# Patient Record
Sex: MALE | Race: WHITE | NOT HISPANIC OR LATINO | Employment: OTHER | URBAN - NONMETROPOLITAN AREA
[De-identification: names, ages, dates, MRNs, and addresses within clinical notes are randomized per-mention and may not be internally consistent; named-entity substitution may affect disease eponyms.]

---

## 2017-02-10 DIAGNOSIS — Z95.5 STATUS POST CORONARY ARTERY STENT PLACEMENT: ICD-10-CM

## 2017-02-10 DIAGNOSIS — I25.10 ATHEROSCLEROSIS OF NATIVE CORONARY ARTERY OF NATIVE HEART WITHOUT ANGINA PECTORIS: ICD-10-CM

## 2017-03-13 ENCOUNTER — HOSPITAL ENCOUNTER (OUTPATIENT)
Dept: NUCLEAR MEDICINE | Facility: HOSPITAL | Age: 52
Discharge: HOME | End: 2017-03-13
Attending: INTERNAL MEDICINE

## 2017-03-13 ENCOUNTER — HOSPITAL ENCOUNTER (OUTPATIENT)
Dept: CARDIOLOGY | Facility: HOSPITAL | Age: 52
Discharge: HOME | End: 2017-03-13
Attending: INTERNAL MEDICINE

## 2017-03-13 VITALS — HEIGHT: 70 IN | BODY MASS INDEX: 23.62 KG/M2 | WEIGHT: 165 LBS

## 2017-03-13 LAB
BH CV NUCLEAR PRIOR STUDY: 3
BH CV STRESS BP STAGE 1: NORMAL
BH CV STRESS COMMENTS STAGE 1: NORMAL
BH CV STRESS DOSE REGADENOSON STAGE 1: 0.4
BH CV STRESS DURATION MIN STAGE 1: 0
BH CV STRESS DURATION SEC STAGE 1: 15
BH CV STRESS HR STAGE 1: 84
BH CV STRESS PROTOCOL 1: NORMAL
BH CV STRESS RECOVERY BP: NORMAL MMHG
BH CV STRESS RECOVERY HR: 54 BPM
BH CV STRESS STAGE 1: 1
LV EF NUC BP: 67 %
MAXIMAL PREDICTED HEART RATE: 169 BPM
PERCENT MAX PREDICTED HR: 59.17 %
STRESS BASELINE BP: NORMAL MMHG
STRESS BASELINE HR: 59 BPM
STRESS PERCENT HR: 70 %
STRESS POST PEAK BP: NORMAL MMHG
STRESS POST PEAK HR: 100 BPM
STRESS TARGET HR: 144 BPM

## 2017-03-13 PROCEDURE — A9500 TC99M SESTAMIBI: HCPCS | Performed by: INTERNAL MEDICINE

## 2017-03-13 PROCEDURE — 78452 HT MUSCLE IMAGE SPECT MULT: CPT | Performed by: INTERNAL MEDICINE

## 2017-03-13 PROCEDURE — 0 TECHNETIUM SESTAMIBI: Performed by: INTERNAL MEDICINE

## 2017-03-13 PROCEDURE — 93017 CV STRESS TEST TRACING ONLY: CPT

## 2017-03-13 PROCEDURE — 25010000002 AMINOPHYLLINE PER 250 MG: Performed by: INTERNAL MEDICINE

## 2017-03-13 PROCEDURE — 93018 CV STRESS TEST I&R ONLY: CPT | Performed by: INTERNAL MEDICINE

## 2017-03-13 PROCEDURE — 78451 HT MUSCLE IMAGE SPECT SING: CPT

## 2017-03-13 PROCEDURE — 25010000002 REGADENOSON 0.4 MG/5ML SOLUTION: Performed by: INTERNAL MEDICINE

## 2017-03-13 RX ORDER — AMINOPHYLLINE DIHYDRATE 25 MG/ML
125 INJECTION, SOLUTION INTRAVENOUS ONCE
Status: COMPLETED | OUTPATIENT
Start: 2017-03-13 | End: 2017-03-13

## 2017-03-13 RX ADMIN — Medication 1 DOSE: at 07:45

## 2017-03-13 RX ADMIN — AMINOPHYLLINE 125 MG: 25 INJECTION, SOLUTION INTRAVENOUS at 09:15

## 2017-03-13 RX ADMIN — Medication 1 DOSE: at 09:15

## 2017-03-13 RX ADMIN — REGADENOSON 0.4 MG: 0.08 INJECTION, SOLUTION INTRAVENOUS at 09:10

## 2017-03-28 ENCOUNTER — OFFICE VISIT (OUTPATIENT)
Dept: CARDIOLOGY | Facility: CLINIC | Age: 52
End: 2017-03-28

## 2017-03-28 VITALS
OXYGEN SATURATION: 96 % | DIASTOLIC BLOOD PRESSURE: 83 MMHG | SYSTOLIC BLOOD PRESSURE: 130 MMHG | HEIGHT: 70 IN | WEIGHT: 166 LBS | HEART RATE: 76 BPM | BODY MASS INDEX: 23.77 KG/M2

## 2017-03-28 DIAGNOSIS — E78.01 FAMILIAL HYPERCHOLESTEROLEMIA: ICD-10-CM

## 2017-03-28 DIAGNOSIS — G47.33 OBSTRUCTIVE SLEEP APNEA SYNDROME: ICD-10-CM

## 2017-03-28 DIAGNOSIS — I10 ESSENTIAL HYPERTENSION: ICD-10-CM

## 2017-03-28 DIAGNOSIS — Z95.5 STATUS POST CORONARY ARTERY STENT PLACEMENT: ICD-10-CM

## 2017-03-28 DIAGNOSIS — I25.10 ATHEROSCLEROSIS OF NATIVE CORONARY ARTERY OF NATIVE HEART WITHOUT ANGINA PECTORIS: Primary | ICD-10-CM

## 2017-03-28 DIAGNOSIS — R07.89 ATYPICAL CHEST PAIN: ICD-10-CM

## 2017-03-28 PROCEDURE — 99213 OFFICE O/P EST LOW 20 MIN: CPT | Performed by: INTERNAL MEDICINE

## 2017-03-28 RX ORDER — ALBUTEROL SULFATE 90 UG/1
2 AEROSOL, METERED RESPIRATORY (INHALATION) EVERY 4 HOURS PRN
COMMUNITY

## 2017-03-28 NOTE — PROGRESS NOTES
Subjective   NAME:    Javier Santiago   :      1965  DATE:    2017    Javier Santiago is a 51-year-old  male who has known atherosclerotic coronary vascular disease he was last seen by me in the clinic in 2016.  At that time we scheduled him for a nuclear stress test.  He finally had that done last week.  Today he states that he is not sleeping well.  He admits that he snores.  And he wakes up frequently gasping for breath and with chest tightness.    The nuclear stress test demonstrates normal myocardial perfusion and normal wall motion.    REASON FOR VISIT:  Chief Complaint   Patient presents with   • Follow-up   • Coronary Artery Disease       HISTORY:  PAST MEDICAL HISTORY:   Past Medical History:   Diagnosis Date   • ASCVD (arteriosclerotic cardiovascular disease)    • Chronic low back pain    • History of EKG 2016    NORMAL   • Hyperlipidemia    • Hypertension    • Hypotension    • Insomnia        SURGICAL HISTORY:   Past Surgical History:   Procedure Laterality Date   • CORONARY ANGIOPLASTY WITH STENT PLACEMENT         SOCIAL HISTORY:   Social History     Social History   • Marital status:      Spouse name: N/A   • Number of children: N/A   • Years of education: N/A     Social History Main Topics   • Smoking status: Current Every Day Smoker     Packs/day: 1.00     Years: 30.00     Types: Cigarettes   • Smokeless tobacco: Current User     Types: Chew   • Alcohol use No   • Drug use: No   • Sexual activity: Not Asked     Other Topics Concern   • None     Social History Narrative       FAMILY HISTORY:   Family History   Problem Relation Age of Onset   • Heart attack Mother      x 3   • Atrial fibrillation Mother    • Heart disease Mother    • Heart attack Father    • Heart disease Father      CABG       REVIEW OF SYSTEMS:  Review of Systems   Constitutional: Positive for fatigue. Negative for activity change and appetite change.   HENT: Positive for congestion and tinnitus.     Eyes: Negative for visual disturbance.   Respiratory: Positive for chest tightness and shortness of breath. Negative for cough.    Cardiovascular: Positive for chest pain and leg swelling. Negative for palpitations.   Gastrointestinal: Negative for blood in stool, nausea and vomiting.   Endocrine: Negative for cold intolerance, heat intolerance and polyuria.   Genitourinary: Negative for dysuria.   Musculoskeletal: Positive for myalgias and neck pain.   Skin: Positive for rash.   Neurological: Negative for dizziness, syncope, weakness and light-headedness.   Hematological: Bruises/bleeds easily.   Psychiatric/Behavioral: Positive for sleep disturbance. Negative for confusion.       Objective       PHYSICAL EXAMINATION:  Physical Exam   Constitutional: He is oriented to person, place, and time. He appears well-developed and well-nourished.   HENT:   Head: Normocephalic and atraumatic.   Eyes: Conjunctivae and EOM are normal. Pupils are equal, round, and reactive to light.   Neck: Normal range of motion. Neck supple. No JVD present. No tracheal deviation present. No thyromegaly present.   Cardiovascular: Normal rate, regular rhythm, normal heart sounds and intact distal pulses.  Exam reveals no gallop and no friction rub.    No murmur heard.  Pulmonary/Chest: Effort normal and breath sounds normal. No respiratory distress. He has no wheezes. He has no rales. He exhibits no tenderness.   Abdominal: Soft. Bowel sounds are normal. He exhibits no distension and no mass. There is no tenderness. No hernia.   Musculoskeletal: Normal range of motion. He exhibits no edema, tenderness or deformity.   Lymphadenopathy:     He has no cervical adenopathy.   Neurological: He is alert and oriented to person, place, and time. He has normal reflexes. He displays normal reflexes. No cranial nerve deficit. He exhibits normal muscle tone. Coordination normal.   Skin: Skin is warm and dry.   Psychiatric: He has a normal mood and affect.  "His behavior is normal. Thought content normal.       VITAL SIGNS: /83 (BP Location: Right arm, Patient Position: Sitting)  Pulse 76  Ht 70\" (177.8 cm)  Wt 166 lb (75.3 kg)  SpO2 96%  BMI 23.82 kg/m2    Procedure   Procedures         Assessment/Plan     Problems Addressed this Visit        Cardiovascular and Mediastinum    Atherosclerosis of native coronary artery - Primary    Status post coronary artery stent placement    Essential hypertension    Familial hypercholesterolemia       Respiratory    Obstructive sleep apnea syndrome    Relevant Orders    Home Sleep Study       Nervous and Auditory    Atypical chest pain    Relevant Orders    Home Sleep Study           Orders Placed This Encounter   Procedures   • Home Sleep Study     Standing Status:   Future     Standing Expiration Date:   3/28/2018     Order Specific Question:   May take own meds     Answer:   Yes     Order Specific Question:   Details     Answer:   O2 Implementation per Protocol    1.  Continue current medical regimen.  2.  We'll schedule for an in home sleep study.  3.  See him back in the clinic in 4 weeks.    Return in about 4 weeks (around 4/25/2017).    Current Outpatient Prescriptions:   •  albuterol (PROVENTIL HFA;VENTOLIN HFA) 108 (90 BASE) MCG/ACT inhaler, Inhale 2 puffs Every 4 (Four) Hours As Needed for Wheezing., Disp: , Rfl:   •  Apremilast (OTEZLA) 30 MG tablet, Take 30 mg by mouth 2 (Two) Times a Day., Disp: , Rfl:   •  aspirin  MG EC tablet, Take  by mouth daily., Disp: , Rfl:   •  atorvastatin (LIPITOR) 80 MG tablet, Take 80 mg by mouth daily., Disp: , Rfl:   •  clopidogrel (PLAVIX) 75 MG tablet, Take  by mouth daily., Disp: , Rfl:   •  gabapentin (NEURONTIN) 800 MG tablet, Take 800 mg by mouth 3 (three) times a day., Disp: , Rfl:   •  omeprazole (PriLOSEC) 40 MG capsule, Take 40 mg by mouth 2 (two) times a day., Disp: , Rfl:   •  lisinopril (PRINIVIL,ZESTRIL) 2.5 MG tablet, Take 2.5 mg by mouth daily., Disp: , " Rfl:   •  metoprolol tartrate (LOPRESSOR) 25 MG tablet, Take 25 mg by mouth 2 (Two) Times a Day. Take 1/2 tablet bid, Disp: , Rfl:                  Joe Diaz DO, KC, FACC, FACOI, FCCP

## 2017-06-15 ENCOUNTER — TELEPHONE (OUTPATIENT)
Dept: CARDIOLOGY | Facility: CLINIC | Age: 52
End: 2017-06-15

## 2017-06-15 DIAGNOSIS — G47.33 OBSTRUCTIVE SLEEP APNEA: Primary | ICD-10-CM

## 2017-06-15 NOTE — TELEPHONE ENCOUNTER
PER DR GIRON     PATIENT IS BEING  REFERRED TO PULMONOLOGY FOR FURTHER EVALUATION OF SLEEP APNEA TEST    PATIENT IS AWARE AND UNDERSTANDS NBMA

## 2017-07-05 ENCOUNTER — OFFICE VISIT (OUTPATIENT)
Dept: PULMONOLOGY | Facility: CLINIC | Age: 52
End: 2017-07-05

## 2017-07-05 VITALS
OXYGEN SATURATION: 94 % | SYSTOLIC BLOOD PRESSURE: 92 MMHG | DIASTOLIC BLOOD PRESSURE: 52 MMHG | HEIGHT: 70 IN | BODY MASS INDEX: 23.62 KG/M2 | HEART RATE: 79 BPM | TEMPERATURE: 98.7 F | WEIGHT: 165 LBS

## 2017-07-05 DIAGNOSIS — G47.33 OSA (OBSTRUCTIVE SLEEP APNEA): Primary | ICD-10-CM

## 2017-07-05 DIAGNOSIS — J41.0 SIMPLE CHRONIC BRONCHITIS (HCC): ICD-10-CM

## 2017-07-05 PROCEDURE — 99204 OFFICE O/P NEW MOD 45 MIN: CPT | Performed by: INTERNAL MEDICINE

## 2017-07-05 NOTE — PROGRESS NOTES
Subjective   Javier Santiago is a 51 y.o. male who is being seen for Sleep Apnea    History of Present Illness   This 51-year-old gentleman who is an active smoker and has a prior diagnosis of COPD has been referred to us for evaluation of sleep apnea.  Patient's wife tells me that he snores loudly and quits breathing during sleep that she has noticed.  Patient said that he is not aware of snoring but feels very was sleepy, tired and fatigued throughout the day with these symptoms his primary care provider has ordered a home sleep study, that was done.  He is not sure about the result yet.    For his COPD he uses albuterol inhaler on a when necessary basis and that pretty much controls his symptoms at this point.    Past Medical History:   Diagnosis Date   • ASCVD (arteriosclerotic cardiovascular disease)    • Chronic low back pain    • History of EKG 01/26/2016    NORMAL   • Hyperlipidemia    • Hypertension    • Hypotension    • Insomnia      Past Surgical History:   Procedure Laterality Date   • CORONARY ANGIOPLASTY WITH STENT PLACEMENT       Family History   Problem Relation Age of Onset   • Heart attack Mother      x 3   • Atrial fibrillation Mother    • Heart disease Mother    • Heart attack Father    • Heart disease Father      CABG      reports that he has been smoking Cigarettes.  He has a 30.00 pack-year smoking history. His smokeless tobacco use includes Chew. He reports that he does not drink alcohol or use illicit drugs.  No Known Allergies        The following portions of the patient's history were reviewed and updated as appropriate: allergies, current medications, past family history, past medical history, past social history, past surgical history and problem list.    Review of Systems   Constitutional: Positive for fatigue (with increased daytime sleepiness).   HENT:        Loud snoring   Respiratory: Positive for apnea (Witnessed apnea per family/spouse) and shortness of breath (exhustional).   "  Cardiovascular: Positive for leg swelling.   Neurological: Positive for headaches.   Psychiatric/Behavioral: Positive for sleep disturbance (Fragmented sleep with unrefreshed feeling in the morning ).        SLEEP: Loud snoring, fragmented sleep, un refreshed feeling in the morning, increased daytime sleepiness    All other systems reviewed and are negative.      Objective   BP 92/52 (BP Location: Left arm, Patient Position: Sitting, Cuff Size: Adult)  Pulse 79  Temp 98.7 °F (37.1 °C) (Oral)   Ht 70\" (177.8 cm)  Wt 165 lb (74.8 kg)  SpO2 94%  BMI 23.68 kg/m2  Physical Exam   Constitutional: He is oriented to person, place, and time.   Neck Size: 14 1/2  Benton Scale Score: 12   HENT:   Head: Normocephalic and atraumatic.   Nose: Mucosal edema present.   Eyes: EOM are normal. Pupils are equal, round, and reactive to light.   Neck: Neck supple.   Cardiovascular: Normal rate, regular rhythm and normal heart sounds.    Pulmonary/Chest: He has rhonchi.   Vesicular breath sound bilaterally with prolonged expiratory phase   Abdominal: Soft. Bowel sounds are normal.   Musculoskeletal: Normal range of motion. He exhibits no deformity.   Neurological: He is alert and oriented to person, place, and time.   Skin: Skin is warm and dry.   Psychiatric: He has a normal mood and affect. His behavior is normal.   Nursing note and vitals reviewed.        Radiology:  No Images in the past 120 days found..    Lab Results:  Orders Only on 02/10/2017   Component Date Value Ref Range Status   • Target HR (85%) 03/13/2017 144  bpm Final   • Max. Pred. HR (100%) 03/13/2017 169  bpm Final   • BH CV STRESS PROTOCOL 1 03/13/2017 Pharmacologic   Final   • Stage 1 03/13/2017 1   Final   • HR Stage 1 03/13/2017 84   Final   • BP Stage 1 03/13/2017 152/85   Final   • Duration Min Stage 1 03/13/2017 0   Final   • Duration Sec Stage 1 03/13/2017 15   Final   • Stress Dose Regadenoson Stage 1 03/13/2017 0.4   Final   • Stress Comments Stage 1 " 03/13/2017 15 sec bolus injection   Final   • Baseline HR 03/13/2017 59  bpm Final   • Baseline BP 03/13/2017 138/79  mmHg Final   • Peak HR 03/13/2017 100  bpm Final   • Percent Max Pred HR 03/13/2017 59.17  % Final   • Percent Target HR 03/13/2017 70  % Final   • Peak BP 03/13/2017 172/96  mmHg Final   • Recovery HR 03/13/2017 54  bpm Final   • Recovery BP 03/13/2017 143/85  mmHg Final   • Nuclear Prior Study 03/13/2017 3   Final   • Nuc Stress EF 03/13/2017 67  % Final           Assessment      ICD-10-CM ICD-9-CM   1. ALEJANDRA (obstructive sleep apnea) G47.33 327.23   2. Simple chronic bronchitis J41.0 491.0                DISCUSSION:  I have reviewed the home sleep study.  Patient had findings consistent with obstructive sleep apnea, apnea-hypopnea index was 9.5.  This patient is very much symptomatic and would benefit from pressure therapy.  I'm giving him AutoPap with a pressure setting of 5-15 cm of water.  We would reevaluate him again in approximately a month from now.  Negative sleep hygiene instructions given, hazards of drowsy driving discussed.    Plan    Orders Placed This Encounter   Procedures   • PAP Therapy     No orders of the defined types were placed in this encounter.                 Sharan Marcelo MD, FCCP, [facility]  Pulmonary, Critical Care, and Sleep Medicine

## 2017-08-09 ENCOUNTER — OFFICE VISIT (OUTPATIENT)
Dept: PULMONOLOGY | Facility: CLINIC | Age: 52
End: 2017-08-09

## 2017-08-09 VITALS
SYSTOLIC BLOOD PRESSURE: 102 MMHG | DIASTOLIC BLOOD PRESSURE: 60 MMHG | HEART RATE: 75 BPM | BODY MASS INDEX: 23.62 KG/M2 | OXYGEN SATURATION: 96 % | HEIGHT: 70 IN | WEIGHT: 165 LBS | TEMPERATURE: 98.6 F

## 2017-08-09 DIAGNOSIS — G47.33 OSA (OBSTRUCTIVE SLEEP APNEA): Primary | ICD-10-CM

## 2017-08-09 DIAGNOSIS — J41.0 SIMPLE CHRONIC BRONCHITIS (HCC): ICD-10-CM

## 2017-08-09 PROCEDURE — 99213 OFFICE O/P EST LOW 20 MIN: CPT | Performed by: INTERNAL MEDICINE

## 2017-08-09 NOTE — PROGRESS NOTES
Subjective   Javier Santiago is a 51 y.o. male who is being seen for Sleep Apnea    History of Present Illness   Patient returns for evaluation of sleep disturbance.  He was diagnosed as having obstructive sleep apnea and we have started him on pressure therapy.  He is using that for almost a month and has already noticed significant improvement.  He tells me that he sleeps better in the morning wakes up refreshed, does not have increased fatigue or sleepiness as before.  Overall he is very happy with the improvement.  He said that he still has some problem with the mask but believes that that would get better as time goes by.    Respiratory wise he is using albuterol inhaler but tells me that he needs less than before.  Exertional dyspnea is persisting but better.  Past Medical History:   Diagnosis Date   • ASCVD (arteriosclerotic cardiovascular disease)    • Chronic low back pain    • History of EKG 01/26/2016    NORMAL   • Hyperlipidemia    • Hypertension    • Hypotension    • Insomnia      Past Surgical History:   Procedure Laterality Date   • CORONARY ANGIOPLASTY WITH STENT PLACEMENT       Family History   Problem Relation Age of Onset   • Heart attack Mother      x 3   • Atrial fibrillation Mother    • Heart disease Mother    • Heart attack Father    • Heart disease Father      CABG      reports that he has been smoking Cigarettes.  He has a 30.00 pack-year smoking history. His smokeless tobacco use includes Chew. He reports that he does not drink alcohol or use illicit drugs.  No Known Allergies        The following portions of the patient's history were reviewed and updated as appropriate: allergies, current medications, past family history, past medical history, past social history, past surgical history and problem list.    Review of Systems   HENT:        Loud snoring   Respiratory: Positive for shortness of breath (exhustional).    Psychiatric/Behavioral:        SLEEP: Loud snoring, fragmented sleep, un  "refreshed feeling in the morning, increased daytime sleepiness    All other systems reviewed and are negative.      Objective   /60 (BP Location: Left arm, Patient Position: Sitting, Cuff Size: Adult)  Pulse 75  Temp 98.6 °F (37 °C) (Oral)   Ht 70\" (177.8 cm)  Wt 165 lb (74.8 kg)  SpO2 96%  BMI 23.68 kg/m2  Physical Exam   Constitutional: He is oriented to person, place, and time.   HENT:   Head: Normocephalic and atraumatic.   Nose: Mucosal edema present.   Eyes: EOM are normal. Pupils are equal, round, and reactive to light.   Neck: Neck supple.   Cardiovascular: Normal rate, regular rhythm and normal heart sounds.    Pulmonary/Chest: He has rhonchi.   Vesicular breath sound bilaterally with prolonged expiratory phase   Abdominal: Soft. Bowel sounds are normal.   Musculoskeletal: Normal range of motion. He exhibits no deformity.   Neurological: He is alert and oriented to person, place, and time.   Skin: Skin is warm and dry.   Psychiatric: He has a normal mood and affect. His behavior is normal.   Nursing note and vitals reviewed.        Radiology:  No Images in the past 120 days found..    Lab Results:  Orders Only on 02/10/2017   Component Date Value Ref Range Status   • Target HR (85%) 03/13/2017 144  bpm Final   • Max. Pred. HR (100%) 03/13/2017 169  bpm Final   •  CV STRESS PROTOCOL 1 03/13/2017 Pharmacologic   Final   • Stage 1 03/13/2017 1   Final   • HR Stage 1 03/13/2017 84   Final   • BP Stage 1 03/13/2017 152/85   Final   • Duration Min Stage 1 03/13/2017 0   Final   • Duration Sec Stage 1 03/13/2017 15   Final   • Stress Dose Regadenoson Stage 1 03/13/2017 0.4   Final   • Stress Comments Stage 1 03/13/2017 15 sec bolus injection   Final   • Baseline HR 03/13/2017 59  bpm Final   • Baseline BP 03/13/2017 138/79  mmHg Final   • Peak HR 03/13/2017 100  bpm Final   • Percent Max Pred HR 03/13/2017 59.17  % Final   • Percent Target HR 03/13/2017 70  % Final   • Peak BP 03/13/2017 172/96  mmHg " Final   • Recovery HR 03/13/2017 54  bpm Final   • Recovery BP 03/13/2017 143/85  mmHg Final   • Nuclear Prior Study 03/13/2017 3   Final   • Nuc Stress EF 03/13/2017 67  % Final       Assessment      ICD-10-CM ICD-9-CM   1. ALEJANDRA (obstructive sleep apnea) G47.33 327.23   2. Simple chronic bronchitis J41.0 491.0                DISCUSSION:  She has responded very well with a pressure therapy.  We like to continue the same, currently he is in 5-15 cm water pressure.  I would reevaluate him again approximately 6 months from now.  Meanwhile I have asked him to continue using albuterolinhaler on a when necessary basis.    Plan    No orders of the defined types were placed in this encounter.    No orders of the defined types were placed in this encounter.                 Sharan Marcelo MD, FCCP, FAASM  Pulmonary, Critical Care, and Sleep Medicine

## 2017-09-14 ENCOUNTER — OFFICE VISIT (OUTPATIENT)
Dept: CARDIOLOGY | Facility: CLINIC | Age: 52
End: 2017-09-14

## 2017-09-14 VITALS
BODY MASS INDEX: 22.62 KG/M2 | WEIGHT: 158 LBS | HEART RATE: 78 BPM | OXYGEN SATURATION: 97 % | SYSTOLIC BLOOD PRESSURE: 115 MMHG | DIASTOLIC BLOOD PRESSURE: 80 MMHG | HEIGHT: 70 IN

## 2017-09-14 DIAGNOSIS — G47.33 OBSTRUCTIVE SLEEP APNEA SYNDROME: ICD-10-CM

## 2017-09-14 DIAGNOSIS — Z72.0 TOBACCO ABUSE: ICD-10-CM

## 2017-09-14 DIAGNOSIS — E78.01 FAMILIAL HYPERCHOLESTEROLEMIA: ICD-10-CM

## 2017-09-14 DIAGNOSIS — I10 ESSENTIAL HYPERTENSION: ICD-10-CM

## 2017-09-14 DIAGNOSIS — I25.119 CORONARY ARTERY DISEASE INVOLVING NATIVE CORONARY ARTERY OF NATIVE HEART WITH ANGINA PECTORIS (HCC): Primary | ICD-10-CM

## 2017-09-14 PROCEDURE — 93000 ELECTROCARDIOGRAM COMPLETE: CPT | Performed by: INTERNAL MEDICINE

## 2017-09-14 PROCEDURE — 99214 OFFICE O/P EST MOD 30 MIN: CPT | Performed by: INTERNAL MEDICINE

## 2017-09-14 RX ORDER — ISOSORBIDE MONONITRATE 30 MG/1
30 TABLET, EXTENDED RELEASE ORAL DAILY
Qty: 30 TABLET | Refills: 11 | Status: SHIPPED | OUTPATIENT
Start: 2017-09-14 | End: 2017-11-08

## 2017-09-14 NOTE — PROGRESS NOTES
Subjective   Javier Santiago is a 52 y.o. male.     Chief Complaint   Patient presents with   • Follow-up       History of Present Illness     Eitan is a pleasant 52-year-old gentleman who underwent stent implantation to his LAD approximately 10 years ago.  He presented in 2016 with an acute anterior wall myocardial infarction and underwent restenting of his LAD at that time.  He notes that he has continued to have chest discomfort.  He states that it occurs perhaps once per week and lasts anywhere from 2-3 minutes.  He states this has been ongoing for the past 6 months and has been getting progressively worse.  He notes that his discomfort is distinctly different than the discomfort he had when he had his myocardial infarction.  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  However, he also notes that the discomfort occurs at night when he is laying down and also notes that he walks on occasion and when he does so he does not have chest discomfort.  Unfortunately, he has continued tobacco products.  He did undergo a stress Cardiolite test in March 2017 which was interpreted as being normal.    The following portions of the patient's history were reviewed and updated as appropriate: allergies, current medications, past family history, past medical history, past social history, past surgical history and problem list.    Review of Systems   Constitutional: Positive for appetite change and fatigue. Negative for activity change.   HENT: Positive for congestion and tinnitus.    Eyes: Positive for visual disturbance.   Respiratory: Positive for cough, chest tightness and shortness of breath.    Cardiovascular: Positive for chest pain and leg swelling. Negative for palpitations.   Gastrointestinal: Negative for blood in stool, nausea and vomiting.   Endocrine: Negative for cold intolerance, heat intolerance and polyuria.   Genitourinary:  Negative for dysuria.   Musculoskeletal: Positive for myalgias. Negative for neck pain.   Skin: Negative for rash.   Neurological: Negative for dizziness, syncope, weakness and light-headedness.   Hematological: Bruises/bleeds easily.   Psychiatric/Behavioral: Negative for confusion and sleep disturbance.       Objective   Physical Exam   Constitutional: He is oriented to person, place, and time. He appears well-developed and well-nourished. No distress.   HENT:   Head: Normocephalic and atraumatic.   Nose: Nose normal.   Mouth/Throat: Oropharynx is clear and moist.   Eyes: Conjunctivae and EOM are normal. Right eye exhibits no discharge. Left eye exhibits no discharge. No scleral icterus.   Neck: Normal range of motion. No hepatojugular reflux and no JVD present. Carotid bruit is not present. No tracheal deviation present.   Cardiovascular: Normal rate, regular rhythm, S1 normal, S2 normal and intact distal pulses.  PMI is not displaced.  Exam reveals no gallop, no S3, no S4 and no friction rub.    No murmur heard.  Pulmonary/Chest: Effort normal and breath sounds normal. No accessory muscle usage. No respiratory distress. He has no wheezes. He has no rales. He exhibits no tenderness.   Abdominal: Soft. Bowel sounds are normal. He exhibits no distension. There is no tenderness.   Musculoskeletal: Normal range of motion. He exhibits no edema or tenderness.       Vascular Status -  His exam exhibits no right foot edema. His exam exhibits no left foot edema.  Neurological: He is alert and oriented to person, place, and time. No cranial nerve deficit. Coordination normal.   Skin: Skin is warm and dry. He is not diaphoretic.   Nursing note and vitals reviewed.        ECG 12 Lead  Date/Time: 9/14/2017 2:38 PM  Performed by: ABBIE GIRON  Authorized by: ABBIE GIRON   Comments: EKG today reveals normal sinus rhythm with normal intervals and durations.  There are no acute or chronic ischemic changes  noted.            Assessment/Plan   CAD  In regard to his history of chest discomfort, I am concerned that he is having ongoing episodes of chest pain.  In particular, I am concerned that he describes having chest pain when he is active and caring for his invalid son.  However, he also has some atypical features to his chest discomfort.  He has undergone a recent stress Cardiolite test which was unremarkable.  For now I feel that the best course of action would be to go ahead and start him on Imdur.  If he continues to have symptoms I will consider cardiac catheterization.    Tobacco.  In regard to their history of tobacco consumption, I have discussed the importance of tobacco cessation as it relates to progression of coronary artery disease and comorbidities.  I discussed options for tobacco cessation to include medical therapy.  I discussed the importance of the planned approach.    Tobacco.  In regard to their history of tobacco consumption, I have discussed the importance of tobacco cessation as it relates to progression of coronary artery disease and comorbidities.  I discussed options for tobacco cessation to include medical therapy.  I discussed the importance of the planned approach.    Hyperlipidemia.  Given the patient's history of CAD I have asked them to obtain a fasting lipid panel and an AST and ALT.    In short, it is been a pleasure to participate in Javier's care, I look forward to seeing him again in 2 weeks.

## 2017-10-10 ENCOUNTER — OFFICE VISIT (OUTPATIENT)
Dept: CARDIOLOGY | Facility: CLINIC | Age: 52
End: 2017-10-10

## 2017-10-10 VITALS
SYSTOLIC BLOOD PRESSURE: 116 MMHG | HEART RATE: 68 BPM | WEIGHT: 155.3 LBS | OXYGEN SATURATION: 96 % | DIASTOLIC BLOOD PRESSURE: 73 MMHG | BODY MASS INDEX: 22.23 KG/M2 | HEIGHT: 70 IN

## 2017-10-10 DIAGNOSIS — I20.0 UNSTABLE ANGINA (HCC): ICD-10-CM

## 2017-10-10 DIAGNOSIS — I10 ESSENTIAL HYPERTENSION: ICD-10-CM

## 2017-10-10 DIAGNOSIS — I25.119 CORONARY ARTERY DISEASE INVOLVING NATIVE CORONARY ARTERY OF NATIVE HEART WITH ANGINA PECTORIS (HCC): Primary | ICD-10-CM

## 2017-10-10 PROCEDURE — 99214 OFFICE O/P EST MOD 30 MIN: CPT | Performed by: NURSE PRACTITIONER

## 2017-10-10 RX ORDER — NICOTINE 21 MG/24HR
1 PATCH, TRANSDERMAL 24 HOURS TRANSDERMAL EVERY 24 HOURS
Qty: 42 PATCH | Refills: 0 | Status: SHIPPED | OUTPATIENT
Start: 2017-10-10 | End: 2017-11-21

## 2017-10-10 RX ORDER — PREDNISONE 10 MG/1
10 TABLET ORAL 2 TIMES DAILY
COMMUNITY
End: 2017-12-27

## 2017-10-10 NOTE — PROGRESS NOTES
Subjective   Javier Santiago is a 52 y.o. male.     Chief Complaint   Patient presents with   • Follow-up       History of Present Illness   Eitan is a pleasant 52-year-old gentleman who underwent stent implantation to his LAD approximately 10 years ago.  He presented in 2016 with an acute anterior wall myocardial infarction and underwent restenting of his LAD at that time.  He notes that he has continued to have chest discomfort.  He states that it occurs perhaps once per week and lasts anywhere from 2-3 minutes.  He states this has been ongoing for the past 6 months and has been getting progressively worse.  He notes that his discomfort is distinctly different than the discomfort he had when he had his myocardial infarction.  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  However, he also notes that the discomfort occurs at night when he is laying down and also notes that he walks on occasion and when he does so he does not have chest discomfort.  Unfortunately, he has continued tobacco products.  He did undergo a stress Cardiolite test in March 2017 which was interpreted as being normal.  {Common H&P Review Areas:54360}    Review of Systems   Constitutional: Positive for fatigue. Negative for activity change and appetite change.   HENT: Positive for congestion and tinnitus.    Eyes: Negative for visual disturbance.   Respiratory: Positive for cough, chest tightness and shortness of breath.    Cardiovascular: Positive for chest pain and leg swelling. Negative for palpitations.   Gastrointestinal: Negative for blood in stool, nausea and vomiting.   Endocrine: Negative for cold intolerance, heat intolerance and polyuria.   Genitourinary: Negative for dysuria.   Musculoskeletal: Negative for myalgias and neck pain.   Skin: Positive for rash.   Neurological: Negative for dizziness, syncope, weakness and light-headedness.    Hematological: Bruises/bleeds easily.   Psychiatric/Behavioral: Positive for sleep disturbance. Negative for confusion.       Objective   Physical Exam    Procedures    Assessment/Plan   {Assess/PlanSmartLinks:03526}  CAD  In regard to his history of chest discomfort, I am concerned that he is having ongoing episodes of chest pain.  In particular, I am concerned that he describes having chest pain when he is active and caring for his invalid son.  However, he also has some atypical features to his chest discomfort.  He has undergone a recent stress Cardiolite test which was unremarkable.  For now I feel that the best course of action would be to go ahead and start him on Imdur.  If he continues to have symptoms I will consider cardiac catheterization.     Tobacco.  In regard to their history of tobacco consumption, I have discussed the importance of tobacco cessation as it relates to progression of coronary artery disease and comorbidities.  I discussed options for tobacco cessation to include medical therapy.  I discussed the importance of the planned approach.     Tobacco.  In regard to their history of tobacco consumption, I have discussed the importance of tobacco cessation as it relates to progression of coronary artery disease and comorbidities.  I discussed options for tobacco cessation to include medical therapy.  I discussed the importance of the planned approach.     Hyperlipidemia.  Given the patient's history of CAD I have asked them to obtain a fasting lipid panel and an AST and ALT.

## 2017-10-10 NOTE — PROGRESS NOTES
Subjective     Chief Complaint: Follow-up; Hyperlipidemia; and Coronary Artery Disease    History of Present Illness   Javier Santiago is a 52 y.o. male who presents with known history of hyperlipidemia, hypertension and coronary artery disease.  Approximately 10 years ago he underwent stent implantation to his LAD.  He presented in 2016 with an acute anterior wall myocardial infarction and underwent restenting of his LAD at that time.  He was seen last month and noted that he was having chest discomfort.  He reported that it occurred at least once per week and last anywhere from 2-3 minutes.  He also noted that this had been going on for about 6 months and was getting progressively worse.  The chest discomfort occurred with exertion and also at rest.  A stress Cardiolite test in 2016 was unremarkable and therefore he was placed on Imdur at his last visit.      He reports today that the chest pain has continued despite the treatment with Imdur.  He reports a recent incident where he was having chest pain and chewed 5 aspirin and the pain was relieved.      Current Outpatient Prescriptions:   •  albuterol (PROVENTIL HFA;VENTOLIN HFA) 108 (90 BASE) MCG/ACT inhaler, Inhale 2 puffs Every 4 (Four) Hours As Needed for Wheezing., Disp: , Rfl:   •  Apremilast (OTEZLA) 30 MG tablet, Take 30 mg by mouth 2 (Two) Times a Day., Disp: , Rfl:   •  aspirin  MG EC tablet, Take  by mouth daily., Disp: , Rfl:   •  atorvastatin (LIPITOR) 80 MG tablet, Take 80 mg by mouth daily., Disp: , Rfl:   •  clopidogrel (PLAVIX) 75 MG tablet, Take  by mouth daily., Disp: , Rfl:   •  gabapentin (NEURONTIN) 800 MG tablet, Take 800 mg by mouth 3 (three) times a day., Disp: , Rfl:   •  isosorbide mononitrate (IMDUR) 30 MG 24 hr tablet, Take 1 tablet by mouth Daily., Disp: 30 tablet, Rfl: 11  •  lisinopril (PRINIVIL,ZESTRIL) 2.5 MG tablet, Take 2.5 mg by mouth daily., Disp: , Rfl:   •  metoprolol tartrate (LOPRESSOR) 25 MG tablet, Take 25 mg by mouth  "2 (Two) Times a Day. Take 1/2 tablet bid, Disp: , Rfl:   •  omeprazole (PriLOSEC) 40 MG capsule, Take 40 mg by mouth 2 (two) times a day., Disp: , Rfl:   •  predniSONE (DELTASONE) 10 MG tablet, Take 10 mg by mouth 2 (Two) Times a Day., Disp: , Rfl:   •  nicotine (NICODERM CQ) 21 MG/24HR patch, Place 1 patch on the skin Daily for 42 days., Disp: 42 patch, Rfl: 0     The following portions of the patient's history were reviewed and updated as appropriate: allergies, current medications, past family history, past medical history, past social history, past surgical history and problem list.    Review of Systems   Constitutional: Positive for fatigue.   HENT: Positive for congestion.    Eyes: Positive for visual disturbance (blurred vision).   Respiratory: Positive for cough, chest tightness and shortness of breath.    Cardiovascular: Positive for chest pain and leg swelling. Negative for palpitations.   Gastrointestinal: Negative for blood in stool.   Skin: Positive for rash.   Neurological: Negative for dizziness, syncope, weakness and light-headedness.   Hematological: Bruises/bleeds easily (bruises easily).   Psychiatric/Behavioral: Positive for sleep disturbance.       Objective     /73 (BP Location: Right arm)  Pulse 68  Ht 70\" (177.8 cm)  Wt 155 lb 4.8 oz (70.4 kg)  SpO2 96%  BMI 22.28 kg/m2    Physical Exam   Constitutional: He appears well-developed and well-nourished.   HENT:   Head: Normocephalic and atraumatic.   Eyes: Pupils are equal, round, and reactive to light.   Neck: No JVD present.   Cardiovascular: Normal rate, regular rhythm and intact distal pulses.  Exam reveals no gallop and no friction rub.    No murmur heard.  Pulses:       Radial pulses are 2+ on the right side, and 2+ on the left side.        Dorsalis pedis pulses are 2+ on the right side, and 2+ on the left side.        Posterior tibial pulses are 2+ on the right side, and 2+ on the left side.   No lower extremity swelling "   Pulmonary/Chest: Effort normal and breath sounds normal. No respiratory distress. He has no wheezes. He has no rales.   Abdominal: Soft. He exhibits no mass. There is no tenderness. No hernia.   Skin: Skin is warm and dry.   Psychiatric: He has a normal mood and affect.       Procedures    Assessment/Plan       Javier was seen today for follow-up, hyperlipidemia and coronary artery disease.    Diagnoses and all orders for this visit:    Coronary artery disease involving native coronary artery of native heart with angina pectoris     Mr. Santiago' complaints of fairly recent onset of chest pain are concerning   Left heart catheterization ordered   Continue Imdur   Lipid panel, ALT, AST to evaluate statin efficacy   Continue aspirin, Plavix, lisinopril and Lopressor    Essential hypertension     Stable.   Continue lisinopril and Lopressor as prescribed    Risk Factor Modification     I have asked Mr. Santiago if he was interested in quitting smoking.  I have advised him of the cardiovascular risks of continuing to smoke, as well as the additional health risks.  I have offered tobacco cessation medications and other self-help materials.  He has agreed to  the NicoDerm patch and this has been prescribed.       I have encouraged the patient to increase their activity.  I have recommended that they begin a walking program, participate in cardiac rehab, or participate in [employer].  I have advised them that the recommendation is to participate in physical activity at least 30 minutes a day, 5-7 days a week.      Return to clinic in 4 weeks             OBDULIO Bustamante

## 2017-10-13 ENCOUNTER — HOSPITAL ENCOUNTER (OUTPATIENT)
Facility: HOSPITAL | Age: 52
Discharge: HOME | End: 2017-10-13
Attending: INTERNAL MEDICINE | Admitting: INTERNAL MEDICINE

## 2017-10-13 VITALS
WEIGHT: 155 LBS | HEIGHT: 70 IN | BODY MASS INDEX: 22.19 KG/M2 | RESPIRATION RATE: 18 BRPM | HEART RATE: 58 BPM | DIASTOLIC BLOOD PRESSURE: 96 MMHG | SYSTOLIC BLOOD PRESSURE: 158 MMHG | TEMPERATURE: 97.7 F | OXYGEN SATURATION: 97 %

## 2017-10-13 DIAGNOSIS — I25.119 CORONARY ARTERY DISEASE INVOLVING NATIVE CORONARY ARTERY OF NATIVE HEART WITH ANGINA PECTORIS (HCC): ICD-10-CM

## 2017-10-13 LAB
ANION GAP SERPL CALCULATED.3IONS-SCNC: 4 MMOL/L (ref 3.6–11.2)
BUN BLD-MCNC: 10 MG/DL (ref 7–21)
BUN/CREAT SERPL: 8.9 (ref 7–25)
CALCIUM SPEC-SCNC: 9.7 MG/DL (ref 7.7–10)
CHLORIDE SERPL-SCNC: 107 MMOL/L (ref 99–112)
CO2 SERPL-SCNC: 27 MMOL/L (ref 24.3–31.9)
CREAT BLD-MCNC: 1.12 MG/DL (ref 0.43–1.29)
DEPRECATED RDW RBC AUTO: 46.1 FL (ref 37–54)
ERYTHROCYTE [DISTWIDTH] IN BLOOD BY AUTOMATED COUNT: 14.4 % (ref 11.5–14.5)
GFR SERPL CREATININE-BSD FRML MDRD: 69 ML/MIN/1.73
GLUCOSE BLD-MCNC: 122 MG/DL (ref 70–110)
HCT VFR BLD AUTO: 46.1 % (ref 42–52)
HGB BLD-MCNC: 16.1 G/DL (ref 14–18)
INR PPP: 0.95 (ref 0.9–1.1)
MCH RBC QN AUTO: 30.9 PG (ref 27–33)
MCHC RBC AUTO-ENTMCNC: 34.9 G/DL (ref 33–37)
MCV RBC AUTO: 88.5 FL (ref 80–94)
OSMOLALITY SERPL CALC.SUM OF ELEC: 276 MOSM/KG (ref 273–305)
PLATELET # BLD AUTO: 384 10*3/MM3 (ref 130–400)
PMV BLD AUTO: 10.2 FL (ref 6–10)
POTASSIUM BLD-SCNC: 3.7 MMOL/L (ref 3.5–5.3)
PROTHROMBIN TIME: 12.7 SECONDS (ref 11–15.4)
RBC # BLD AUTO: 5.21 10*6/MM3 (ref 4.7–6.1)
SODIUM BLD-SCNC: 138 MMOL/L (ref 135–153)
WBC NRBC COR # BLD: 12.7 10*3/MM3 (ref 4.5–12.5)

## 2017-10-13 PROCEDURE — 80048 BASIC METABOLIC PNL TOTAL CA: CPT | Performed by: INTERNAL MEDICINE

## 2017-10-13 PROCEDURE — 25010000002 FENTANYL CITRATE (PF) 100 MCG/2ML SOLUTION: Performed by: INTERNAL MEDICINE

## 2017-10-13 PROCEDURE — C1894 INTRO/SHEATH, NON-LASER: HCPCS | Performed by: INTERNAL MEDICINE

## 2017-10-13 PROCEDURE — 93454 CORONARY ARTERY ANGIO S&I: CPT | Performed by: INTERNAL MEDICINE

## 2017-10-13 PROCEDURE — 0 IOPAMIDOL PER 1 ML: Performed by: INTERNAL MEDICINE

## 2017-10-13 PROCEDURE — 25010000002 HEPARIN (PORCINE) PER 1000 UNITS: Performed by: INTERNAL MEDICINE

## 2017-10-13 PROCEDURE — 25010000002 MIDAZOLAM PER 1 MG: Performed by: INTERNAL MEDICINE

## 2017-10-13 PROCEDURE — 85027 COMPLETE CBC AUTOMATED: CPT | Performed by: INTERNAL MEDICINE

## 2017-10-13 PROCEDURE — 93005 ELECTROCARDIOGRAM TRACING: CPT | Performed by: INTERNAL MEDICINE

## 2017-10-13 PROCEDURE — 93010 ELECTROCARDIOGRAM REPORT: CPT | Performed by: INTERNAL MEDICINE

## 2017-10-13 PROCEDURE — C1769 GUIDE WIRE: HCPCS | Performed by: INTERNAL MEDICINE

## 2017-10-13 PROCEDURE — 85610 PROTHROMBIN TIME: CPT | Performed by: INTERNAL MEDICINE

## 2017-10-13 PROCEDURE — 25010000002 DIPHENHYDRAMINE PER 50 MG: Performed by: INTERNAL MEDICINE

## 2017-10-13 RX ORDER — MIDAZOLAM HYDROCHLORIDE 1 MG/ML
INJECTION INTRAMUSCULAR; INTRAVENOUS AS NEEDED
Status: DISCONTINUED | OUTPATIENT
Start: 2017-10-13 | End: 2017-10-13 | Stop reason: HOSPADM

## 2017-10-13 RX ORDER — NITROGLYCERIN 5 MG/ML
INJECTION, SOLUTION INTRAVENOUS AS NEEDED
Status: DISCONTINUED | OUTPATIENT
Start: 2017-10-13 | End: 2017-10-13 | Stop reason: HOSPADM

## 2017-10-13 RX ORDER — DIPHENHYDRAMINE HYDROCHLORIDE 50 MG/ML
INJECTION INTRAMUSCULAR; INTRAVENOUS AS NEEDED
Status: DISCONTINUED | OUTPATIENT
Start: 2017-10-13 | End: 2017-10-13 | Stop reason: HOSPADM

## 2017-10-13 RX ORDER — FENTANYL CITRATE 50 UG/ML
INJECTION, SOLUTION INTRAMUSCULAR; INTRAVENOUS AS NEEDED
Status: DISCONTINUED | OUTPATIENT
Start: 2017-10-13 | End: 2017-10-13 | Stop reason: HOSPADM

## 2017-10-13 RX ORDER — SODIUM CHLORIDE 9 MG/ML
100 INJECTION, SOLUTION INTRAVENOUS CONTINUOUS
Status: DISCONTINUED | OUTPATIENT
Start: 2017-10-13 | End: 2017-10-13 | Stop reason: HOSPADM

## 2017-10-13 RX ORDER — SODIUM CHLORIDE 9 MG/ML
INJECTION, SOLUTION INTRAVENOUS CONTINUOUS PRN
Status: DISCONTINUED | OUTPATIENT
Start: 2017-10-13 | End: 2017-10-13 | Stop reason: HOSPADM

## 2017-10-13 RX ORDER — MIDAZOLAM HYDROCHLORIDE 1 MG/ML
2 INJECTION INTRAMUSCULAR; INTRAVENOUS ONCE
Status: COMPLETED | OUTPATIENT
Start: 2017-10-13 | End: 2017-10-13

## 2017-10-13 RX ORDER — LIDOCAINE HYDROCHLORIDE 20 MG/ML
INJECTION, SOLUTION INFILTRATION; PERINEURAL AS NEEDED
Status: DISCONTINUED | OUTPATIENT
Start: 2017-10-13 | End: 2017-10-13 | Stop reason: HOSPADM

## 2017-10-13 RX ADMIN — MIDAZOLAM HYDROCHLORIDE 2 MG: 1 INJECTION, SOLUTION INTRAMUSCULAR; INTRAVENOUS at 16:12

## 2017-10-13 NOTE — NURSING NOTE
"Dr. Centeno in room with patient.  Patient was informed per MD of risk to leaving AMA.  Patient verbalized understanding, and stated \"I know how to take care of myself.\"  Dr. Centeno encouraged patient to stay and would allow staff to medicate patient for anxiety if he would agree to stay until 1700.  Patient agreed.  New orders noted.  "

## 2017-10-13 NOTE — DISCHARGE INSTR - ACTIVITY
No strenuous activity with right hand/wrist for 1 week (no pulling/tugging)  Do NOT lift more than 5 pounds for 1 week with right hand  Do not submerge wrist in water for 3 days.

## 2017-10-13 NOTE — NURSING NOTE
Patient stated he was leaving.  Encouraged patient to stay for at least 20 minutes.  Dr. Centeno notified of patient wishing to leave AMA.  Dr. Centeno stated he would be in to see patient.

## 2017-10-13 NOTE — NURSING NOTE
"Patient arrived to [facility]B post cath.  TR Band in place.  Patient asks how long he will need to stay.  Informed patient approximately 3 hours.  Patient stated he would not stay that long, that he \"had things he needed to do\", and he was capable of taking care of himself.  Informed patient of bleeding risk and the importance of being monitored prior to discharge.  Patient verbalized understanding, but continued to state he \"would leave when he got ready to.\"  "

## 2017-11-01 ENCOUNTER — LAB (OUTPATIENT)
Dept: LAB | Facility: HOSPITAL | Age: 52
End: 2017-11-01

## 2017-11-01 ENCOUNTER — TRANSCRIBE ORDERS (OUTPATIENT)
Dept: ADMINISTRATIVE | Facility: HOSPITAL | Age: 52
End: 2017-11-01

## 2017-11-01 DIAGNOSIS — I25.119 CORONARY ARTERY DISEASE INVOLVING NATIVE CORONARY ARTERY OF NATIVE HEART WITH ANGINA PECTORIS (HCC): ICD-10-CM

## 2017-11-01 DIAGNOSIS — L29.9 PRURITUS OF SKIN: Primary | ICD-10-CM

## 2017-11-01 DIAGNOSIS — L50.1 CHRONIC IDIOPATHIC URTICARIA: ICD-10-CM

## 2017-11-01 DIAGNOSIS — L29.9 PRURITUS OF SKIN: ICD-10-CM

## 2017-11-01 LAB
ALT SERPL W P-5'-P-CCNC: 21 U/L (ref 10–44)
ANION GAP SERPL CALCULATED.3IONS-SCNC: 5.1 MMOL/L (ref 3.6–11.2)
AST SERPL-CCNC: 21 U/L (ref 10–34)
BASOPHILS # BLD AUTO: 0.03 10*3/MM3 (ref 0–0.3)
BASOPHILS NFR BLD AUTO: 0.5 % (ref 0–2)
BUN BLD-MCNC: 8 MG/DL (ref 7–21)
BUN/CREAT SERPL: 7.5 (ref 7–25)
CALCIUM SPEC-SCNC: 9.5 MG/DL (ref 7.7–10)
CHLORIDE SERPL-SCNC: 114 MMOL/L (ref 99–112)
CHOLEST SERPL-MCNC: 137 MG/DL (ref 0–200)
CO2 SERPL-SCNC: 22.9 MMOL/L (ref 24.3–31.9)
CREAT BLD-MCNC: 1.06 MG/DL (ref 0.43–1.29)
DEPRECATED RDW RBC AUTO: 48.5 FL (ref 37–54)
EOSINOPHIL # BLD AUTO: 0.24 10*3/MM3 (ref 0–0.7)
EOSINOPHIL NFR BLD AUTO: 4.3 % (ref 0–5)
ERYTHROCYTE [DISTWIDTH] IN BLOOD BY AUTOMATED COUNT: 15.1 % (ref 11.5–14.5)
GFR SERPL CREATININE-BSD FRML MDRD: 73 ML/MIN/1.73
GLUCOSE BLD-MCNC: 114 MG/DL (ref 70–110)
HCT VFR BLD AUTO: 45.3 % (ref 42–52)
HDLC SERPL-MCNC: 40 MG/DL (ref 60–100)
HGB BLD-MCNC: 15.6 G/DL (ref 14–18)
IMM GRANULOCYTES # BLD: 0 10*3/MM3 (ref 0–0.03)
IMM GRANULOCYTES NFR BLD: 0 % (ref 0–0.5)
INR PPP: 0.89 (ref 0.9–1.1)
LDLC SERPL CALC-MCNC: 78 MG/DL (ref 0–100)
LDLC/HDLC SERPL: 1.95 {RATIO}
LYMPHOCYTES # BLD AUTO: 1.31 10*3/MM3 (ref 1–3)
LYMPHOCYTES NFR BLD AUTO: 23.4 % (ref 21–51)
MCH RBC QN AUTO: 31 PG (ref 27–33)
MCHC RBC AUTO-ENTMCNC: 34.4 G/DL (ref 33–37)
MCV RBC AUTO: 89.9 FL (ref 80–94)
MONOCYTES # BLD AUTO: 0.29 10*3/MM3 (ref 0.1–0.9)
MONOCYTES NFR BLD AUTO: 5.2 % (ref 0–10)
NEUTROPHILS # BLD AUTO: 3.74 10*3/MM3 (ref 1.4–6.5)
NEUTROPHILS NFR BLD AUTO: 66.6 % (ref 30–70)
OSMOLALITY SERPL CALC.SUM OF ELEC: 282.3 MOSM/KG (ref 273–305)
PLATELET # BLD AUTO: 285 10*3/MM3 (ref 130–400)
PMV BLD AUTO: 10.8 FL (ref 6–10)
POTASSIUM BLD-SCNC: 3.8 MMOL/L (ref 3.5–5.3)
PROTHROMBIN TIME: 12.1 SECONDS (ref 11–15.4)
RBC # BLD AUTO: 5.04 10*6/MM3 (ref 4.7–6.1)
SODIUM BLD-SCNC: 142 MMOL/L (ref 135–153)
TRIGL SERPL-MCNC: 95 MG/DL (ref 0–150)
VLDLC SERPL-MCNC: 19 MG/DL
WBC NRBC COR # BLD: 5.61 10*3/MM3 (ref 4.5–12.5)

## 2017-11-01 PROCEDURE — 86003 ALLG SPEC IGE CRUDE XTRC EA: CPT | Performed by: NURSE PRACTITIONER

## 2017-11-01 PROCEDURE — 84450 TRANSFERASE (AST) (SGOT): CPT | Performed by: NURSE PRACTITIONER

## 2017-11-01 PROCEDURE — 85610 PROTHROMBIN TIME: CPT | Performed by: NURSE PRACTITIONER

## 2017-11-01 PROCEDURE — 83520 IMMUNOASSAY QUANT NOS NONAB: CPT | Performed by: NURSE PRACTITIONER

## 2017-11-01 PROCEDURE — 80048 BASIC METABOLIC PNL TOTAL CA: CPT | Performed by: NURSE PRACTITIONER

## 2017-11-01 PROCEDURE — 85025 COMPLETE CBC W/AUTO DIFF WBC: CPT | Performed by: NURSE PRACTITIONER

## 2017-11-01 PROCEDURE — 84460 ALANINE AMINO (ALT) (SGPT): CPT | Performed by: NURSE PRACTITIONER

## 2017-11-01 PROCEDURE — 82785 ASSAY OF IGE: CPT | Performed by: NURSE PRACTITIONER

## 2017-11-01 PROCEDURE — 36415 COLL VENOUS BLD VENIPUNCTURE: CPT

## 2017-11-01 PROCEDURE — 80061 LIPID PANEL: CPT | Performed by: NURSE PRACTITIONER

## 2017-11-03 ENCOUNTER — TELEPHONE (OUTPATIENT)
Dept: CARDIOLOGY | Facility: CLINIC | Age: 52
End: 2017-11-03

## 2017-11-03 LAB — TRYPTASE SERPL-MCNC: 2.8 UG/L (ref 2.2–13.2)

## 2017-11-03 NOTE — TELEPHONE ENCOUNTER
----- Message from OBDULIO Lock sent at 11/1/2017  5:27 PM EDT -----  Please call Mr Santiago and tell him that his cholesterol is fine.    Thanks, Iza

## 2017-11-03 NOTE — TELEPHONE ENCOUNTER
----- Message from OBDULIO Lock sent at 11/1/2017  5:28 PM EDT -----  Please call Mr Santiago and tell him that his labs were good.    Thanks, Iza

## 2017-11-04 LAB
CALIF WALNUT POLN IGE QN: <0.1 KU/L
CLAM IGE QN: <0.1 KU/L
CODFISH IGE QN: <0.1 KU/L
CONV CLASS DESCRIPTION: ABNORMAL
CORN IGE QN: <0.1 KU/L
COW MILK IGE QN: 0.8 KU/L
EGG WHITE IGE QN: <0.1 KU/L
PEANUT IGE QN: <0.1 KU/L
SCALLOP IGE QN: <0.1 KU/L
SESAME SEED IGE: <0.1 KU/L
SHRIMP IGE: 0.86 KU/L
SOYBEAN IGE QN: <0.1 KU/L
WHEAT IGE QN: <0.1 KU/L

## 2017-11-07 LAB
A ALTERNATA IGE QN: <0.1 KU/L
A FUMIGATUS IGE QN: 0.17 KU/L
BERMUDA GRASS IGE QN: <0.1 KU/L
BOXELDER IGE QN: ABNORMAL KU/L
C HERBARUM IGE QN: <0.1 KU/L
CAT DANDER IGG QN: 0.32 KU/L
CMN PIGWEED IGE QN: ABNORMAL KU/L
COMMON RAGWEED IGE QN: <0.1 KU/L
CONV CLASS DESCRIPTION: ABNORMAL
COTTONWOOD IGE QN: ABNORMAL KU/L
D FARINAE IGE QN: 0.21 KU/L
D PTERONYSS IGE QN: 0.28 KU/L
DOG DANDER IGE QN: 0.88 KU/L
MOUSE URINE PROT IGE QN: <0.1 KU/L
MT JUNIPER IGE QN: ABNORMAL KU/L
P NOTATUM IGE QN: ABNORMAL KU/L
PECAN/HICK TREE IGE QN: ABNORMAL KU/L
ROACH IGE QN: 1.21 KU/L
SALTWORT IGE QN: ABNORMAL KU/L
SHEEP SORREL IGE QN: ABNORMAL KU/L
T003-IGE SILVER BIRCH: ABNORMAL KU/L
T011-IGE MAPLE LEAF SYCAMORE: ABNORMAL KU/L
TIMOTHY IGE QN: <0.1 KU/L
TOTAL IGE SMQN RAST: 966 IU/ML (ref 0–100)
WALNUT IGE QN: ABNORMAL KU/L
WHITE ASH IGE QN: ABNORMAL KU/L
WHITE ELM IGE QN: <0.1 KU/L
WHITE MULBERRY IGE QN: ABNORMAL KU/L
WHITE OAK IGE QN: ABNORMAL KU/L

## 2017-11-08 ENCOUNTER — OFFICE VISIT (OUTPATIENT)
Dept: CARDIOLOGY | Facility: CLINIC | Age: 52
End: 2017-11-08

## 2017-11-08 VITALS
WEIGHT: 155.2 LBS | SYSTOLIC BLOOD PRESSURE: 97 MMHG | HEART RATE: 70 BPM | OXYGEN SATURATION: 96 % | BODY MASS INDEX: 22.22 KG/M2 | HEIGHT: 70 IN | DIASTOLIC BLOOD PRESSURE: 63 MMHG

## 2017-11-08 DIAGNOSIS — E78.01 FAMILIAL HYPERCHOLESTEROLEMIA: ICD-10-CM

## 2017-11-08 DIAGNOSIS — I25.119 CORONARY ARTERY DISEASE INVOLVING NATIVE CORONARY ARTERY OF NATIVE HEART WITH ANGINA PECTORIS (HCC): ICD-10-CM

## 2017-11-08 DIAGNOSIS — R07.89 ATYPICAL CHEST PAIN: Primary | ICD-10-CM

## 2017-11-08 DIAGNOSIS — I10 ESSENTIAL HYPERTENSION: ICD-10-CM

## 2017-11-08 LAB
A ALTERNATA IGE QN: <0.1 KU/L
A FUMIGATUS IGE QN: 0.16 KU/L
AMER ROACH IGE QN: 0.23 KU/L
BAHIA GRASS IGE QN: <0.1 KU/L
BAYBERRY POLN IGE QN: ABNORMAL KU/L
BERMUDA GRASS IGE QN: <0.1 KU/L
BOXELDER IGE QN: <0.1 KU/L
C HERBARUM IGE QN: <0.1 KU/L
CAT DANDER IGG QN: 0.29 KU/L
COMMON RAGWEED IGE QN: <0.1 KU/L
CONV CLASS DESCRIPTION: ABNORMAL
D FARINAE IGE QN: 0.21 KU/L
D PTERONYSS IGE QN: 0.26 KU/L
DOG DANDER IGE QN: 0.89 KU/L
DOG FENNEL IGE QN: ABNORMAL KU/L
ENGL PLANTAIN IGE QN: 0.11 KU/L
GOOSEFOOT IGE QN: 0.13 KU/L
GUM-TREE IGE QN: <0.1 KU/L
ITALIAN CYPRESS IGE QN: ABNORMAL KU/L
JOHNSON GRASS IGE QN: <0.1 KU/L
M RACEMOSUS IGE QN: <0.1 KU/L
P NOTATUM IGE QN: <0.1 KU/L
PEPPER TREE IGE QN: ABNORMAL KU/L
PER RYE GRASS IGE QN: <0.1 KU/L
QUEEN PALM IGE QN: ABNORMAL KU/L
S BOTRYOSUM IGE QN: <0.1 KU/L
SHEEP SORREL IGE QN: <0.1 KU/L
T210-IGE PRIVET, COMMON: ABNORMAL KU/L
VIRG LIVE OAK IGE QN: <0.1 KU/L
WHITE ELM IGE QN: <0.1 KU/L

## 2017-11-08 PROCEDURE — 99214 OFFICE O/P EST MOD 30 MIN: CPT | Performed by: NURSE PRACTITIONER

## 2017-11-08 RX ORDER — NITROGLYCERIN 0.4 MG/1
0.4 TABLET SUBLINGUAL
COMMUNITY

## 2017-11-08 RX ORDER — HYDRALAZINE HYDROCHLORIDE 25 MG/1
25 TABLET, FILM COATED ORAL 3 TIMES DAILY
COMMUNITY

## 2017-11-08 RX ORDER — DIAZEPAM 5 MG/1
5 TABLET ORAL 2 TIMES DAILY PRN
COMMUNITY
End: 2020-11-30

## 2017-11-08 RX ORDER — HYDROCODONE BITARTRATE AND ACETAMINOPHEN 10; 325 MG/1; MG/1
1 TABLET ORAL EVERY 6 HOURS PRN
COMMUNITY
End: 2020-11-30

## 2017-11-08 RX ORDER — METHYLPREDNISOLONE 4 MG/1
4 TABLET ORAL DAILY
COMMUNITY
End: 2018-05-14 | Stop reason: ALTCHOICE

## 2017-11-08 RX ORDER — MOMETASONE FUROATE 50 UG/1
2 SPRAY, METERED NASAL DAILY
COMMUNITY
End: 2021-04-23

## 2017-11-08 NOTE — PROGRESS NOTES
Subjective     Chief Complaint: Coronary Artery Disease    History of Present Illness   Javier Santiago is a 52 y.o. male who presents with known history of hyperlipidemia, hypertension and coronary artery disease.  Approximately 10 years ago he underwent stent implantation to his LAD.  He presented in 2016 with an acute anterior wall myocardial infarction and underwent restenting of his LAD at that time. He continued to complain of recurrent chest pain, with and without exertion.  In October 2017 he underwent cardiac cath which was unremarkable for any significant disease.  He is here for followup today.      He does continue to complain of chest discomfort and lower extremity swelling.  The chest discomfort occurs mostly at night and is midsternal.  He does not report exertional chest discomfort.  He reports shortness of breath.        Current Outpatient Prescriptions:   •  albuterol (PROVENTIL HFA;VENTOLIN HFA) 108 (90 BASE) MCG/ACT inhaler, Inhale 2 puffs Every 4 (Four) Hours As Needed for Wheezing., Disp: , Rfl:   •  Apremilast (OTEZLA) 30 MG tablet, Take 30 mg by mouth 2 (Two) Times a Day., Disp: , Rfl:   •  aspirin  MG EC tablet, Take  by mouth daily., Disp: , Rfl:   •  atorvastatin (LIPITOR) 80 MG tablet, Take 80 mg by mouth daily., Disp: , Rfl:   •  clopidogrel (PLAVIX) 75 MG tablet, Take  by mouth daily., Disp: , Rfl:   •  diazePAM (VALIUM) 5 MG tablet, Take 5 mg by mouth 2 (Two) Times a Day As Needed for Anxiety., Disp: , Rfl:   •  EPINEPHrine (EPIPEN IJ), Inject  as directed., Disp: , Rfl:   •  gabapentin (NEURONTIN) 800 MG tablet, Take 800 mg by mouth 3 (three) times a day., Disp: , Rfl:   •  hydrALAZINE (APRESOLINE) 25 MG tablet, Take 25 mg by mouth 3 (Three) Times a Day., Disp: , Rfl:   •  HYDROcodone-acetaminophen (NORCO)  MG per tablet, Take 1 tablet by mouth Every 6 (Six) Hours As Needed for Moderate Pain ., Disp: , Rfl:   •  isosorbide mononitrate (IMDUR) 30 MG 24 hr tablet, Take 1 tablet  by mouth Daily., Disp: 30 tablet, Rfl: 11  •  methylPREDNISolone (MEDROL) 4 MG tablet, Take 4 mg by mouth Daily., Disp: , Rfl:   •  mometasone (NASONEX) 50 MCG/ACT nasal spray, 2 sprays into each nostril Daily., Disp: , Rfl:   •  nitroglycerin (NITROSTAT) 0.4 MG SL tablet, Place 0.4 mg under the tongue Every 5 (Five) Minutes As Needed for Chest Pain. Take no more than 3 doses in 15 minutes., Disp: , Rfl:   •  omeprazole (PriLOSEC) 40 MG capsule, Take 40 mg by mouth 2 (two) times a day., Disp: , Rfl:   •  lisinopril (PRINIVIL,ZESTRIL) 2.5 MG tablet, Take 2.5 mg by mouth daily., Disp: , Rfl:   •  metoprolol tartrate (LOPRESSOR) 25 MG tablet, Take 25 mg by mouth 2 (Two) Times a Day. Take 1/2 tablet bid, Disp: , Rfl:   •  nicotine (NICODERM CQ) 21 MG/24HR patch, Place 1 patch on the skin Daily for 42 days., Disp: 42 patch, Rfl: 0  •  predniSONE (DELTASONE) 10 MG tablet, Take 10 mg by mouth 2 (Two) Times a Day., Disp: , Rfl:      The following portions of the patient's history were reviewed and updated as appropriate: allergies, current medications, past family history, past medical history, past social history, past surgical history and problem list.    Review of Systems   Constitutional: Negative for activity change, appetite change and fatigue.   HENT: Negative for congestion and tinnitus.    Eyes: Negative for visual disturbance.   Respiratory: Positive for cough, chest tightness and shortness of breath.    Cardiovascular: Positive for chest pain and leg swelling. Negative for palpitations.   Gastrointestinal: Negative for blood in stool, nausea and vomiting.   Endocrine: Negative for cold intolerance, heat intolerance and polyuria.   Genitourinary: Negative for dysuria.   Musculoskeletal: Negative for myalgias and neck pain.   Skin: Negative for rash.   Neurological: Negative for dizziness, syncope, weakness and light-headedness.   Hematological: Bruises/bleeds easily.   Psychiatric/Behavioral: Negative for  "confusion and sleep disturbance.       Objective     BP 97/63 (BP Location: Right arm, Patient Position: Sitting)  Pulse 70  Ht 70\" (177.8 cm)  Wt 155 lb 3.2 oz (70.4 kg)  SpO2 96%  BMI 22.27 kg/m2    Physical Exam   Constitutional: He appears well-developed and well-nourished.   HENT:   Head: Normocephalic and atraumatic.   Eyes: Pupils are equal, round, and reactive to light.   Neck: No JVD present.   Cardiovascular: Normal rate, regular rhythm, S1 normal, S2 normal, normal heart sounds and intact distal pulses.  Exam reveals no gallop and no friction rub.    No murmur heard.  Pulses:       Radial pulses are 2+ on the right side, and 2+ on the left side.        Dorsalis pedis pulses are 2+ on the right side, and 2+ on the left side.        Posterior tibial pulses are 2+ on the right side, and 2+ on the left side.   No lower extremity swelling.    Right wrist healed.  No evidence of hematoma or mass at catheter insertion site.     Pulmonary/Chest: Effort normal and breath sounds normal. No respiratory distress. He has no wheezes. He has no rales.   Abdominal: Soft. He exhibits no mass. There is no tenderness. No hernia.   Skin: Skin is warm and dry.   Psychiatric: He has a normal mood and affect.       Procedures    DATA:          Results for orders placed in visit on 02/10/17   Stress Test With Myocardial Perfusion One Day    Narrative · Impressions are consistent with a low risk study.  · Left ventricular ejection fraction is normal (Calculated EF = 67%).  · Myocardial perfusion imaging indicates a normal myocardial perfusion   study with no evidence of ischemia.  · Gated images show normal wall motion.          Results for orders placed during the hospital encounter of 10/13/17   Cardiac Catheterization/Vascular Study    Narrative · Right dominant coronary system without hemodynamically significant   coronary artery disease.     Final impression  Right dominant coronary artery system without hemodynamically " significant   coronary artery disease        Final impression and plan:  Overall it is my impression that Javier does not have hemodynamically   significant coronary artery disease and will undergo risk factor   modification as appropriate.         Assessment/Plan       Javier was seen today for coronary artery disease.    Diagnoses and all orders for this visit:    Atypical chest pain  -     Ambulatory Referral to Gastroenterology    In light of Mr Santiago normal cardiac cath, I have ordered him a referral to gastroenterology.      Essential hypertension  -     Adult Transthoracic Echo Complete W/ Cont if Necessary Per Protocol; Future       Familial hypercholesterolemia     Recent lipid panel reviewed and discussed.  No changes to current medication.      Coronary artery disease involving native coronary artery of native heart with angina pectoris  -     Adult Transthoracic Echo Complete W/ Cont if Necessary Per Protocol; Future    Return to clinic in 6 months.                 Iza Muhammad, APRN

## 2017-11-16 ENCOUNTER — APPOINTMENT (OUTPATIENT)
Dept: CARDIOLOGY | Facility: HOSPITAL | Age: 52
End: 2017-11-16

## 2017-11-17 ENCOUNTER — APPOINTMENT (OUTPATIENT)
Dept: CARDIOLOGY | Facility: HOSPITAL | Age: 52
End: 2017-11-17

## 2017-11-22 ENCOUNTER — APPOINTMENT (OUTPATIENT)
Dept: CARDIOLOGY | Facility: HOSPITAL | Age: 52
End: 2017-11-22

## 2017-12-11 ENCOUNTER — CONSULT (OUTPATIENT)
Dept: GASTROENTEROLOGY | Facility: CLINIC | Age: 52
End: 2017-12-11

## 2017-12-11 ENCOUNTER — DOCUMENTATION (OUTPATIENT)
Dept: GASTROENTEROLOGY | Facility: CLINIC | Age: 52
End: 2017-12-11

## 2017-12-11 ENCOUNTER — TELEPHONE (OUTPATIENT)
Dept: GASTROENTEROLOGY | Facility: CLINIC | Age: 52
End: 2017-12-11

## 2017-12-11 VITALS
BODY MASS INDEX: 21.73 KG/M2 | HEART RATE: 64 BPM | HEIGHT: 70 IN | DIASTOLIC BLOOD PRESSURE: 71 MMHG | SYSTOLIC BLOOD PRESSURE: 112 MMHG | WEIGHT: 151.8 LBS | OXYGEN SATURATION: 97 %

## 2017-12-11 DIAGNOSIS — Z79.01 ON CLOPIDOGREL THERAPY: ICD-10-CM

## 2017-12-11 DIAGNOSIS — K21.9 GASTROESOPHAGEAL REFLUX DISEASE, ESOPHAGITIS PRESENCE NOT SPECIFIED: Primary | ICD-10-CM

## 2017-12-11 DIAGNOSIS — R07.9 CHEST PAIN, UNSPECIFIED TYPE: ICD-10-CM

## 2017-12-11 DIAGNOSIS — R13.10 DYSPHAGIA, UNSPECIFIED TYPE: ICD-10-CM

## 2017-12-11 PROCEDURE — 99214 OFFICE O/P EST MOD 30 MIN: CPT | Performed by: PHYSICIAN ASSISTANT

## 2017-12-11 RX ORDER — PANTOPRAZOLE SODIUM 40 MG/1
40 TABLET, DELAYED RELEASE ORAL
Qty: 60 TABLET | Refills: 5 | Status: SHIPPED | OUTPATIENT
Start: 2017-12-11

## 2017-12-11 NOTE — PROGRESS NOTES
Deedee called back from patient's cardiologist's office and said that she spoke with Iza and she stated that it was ok for patient to hold his Plavix 1 day before and day of procedure.

## 2017-12-11 NOTE — PROGRESS NOTES
": 1965    Chief Complaint   Patient presents with   • Chest Pain     atypical       Javier Santiago is a 52 y.o. male who presents to the office today as a consultation from OBDULIO Lock (established with ) for evaluation of Chest Pain (atypical).    History of Present Illness:  He reports chest pain which starts bilaterally in lower chest and radiates up even into his neck. This has been occurring for the past couple of years and seems to be getting some worse and more in frequency. He has had complete cardiology evaluation. History of MI and s/p 2 cardiac stents. His last cardiac cath was 10/2017 which was reported as \"good.\" His last stent was 3 years ago around the same time as his MI. He states that chest pain is best relieved with 81 mg ASA and notices complete relief after only a few minutes.     Denies any abdominal pain, nausea, vomiting or rectal bleeding. Takes Plavix. Cardiologist is Dr. Centeno.    He takes Prilosec 40 mg BID and has been taking this for years. He has only taken Prilosec and nothing else. Still has heartburn intermittently and acid coming into his mouth. Dysphagia is present with all foods, especially meats and even water. He has noticed more dysphagia when he eats quickly. History of EGD with dilatation by Dr. Zamudio when he was 50. He had colonoscopy at that time. No polyps on the colonoscopy. There is no known family history of colon cancer or colon polyps.      Review of Systems   Constitutional: Positive for fatigue. Negative for chills and fever.   HENT: Positive for congestion and sore throat. Negative for trouble swallowing and voice change.    Eyes: Positive for pain and visual disturbance.   Respiratory: Positive for cough, shortness of breath and wheezing.    Cardiovascular: Positive for chest pain, palpitations and leg swelling.   Gastrointestinal: Negative for abdominal distention, abdominal pain, anal bleeding, blood in stool, constipation, diarrhea, nausea, " rectal pain and vomiting.   Endocrine: Negative for cold intolerance and heat intolerance.   Genitourinary: Negative for difficulty urinating.   Musculoskeletal: Positive for arthralgias, back pain and myalgias.   Skin: Positive for rash and wound.   Allergic/Immunologic: Positive for environmental allergies. Negative for food allergies.   Neurological: Positive for dizziness and headaches. Negative for syncope and light-headedness.   Hematological: Bruises/bleeds easily.   Psychiatric/Behavioral: Positive for sleep disturbance. The patient is nervous/anxious.        Past Medical History:   Diagnosis Date   • ASCVD (arteriosclerotic cardiovascular disease)    • Chronic low back pain    • History of EKG 01/26/2016    NORMAL   • Hyperlipidemia    • Hypertension    • Hypotension    • Insomnia        Past Surgical History:   Procedure Laterality Date   • BACK SURGERY     • CARDIAC CATHETERIZATION N/A 10/13/2017    Procedure: Left Heart Cath;  Surgeon: Chester Centeno MD;  Location: [facility] INVASIVE LOCATION;  Service:    • COLONOSCOPY     • CORONARY ANGIOPLASTY WITH STENT PLACEMENT     • ENDOSCOPY         Family History   Problem Relation Age of Onset   • Heart attack Mother      x 3   • Atrial fibrillation Mother    • Heart disease Mother    • Heart attack Father    • Heart disease Father      CABG       Social History     Social History   • Marital status:      Spouse name: N/A   • Number of children: N/A   • Years of education: N/A     Social History Main Topics   • Smoking status: Current Every Day Smoker     Packs/day: 1.00     Years: 35.00     Types: Cigarettes   • Smokeless tobacco: Current User     Types: Chew   • Alcohol use No   • Drug use: No   • Sexual activity: Defer     Other Topics Concern   • None     Social History Narrative         Current Outpatient Prescriptions:   •  albuterol (PROVENTIL HFA;VENTOLIN HFA) 108 (90 BASE) MCG/ACT inhaler, Inhale 2 puffs Every 4 (Four) Hours As Needed for  "Wheezing., Disp: , Rfl:   •  Apremilast (OTEZLA) 30 MG tablet, Take 30 mg by mouth 2 (Two) Times a Day., Disp: , Rfl:   •  aspirin  MG EC tablet, Take  by mouth daily., Disp: , Rfl:   •  atorvastatin (LIPITOR) 80 MG tablet, Take 80 mg by mouth daily., Disp: , Rfl:   •  clopidogrel (PLAVIX) 75 MG tablet, Take  by mouth daily., Disp: , Rfl:   •  diazePAM (VALIUM) 5 MG tablet, Take 5 mg by mouth 2 (Two) Times a Day As Needed for Anxiety., Disp: , Rfl:   •  EPINEPHrine (EPIPEN IJ), Inject  as directed., Disp: , Rfl:   •  gabapentin (NEURONTIN) 800 MG tablet, Take 800 mg by mouth 3 (three) times a day., Disp: , Rfl:   •  hydrALAZINE (APRESOLINE) 25 MG tablet, Take 25 mg by mouth 3 (Three) Times a Day., Disp: , Rfl:   •  HYDROcodone-acetaminophen (NORCO)  MG per tablet, Take 1 tablet by mouth Every 6 (Six) Hours As Needed for Moderate Pain ., Disp: , Rfl:   •  lisinopril (PRINIVIL,ZESTRIL) 2.5 MG tablet, Take 2.5 mg by mouth daily., Disp: , Rfl:   •  methylPREDNISolone (MEDROL) 4 MG tablet, Take 4 mg by mouth Daily., Disp: , Rfl:   •  metoprolol tartrate (LOPRESSOR) 25 MG tablet, Take 25 mg by mouth 2 (Two) Times a Day. Take 1/2 tablet bid, Disp: , Rfl:   •  mometasone (NASONEX) 50 MCG/ACT nasal spray, 2 sprays into each nostril Daily., Disp: , Rfl:   •  nitroglycerin (NITROSTAT) 0.4 MG SL tablet, Place 0.4 mg under the tongue Every 5 (Five) Minutes As Needed for Chest Pain. Take no more than 3 doses in 15 minutes., Disp: , Rfl:   •  omeprazole (PriLOSEC) 40 MG capsule, Take 40 mg by mouth 2 (two) times a day., Disp: , Rfl:   •  predniSONE (DELTASONE) 10 MG tablet, Take 10 mg by mouth 2 (Two) Times a Day., Disp: , Rfl:     Allergies:   Review of patient's allergies indicates no known allergies.    Vitals:  /71  Pulse 64  Ht 177.8 cm (70\")  Wt 68.9 kg (151 lb 12.8 oz)  SpO2 97%  BMI 21.78 kg/m2    Physical Exam   Constitutional: He is oriented to person, place, and time. He appears well-developed and " well-nourished. No distress.   HENT:   Head: Normocephalic and atraumatic.   Right Ear: External ear normal.   Left Ear: External ear normal.   Nose: Nose normal.   Mouth/Throat: Oropharynx is clear and moist.   Eyes: Conjunctivae and EOM are normal. Right eye exhibits no discharge. Left eye exhibits no discharge. No scleral icterus.   Neck: Normal range of motion. Neck supple.   Cardiovascular: Normal rate, regular rhythm and normal heart sounds.  Exam reveals no gallop and no friction rub.    No murmur heard.  Pulmonary/Chest: Effort normal and breath sounds normal. No respiratory distress. He has no wheezes. He has no rales. He exhibits no tenderness.   Abdominal: Soft. Normal appearance and bowel sounds are normal. He exhibits no distension, no ascites and no mass. There is tenderness (epigastric, moderate). There is no rigidity and no guarding. No hernia.   Musculoskeletal: Normal range of motion. He exhibits no edema or deformity.   Neurological: He is alert and oriented to person, place, and time. He exhibits normal muscle tone. Coordination normal.   Skin: Skin is warm and dry. No rash noted. No erythema. No pallor.   Psychiatric: He has a normal mood and affect. His behavior is normal. Judgment and thought content normal.   Nursing note and vitals reviewed.      Assessment/Plan:  1. Gastroesophageal reflux disease, esophagitis presence not specified    2. Chest pain, unspecified type    3. On clopidogrel therapy    4. Dysphagia, unspecified type      He will need an esophagogastroduodenoscopy with possible dilatation of the esophagus performed with IV general sedation. All of the risks, benefits and alternatives of this procedure have been discussed with him, all of his questions have been answered and he has elected to proceed. He should follow up in the office after this procedure to discuss the results and further recommendations can be made at that time.    Discontinue Prilosec. Start Protonix 40 mg BID  30 minutes before meals for treatment of GERD.     We will discuss holding Plavix and continuing ASA with his cardiologist and with Dr. Villanueva.           Return for follow up after procedure.    Holley Summers PA-C      Electronically signed 12/11/2017 at 10:17 AM.

## 2017-12-11 NOTE — PROGRESS NOTES
Spoke with Deedee at patient's cardiologist's office in-regards to patient stopping ASA and Plavix before having his EGD. She stated she will talk to with them and call me back.

## 2017-12-12 PROBLEM — R13.10 DYSPHAGIA: Status: ACTIVE | Noted: 2017-12-12

## 2017-12-12 PROBLEM — Z79.01 ON CLOPIDOGREL THERAPY: Status: ACTIVE | Noted: 2017-12-12

## 2017-12-12 PROBLEM — R07.9 CHEST PAIN: Status: ACTIVE | Noted: 2017-12-12

## 2017-12-12 PROBLEM — K21.9 GASTROESOPHAGEAL REFLUX DISEASE: Status: ACTIVE | Noted: 2017-12-12

## 2018-01-08 ENCOUNTER — TELEPHONE (OUTPATIENT)
Dept: GASTROENTEROLOGY | Facility: CLINIC | Age: 53
End: 2018-01-08

## 2018-01-08 NOTE — TELEPHONE ENCOUNTER
Holley, can you please put a case request in for patient to have a EGD. He had one a little while back and just needs a new one. Please and Thank you.

## 2018-01-09 NOTE — TELEPHONE ENCOUNTER
I saw that he is scheduled for 2/9. Spoke with Chanel in surgery scheduling and she says no need for another case request.

## 2018-02-09 ENCOUNTER — ANESTHESIA EVENT (OUTPATIENT)
Dept: PERIOP | Facility: HOSPITAL | Age: 53
End: 2018-02-09

## 2018-02-09 ENCOUNTER — ANESTHESIA (OUTPATIENT)
Dept: PERIOP | Facility: HOSPITAL | Age: 53
End: 2018-02-09

## 2018-02-09 ENCOUNTER — HOSPITAL ENCOUNTER (OUTPATIENT)
Facility: HOSPITAL | Age: 53
Setting detail: HOSPITAL OUTPATIENT SURGERY
Discharge: HOME | End: 2018-02-09
Attending: INTERNAL MEDICINE | Admitting: INTERNAL MEDICINE

## 2018-02-09 VITALS
OXYGEN SATURATION: 98 % | RESPIRATION RATE: 20 BRPM | HEIGHT: 70 IN | SYSTOLIC BLOOD PRESSURE: 131 MMHG | BODY MASS INDEX: 22.9 KG/M2 | DIASTOLIC BLOOD PRESSURE: 89 MMHG | HEART RATE: 63 BPM | WEIGHT: 160 LBS | TEMPERATURE: 97.9 F

## 2018-02-09 DIAGNOSIS — Z79.01 ON CLOPIDOGREL THERAPY: ICD-10-CM

## 2018-02-09 DIAGNOSIS — R13.10 DYSPHAGIA, UNSPECIFIED TYPE: ICD-10-CM

## 2018-02-09 DIAGNOSIS — K21.9 GASTROESOPHAGEAL REFLUX DISEASE, ESOPHAGITIS PRESENCE NOT SPECIFIED: ICD-10-CM

## 2018-02-09 DIAGNOSIS — R07.9 CHEST PAIN, UNSPECIFIED TYPE: ICD-10-CM

## 2018-02-09 PROCEDURE — 43239 EGD BIOPSY SINGLE/MULTIPLE: CPT | Performed by: INTERNAL MEDICINE

## 2018-02-09 PROCEDURE — 25010000002 PROPOFOL 10 MG/ML EMULSION: Performed by: NURSE ANESTHETIST, CERTIFIED REGISTERED

## 2018-02-09 PROCEDURE — 25010000002 PROPOFOL 1000 MG/ML EMULSION: Performed by: NURSE ANESTHETIST, CERTIFIED REGISTERED

## 2018-02-09 PROCEDURE — 43248 EGD GUIDE WIRE INSERTION: CPT | Performed by: INTERNAL MEDICINE

## 2018-02-09 RX ORDER — FENTANYL CITRATE 50 UG/ML
50 INJECTION, SOLUTION INTRAMUSCULAR; INTRAVENOUS
Status: DISCONTINUED | OUTPATIENT
Start: 2018-02-09 | End: 2018-02-09 | Stop reason: HOSPADM

## 2018-02-09 RX ORDER — ONDANSETRON 2 MG/ML
4 INJECTION INTRAMUSCULAR; INTRAVENOUS ONCE AS NEEDED
Status: DISCONTINUED | OUTPATIENT
Start: 2018-02-09 | End: 2018-02-09 | Stop reason: HOSPADM

## 2018-02-09 RX ORDER — LIDOCAINE HYDROCHLORIDE 10 MG/ML
INJECTION, SOLUTION INFILTRATION; PERINEURAL AS NEEDED
Status: DISCONTINUED | OUTPATIENT
Start: 2018-02-09 | End: 2018-02-09 | Stop reason: SURG

## 2018-02-09 RX ORDER — SODIUM CHLORIDE, SODIUM LACTATE, POTASSIUM CHLORIDE, CALCIUM CHLORIDE 600; 310; 30; 20 MG/100ML; MG/100ML; MG/100ML; MG/100ML
125 INJECTION, SOLUTION INTRAVENOUS CONTINUOUS
Status: DISCONTINUED | OUTPATIENT
Start: 2018-02-09 | End: 2018-02-09 | Stop reason: HOSPADM

## 2018-02-09 RX ORDER — PROPOFOL 10 MG/ML
VIAL (ML) INTRAVENOUS AS NEEDED
Status: DISCONTINUED | OUTPATIENT
Start: 2018-02-09 | End: 2018-02-09 | Stop reason: SURG

## 2018-02-09 RX ORDER — OXYCODONE HYDROCHLORIDE AND ACETAMINOPHEN 5; 325 MG/1; MG/1
1 TABLET ORAL ONCE AS NEEDED
Status: DISCONTINUED | OUTPATIENT
Start: 2018-02-09 | End: 2018-02-09 | Stop reason: HOSPADM

## 2018-02-09 RX ORDER — MEPERIDINE HYDROCHLORIDE 25 MG/ML
12.5 INJECTION INTRAMUSCULAR; INTRAVENOUS; SUBCUTANEOUS
Status: DISCONTINUED | OUTPATIENT
Start: 2018-02-09 | End: 2018-02-09 | Stop reason: HOSPADM

## 2018-02-09 RX ORDER — SODIUM CHLORIDE 0.9 % (FLUSH) 0.9 %
1-10 SYRINGE (ML) INJECTION AS NEEDED
Status: DISCONTINUED | OUTPATIENT
Start: 2018-02-09 | End: 2018-02-09 | Stop reason: HOSPADM

## 2018-02-09 RX ORDER — IPRATROPIUM BROMIDE AND ALBUTEROL SULFATE 2.5; .5 MG/3ML; MG/3ML
3 SOLUTION RESPIRATORY (INHALATION) ONCE AS NEEDED
Status: DISCONTINUED | OUTPATIENT
Start: 2018-02-09 | End: 2018-02-09 | Stop reason: HOSPADM

## 2018-02-09 RX ADMIN — EPHEDRINE SULFATE 5 MG: 50 INJECTION INTRAMUSCULAR; INTRAVENOUS; SUBCUTANEOUS at 10:37

## 2018-02-09 RX ADMIN — EPHEDRINE SULFATE 5 MG: 50 INJECTION INTRAMUSCULAR; INTRAVENOUS; SUBCUTANEOUS at 10:38

## 2018-02-09 RX ADMIN — SODIUM CHLORIDE, POTASSIUM CHLORIDE, SODIUM LACTATE AND CALCIUM CHLORIDE: 600; 310; 30; 20 INJECTION, SOLUTION INTRAVENOUS at 10:27

## 2018-02-09 RX ADMIN — PROPOFOL 150 MCG/KG/MIN: 10 INJECTION, EMULSION INTRAVENOUS at 10:30

## 2018-02-09 RX ADMIN — PROPOFOL 70 MG: 10 INJECTION, EMULSION INTRAVENOUS at 10:30

## 2018-02-09 RX ADMIN — LIDOCAINE HYDROCHLORIDE 60 MG: 10 INJECTION, SOLUTION INFILTRATION; PERINEURAL at 10:30

## 2018-02-09 NOTE — ANESTHESIA POSTPROCEDURE EVALUATION
Patient: Javier Santiago    Procedure Summary     Date Anesthesia Start Anesthesia Stop Room / Location    02/09/18 1027 1038 [facility] 10 / [facility]       Procedure Diagnosis Surgeon Provider    ESOPHAGOGASTRODUODENOSCOPY WITH DILATATION CPT CODE: 10650 (N/A Esophagus) On clopidogrel therapy; Gastroesophageal reflux disease, esophagitis presence not specified; Dysphagia, unspecified type; Chest pain, unspecified type  (On clopidogrel therapy [Z79.01]; Gastroesophageal reflux disease, esophagitis presence not specified [K21.9]; Dysphagia, unspecified type [R13.10]; Chest pain, unspecified type [R07.9]) MD Tray Galan III Depa, DO          Anesthesia Type: general  Last vitals  BP   (!) 83/43 (continue to monitor, ivf wor) (02/09/18 1045)   Temp   97.9 °F (36.6 °C) (02/09/18 1040)   Pulse   57 (02/09/18 1045)   Resp   16 (02/09/18 1045)     SpO2   100 % (02/09/18 1045)     Post Anesthesia Care and Evaluation    Patient location during evaluation: bedside  Patient participation: complete - patient participated  Level of consciousness: awake and alert  Pain score: 1  Pain management: adequate  Airway patency: patent  Anesthetic complications: No anesthetic complications  PONV Status: none  Cardiovascular status: acceptable  Respiratory status: acceptable  Hydration status: acceptable

## 2018-02-09 NOTE — H&P
History of presenting illness: 52-year-old white male presents for EGD in order to investigate to GERD symptoms, atypical chest pain, dysphagia.    Review of Systems:   Negative and noncontributory    Past History:  Past Medical History:   Diagnosis Date   • Arthritis    • ASCVD (arteriosclerotic cardiovascular disease)    • CHF (congestive heart failure)    • Chronic low back pain    • COPD (chronic obstructive pulmonary disease)    • Coronary artery disease    • History of EKG 01/26/2016    NORMAL   • Hyperlipidemia    • Hypertension    • Hypotension    • Insomnia      Past Surgical History:   Procedure Laterality Date   • BACK SURGERY     • CARDIAC CATHETERIZATION N/A 10/13/2017    Procedure: Left Heart Cath;  Surgeon: Chester Centeno MD;  Location: [facility] INVASIVE LOCATION;  Service:    • COLONOSCOPY     • CORONARY ANGIOPLASTY WITH STENT PLACEMENT     • ENDOSCOPY       Family History   Problem Relation Age of Onset   • Heart attack Mother      x 3   • Atrial fibrillation Mother    • Heart disease Mother    • Heart attack Father    • Heart disease Father      CABG     Social History     Social History   • Marital status:      Spouse name: N/A   • Number of children: N/A   • Years of education: N/A     Social History Main Topics   • Smoking status: Current Every Day Smoker     Packs/day: 1.00     Years: 35.00     Types: Cigarettes   • Smokeless tobacco: Current User     Types: Chew   • Alcohol use No   • Drug use: No   • Sexual activity: Defer     Other Topics Concern   • None     Social History Narrative     Prior to Admission medications    Medication Sig Start Date End Date Taking? Authorizing Provider   albuterol (PROVENTIL HFA;VENTOLIN HFA) 108 (90 BASE) MCG/ACT inhaler Inhale 2 puffs Every 4 (Four) Hours As Needed for Wheezing.   Yes Historical Provider, MD   Apremilast (OTEZLA) 30 MG tablet Take 30 mg by mouth 2 (Two) Times a Day.   Yes Historical Provider, MD   aspirin  MG EC tablet  Take  by mouth daily. 10/23/14  Yes Historical Provider, MD   atorvastatin (LIPITOR) 80 MG tablet Take 80 mg by mouth daily. 4/28/16  Yes Historical Provider, MD   clopidogrel (PLAVIX) 75 MG tablet Take  by mouth daily. 10/23/14  Yes Historical Provider, MD   diazePAM (VALIUM) 5 MG tablet Take 5 mg by mouth 2 (Two) Times a Day As Needed for Anxiety.   Yes Historical Provider, MD   gabapentin (NEURONTIN) 800 MG tablet Take 800 mg by mouth 3 (three) times a day. 4/28/16  Yes Historical Provider, MD   HYDROcodone-acetaminophen (NORCO)  MG per tablet Take 1 tablet by mouth Every 6 (Six) Hours As Needed for Moderate Pain .   Yes Historical Provider, MD   lisinopril (PRINIVIL,ZESTRIL) 2.5 MG tablet Take 2.5 mg by mouth daily. 4/28/16  Yes Historical Provider, MD   methylPREDNISolone (MEDROL) 4 MG tablet Take 4 mg by mouth Daily.   Yes Historical Provider, MD   metoprolol tartrate (LOPRESSOR) 25 MG tablet Take 25 mg by mouth 2 (Two) Times a Day. Take 1/2 tablet bid   Yes Historical Provider, MD   mometasone (NASONEX) 50 MCG/ACT nasal spray 2 sprays into each nostril Daily.   Yes Historical Provider, MD   nitroglycerin (NITROSTAT) 0.4 MG SL tablet Place 0.4 mg under the tongue Every 5 (Five) Minutes As Needed for Chest Pain. Take no more than 3 doses in 15 minutes.   Yes Historical Provider, MD   pantoprazole (PROTONIX) 40 MG EC tablet Take 1 tablet by mouth 2 (Two) Times a Day Before Meals. 12/11/17  Yes Holley Summers PA-C   EPINEPHrine (EPIPEN IJ) Inject  as directed.    Historical Provider, MD   hydrALAZINE (APRESOLINE) 25 MG tablet Take 25 mg by mouth 3 (Three) Times a Day.    Historical Provider, MD       Current medication:  I have reviewed the list of current medications.    Allergies:   Review of patient's allergies indicates no known allergies.    Vital Signs  Temp:  [98.8 °F (37.1 °C)] 98.8 °F (37.1 °C)  Heart Rate:  [63] 63  Resp:  [18] 18  BP: (114)/(67) 114/67    Physical exam:  Alert, oriented  ×3  HEENT: Normal  Neck: No mass  Chest: Clear  Heart: Regular rhythm  Abdomen: Soft, nontender, active BS  Extremities: No edema  Neuro: No focal deficit    Assessment/Plan:   GERD  Dysphagia  Atypical chest pain    REC  EGD with possible esophageal dilation planned.  The procedures, benefits, risks and alternatives explained to the patient.      Everett Villanueva III, MD  02/09/18  10:24 AM

## 2018-02-09 NOTE — ANESTHESIA PREPROCEDURE EVALUATION
Anesthesia Evaluation     Patient summary reviewed and Nursing notes reviewed   no history of anesthetic complications:  NPO Solid Status: > 8 hours  NPO Liquid Status: > 8 hours           Airway   Mallampati: II  TM distance: >3 FB  Neck ROM: full  no difficulty expected  Dental - normal exam   (+) edentulous    Pulmonary - normal exam    breath sounds clear to auscultation  (+) a smoker Current Smoked day of surgery, COPD moderate, shortness of breath, sleep apnea,   Cardiovascular - normal exam    ECG reviewed  Rhythm: regular  Rate: normal    (+) hypertension well controlled, CAD, angina, CHF, [place], hyperlipidemia      Neuro/Psych- negative ROS  GI/Hepatic/Renal/Endo    (+)  hiatal hernia, GERD,     Musculoskeletal     Abdominal  - normal exam    Bowel sounds: normal.   Substance History - negative use     OB/GYN negative ob/gyn ROS         Other   (+) arthritis                     Anesthesia Plan    ASA 3     general   total IV anesthesia  intravenous induction   Anesthetic plan and risks discussed with patient and sibling.    Plan discussed with CRNA.

## 2018-02-09 NOTE — PLAN OF CARE
Problem: GI Endoscopy (Adult)  Goal: Signs and Symptoms of Listed Potential Problems Will be Absent or Manageable (GI Endoscopy)  Outcome: Ongoing (interventions implemented as appropriate)   02/09/18 1031   GI Endoscopy   Problems Assessed (GI Endoscopy) all   Problems Present (GI Endoscopy) none

## 2018-02-09 NOTE — ANESTHESIA POSTPROCEDURE EVALUATION
Patient: Javier Santiago    Procedure Summary     Date Anesthesia Start Anesthesia Stop Room / Location    02/09/18 1027 1038 [facility] / [facility]       Procedure Diagnosis Surgeon Provider    ESOPHAGOGASTRODUODENOSCOPY WITH DILATATION CPT CODE: 23813 (N/A Esophagus) On clopidogrel therapy; Gastroesophageal reflux disease, esophagitis presence not specified; Dysphagia, unspecified type; Chest pain, unspecified type  (On clopidogrel therapy [Z79.01]; Gastroesophageal reflux disease, esophagitis presence not specified [K21.9]; Dysphagia, unspecified type [R13.10]; Chest pain, unspecified type [R07.9]) MD Tray Galan III Depa, DO          Anesthesia Type: general  Last vitals  BP   (!) 83/43 (continue to monitor, ivf wor) (02/09/18 1045)   Temp   97.9 °F (36.6 °C) (02/09/18 1040)   Pulse   57 (02/09/18 1045)   Resp   16 (02/09/18 1045)     SpO2   100 % (02/09/18 1045)     Post Anesthesia Care and Evaluation    Patient location during evaluation: PHASE II  Patient participation: complete - patient participated  Level of consciousness: awake and alert  Pain score: 0  Pain management: adequate  Airway patency: patent  Anesthetic complications: No anesthetic complications  PONV Status: controlled  Cardiovascular status: acceptable  Respiratory status: acceptable and room air  Hydration status: euvolemic  No anesthesia care post op

## 2018-02-09 NOTE — OP NOTE
02/09/18    ESOPHAGOGASTRODUODENOSCOPY WITH BIOPSIES AND ESOPHAGEAL DILATATION  Procedure Note    Javier Santiago  2/9/2018    Dr. Villanueva      Pre-op Diagnosis: GERD, dysphagia, atypical chest pain      Post-op Diagnosis: Normal EGD        Anesthesia: Per Anesthesia service, general anesthesia      Estimated Blood Loss: Negligible      Findings: EGD was performed with careful inspection of the esophagus, stomach and duodenum to the fourth portion.  All areas appeared normal.  Biopsies were taken randomly from the esophagus and gastric antrum.  The esophagus was dilated using a 48 Yi Savary dilator.  Reinspection of the esophagus, using the endoscope, revealed no injury to the esophagus.  She tolerated the procedure well and there were no complications.  She left the OR in stable and satisfactory condition.      Specimens: Esophagus and gastric antrum      Grafts/Implants: N/A      Complications: None      Recommendations:   Findings discussed with the patient  Continue PPI treatment    Everett Villanueva III, MD     Date: 2/9/2018  Time: 10:36 AM    Isidro MAK

## 2018-02-12 LAB
LAB AP CASE REPORT: NORMAL
Lab: NORMAL
PATH REPORT.FINAL DX SPEC: NORMAL

## 2018-02-20 ENCOUNTER — HOSPITAL ENCOUNTER (OUTPATIENT)
Dept: CARDIOLOGY | Facility: HOSPITAL | Age: 53
Discharge: HOME | End: 2018-02-20
Admitting: NURSE PRACTITIONER

## 2018-02-20 DIAGNOSIS — I25.119 CORONARY ARTERY DISEASE INVOLVING NATIVE CORONARY ARTERY OF NATIVE HEART WITH ANGINA PECTORIS (HCC): ICD-10-CM

## 2018-02-20 DIAGNOSIS — I10 ESSENTIAL HYPERTENSION: ICD-10-CM

## 2018-02-20 DIAGNOSIS — R07.89 ATYPICAL CHEST PAIN: ICD-10-CM

## 2018-02-20 PROCEDURE — 93306 TTE W/DOPPLER COMPLETE: CPT

## 2018-02-20 PROCEDURE — 93306 TTE W/DOPPLER COMPLETE: CPT | Performed by: INTERNAL MEDICINE

## 2018-02-22 LAB
BH CV ECHO MEAS - % IVS THICK: 48.3 %
BH CV ECHO MEAS - % LVPW THICK: 120.9 %
BH CV ECHO MEAS - ACS: 2.1 CM
BH CV ECHO MEAS - AO MAX PG: 5.2 MMHG
BH CV ECHO MEAS - AO MEAN PG: 3 MMHG
BH CV ECHO MEAS - AO ROOT AREA (BSA CORRECTED): 1.6
BH CV ECHO MEAS - AO ROOT AREA: 7 CM^2
BH CV ECHO MEAS - AO ROOT DIAM: 3 CM
BH CV ECHO MEAS - AO V2 MAX: 113.6 CM/SEC
BH CV ECHO MEAS - AO V2 MEAN: 80.5 CM/SEC
BH CV ECHO MEAS - AO V2 VTI: 23.5 CM
BH CV ECHO MEAS - BSA(HAYCOCK): 1.9 M^2
BH CV ECHO MEAS - BSA: 1.9 M^2
BH CV ECHO MEAS - BZI_BMI: 23 KILOGRAMS/M^2
BH CV ECHO MEAS - BZI_METRIC_HEIGHT: 177.8 CM
BH CV ECHO MEAS - BZI_METRIC_WEIGHT: 72.6 KG
BH CV ECHO MEAS - CONTRAST EF 4CH: 66.7 ML/M^2
BH CV ECHO MEAS - EDV(CUBED): 181.3 ML
BH CV ECHO MEAS - EDV(MOD-SP4): 90 ML
BH CV ECHO MEAS - EDV(TEICH): 157.5 ML
BH CV ECHO MEAS - EF(CUBED): 69.9 %
BH CV ECHO MEAS - EF(TEICH): 60.8 %
BH CV ECHO MEAS - ESV(CUBED): 54.5 ML
BH CV ECHO MEAS - ESV(MOD-SP4): 30 ML
BH CV ECHO MEAS - ESV(TEICH): 61.7 ML
BH CV ECHO MEAS - FS: 33 %
BH CV ECHO MEAS - IVS/LVPW: 1.1
BH CV ECHO MEAS - IVSD: 0.7 CM
BH CV ECHO MEAS - IVSS: 1 CM
BH CV ECHO MEAS - LA DIMENSION: 3.2 CM
BH CV ECHO MEAS - LA/AO: 1.1
BH CV ECHO MEAS - LV DIASTOLIC VOL/BSA (35-75): 47.4 ML/M^2
BH CV ECHO MEAS - LV MASS(C)D: 138.1 GRAMS
BH CV ECHO MEAS - LV MASS(C)DI: 72.8 GRAMS/M^2
BH CV ECHO MEAS - LV MASS(C)S: 163.1 GRAMS
BH CV ECHO MEAS - LV MASS(C)SI: 85.9 GRAMS/M^2
BH CV ECHO MEAS - LV SYSTOLIC VOL/BSA (12-30): 15.8 ML/M^2
BH CV ECHO MEAS - LVIDD: 5.7 CM
BH CV ECHO MEAS - LVIDS: 3.8 CM
BH CV ECHO MEAS - LVLD AP4: 8.3 CM
BH CV ECHO MEAS - LVLS AP4: 6.6 CM
BH CV ECHO MEAS - LVOT AREA (M): 2.8 CM^2
BH CV ECHO MEAS - LVOT AREA: 2.8 CM^2
BH CV ECHO MEAS - LVOT DIAM: 1.9 CM
BH CV ECHO MEAS - LVPWD: 0.66 CM
BH CV ECHO MEAS - LVPWS: 1.5 CM
BH CV ECHO MEAS - MV A MAX VEL: 45.4 CM/SEC
BH CV ECHO MEAS - MV E MAX VEL: 95.8 CM/SEC
BH CV ECHO MEAS - MV E/A: 2.1
BH CV ECHO MEAS - PA ACC SLOPE: 1725 CM/SEC^2
BH CV ECHO MEAS - PA ACC TIME: 0.07 SEC
BH CV ECHO MEAS - PA PR(ACCEL): 48.9 MMHG
BH CV ECHO MEAS - RAP SYSTOLE: 10 MMHG
BH CV ECHO MEAS - RVDD: 1 CM
BH CV ECHO MEAS - RVSP: 36 MMHG
BH CV ECHO MEAS - SI(AO): 86.5 ML/M^2
BH CV ECHO MEAS - SI(CUBED): 66.8 ML/M^2
BH CV ECHO MEAS - SI(MOD-SP4): 31.6 ML/M^2
BH CV ECHO MEAS - SI(TEICH): 50.5 ML/M^2
BH CV ECHO MEAS - SV(AO): 164.3 ML
BH CV ECHO MEAS - SV(CUBED): 126.8 ML
BH CV ECHO MEAS - SV(MOD-SP4): 60 ML
BH CV ECHO MEAS - SV(TEICH): 95.8 ML
BH CV ECHO MEAS - TR MAX VEL: 255.2 CM/SEC
MAXIMAL PREDICTED HEART RATE: 168 BPM
STRESS TARGET HR: 143 BPM

## 2018-02-23 ENCOUNTER — TELEPHONE (OUTPATIENT)
Dept: CARDIOLOGY | Facility: CLINIC | Age: 53
End: 2018-02-23

## 2018-02-23 NOTE — TELEPHONE ENCOUNTER
----- Message from OBDULIO Lock sent at 2/22/2018  3:36 PM EST -----  Please call Mr Santiago and tell him that his echo was fine.     Thanks, Iza

## 2018-05-14 ENCOUNTER — OFFICE VISIT (OUTPATIENT)
Dept: CARDIOLOGY | Facility: CLINIC | Age: 53
End: 2018-05-14

## 2018-05-14 VITALS
OXYGEN SATURATION: 98 % | HEIGHT: 70 IN | HEART RATE: 60 BPM | SYSTOLIC BLOOD PRESSURE: 119 MMHG | WEIGHT: 159 LBS | BODY MASS INDEX: 22.76 KG/M2 | DIASTOLIC BLOOD PRESSURE: 74 MMHG

## 2018-05-14 DIAGNOSIS — I10 ESSENTIAL HYPERTENSION: Primary | ICD-10-CM

## 2018-05-14 DIAGNOSIS — E78.01 FAMILIAL HYPERCHOLESTEROLEMIA: ICD-10-CM

## 2018-05-14 DIAGNOSIS — I25.119 CORONARY ARTERY DISEASE INVOLVING NATIVE CORONARY ARTERY OF NATIVE HEART WITH ANGINA PECTORIS (HCC): ICD-10-CM

## 2018-05-14 DIAGNOSIS — I20.0 UNSTABLE ANGINA (HCC): ICD-10-CM

## 2018-05-14 PROCEDURE — 99214 OFFICE O/P EST MOD 30 MIN: CPT | Performed by: NURSE PRACTITIONER

## 2018-05-14 RX ORDER — DICLOFENAC SODIUM AND MISOPROSTOL 50; 200 MG/1; UG/1
1 TABLET, DELAYED RELEASE ORAL 2 TIMES DAILY
COMMUNITY
End: 2019-02-28

## 2018-05-14 RX ORDER — ISOSORBIDE MONONITRATE 30 MG/1
30 TABLET, EXTENDED RELEASE ORAL DAILY
COMMUNITY
End: 2019-03-20 | Stop reason: ALTCHOICE

## 2018-05-14 RX ORDER — DILTIAZEM HYDROCHLORIDE 60 MG/1
2 TABLET, FILM COATED ORAL 2 TIMES DAILY PRN
COMMUNITY
Start: 2018-04-09

## 2018-05-14 NOTE — PROGRESS NOTES
Subjective     Chief Complaint: Coronary Artery Disease; Hypertension; and Hyperlipidemia    History of Present Illness   Javier Santiago is a 52 y.o. male who presents medical history significant for coronary artery disease and status post remote history of stent implantation to the LAD, hyperlipidemia and hypertension.  In 2016 he presented with an acute anterior wall myocardial infarction and underwent restenting of his LAD at that time.  He continued to complain of recurrent chest pain with and without exertion.  In October 2017 he underwent cardiac catheter which was unremarkable for any significant disease.    Today he is here for his regular follow-up visit.  He does report intermittent sharp knifelike pain in his left chest area.  Pain lasts just a few minutes and generally resolves either spontaneously or by taking 4-5 aspirin.  He reports that there is associated weakness and shortness of air.      Mr. Concepcion does recur report that he is compliant with his medications.  Unfortunately he has continued to smoke, although he has decreased to about one to 2 cigarettes per day.  He walks fairly regularly and reports that he does have some light chest pain with exercise.        Current Outpatient Prescriptions:   •  albuterol (PROVENTIL HFA;VENTOLIN HFA) 108 (90 BASE) MCG/ACT inhaler, Inhale 2 puffs Every 4 (Four) Hours As Needed for Wheezing., Disp: , Rfl:   •  atorvastatin (LIPITOR) 80 MG tablet, Take 80 mg by mouth daily., Disp: , Rfl:   •  clopidogrel (PLAVIX) 75 MG tablet, Take  by mouth daily., Disp: , Rfl:   •  diazePAM (VALIUM) 5 MG tablet, Take 5 mg by mouth 2 (Two) Times a Day As Needed for Anxiety., Disp: , Rfl:   •  diclofenac-misoprostol (ARTHROTEC 50) 50-0.2 MG EC tablet, Take 1 tablet by mouth 2 (Two) Times a Day., Disp: , Rfl:   •  EPINEPHrine (EPIPEN IJ), Inject  as directed., Disp: , Rfl:   •  gabapentin (NEURONTIN) 800 MG tablet, Take 800 mg by mouth 3 (three) times a day., Disp: , Rfl:   •   "hydrALAZINE (APRESOLINE) 25 MG tablet, Take 25 mg by mouth 3 (Three) Times a Day., Disp: , Rfl:   •  HYDROcodone-acetaminophen (NORCO)  MG per tablet, Take 1 tablet by mouth Every 6 (Six) Hours As Needed for Moderate Pain ., Disp: , Rfl:   •  isosorbide mononitrate (IMDUR) 30 MG 24 hr tablet, Take 30 mg by mouth Daily., Disp: , Rfl:   •  mometasone (NASONEX) 50 MCG/ACT nasal spray, 2 sprays into each nostril Daily., Disp: , Rfl:   •  nitroglycerin (NITROSTAT) 0.4 MG SL tablet, Place 0.4 mg under the tongue Every 5 (Five) Minutes As Needed for Chest Pain. Take no more than 3 doses in 15 minutes., Disp: , Rfl:   •  pantoprazole (PROTONIX) 40 MG EC tablet, Take 1 tablet by mouth 2 (Two) Times a Day Before Meals., Disp: 60 tablet, Rfl: 5  •  ustekinumab (STELARA) 45 MG/0.5ML injection, Inject 45 mg under the skin Every 30 (Thirty) Days., Disp: , Rfl:   •  aspirin 81 MG tablet, Take 1 tablet by mouth Daily., Disp: 30 tablet, Rfl: 11  •  SYMBICORT 80-4.5 MCG/ACT inhaler, , Disp: , Rfl:      The following portions of the patient's history were reviewed and updated as appropriate: allergies, current medications, past family history, past medical history, past social history, past surgical history and problem list.    Review of Systems   Constitution: Negative.   HENT: Negative.    Eyes: Negative.    Cardiovascular: Positive for chest pain and leg swelling (right ankle).   Respiratory: Positive for cough and shortness of breath.    Endocrine: Negative.    Hematologic/Lymphatic: Bruises/bleeds easily.   Skin: Negative.    Musculoskeletal: Negative.    Gastrointestinal: Negative.    Genitourinary: Negative.    Neurological: Positive for dizziness and light-headedness.   Psychiatric/Behavioral: Negative.    Allergic/Immunologic: Negative.          Objective     /74 (BP Location: Left arm)   Pulse 60   Ht 177.8 cm (70\")   Wt 72.1 kg (159 lb)   SpO2 98%   BMI 22.81 kg/m²     Physical Exam   Constitutional: He " appears well-developed and well-nourished.   HENT:   Head: Normocephalic and atraumatic.   Eyes: Pupils are equal, round, and reactive to light.   Neck: No JVD present.   Cardiovascular: Normal rate, regular rhythm and intact distal pulses.  Exam reveals no gallop and no friction rub.    No murmur heard.  Pulmonary/Chest: Effort normal. No respiratory distress. He has decreased breath sounds. He has no wheezes. He has no rales.   Abdominal: Soft. He exhibits no mass. There is no tenderness. No hernia.   Skin: Skin is warm and dry.   Psychiatric: He has a normal mood and affect.   Vitals reviewed.      Procedures      Assessment/Plan       Javier was seen today for coronary artery disease, hypertension and hyperlipidemia.    Diagnoses and all orders for this visit:      Assessment:  1. Coronary artery disease, status post stenting ×2 of the LAD  2. Unstable angina  3. Essential hypertension  4. Hyperlipidemia      Plan:  1. Continue current line directed medical therapy including aspirin 81 mg, Plavix, lisinopril, metoprolol and atorvastatin  2. Continue Imdur as prescribed.  I'm hesitant to increase his Imdur were due to his already fairly low blood pressure.   3. Have gone ahead and ordered a nuclear stress test.  If this is normal, we can consider increasing his Imdur were and possibly decreasing his lisinopril, referral to pulmonology, or repeat cardiac catheterization.  4. Again I have asked him to quit smoking.  I discussed the risks associated with tobacco use with regard to his cardiac health as well as his overall health.  I spent less than 3 minutes talking to him about this.  He is aware of the risks and states that it is hard to give up his cigarette after a meal and with morning coffee.  5. Return to clinic in about 4 weeks, sooner for any concerning problems.      Return in about 4 weeks (around 6/11/2018).        OBDULIO Bustamante

## 2018-07-12 ENCOUNTER — TELEPHONE (OUTPATIENT)
Dept: CARDIOLOGY | Facility: CLINIC | Age: 53
End: 2018-07-12

## 2018-07-12 NOTE — TELEPHONE ENCOUNTER
Called patient to see if he was ready to reschedule his stress test. He said that he had been sick the day of the tests and missed the appointment. I told him that was fine, we understand things come up all the time. I asked if he would be alright with me transferring him to schedule that now and he was. Transferred him to ext. [id number]

## 2019-02-28 ENCOUNTER — OFFICE VISIT (OUTPATIENT)
Dept: NEUROSURGERY | Facility: CLINIC | Age: 54
End: 2019-02-28

## 2019-02-28 VITALS
HEIGHT: 70 IN | TEMPERATURE: 98.6 F | OXYGEN SATURATION: 99 % | RESPIRATION RATE: 20 BRPM | BODY MASS INDEX: 20.76 KG/M2 | SYSTOLIC BLOOD PRESSURE: 131 MMHG | HEART RATE: 76 BPM | WEIGHT: 145 LBS | DIASTOLIC BLOOD PRESSURE: 85 MMHG

## 2019-02-28 DIAGNOSIS — M50.30 DEGENERATIVE DISC DISEASE, CERVICAL: ICD-10-CM

## 2019-02-28 DIAGNOSIS — M48.02 SPINAL STENOSIS OF CERVICAL REGION: ICD-10-CM

## 2019-02-28 DIAGNOSIS — M51.36 DEGENERATIVE DISC DISEASE, LUMBAR: Primary | ICD-10-CM

## 2019-02-28 PROCEDURE — 99203 OFFICE O/P NEW LOW 30 MIN: CPT | Performed by: NEUROLOGICAL SURGERY

## 2019-02-28 RX ORDER — BACLOFEN 10 MG/1
10 TABLET ORAL 2 TIMES DAILY
Qty: 60 TABLET | Refills: 0 | Status: SHIPPED | OUTPATIENT
Start: 2019-02-28

## 2019-02-28 NOTE — PROGRESS NOTES
Javier Santiago  1965  [id number]      Chief Complaint   Patient presents with   • Arm Pain   • Neck Pain       HISTORY OF PRESENT ILLNESS: This is a 53-year-old male with history of arthritis presenting with a 4-month history of severe pain in his both upper extremities particularly when he hyperextends his neck.  This occurred when he hyperextended his neck with the onset of radiation in both upper extremities and it has persisted.  Cervical MRIs been performed is referred for neurosurgical consultation.    Past Medical History:   Diagnosis Date   • Arthritis    • ASCVD (arteriosclerotic cardiovascular disease)    • CHF (congestive heart failure) (CMS/[facility])    • Chronic low back pain    • COPD (chronic obstructive pulmonary disease) (CMS/[facility])    • Coronary artery disease    • History of EKG 01/26/2016    NORMAL   • Hyperlipidemia    • Hypertension    • Hypotension    • Insomnia        Past Surgical History:   Procedure Laterality Date   • BACK SURGERY     • CARDIAC CATHETERIZATION N/A 10/13/2017    Procedure: Left Heart Cath;  Surgeon: Chester Centeno MD;  Location: [facility] CATH INVASIVE LOCATION;  Service:    • COLONOSCOPY     • CORONARY ANGIOPLASTY WITH STENT PLACEMENT     • ENDOSCOPY     • ENDOSCOPY N/A 2/9/2018    Procedure: ESOPHAGOGASTRODUODENOSCOPY WITH DILATATION CPT CODE: 17935;  Surgeon: Everett Villanueva III, MD;  Location: [facility] OR;  Service:        Family History   Problem Relation Age of Onset   • Heart attack Mother         x 3   • Atrial fibrillation Mother    • Heart disease Mother    • Heart attack Father    • Heart disease Father         CABG       Social History     Socioeconomic History   • Marital status:      Spouse name: Not on file   • Number of children: Not on file   • Years of education: Not on file   • Highest education level: Not on file   Social Needs   • Financial resource strain: Not on file   • Food insecurity - worry: Not on file   • Food insecurity - inability: Not on  file   • Transportation needs - medical: Not on file   • Transportation needs - non-medical: Not on file   Occupational History   • Not on file   Tobacco Use   • Smoking status: Current Every Day Smoker     Packs/day: 0.25     Years: 35.00     Pack years: 8.75     Types: Cigarettes   • Smokeless tobacco: Current User     Types: Chew   Substance and Sexual Activity   • Alcohol use: No   • Drug use: No   • Sexual activity: Defer   Other Topics Concern   • Not on file   Social History Narrative   • Not on file       No Known Allergies      Current Outpatient Medications:   •  albuterol (PROVENTIL HFA;VENTOLIN HFA) 108 (90 BASE) MCG/ACT inhaler, Inhale 2 puffs Every 4 (Four) Hours As Needed for Wheezing., Disp: , Rfl:   •  aspirin 81 MG tablet, Take 1 tablet by mouth Daily., Disp: 30 tablet, Rfl: 11  •  atorvastatin (LIPITOR) 80 MG tablet, Take 80 mg by mouth daily., Disp: , Rfl:   •  clopidogrel (PLAVIX) 75 MG tablet, Take  by mouth daily., Disp: , Rfl:   •  diazePAM (VALIUM) 5 MG tablet, Take 5 mg by mouth 2 (Two) Times a Day As Needed for Anxiety., Disp: , Rfl:   •  diclofenac-misoprostol (ARTHROTEC 50) 50-0.2 MG EC tablet, Take 1 tablet by mouth 2 (Two) Times a Day., Disp: , Rfl:   •  EPINEPHrine (EPIPEN IJ), Inject  as directed., Disp: , Rfl:   •  gabapentin (NEURONTIN) 800 MG tablet, Take 800 mg by mouth 3 (three) times a day., Disp: , Rfl:   •  hydrALAZINE (APRESOLINE) 25 MG tablet, Take 25 mg by mouth 3 (Three) Times a Day., Disp: , Rfl:   •  HYDROcodone-acetaminophen (NORCO)  MG per tablet, Take 1 tablet by mouth Every 6 (Six) Hours As Needed for Moderate Pain ., Disp: , Rfl:   •  isosorbide mononitrate (IMDUR) 30 MG 24 hr tablet, Take 30 mg by mouth Daily., Disp: , Rfl:   •  mometasone (NASONEX) 50 MCG/ACT nasal spray, 2 sprays into each nostril Daily., Disp: , Rfl:   •  nitroglycerin (NITROSTAT) 0.4 MG SL tablet, Place 0.4 mg under the tongue Every 5 (Five) Minutes As Needed for Chest Pain. Take no more  "than 3 doses in 15 minutes., Disp: , Rfl:   •  pantoprazole (PROTONIX) 40 MG EC tablet, Take 1 tablet by mouth 2 (Two) Times a Day Before Meals., Disp: 60 tablet, Rfl: 5  •  SYMBICORT 80-4.5 MCG/ACT inhaler, , Disp: , Rfl:   •  ustekinumab (STELARA) 45 MG/0.5ML injection, Inject 45 mg under the skin Every 30 (Thirty) Days., Disp: , Rfl:     Review of Systems   Constitutional: Positive for activity change, appetite change and unexpected weight change.   HENT: Positive for drooling, sore throat, tinnitus and trouble swallowing.    Eyes: Positive for visual disturbance.   Respiratory: Positive for apnea and shortness of breath.    Cardiovascular: Positive for chest pain and leg swelling.   Genitourinary: Positive for flank pain.   Musculoskeletal: Positive for arthralgias, back pain, joint swelling, myalgias, neck pain and neck stiffness.   Skin: Positive for rash.   Neurological: Positive for numbness.   Hematological: Bruises/bleeds easily.   Psychiatric/Behavioral: Positive for sleep disturbance. The patient is nervous/anxious.    All other systems reviewed and are negative.      Vitals:    02/28/19 1346   BP: 131/85   BP Location: Left arm   Patient Position: Sitting   Pulse: 76   Resp: 20   Temp: 98.6 °F (37 °C)   SpO2: 99%   Weight: 65.8 kg (145 lb)   Height: 177.8 cm (70\")       Neurological Examination: Mental status/speech: The patient is alert and oriented.  Speech is clear without aphysia or dysarthria.  No overt cognitive deficits.    Cranial nerve examination:    Olfaction: Smell is intact.  Vision: Vision is intact without visual field abnormalities.  Funduscopic examination is normal.  No pupillary irregularity.  Ocular motor examination: The extraocular muscles are intact.  There is no diplopia.  The pupil is round and reactive to both light and accommodation.  There is no nystagmus.  Facial movement/sensation: There is no facial weakness.  Sensation is intact in the first, second, and third divisions " of the trigeminal nerve.  The corneal reflex is intact.  Auditory: Hearing is intact to finger rub bilaterally.  Cranial nerves IX, X, XI, XII: Phonation is normal.  No dysphagia.  Tongue is protruded in the midline without atrophy.  The gag reflex is intact.  Shoulder shrug is normal.    Musculoligamentous ligamentous examination: The deep tendon reflexes are symmetrical.  I do not find evidence of hyper reflexia or clonus.  He has generalized weakness in his upper extremities.            Medical Decision Making:     Diagnostic Data Set: Cervical MRI shows multilevel degenerative disc disease and moderately severe spinal stenosis with no evidence of abnormal signal within the spinal cord.      Assessment: Symptomatic cervical spondylosis          Recommendations: I have recommended cervical myelography, post myelography CT scanning associated with a EMG and NCV of both upper extremities.  Certainly his symptoms suggest that he has nerve root compression related to degenerative change.  He has multilevel disease therefore the goal will be defined which is the culprit..  I will keep you informed and appreciate seeing him        I greatly appreciate the opportunity to see and evaluate this individual.  If you have questions or concerns regarding issues that I may have overlooked please call me at any time: [phone].  Tobias Lema M.D.  [facility]  [address] beds at the [facility].  [place] [address]

## 2019-03-04 ENCOUNTER — TELEPHONE (OUTPATIENT)
Dept: CARDIOLOGY | Facility: CLINIC | Age: 54
End: 2019-03-04

## 2019-03-04 NOTE — TELEPHONE ENCOUNTER
ROBINSON FROM DR. HURLEY OFFICE CALLED WANTING TO KNOW IF FUAD COULD GO OFF PLAVIX FOR SURGERY. HAVING A CERVICAL MILOGRAM, CAN SCAN RESPONSE INTO Epic, OR IF YOU NEED TO SPEAK TO HER , THEIR NUMBER -[phone].

## 2019-03-20 ENCOUNTER — HOSPITAL ENCOUNTER (OUTPATIENT)
Dept: CT IMAGING | Facility: HOSPITAL | Age: 54
Discharge: HOME | End: 2019-03-20

## 2019-03-20 ENCOUNTER — HOSPITAL ENCOUNTER (OUTPATIENT)
Dept: NEUROLOGY | Facility: HOSPITAL | Age: 54
Discharge: HOME | End: 2019-03-20

## 2019-03-20 ENCOUNTER — HOSPITAL ENCOUNTER (OUTPATIENT)
Dept: GENERAL RADIOLOGY | Facility: HOSPITAL | Age: 54
Discharge: HOME | End: 2019-03-20
Admitting: NEUROLOGICAL SURGERY

## 2019-03-20 VITALS
RESPIRATION RATE: 20 BRPM | HEIGHT: 70 IN | TEMPERATURE: 98 F | DIASTOLIC BLOOD PRESSURE: 85 MMHG | SYSTOLIC BLOOD PRESSURE: 132 MMHG | HEART RATE: 65 BPM | OXYGEN SATURATION: 97 % | BODY MASS INDEX: 21.02 KG/M2 | WEIGHT: 146.8 LBS

## 2019-03-20 DIAGNOSIS — M47.812 CERVICAL SPONDYLOSIS WITHOUT MYELOPATHY: Primary | ICD-10-CM

## 2019-03-20 PROCEDURE — 95886 MUSC TEST DONE W/N TEST COMP: CPT

## 2019-03-20 PROCEDURE — 95910 NRV CNDJ TEST 7-8 STUDIES: CPT

## 2019-03-20 PROCEDURE — 25010000002 IOPAMIDOL 61 % SOLUTION: Performed by: NEUROLOGICAL SURGERY

## 2019-03-20 PROCEDURE — 72240 MYELOGRAPHY NECK SPINE: CPT

## 2019-03-20 PROCEDURE — 72126 CT NECK SPINE W/DYE: CPT

## 2019-03-20 PROCEDURE — 63710000001 DIPHENHYDRAMINE PER 50 MG: Performed by: NEUROLOGICAL SURGERY

## 2019-03-20 PROCEDURE — 62302 MYELOGRAPHY LUMBAR INJECTION: CPT

## 2019-03-20 RX ORDER — DIPHENHYDRAMINE HCL 50 MG
50 CAPSULE ORAL ONCE
Status: COMPLETED | OUTPATIENT
Start: 2019-03-20 | End: 2019-03-20

## 2019-03-20 RX ORDER — MELOXICAM 7.5 MG/1
7.5 TABLET ORAL 2 TIMES DAILY
Qty: 60 TABLET | Refills: 1 | Status: SHIPPED | OUTPATIENT
Start: 2019-03-20 | End: 2021-04-26 | Stop reason: HOSPADM

## 2019-03-20 RX ORDER — LIDOCAINE HYDROCHLORIDE 10 MG/ML
5 INJECTION, SOLUTION INFILTRATION; PERINEURAL ONCE
Status: COMPLETED | OUTPATIENT
Start: 2019-03-20 | End: 2019-03-20

## 2019-03-20 RX ORDER — DIAZEPAM 5 MG/1
10 TABLET ORAL ONCE
Status: COMPLETED | OUTPATIENT
Start: 2019-03-20 | End: 2019-03-20

## 2019-03-20 RX ADMIN — LIDOCAINE HYDROCHLORIDE 5 ML: 10 INJECTION, SOLUTION INFILTRATION; PERINEURAL at 12:40

## 2019-03-20 RX ADMIN — DIAZEPAM 10 MG: 5 TABLET ORAL at 07:59

## 2019-03-20 RX ADMIN — IOPAMIDOL 15 ML: 612 INJECTION, SOLUTION INTRATHECAL at 12:40

## 2019-03-20 RX ADMIN — DIPHENHYDRAMINE HYDROCHLORIDE 50 MG: 50 CAPSULE ORAL at 08:00

## 2019-03-20 NOTE — POST-PROCEDURE NOTE
Neurosurgery post myelogram note:  This is a 53-year-old male with history of arthritis presenting with a 4-month history of severe pain in his both upper extremities particularly when he hyperextends his neck.  This occurred when he hyperextended his neck with the onset of radiation in both upper extremities and it has persisted.  Cervical MRIs been performed is referred for neurosurgical consultation.    EMG/NCV  Ulnar neuropathy at the elbow bilaterally, mild (incidental finding)     EMG suggest chronic C5/C6 radiculopathy bilaterally       CT scan:  The CT scan after the myelogram shows the presence of multilevel disc narrowing and facet closure with neuroforaminal closure particularly C4-C5; C5-C6.    Impression:  The symptoms that he has are pain in his upper extremities when he extends his arms.  He has neck pain as well.  There is no consistent radiculopathy however.  A conservative course should be employed prior to any consideration of surgical intervention.    I have given him prescription of meloxicam 7.5 mg twice daily sent him referred him to physical therapy; he will follow-up in the [facility] in 1 month.

## 2019-03-20 NOTE — NURSING NOTE
Given printed and oral discharge instructions with CD of films. Verbalizes understanding. Discharged per wheelchair to front entrance with family driving.

## 2019-03-20 NOTE — NURSING NOTE
Returned to room. Tolerated procedure well. No dressing at injection site. HOB elevated, provided drinks and lunch tray. Enc to drink at least 8 - 10 glasses of fluid per day.

## 2019-03-21 ENCOUNTER — TELEPHONE (OUTPATIENT)
Dept: INFUSION THERAPY | Facility: HOSPITAL | Age: 54
End: 2019-03-21

## 2019-07-30 ENCOUNTER — TRANSCRIBE ORDERS (OUTPATIENT)
Dept: ADMINISTRATIVE | Facility: HOSPITAL | Age: 54
End: 2019-07-30

## 2019-07-30 DIAGNOSIS — R13.10 DYSPHAGIA, UNSPECIFIED TYPE: Primary | ICD-10-CM

## 2020-11-11 ENCOUNTER — TRANSCRIBE ORDERS (OUTPATIENT)
Dept: ADMINISTRATIVE | Facility: HOSPITAL | Age: 55
End: 2020-11-11

## 2020-11-11 DIAGNOSIS — Z01.818 PRE-OPERATIVE CLEARANCE: Primary | ICD-10-CM

## 2020-11-13 ENCOUNTER — LAB (OUTPATIENT)
Dept: LAB | Facility: HOSPITAL | Age: 55
End: 2020-11-13

## 2020-11-13 DIAGNOSIS — Z01.818 PRE-OPERATIVE CLEARANCE: ICD-10-CM

## 2020-11-13 PROCEDURE — C9803 HOPD COVID-19 SPEC COLLECT: HCPCS

## 2020-11-13 PROCEDURE — U0004 COV-19 TEST NON-CDC HGH THRU: HCPCS | Performed by: OPHTHALMOLOGY

## 2020-11-14 LAB — SARS-COV-2 RNA RESP QL NAA+PROBE: NOT DETECTED

## 2020-11-30 ENCOUNTER — PREP FOR SURGERY (OUTPATIENT)
Dept: OTHER | Facility: HOSPITAL | Age: 55
End: 2020-11-30

## 2020-11-30 ENCOUNTER — OFFICE VISIT (OUTPATIENT)
Dept: NEUROSURGERY | Facility: CLINIC | Age: 55
End: 2020-11-30

## 2020-11-30 VITALS
DIASTOLIC BLOOD PRESSURE: 82 MMHG | WEIGHT: 157 LBS | HEIGHT: 70 IN | SYSTOLIC BLOOD PRESSURE: 130 MMHG | BODY MASS INDEX: 22.48 KG/M2

## 2020-11-30 DIAGNOSIS — M48.02 SPINAL STENOSIS IN CERVICAL REGION: Primary | ICD-10-CM

## 2020-11-30 DIAGNOSIS — Z79.01 ON CLOPIDOGREL THERAPY: ICD-10-CM

## 2020-11-30 DIAGNOSIS — Z72.0 TOBACCO USE: ICD-10-CM

## 2020-11-30 DIAGNOSIS — I20.0 UNSTABLE ANGINA (HCC): ICD-10-CM

## 2020-11-30 PROCEDURE — 99214 OFFICE O/P EST MOD 30 MIN: CPT | Performed by: PHYSICIAN ASSISTANT

## 2020-11-30 RX ORDER — SODIUM CHLORIDE, SODIUM LACTATE, POTASSIUM CHLORIDE, CALCIUM CHLORIDE 600; 310; 30; 20 MG/100ML; MG/100ML; MG/100ML; MG/100ML
100 INJECTION, SOLUTION INTRAVENOUS CONTINUOUS
Status: CANCELLED | OUTPATIENT
Start: 2020-11-30

## 2020-11-30 RX ORDER — SODIUM CHLORIDE 0.9 % (FLUSH) 0.9 %
3-10 SYRINGE (ML) INJECTION AS NEEDED
Status: CANCELLED | OUTPATIENT
Start: 2020-11-30

## 2020-11-30 RX ORDER — SODIUM CHLORIDE 0.9 % (FLUSH) 0.9 %
3 SYRINGE (ML) INJECTION EVERY 12 HOURS SCHEDULED
Status: CANCELLED | OUTPATIENT
Start: 2020-11-30

## 2020-11-30 RX ORDER — CHLORHEXIDINE GLUCONATE 4 G/100ML
SOLUTION TOPICAL
Qty: 120 ML | Refills: 0 | Status: SHIPPED | OUTPATIENT
Start: 2020-11-30 | End: 2021-02-24

## 2020-11-30 NOTE — PROGRESS NOTES
Patient: Javier Santiago  : 1965  Gender: male    Primary Care Provider: Lorene Velarde MD    Chief Complaint: Neck and upper extremity pain    History of Present Illness:  Mr. Santiago is a 55-year-old disabled gentleman who is known to Dr. Lema.  He underwent a cervical myelogram on 3/20/2019 which demonstrated multilevel disc narrowing and facet closure with foraminal closure particularly at C4-5 and C5-6.  In the absence of nuclear complaints at that time no intervention was recommended.  He presents today for evaluation of worsening symptoms.    Patient states that over the last 6-9 months he has developed increased pain in his neck and upper extremities.  Pain extends from his posterior cervical region and radiates to his hands, particularly his thumbs bilaterally.  He also reports some vague numbness in his arms and hands.  He reports sensory alteration in his left lower extremity as well which he has attributed to fibromyalgia in the past.  He denies overt gait disturbance, bowel or bladder difficulties.  He has tried physical therapy in the last year or so to no avail.  He presents today with a cervical MRI.      Past Medical and Surgical History:  Past Medical History:   Diagnosis Date   • Arthritis     psoriatic   • ASCVD (arteriosclerotic cardiovascular disease)    • CHF (congestive heart failure) (CMS/[facility])    • Chronic low back pain    • COPD (chronic obstructive pulmonary disease) (CMS/[facility])    • Coronary artery disease    • History of EKG 2016    NORMAL   • Hyperlipidemia    • Hypertension    • Hypotension    • Insomnia      Past Surgical History:   Procedure Laterality Date   • BACK SURGERY     • CARDIAC CATHETERIZATION N/A 10/13/2017    Procedure: Left Heart Cath;  Surgeon: Chester Centeno MD;  Location: [facility] INVASIVE LOCATION;  Service:    • COLONOSCOPY     • CORONARY ANGIOPLASTY WITH STENT PLACEMENT     • ENDOSCOPY     • ENDOSCOPY N/A 2018    Procedure:  ESOPHAGOGASTRODUODENOSCOPY WITH DILATATION CPT CODE: 43104;  Surgeon: Everett Villanueva III, MD;  Location: [facility] OR;  Service:        Current Medications:    Current Outpatient Medications:   •  albuterol (PROVENTIL HFA;VENTOLIN HFA) 108 (90 BASE) MCG/ACT inhaler, Inhale 2 puffs Every 4 (Four) Hours As Needed for Wheezing., Disp: , Rfl:   •  aspirin 81 MG tablet, Take 1 tablet by mouth Daily., Disp: 30 tablet, Rfl: 11  •  atorvastatin (LIPITOR) 80 MG tablet, Take 80 mg by mouth daily., Disp: , Rfl:   •  baclofen (LIORESAL) 10 MG tablet, Take 1 tablet by mouth 2 (Two) Times a Day., Disp: 60 tablet, Rfl: 0  •  clopidogrel (PLAVIX) 75 MG tablet, Take  by mouth daily., Disp: , Rfl:   •  EPINEPHrine (EPIPEN IJ), Inject  as directed., Disp: , Rfl:   •  hydrALAZINE (APRESOLINE) 25 MG tablet, Take 25 mg by mouth 3 (Three) Times a Day., Disp: , Rfl:   •  meloxicam (MOBIC) 7.5 MG tablet, Take 1 tablet by mouth 2 (Two) Times a Day., Disp: 60 tablet, Rfl: 1  •  mometasone (NASONEX) 50 MCG/ACT nasal spray, 2 sprays into each nostril Daily., Disp: , Rfl:   •  nitroglycerin (NITROSTAT) 0.4 MG SL tablet, Place 0.4 mg under the tongue Every 5 (Five) Minutes As Needed for Chest Pain. Take no more than 3 doses in 15 minutes., Disp: , Rfl:   •  pantoprazole (PROTONIX) 40 MG EC tablet, Take 1 tablet by mouth 2 (Two) Times a Day Before Meals., Disp: 60 tablet, Rfl: 5  •  SYMBICORT 80-4.5 MCG/ACT inhaler, , Disp: , Rfl:   •  ustekinumab (STELARA) 45 MG/0.5ML injection, Inject 45 mg under the skin Every 30 (Thirty) Days., Disp: , Rfl:   •  chlorhexidine (HIBICLENS) 4 % external liquid, Shower each day with solution for 5 days beginning 5 days before surgery., Disp: 120 mL, Rfl: 0  •  diazePAM (VALIUM) 5 MG tablet, Take 5 mg by mouth 2 (Two) Times a Day As Needed for Anxiety., Disp: , Rfl:   •  gabapentin (NEURONTIN) 800 MG tablet, Take 800 mg by mouth 3 (three) times a day., Disp: , Rfl:   •  HYDROcodone-acetaminophen (NORCO)  MG  "per tablet, Take 1 tablet by mouth Every 6 (Six) Hours As Needed for Moderate Pain ., Disp: , Rfl:   •  mupirocin (BACTROBAN) 2 % ointment, Apply to the inside of each nostril with a cotton swab two times daily, morning and evening, for 5 days before surgery., Disp: 15 g, Rfl: 0    Allergies:  No Known Allergies      Review of Systems   Musculoskeletal: Positive for back pain, joint swelling, neck pain and neck stiffness.   Neurological: Positive for numbness.   Psychiatric/Behavioral: Positive for sleep disturbance.         Physical Exam  Constitutional:       Appearance: Normal appearance.   HENT:      Head: Normocephalic and atraumatic.   Neck:      Musculoskeletal: Normal range of motion and neck supple.   Musculoskeletal: Normal range of motion.   Skin:     General: Skin is warm and dry.   Neurological:      Mental Status: He is alert and oriented to person, place, and time.      Motor: Motor function is intact.      Coordination: Coordination is intact. Romberg sign negative. Coordination normal.      Gait: Gait is intact.      Deep Tendon Reflexes:      Reflex Scores:       Bicep reflexes are 3+ on the right side and 3+ on the left side.       Brachioradialis reflexes are 3+ on the right side and 3+ on the left side.       Patellar reflexes are 3+ on the right side and 3+ on the left side.       Achilles reflexes are 3+ on the right side and 3+ on the left side.     Comments: Decreased  strength bilaterally  Decreased pinprick sensation over bilateral hands particularly thumb and index finger bilaterally  Positive Penn sign on the left   Psychiatric:         Mood and Affect: Mood normal.         Behavior: Behavior normal.           Vitals:    11/30/20 1329   BP: 130/82   BP Location: Left arm   Patient Position: Sitting   Cuff Size: Adult   Weight: 71.2 kg (157 lb)   Height: 177.8 cm (70\")       Patient's Body mass index is 22.53 kg/m². BMI is within normal parameters. No follow-up " required..    Imaging Review:  Cervical MRI performed 6/25/2020 demonstrates moderate-severe canal narrowing at C4-5 and C5-6 to a somewhat lesser degree at C6-7.  Mod-severe foraminal narrowing is noted C4-5 and C5-6 and C6-7. Signal change is noted within the cord at the C4-6 levels.    Assessment:  1.  Cervical stenosis  2.  Cervical myelopathy    Plan:  Mr. Santiago is seen today for evaluation of worsening neck and upper extremity pain.  I reviewed his cervical imaging with Dr. Lema which demonstrates multilevel stenosis as well as signal change within the spinal cord at the C4-6 levels. This has progressed since myelogram imaging from March 2019.  Patient's examination is consistent with myelopathy.  As such Dr. Lema has recommended decompression C4-6 possibly to include C7.  The procedure as well as associated risks, possible complications and limitations were discussed at length. Patient elects to proceed.     Due to his cardiac history he will require formal cardiac clearance. He has previously been established with a cardiologist in [place].  I have placed a new referral if he should need this.  We will begin surgical planning once cardiac clearance arranged.  He will call with any questions, new/worsening or progressive symptoms in the interim.    Patient was counseled at length on the importance of smoking cessation for his best surgical outcome.     Naye Irving PA-C

## 2020-12-15 ENCOUNTER — TRANSCRIBE ORDERS (OUTPATIENT)
Dept: ADMINISTRATIVE | Facility: HOSPITAL | Age: 55
End: 2020-12-15

## 2020-12-15 DIAGNOSIS — Z01.818 OTHER SPECIFIED PRE-OPERATIVE EXAMINATION: Primary | ICD-10-CM

## 2020-12-17 NOTE — TELEPHONE ENCOUNTER
Provider:  Torey    Caller:   patient  Time of call:   8:53 am  Phone #:  [phone]  Surgery:  -----  Surgery Date:  ------  Last visit: 11/30/2020    Next visit: -----    JON:    -----     Reason for call:       Pt  Called asking if Dr. Lema would write him some pain medication until his next appointment. Pt states that Dr. Velarde is not allowed to write for pain medications  any longer.  Please advise.

## 2020-12-17 NOTE — TELEPHONE ENCOUNTER
09/21/2020 Gabapentin 600MG 1965 90 30 GREEN [facility] [place] 2  09/21/2020 Hydrocodone/Acetaminophen  325MG/10MG  1965 55 30 GREEN [facility] [place] [facility] 2  10/21/2020 Gabapentin 600MG 1965 90 30 [facility] [place] 2  10/21/2020 Hydrocodone/Acetaminophen  325MG/10MG  1965 [age] GREEN [facility] [place] [facility] 2  11/18/2020 Gabapentin 600MG 1965 90 30 GREEN [facility] [place] 3  11/18/2020 Hydrocodone/Acetaminophen  325MG/10MG  1965 [age] [facility] [place] [facility] 3

## 2020-12-18 ENCOUNTER — LAB (OUTPATIENT)
Dept: LAB | Facility: HOSPITAL | Age: 55
End: 2020-12-18

## 2020-12-18 DIAGNOSIS — Z01.818 OTHER SPECIFIED PRE-OPERATIVE EXAMINATION: ICD-10-CM

## 2020-12-18 PROCEDURE — U0004 COV-19 TEST NON-CDC HGH THRU: HCPCS | Performed by: OPHTHALMOLOGY

## 2020-12-18 PROCEDURE — C9803 HOPD COVID-19 SPEC COLLECT: HCPCS

## 2020-12-19 LAB — SARS-COV-2 RNA RESP QL NAA+PROBE: NOT DETECTED

## 2020-12-21 RX ORDER — HYDROCODONE BITARTRATE AND ACETAMINOPHEN 10; 325 MG/1; MG/1
1 TABLET ORAL EVERY 8 HOURS PRN
Qty: 40 TABLET | Refills: 0 | Status: SHIPPED | OUTPATIENT
Start: 2020-12-21 | End: 2021-04-23

## 2020-12-22 ENCOUNTER — TELEPHONE (OUTPATIENT)
Dept: NEUROSURGERY | Facility: CLINIC | Age: 55
End: 2020-12-22

## 2020-12-22 NOTE — TELEPHONE ENCOUNTER
Called Javier to confirm if he wanted  or mailed prescription. Pt. Stated he needed it mailed, verified address. Going out today.

## 2020-12-29 ENCOUNTER — OFFICE VISIT (OUTPATIENT)
Dept: CARDIOLOGY | Facility: CLINIC | Age: 55
End: 2020-12-29

## 2020-12-29 VITALS
DIASTOLIC BLOOD PRESSURE: 93 MMHG | SYSTOLIC BLOOD PRESSURE: 142 MMHG | OXYGEN SATURATION: 96 % | BODY MASS INDEX: 23.51 KG/M2 | HEART RATE: 84 BPM | WEIGHT: 164.2 LBS | HEIGHT: 70 IN

## 2020-12-29 DIAGNOSIS — I10 ESSENTIAL HYPERTENSION: Primary | ICD-10-CM

## 2020-12-29 DIAGNOSIS — I25.10 CORONARY ARTERY DISEASE INVOLVING NATIVE CORONARY ARTERY OF NATIVE HEART WITHOUT ANGINA PECTORIS: ICD-10-CM

## 2020-12-29 DIAGNOSIS — R06.02 SHORTNESS OF BREATH: ICD-10-CM

## 2020-12-29 DIAGNOSIS — Z01.810 PREOPERATIVE CARDIOVASCULAR EXAMINATION: ICD-10-CM

## 2020-12-29 DIAGNOSIS — E78.5 DYSLIPIDEMIA: ICD-10-CM

## 2020-12-29 PROCEDURE — 99204 OFFICE O/P NEW MOD 45 MIN: CPT | Performed by: INTERNAL MEDICINE

## 2020-12-29 RX ORDER — METOPROLOL SUCCINATE 25 MG/1
25 TABLET, EXTENDED RELEASE ORAL DAILY
Qty: 90 TABLET | Refills: 1 | Status: SHIPPED | OUTPATIENT
Start: 2020-12-29

## 2020-12-29 RX ORDER — LORATADINE 10 MG/1
CAPSULE, LIQUID FILLED ORAL
COMMUNITY
End: 2021-04-23

## 2020-12-29 RX ORDER — HYDROXYZINE HYDROCHLORIDE 25 MG/1
25 TABLET, FILM COATED ORAL EVERY 8 HOURS PRN
COMMUNITY
Start: 2020-12-16

## 2020-12-29 RX ORDER — DULOXETIN HYDROCHLORIDE 30 MG/1
30 CAPSULE, DELAYED RELEASE ORAL DAILY
COMMUNITY

## 2020-12-29 RX ORDER — METOPROLOL SUCCINATE 25 MG/1
25 TABLET, EXTENDED RELEASE ORAL DAILY
COMMUNITY
End: 2020-12-29 | Stop reason: SDUPTHER

## 2020-12-29 NOTE — PROGRESS NOTES
[facility] CARDIOLOGY  [address] [place] [address]  Phone: [phone]  Fax: [phone]    12/29/2020    Chief Complaint   Patient presents with   • Coronary Artery Disease   • Hyperlipidemia   • Hypertension        History:   Javier Santiago is a 55 y.o. male seen in follow for surgical clearance for cervical spine surgery.  He has a past medical history significant for CAD, essential hypertension, chronic tobacco abuse, dyslipidemia, and COPD. He is a current every day smoker. He smokes o.5 to 1 ppd. Denies to quit at this time.   The chart and medications were reviewed today. Problems above addressed this visit.        Past Medical History:   Diagnosis Date   • Arthritis     psoriatic   • ASCVD (arteriosclerotic cardiovascular disease)    • CHF (congestive heart failure) (CMS/[facility])    • Chronic low back pain    • COPD (chronic obstructive pulmonary disease) (CMS/[facility])    • Coronary artery disease    • History of EKG 01/26/2016    NORMAL   • Hyperlipidemia    • Hypertension    • Hypotension    • Insomnia        Past Surgical History:   Procedure Laterality Date   • BACK SURGERY     • CARDIAC CATHETERIZATION N/A 10/13/2017    Procedure: Left Heart Cath;  Surgeon: Chester Centeno MD;  Location: [facility] CATH INVASIVE LOCATION;  Service:    • COLONOSCOPY     • CORONARY ANGIOPLASTY WITH STENT PLACEMENT     • ENDOSCOPY     • ENDOSCOPY N/A 2/9/2018    Procedure: ESOPHAGOGASTRODUODENOSCOPY WITH DILATATION CPT CODE: 09607;  Surgeon: Everett Villanueva III, MD;  Location: [facility] OR;  Service:         Past Social History:  Social History     Socioeconomic History   • Marital status:      Spouse name: Coretta hBatt   • Number of children: Not on file   • Years of education: Not on file   • Highest education level: Not on file   Tobacco Use   • Smoking status: Current Every Day Smoker     Packs/day: 0.25     Years: 35.00     Pack years: 8.75     Types: Cigarettes   • Smokeless  tobacco: Current User     Types: Chew   Substance and Sexual Activity   • Alcohol use: No   • Drug use: No   • Sexual activity: Defer   Social History Narrative    Live in [place] with ex wife and disabled son       Past Family History:  Family History   Problem Relation Age of Onset   • Heart attack Mother         x 3   • Atrial fibrillation Mother    • Heart disease Mother    • Heart attack Father    • Heart disease Father         CABG       Review of Systems:   Review of Systems   Constitution: Negative for diaphoresis, fever, weight gain and weight loss.   HENT: Negative for congestion, nosebleeds and sore throat.    Eyes: Negative for blurred vision and visual disturbance.   Cardiovascular: Negative for chest pain, claudication, dyspnea on exertion, irregular heartbeat, leg swelling, orthopnea, palpitations, paroxysmal nocturnal dyspnea and syncope.   Respiratory: Negative for cough, hemoptysis, shortness of breath, sleep disturbances due to breathing, snoring, sputum production and wheezing.    Endocrine: Negative for cold intolerance, heat intolerance, polydipsia, polyphagia and polyuria.   Hematologic/Lymphatic: Negative for bleeding problem. Bruises/bleeds easily.   Skin: Negative for dry skin, itching and rash.   Musculoskeletal: Negative for muscle cramps, muscle weakness and myalgias.   Gastrointestinal: Negative for abdominal pain, constipation, diarrhea, heartburn, hematemesis, hematochezia, hemorrhoids, melena, nausea and vomiting.   Genitourinary: Negative for hematuria and nocturia.   Neurological: Negative for excessive daytime sleepiness, dizziness, focal weakness, headaches, light-headedness, numbness, seizures, vertigo and weakness.   Psychiatric/Behavioral: Negative for depression, substance abuse and suicidal ideas.   Allergic/Immunologic: Negative for environmental allergies.          Current Outpatient Medications   Medication Sig Dispense Refill   • aspirin 81 MG tablet Take 1 tablet  by mouth Daily. (Patient taking differently: Take 325 mg by mouth Daily.) 30 tablet 11   • atorvastatin (LIPITOR) 80 MG tablet Take 80 mg by mouth daily.     • clopidogrel (PLAVIX) 75 MG tablet Take  by mouth daily.     • DULoxetine (CYMBALTA) 30 MG capsule Take 30 mg by mouth Daily.     • hydrOXYzine (ATARAX) 25 MG tablet      • Loratadine 10 MG capsule Take  by mouth.     • metoprolol succinate XL (TOPROL-XL) 25 MG 24 hr tablet Take 1 tablet by mouth Daily. 90 tablet 1   • pantoprazole (PROTONIX) 40 MG EC tablet Take 1 tablet by mouth 2 (Two) Times a Day Before Meals. 60 tablet 5   • albuterol (PROVENTIL HFA;VENTOLIN HFA) 108 (90 BASE) MCG/ACT inhaler Inhale 2 puffs Every 4 (Four) Hours As Needed for Wheezing.     • baclofen (LIORESAL) 10 MG tablet Take 1 tablet by mouth 2 (Two) Times a Day. 60 tablet 0   • chlorhexidine (HIBICLENS) 4 % external liquid Shower each day with solution for 5 days beginning 5 days before surgery. 120 mL 0   • EPINEPHrine (EPIPEN IJ) Inject  as directed.     • hydrALAZINE (APRESOLINE) 25 MG tablet Take 25 mg by mouth 3 (Three) Times a Day.     • HYDROcodone-acetaminophen (NORCO)  MG per tablet Take 1 tablet by mouth Every 8 (Eight) Hours As Needed for Moderate Pain . 40 tablet 0   • meloxicam (MOBIC) 7.5 MG tablet Take 1 tablet by mouth 2 (Two) Times a Day. 60 tablet 1   • mometasone (NASONEX) 50 MCG/ACT nasal spray 2 sprays into each nostril Daily.     • mupirocin (BACTROBAN) 2 % ointment Apply to the inside of each nostril with a cotton swab two times daily, morning and evening, for 5 days before surgery. 15 g 0   • nitroglycerin (NITROSTAT) 0.4 MG SL tablet Place 0.4 mg under the tongue Every 5 (Five) Minutes As Needed for Chest Pain. Take no more than 3 doses in 15 minutes.     • SYMBICORT 80-4.5 MCG/ACT inhaler      • ustekinumab (STELARA) 45 MG/0.5ML injection Inject 45 mg under the skin Every 30 (Thirty) Days.       No current facility-administered medications for this  "visit.         No Known Allergies    Objective     /93 (BP Location: Left arm)   Pulse 84   Ht 177.8 cm (70\")   Wt 74.5 kg (164 lb 3.2 oz)   SpO2 96%   BMI 23.56 kg/m²     Physical Exam  Constitutional:       General: He is not in acute distress.     Appearance: Normal appearance.   HENT:      Head: Normocephalic.      Nose: Nose normal.      Mouth/Throat:      Pharynx: Oropharynx is clear.   Eyes:      Pupils: Pupils are equal, round, and reactive to light.   Neck:      Musculoskeletal: Normal range of motion and neck supple.   Cardiovascular:      Rate and Rhythm: Normal rate and regular rhythm.      Pulses: Normal pulses.      Heart sounds: Normal heart sounds.   Pulmonary:      Effort: Pulmonary effort is normal.      Breath sounds: Normal breath sounds.   Abdominal:      General: Bowel sounds are normal.      Palpations: Abdomen is soft.   Musculoskeletal:         General: No swelling.   Skin:     Findings: No bruising or lesion.   Neurological:      General: No focal deficit present.      Mental Status: He is alert and oriented to person, place, and time.   Psychiatric:         Mood and Affect: Mood normal.         Behavior: Behavior normal.         Judgment: Judgment normal.            DATA:      Results for orders placed during the hospital encounter of 02/20/18   Adult Transthoracic Echo Complete W/ Cont if Necessary Per Protocol    Narrative · Mild mitral valve regurgitation is present  · Estimated EF appears to be in the range of 56 - 60%. Normal left   ventricular cavity size noted. All left ventricular wall segments contract   normally.  · Mild mitral valve regurgitation is present.  · Trace tricuspid valve regurgitation is present. Estimated right   ventricular systolic pressure from tricuspid regurgitation is mildly   elevated (35-45 mmHg).         Results for orders placed in visit on 02/10/17   Stress Test With Myocardial Perfusion One Day    Narrative · Impressions are consistent with a " low risk study.  · Left ventricular ejection fraction is normal (Calculated EF = 67%).  · Myocardial perfusion imaging indicates a normal myocardial perfusion   study with no evidence of ischemia.  · Gated images show normal wall motion.         Results for orders placed in visit on 02/10/17   Stress Test With Myocardial Perfusion One Day    Narrative · Impressions are consistent with a low risk study.  · Left ventricular ejection fraction is normal (Calculated EF = 67%).  · Myocardial perfusion imaging indicates a normal myocardial perfusion   study with no evidence of ischemia.  · Gated images show normal wall motion.         Results for orders placed during the hospital encounter of 10/13/17   Cardiac Catheterization/Vascular Study    Narrative · Right dominant coronary system without hemodynamically significant   coronary artery disease.     Final impression  Right dominant coronary artery system without hemodynamically significant   coronary artery disease    Procedures performed:  Coronary angiography    History of present illness:  Javier is a very pleasant 52-year-old gentleman who presents with a history   of known coronary artery disease status post myocardial infarction.  He   previously underwent stenting of his LAD and has had recurrent progressive   chest discomfort.  Because of his symptoms he underwent stress testing   which revealed a fixed defect in his anterior territory.  He was maximized   on medical therapy however continued to have chest discomfort and as such   was referred for coronary angiography.  Prior to the procedure the patient   was given informed consent apprising him of the risk of heart attack,   stroke and death.  The patient understood these risks and agreed to   proceed.    Procedure in detail:  The patient was brought to the cardiac catheterization laboratory and   prepped and draped in the usual sterile fashion.  Adequate anesthesia was   obtained by infiltrating the right wrist  with local lidocaine.  Using a   modified Seldinger technique a 5 [place]ian sheath was placed in the right   radial artery.  A 5 [place]ian Negrito catheter was used to engage the ostium of   the left main coronary artery and angiography was performed in varying   views using hand injection of contrast material.  After this a 5 [place]ian   Negrito catheter was used to engage the ostium of the right coronary artery   and angiography was again performed in varying views using hand injection   of contrast material.  At the conclusion of the procedure the patient was   returned to the floor without evidence of complication.    Findings in detail:  Left Main coronary artery:  The left main coronary artery is a large vessel which bifurcates into the   LAD and circumflex arteries.  The left main coronary artery is free of   atherosclerotic disease.    Left anterior descending artery:  The left anterior descending artery is a large vessel which gives rise to   one large diagonal branch.  The LAD is noted to have a previously   implanted stent is proximal segment.  The stent is widely patent without   evidence of significant in-stent restenosis.    Circumflex artery:  The circumflex artery is a large vessel which gives rise to 2 obtuse   marginal branches the first of which is very large the next of which is   moderate.  The circumflex artery and its branches are free of   atherosclerotic disease.    Right coronary artery:  The right coronary artery is a large vessel gives rise to the posterior   descending artery and a posterior lateral branch.  The right coronary   artery and its branches are free of atherosclerotic disease.    Final impression and plan:  Overall it is my impression that Javier does not have hemodynamically   significant coronary artery disease and will undergo risk factor   modification as appropriate.       Procedures     Lab Results   Component Value Date    CHOL 137 11/01/2017    CHLPL 170 01/11/2016    CHLPL 230 (H)  01/14/2014     Lab Results   Component Value Date    TRIG 95 11/01/2017    TRIG 234 (H) 01/11/2016    TRIG 323 (H) 01/14/2014     Lab Results   Component Value Date    HDL 40 (L) 11/01/2017    HDL 25 (L) 01/11/2016     Lab Results   Component Value Date    LDL 78 11/01/2017    LDL 98 01/11/2016     Assessment and Plan      ASCVD with status post PCI to the LAD  Hypertension is stable  Intermittent episodes of shortness of breath.   Chronic tobacco abuse. Smoke 0.5 to 1 ppd.     Inability to walk on a treadmill due to back pain and pre-op exam for cervical spine surgery.   Will proceed with a Lexiscan stress test, echocardiogram, CMP, Lipid panel, and TSH  Metoprolol succinate 25 mg daily.  Follow up in 3 monhts      Recommended increase activity to 30 minutes of walking daily, most days of the week.  Counseled the patient for 5 regarding smoking cessation.  Discussed tobacco use relationship to cardiac disease progression.  Offered smoking cessation medications. Denies to quit  Discussed diet and weight loss with patient.        Patient's Body mass index is 23.56 kg/m². BMI is within normal parameters. No follow-up required..       Return in about 3 months (around 3/29/2021).    Thank you for allowing me to participate in the care of Javier Santiago. Feel free to contact me directly with any further questions or concerns.        This document has been electronically signed by Brent Degroot MD  January 11, 2021 14:34 EST    Brent Degroot MD, [facility]  Interventional Cardiology    ANGELICA Liz, acting as scribe for Brent Degroot MD, [facility]    12/29/20  16:31 EST

## 2021-01-06 ENCOUNTER — TELEPHONE (OUTPATIENT)
Dept: CARDIOLOGY | Facility: CLINIC | Age: 56
End: 2021-01-06

## 2021-01-06 NOTE — TELEPHONE ENCOUNTER
Connie huerta/Dr. Lema office contacted office re: cardiac clearance and pre-procedure instructions for Plavix.    Patient was seen 12/29, and is scheduled for stress/echo 1/20/21 to determine cardiac clearance.     Once tests are completed, determination will be made by Dr. Mukherjee and scanned into patient's chart.    [facility]

## 2021-01-20 ENCOUNTER — HOSPITAL ENCOUNTER (OUTPATIENT)
Dept: CARDIOLOGY | Facility: HOSPITAL | Age: 56
Discharge: HOME | End: 2021-01-20

## 2021-01-20 ENCOUNTER — HOSPITAL ENCOUNTER (OUTPATIENT)
Dept: NUCLEAR MEDICINE | Facility: HOSPITAL | Age: 56
Discharge: HOME | End: 2021-01-20

## 2021-01-20 DIAGNOSIS — R06.02 SHORTNESS OF BREATH: ICD-10-CM

## 2021-01-20 DIAGNOSIS — I25.10 CORONARY ARTERY DISEASE INVOLVING NATIVE CORONARY ARTERY OF NATIVE HEART WITHOUT ANGINA PECTORIS: ICD-10-CM

## 2021-01-20 DIAGNOSIS — Z01.810 PREOPERATIVE CARDIOVASCULAR EXAMINATION: ICD-10-CM

## 2021-01-20 DIAGNOSIS — E78.5 DYSLIPIDEMIA: ICD-10-CM

## 2021-01-20 DIAGNOSIS — I10 ESSENTIAL HYPERTENSION: ICD-10-CM

## 2021-01-20 PROCEDURE — 78452 HT MUSCLE IMAGE SPECT MULT: CPT

## 2021-01-20 PROCEDURE — 25010000002 REGADENOSON 0.4 MG/5ML SOLUTION: Performed by: INTERNAL MEDICINE

## 2021-01-20 PROCEDURE — 93017 CV STRESS TEST TRACING ONLY: CPT

## 2021-01-20 PROCEDURE — 93306 TTE W/DOPPLER COMPLETE: CPT

## 2021-01-20 PROCEDURE — 93018 CV STRESS TEST I&R ONLY: CPT | Performed by: INTERNAL MEDICINE

## 2021-01-20 PROCEDURE — 78452 HT MUSCLE IMAGE SPECT MULT: CPT | Performed by: INTERNAL MEDICINE

## 2021-01-20 PROCEDURE — A9500 TC99M SESTAMIBI: HCPCS | Performed by: INTERNAL MEDICINE

## 2021-01-20 PROCEDURE — 93306 TTE W/DOPPLER COMPLETE: CPT | Performed by: INTERNAL MEDICINE

## 2021-01-20 PROCEDURE — 0 TECHNETIUM SESTAMIBI: Performed by: INTERNAL MEDICINE

## 2021-01-20 RX ADMIN — TECHNETIUM TC 99M SESTAMIBI 1 DOSE: 1 INJECTION INTRAVENOUS at 10:34

## 2021-01-20 RX ADMIN — REGADENOSON 0.4 MG: 0.08 INJECTION, SOLUTION INTRAVENOUS at 10:34

## 2021-01-20 RX ADMIN — TECHNETIUM TC 99M SESTAMIBI 1 DOSE: 1 INJECTION INTRAVENOUS at 08:50

## 2021-01-23 LAB
BH CV ECHO MEAS - % IVS THICK: -6.4 %
BH CV ECHO MEAS - % LVPW THICK: 11.8 %
BH CV ECHO MEAS - ACS: 2.4 CM
BH CV ECHO MEAS - AO MAX PG: 4.5 MMHG
BH CV ECHO MEAS - AO MEAN PG: 2 MMHG
BH CV ECHO MEAS - AO ROOT AREA (BSA CORRECTED): 1.3
BH CV ECHO MEAS - AO ROOT AREA: 4.9 CM^2
BH CV ECHO MEAS - AO ROOT DIAM: 2.5 CM
BH CV ECHO MEAS - AO V2 MAX: 106 CM/SEC
BH CV ECHO MEAS - AO V2 MEAN: 65.3 CM/SEC
BH CV ECHO MEAS - AO V2 VTI: 21 CM
BH CV ECHO MEAS - BSA(HAYCOCK): 1.9 M^2
BH CV ECHO MEAS - BSA: 1.9 M^2
BH CV ECHO MEAS - BZI_BMI: 23.5 KILOGRAMS/M^2
BH CV ECHO MEAS - BZI_METRIC_HEIGHT: 177.8 CM
BH CV ECHO MEAS - BZI_METRIC_WEIGHT: 74.4 KG
BH CV ECHO MEAS - EDV(CUBED): 133 ML
BH CV ECHO MEAS - EDV(MOD-SP4): 84.3 ML
BH CV ECHO MEAS - EDV(TEICH): 124.1 ML
BH CV ECHO MEAS - EF(CUBED): 77.4 %
BH CV ECHO MEAS - EF(MOD-SP4): 64.8 %
BH CV ECHO MEAS - EF(TEICH): 69.2 %
BH CV ECHO MEAS - ESV(CUBED): 30.1 ML
BH CV ECHO MEAS - ESV(MOD-SP4): 29.7 ML
BH CV ECHO MEAS - ESV(TEICH): 38.2 ML
BH CV ECHO MEAS - FS: 39.1 %
BH CV ECHO MEAS - IVS/LVPW: 1
BH CV ECHO MEAS - IVSD: 1 CM
BH CV ECHO MEAS - IVSS: 0.97 CM
BH CV ECHO MEAS - LA DIMENSION: 2.8 CM
BH CV ECHO MEAS - LA/AO: 1.1
BH CV ECHO MEAS - LV DIASTOLIC VOL/BSA (35-75): 43.9 ML/M^2
BH CV ECHO MEAS - LV MASS(C)D: 191.5 GRAMS
BH CV ECHO MEAS - LV MASS(C)DI: 99.8 GRAMS/M^2
BH CV ECHO MEAS - LV MASS(C)S: 91.6 GRAMS
BH CV ECHO MEAS - LV MASS(C)SI: 47.8 GRAMS/M^2
BH CV ECHO MEAS - LV SYSTOLIC VOL/BSA (12-30): 15.5 ML/M^2
BH CV ECHO MEAS - LVIDD: 5.1 CM
BH CV ECHO MEAS - LVIDS: 3.1 CM
BH CV ECHO MEAS - LVLD AP4: 7.2 CM
BH CV ECHO MEAS - LVLS AP4: 5.7 CM
BH CV ECHO MEAS - LVOT AREA (M): 3.8 CM^2
BH CV ECHO MEAS - LVOT AREA: 3.8 CM^2
BH CV ECHO MEAS - LVOT DIAM: 2.2 CM
BH CV ECHO MEAS - LVPWD: 0.99 CM
BH CV ECHO MEAS - LVPWS: 1.1 CM
BH CV ECHO MEAS - MV A MAX VEL: 57.8 CM/SEC
BH CV ECHO MEAS - MV E MAX VEL: 91.3 CM/SEC
BH CV ECHO MEAS - MV E/A: 1.6
BH CV ECHO MEAS - PA ACC TIME: 0.08 SEC
BH CV ECHO MEAS - PA PR(ACCEL): 42.6 MMHG
BH CV ECHO MEAS - RAP SYSTOLE: 10 MMHG
BH CV ECHO MEAS - RVSP: 23.8 MMHG
BH CV ECHO MEAS - SI(AO): 53.7 ML/M^2
BH CV ECHO MEAS - SI(CUBED): 53.7 ML/M^2
BH CV ECHO MEAS - SI(MOD-SP4): 28.5 ML/M^2
BH CV ECHO MEAS - SI(TEICH): 44.8 ML/M^2
BH CV ECHO MEAS - SV(AO): 103.1 ML
BH CV ECHO MEAS - SV(CUBED): 103 ML
BH CV ECHO MEAS - SV(MOD-SP4): 54.6 ML
BH CV ECHO MEAS - SV(TEICH): 85.9 ML
BH CV ECHO MEAS - TR MAX VEL: 186 CM/SEC
BH CV NUCLEAR PRIOR STUDY: 3
BH CV STRESS BP STAGE 1: NORMAL
BH CV STRESS BP STAGE 2: NORMAL
BH CV STRESS COMMENTS STAGE 1: NORMAL
BH CV STRESS COMMENTS STAGE 2: NORMAL
BH CV STRESS DOSE REGADENOSON STAGE 1: 0.4
BH CV STRESS DURATION MIN STAGE 1: 0
BH CV STRESS DURATION MIN STAGE 2: 4
BH CV STRESS DURATION SEC STAGE 1: 10
BH CV STRESS DURATION SEC STAGE 2: 0
BH CV STRESS HR STAGE 1: 80
BH CV STRESS HR STAGE 2: 61
BH CV STRESS PROTOCOL 1: NORMAL
BH CV STRESS RECOVERY BP: NORMAL MMHG
BH CV STRESS RECOVERY HR: 61 BPM
BH CV STRESS STAGE 1: 1
BH CV STRESS STAGE 2: 2
LV EF NUC BP: 60 %
MAXIMAL PREDICTED HEART RATE: 165 BPM
MAXIMAL PREDICTED HEART RATE: 165 BPM
PERCENT MAX PREDICTED HR: 48.48 %
STRESS BASELINE BP: NORMAL MMHG
STRESS BASELINE HR: 59 BPM
STRESS PERCENT HR: 57 %
STRESS POST PEAK BP: NORMAL MMHG
STRESS POST PEAK HR: 80 BPM
STRESS TARGET HR: 140 BPM
STRESS TARGET HR: 140 BPM

## 2021-01-28 ENCOUNTER — TELEPHONE (OUTPATIENT)
Dept: CARDIOLOGY | Facility: CLINIC | Age: 56
End: 2021-01-28

## 2021-01-28 NOTE — TELEPHONE ENCOUNTER
----- Message from Brent Degroot MD sent at 1/25/2021 10:37 AM EST -----  Please call the patient regarding his abnormal result. It could be secondary to his previous heart attack vs new blockages, his echo was normal, ask him if he has any symptoms of chest pain or sob if yes he will need repeat cath prior to his surgery      Patient notified of test results and will schedule LHC on 2-

## 2021-02-02 DIAGNOSIS — I25.10 CORONARY ARTERY DISEASE INVOLVING NATIVE CORONARY ARTERY OF NATIVE HEART WITHOUT ANGINA PECTORIS: ICD-10-CM

## 2021-02-02 DIAGNOSIS — R06.02 SHORTNESS OF BREATH: ICD-10-CM

## 2021-02-02 DIAGNOSIS — Z01.810 PREOPERATIVE CARDIOVASCULAR EXAMINATION: ICD-10-CM

## 2021-02-02 DIAGNOSIS — I20.9 ANGINA, CLASS II (HCC): Primary | ICD-10-CM

## 2021-02-02 DIAGNOSIS — E78.5 DYSLIPIDEMIA: ICD-10-CM

## 2021-02-02 DIAGNOSIS — I10 ESSENTIAL HYPERTENSION: ICD-10-CM

## 2021-02-02 DIAGNOSIS — Z01.818 OTHER SPECIFIED PRE-OPERATIVE EXAMINATION: ICD-10-CM

## 2021-02-09 ENCOUNTER — LAB (OUTPATIENT)
Dept: LAB | Facility: HOSPITAL | Age: 56
End: 2021-02-09

## 2021-02-09 DIAGNOSIS — R06.02 SHORTNESS OF BREATH: ICD-10-CM

## 2021-02-09 DIAGNOSIS — I10 ESSENTIAL HYPERTENSION: ICD-10-CM

## 2021-02-09 DIAGNOSIS — Z01.810 PREOPERATIVE CARDIOVASCULAR EXAMINATION: ICD-10-CM

## 2021-02-09 DIAGNOSIS — E78.5 DYSLIPIDEMIA: ICD-10-CM

## 2021-02-09 DIAGNOSIS — Z01.818 OTHER SPECIFIED PRE-OPERATIVE EXAMINATION: ICD-10-CM

## 2021-02-09 DIAGNOSIS — I20.9 ANGINA, CLASS II (HCC): ICD-10-CM

## 2021-02-09 DIAGNOSIS — I25.10 CORONARY ARTERY DISEASE INVOLVING NATIVE CORONARY ARTERY OF NATIVE HEART WITHOUT ANGINA PECTORIS: ICD-10-CM

## 2021-02-09 PROCEDURE — C9803 HOPD COVID-19 SPEC COLLECT: HCPCS

## 2021-02-09 PROCEDURE — U0004 COV-19 TEST NON-CDC HGH THRU: HCPCS

## 2021-02-10 LAB — SARS-COV-2 RNA RESP QL NAA+PROBE: NOT DETECTED

## 2021-02-11 ENCOUNTER — HOSPITAL ENCOUNTER (OUTPATIENT)
Facility: HOSPITAL | Age: 56
Setting detail: HOSPITAL OUTPATIENT SURGERY
Discharge: HOME | End: 2021-02-11
Attending: INTERNAL MEDICINE | Admitting: INTERNAL MEDICINE

## 2021-02-11 VITALS
HEIGHT: 70 IN | OXYGEN SATURATION: 99 % | DIASTOLIC BLOOD PRESSURE: 84 MMHG | TEMPERATURE: 98.2 F | HEART RATE: 57 BPM | SYSTOLIC BLOOD PRESSURE: 127 MMHG | RESPIRATION RATE: 18 BRPM | BODY MASS INDEX: 23.19 KG/M2 | WEIGHT: 162 LBS

## 2021-02-11 DIAGNOSIS — R06.02 SHORTNESS OF BREATH: ICD-10-CM

## 2021-02-11 DIAGNOSIS — I10 ESSENTIAL HYPERTENSION: ICD-10-CM

## 2021-02-11 DIAGNOSIS — I25.10 CORONARY ARTERY DISEASE INVOLVING NATIVE CORONARY ARTERY OF NATIVE HEART WITHOUT ANGINA PECTORIS: ICD-10-CM

## 2021-02-11 DIAGNOSIS — Z01.810 PREOPERATIVE CARDIOVASCULAR EXAMINATION: ICD-10-CM

## 2021-02-11 DIAGNOSIS — I20.9 ANGINA, CLASS II (HCC): ICD-10-CM

## 2021-02-11 DIAGNOSIS — E78.5 DYSLIPIDEMIA: ICD-10-CM

## 2021-02-11 LAB
ALBUMIN SERPL-MCNC: 4.3 G/DL (ref 3.5–5.2)
ALBUMIN/GLOB SERPL: 1.4 G/DL
ALP SERPL-CCNC: 145 U/L (ref 39–117)
ALT SERPL W P-5'-P-CCNC: 21 U/L (ref 1–41)
ANION GAP SERPL CALCULATED.3IONS-SCNC: 10.8 MMOL/L (ref 5–15)
APTT PPP: 29.6 SECONDS (ref 25.6–35.3)
AST SERPL-CCNC: 21 U/L (ref 1–40)
BASOPHILS # BLD AUTO: 0.13 10*3/MM3 (ref 0–0.2)
BASOPHILS NFR BLD AUTO: 1.8 % (ref 0–1.5)
BILIRUB SERPL-MCNC: 0.2 MG/DL (ref 0–1.2)
BUN SERPL-MCNC: 7 MG/DL (ref 6–20)
BUN/CREAT SERPL: 6.7 (ref 7–25)
CALCIUM SPEC-SCNC: 10.2 MG/DL (ref 8.6–10.5)
CHLORIDE SERPL-SCNC: 102 MMOL/L (ref 98–107)
CO2 SERPL-SCNC: 24.2 MMOL/L (ref 22–29)
CREAT SERPL-MCNC: 1.05 MG/DL (ref 0.76–1.27)
DEPRECATED RDW RBC AUTO: 47.8 FL (ref 37–54)
EOSINOPHIL # BLD AUTO: 0.42 10*3/MM3 (ref 0–0.4)
EOSINOPHIL NFR BLD AUTO: 5.9 % (ref 0.3–6.2)
ERYTHROCYTE [DISTWIDTH] IN BLOOD BY AUTOMATED COUNT: 13.7 % (ref 12.3–15.4)
GFR SERPL CREATININE-BSD FRML MDRD: 73 ML/MIN/1.73
GLOBULIN UR ELPH-MCNC: 3.1 GM/DL
GLUCOSE SERPL-MCNC: 118 MG/DL (ref 65–99)
HCT VFR BLD AUTO: 46.1 % (ref 37.5–51)
HGB BLD-MCNC: 15.6 G/DL (ref 13–17.7)
IMM GRANULOCYTES # BLD AUTO: 0.03 10*3/MM3 (ref 0–0.05)
IMM GRANULOCYTES NFR BLD AUTO: 0.4 % (ref 0–0.5)
INR PPP: 0.88 (ref 0.9–1.1)
LYMPHOCYTES # BLD AUTO: 2.37 10*3/MM3 (ref 0.7–3.1)
LYMPHOCYTES NFR BLD AUTO: 33.4 % (ref 19.6–45.3)
MCH RBC QN AUTO: 32 PG (ref 26.6–33)
MCHC RBC AUTO-ENTMCNC: 33.8 G/DL (ref 31.5–35.7)
MCV RBC AUTO: 94.7 FL (ref 79–97)
MONOCYTES # BLD AUTO: 0.53 10*3/MM3 (ref 0.1–0.9)
MONOCYTES NFR BLD AUTO: 7.5 % (ref 5–12)
NEUTROPHILS NFR BLD AUTO: 3.62 10*3/MM3 (ref 1.7–7)
NEUTROPHILS NFR BLD AUTO: 51 % (ref 42.7–76)
NRBC BLD AUTO-RTO: 0 /100 WBC (ref 0–0.2)
PLATELET # BLD AUTO: 345 10*3/MM3 (ref 140–450)
PMV BLD AUTO: 10.2 FL (ref 6–12)
POTASSIUM SERPL-SCNC: 3.6 MMOL/L (ref 3.5–5.2)
PROT SERPL-MCNC: 7.4 G/DL (ref 6–8.5)
PROTHROMBIN TIME: 11.7 SECONDS (ref 11.9–14.1)
RBC # BLD AUTO: 4.87 10*6/MM3 (ref 4.14–5.8)
SODIUM SERPL-SCNC: 137 MMOL/L (ref 136–145)
WBC # BLD AUTO: 7.1 10*3/MM3 (ref 3.4–10.8)

## 2021-02-11 PROCEDURE — 85610 PROTHROMBIN TIME: CPT | Performed by: INTERNAL MEDICINE

## 2021-02-11 PROCEDURE — 80053 COMPREHEN METABOLIC PANEL: CPT | Performed by: INTERNAL MEDICINE

## 2021-02-11 PROCEDURE — S0260 H&P FOR SURGERY: HCPCS | Performed by: INTERNAL MEDICINE

## 2021-02-11 PROCEDURE — 25010000002 FENTANYL CITRATE (PF) 100 MCG/2ML SOLUTION: Performed by: INTERNAL MEDICINE

## 2021-02-11 PROCEDURE — 85730 THROMBOPLASTIN TIME PARTIAL: CPT | Performed by: INTERNAL MEDICINE

## 2021-02-11 PROCEDURE — 99152 MOD SED SAME PHYS/QHP 5/>YRS: CPT | Performed by: INTERNAL MEDICINE

## 2021-02-11 PROCEDURE — 93458 L HRT ARTERY/VENTRICLE ANGIO: CPT | Performed by: INTERNAL MEDICINE

## 2021-02-11 PROCEDURE — C1769 GUIDE WIRE: HCPCS | Performed by: INTERNAL MEDICINE

## 2021-02-11 PROCEDURE — 25010000002 MIDAZOLAM PER 1 MG: Performed by: INTERNAL MEDICINE

## 2021-02-11 PROCEDURE — 85025 COMPLETE CBC W/AUTO DIFF WBC: CPT | Performed by: INTERNAL MEDICINE

## 2021-02-11 PROCEDURE — C1894 INTRO/SHEATH, NON-LASER: HCPCS | Performed by: INTERNAL MEDICINE

## 2021-02-11 PROCEDURE — 0 IOPAMIDOL PER 1 ML: Performed by: INTERNAL MEDICINE

## 2021-02-11 PROCEDURE — 25010000002 HEPARIN (PORCINE) PER 1000 UNITS: Performed by: INTERNAL MEDICINE

## 2021-02-11 RX ORDER — SODIUM CHLORIDE 9 MG/ML
100 INJECTION, SOLUTION INTRAVENOUS CONTINUOUS
Status: DISCONTINUED | OUTPATIENT
Start: 2021-02-11 | End: 2021-02-11 | Stop reason: HOSPADM

## 2021-02-11 RX ORDER — MIDAZOLAM HYDROCHLORIDE 1 MG/ML
INJECTION INTRAMUSCULAR; INTRAVENOUS AS NEEDED
Status: DISCONTINUED | OUTPATIENT
Start: 2021-02-11 | End: 2021-02-11 | Stop reason: HOSPADM

## 2021-02-11 RX ORDER — CLOPIDOGREL BISULFATE 75 MG/1
TABLET ORAL AS NEEDED
Status: DISCONTINUED | OUTPATIENT
Start: 2021-02-11 | End: 2021-02-11 | Stop reason: HOSPADM

## 2021-02-11 RX ORDER — HEPARIN SODIUM 1000 [USP'U]/ML
INJECTION, SOLUTION INTRAVENOUS; SUBCUTANEOUS AS NEEDED
Status: DISCONTINUED | OUTPATIENT
Start: 2021-02-11 | End: 2021-02-11 | Stop reason: HOSPADM

## 2021-02-11 RX ORDER — FENTANYL CITRATE 50 UG/ML
INJECTION, SOLUTION INTRAMUSCULAR; INTRAVENOUS AS NEEDED
Status: DISCONTINUED | OUTPATIENT
Start: 2021-02-11 | End: 2021-02-11 | Stop reason: HOSPADM

## 2021-02-11 RX ORDER — SODIUM CHLORIDE 9 MG/ML
INJECTION, SOLUTION INTRAVENOUS CONTINUOUS PRN
Status: DISCONTINUED | OUTPATIENT
Start: 2021-02-11 | End: 2021-02-11 | Stop reason: HOSPADM

## 2021-02-11 RX ORDER — LIDOCAINE HYDROCHLORIDE 20 MG/ML
INJECTION, SOLUTION INFILTRATION; PERINEURAL AS NEEDED
Status: DISCONTINUED | OUTPATIENT
Start: 2021-02-11 | End: 2021-02-11 | Stop reason: HOSPADM

## 2021-02-11 NOTE — H&P
Chief Complaint   Patient presents with   • Coronary Artery Disease   • Hyperlipidemia   • Hypertension         History:   Javier Santiago is a 55 y.o. male seen in follow for surgical clearance for cervical spine surgery.  He has a past medical history significant for CAD, essential hypertension, chronic tobacco abuse, dyslipidemia, and COPD. He is a current every day smoker. He smokes o.5 to 1 ppd. Denies to quit at this time.   The chart and medications were reviewed today. Problems above addressed this visit.           Medical History        Past Medical History:   Diagnosis Date   • Arthritis       psoriatic   • ASCVD (arteriosclerotic cardiovascular disease)     • CHF (congestive heart failure) (CMS/[facility])     • Chronic low back pain     • COPD (chronic obstructive pulmonary disease) (CMS/[facility])     • Coronary artery disease     • History of EKG 01/26/2016     NORMAL   • Hyperlipidemia     • Hypertension     • Hypotension     • Insomnia             Surgical History         Past Surgical History:   Procedure Laterality Date   • BACK SURGERY       • CARDIAC CATHETERIZATION N/A 10/13/2017     Procedure: Left Heart Cath;  Surgeon: Chester Centeno MD;  Location: [facility] CATH INVASIVE LOCATION;  Service:    • COLONOSCOPY       • CORONARY ANGIOPLASTY WITH STENT PLACEMENT       • ENDOSCOPY       • ENDOSCOPY N/A 2/9/2018     Procedure: ESOPHAGOGASTRODUODENOSCOPY WITH DILATATION CPT CODE: 28575;  Surgeon: Everett Villanueva III, MD;  Location: [facility] OR;  Service:             Past Social History:  Social History   Social History            Socioeconomic History   • Marital status:        Spouse name: Coretta Bhatt   • Number of children: Not on file   • Years of education: Not on file   • Highest education level: Not on file   Tobacco Use   • Smoking status: Current Every Day Smoker       Packs/day: 0.25       Years: 35.00       Pack years: 8.75       Types: Cigarettes   • Smokeless tobacco: Current User       Types:  Chew   Substance and Sexual Activity   • Alcohol use: No   • Drug use: No   • Sexual activity: Defer   Social History Narrative     Live in [place] with ex wife and disabled son           Past Family History:        Family History   Problem Relation Age of Onset   • Heart attack Mother           x 3   • Atrial fibrillation Mother     • Heart disease Mother     • Heart attack Father     • Heart disease Father           CABG         Review of Systems:   Review of Systems   Constitution: Negative for diaphoresis, fever, weight gain and weight loss.   HENT: Negative for congestion, nosebleeds and sore throat.    Eyes: Negative for blurred vision and visual disturbance.   Cardiovascular: Negative for chest pain, claudication, dyspnea on exertion, irregular heartbeat, leg swelling, orthopnea, palpitations, paroxysmal nocturnal dyspnea and syncope.   Respiratory: Negative for cough, hemoptysis, shortness of breath, sleep disturbances due to breathing, snoring, sputum production and wheezing.    Endocrine: Negative for cold intolerance, heat intolerance, polydipsia, polyphagia and polyuria.   Hematologic/Lymphatic: Negative for bleeding problem. Bruises/bleeds easily.   Skin: Negative for dry skin, itching and rash.   Musculoskeletal: Negative for muscle cramps, muscle weakness and myalgias.   Gastrointestinal: Negative for abdominal pain, constipation, diarrhea, heartburn, hematemesis, hematochezia, hemorrhoids, melena, nausea and vomiting.   Genitourinary: Negative for hematuria and nocturia.   Neurological: Negative for excessive daytime sleepiness, dizziness, focal weakness, headaches, light-headedness, numbness, seizures, vertigo and weakness.   Psychiatric/Behavioral: Negative for depression, substance abuse and suicidal ideas.   Allergic/Immunologic: Negative for environmental allergies.            Current Medications          Current Outpatient Medications   Medication Sig Dispense Refill   • aspirin 81 MG  tablet Take 1 tablet by mouth Daily. (Patient taking differently: Take 325 mg by mouth Daily.) 30 tablet 11   • atorvastatin (LIPITOR) 80 MG tablet Take 80 mg by mouth daily.       • clopidogrel (PLAVIX) 75 MG tablet Take  by mouth daily.       • DULoxetine (CYMBALTA) 30 MG capsule Take 30 mg by mouth Daily.       • hydrOXYzine (ATARAX) 25 MG tablet         • Loratadine 10 MG capsule Take  by mouth.       • metoprolol succinate XL (TOPROL-XL) 25 MG 24 hr tablet Take 1 tablet by mouth Daily. 90 tablet 1   • pantoprazole (PROTONIX) 40 MG EC tablet Take 1 tablet by mouth 2 (Two) Times a Day Before Meals. 60 tablet 5   • albuterol (PROVENTIL HFA;VENTOLIN HFA) 108 (90 BASE) MCG/ACT inhaler Inhale 2 puffs Every 4 (Four) Hours As Needed for Wheezing.       • baclofen (LIORESAL) 10 MG tablet Take 1 tablet by mouth 2 (Two) Times a Day. 60 tablet 0   • chlorhexidine (HIBICLENS) 4 % external liquid Shower each day with solution for 5 days beginning 5 days before surgery. 120 mL 0   • EPINEPHrine (EPIPEN IJ) Inject  as directed.       • hydrALAZINE (APRESOLINE) 25 MG tablet Take 25 mg by mouth 3 (Three) Times a Day.       • HYDROcodone-acetaminophen (NORCO)  MG per tablet Take 1 tablet by mouth Every 8 (Eight) Hours As Needed for Moderate Pain . 40 tablet 0   • meloxicam (MOBIC) 7.5 MG tablet Take 1 tablet by mouth 2 (Two) Times a Day. 60 tablet 1   • mometasone (NASONEX) 50 MCG/ACT nasal spray 2 sprays into each nostril Daily.       • mupirocin (BACTROBAN) 2 % ointment Apply to the inside of each nostril with a cotton swab two times daily, morning and evening, for 5 days before surgery. 15 g 0   • nitroglycerin (NITROSTAT) 0.4 MG SL tablet Place 0.4 mg under the tongue Every 5 (Five) Minutes As Needed for Chest Pain. Take no more than 3 doses in 15 minutes.       • SYMBICORT 80-4.5 MCG/ACT inhaler         • ustekinumab (STELARA) 45 MG/0.5ML injection Inject 45 mg under the skin Every 30 (Thirty) Days.          No current  "facility-administered medications for this visit.             No Known Allergies        Objective         /93 (BP Location: Left arm)   Pulse 84   Ht 177.8 cm (70\")   Wt 74.5 kg (164 lb 3.2 oz)   SpO2 96%   BMI 23.56 kg/m²      Physical Exam  Constitutional:       General: He is not in acute distress.     Appearance: Normal appearance.   HENT:      Head: Normocephalic.      Nose: Nose normal.      Mouth/Throat:      Pharynx: Oropharynx is clear.   Eyes:      Pupils: Pupils are equal, round, and reactive to light.   Neck:      Musculoskeletal: Normal range of motion and neck supple.   Cardiovascular:      Rate and Rhythm: Normal rate and regular rhythm.      Pulses: Normal pulses.      Heart sounds: Normal heart sounds.   Pulmonary:      Effort: Pulmonary effort is normal.      Breath sounds: Normal breath sounds.   Abdominal:      General: Bowel sounds are normal.      Palpations: Abdomen is soft.   Musculoskeletal:         General: No swelling.   Skin:     Findings: No bruising or lesion.   Neurological:      General: No focal deficit present.      Mental Status: He is alert and oriented to person, place, and time.   Psychiatric:         Mood and Affect: Mood normal.         Behavior: Behavior normal.         Judgment: Judgment normal.               DATA:           Results for orders placed during the hospital encounter of 02/20/18   Adult Transthoracic Echo Complete W/ Cont if Necessary Per Protocol     Narrative · Mild mitral valve regurgitation is present  · Estimated EF appears to be in the range of 56 - 60%. Normal left   ventricular cavity size noted. All left ventricular wall segments contract   normally.  · Mild mitral valve regurgitation is present.  · Trace tricuspid valve regurgitation is present. Estimated right   ventricular systolic pressure from tricuspid regurgitation is mildly   elevated (35-45 mmHg).              Results for orders placed in visit on 02/10/17   Stress Test With " Myocardial Perfusion One Day     Narrative · Impressions are consistent with a low risk study.  · Left ventricular ejection fraction is normal (Calculated EF = 67%).  · Myocardial perfusion imaging indicates a normal myocardial perfusion   study with no evidence of ischemia.  · Gated images show normal wall motion.              Results for orders placed in visit on 02/10/17   Stress Test With Myocardial Perfusion One Day     Narrative · Impressions are consistent with a low risk study.  · Left ventricular ejection fraction is normal (Calculated EF = 67%).  · Myocardial perfusion imaging indicates a normal myocardial perfusion   study with no evidence of ischemia.  · Gated images show normal wall motion.              Results for orders placed during the hospital encounter of 10/13/17   Cardiac Catheterization/Vascular Study     Narrative · Right dominant coronary system without hemodynamically significant   coronary artery disease.     Final impression  Right dominant coronary artery system without hemodynamically significant   coronary artery disease     Procedures performed:  Coronary angiography     History of present illness:  Javier is a very pleasant 52-year-old gentleman who presents with a history   of known coronary artery disease status post myocardial infarction.  He   previously underwent stenting of his LAD and has had recurrent progressive   chest discomfort.  Because of his symptoms he underwent stress testing   which revealed a fixed defect in his anterior territory.  He was maximized   on medical therapy however continued to have chest discomfort and as such   was referred for coronary angiography.  Prior to the procedure the patient   was given informed consent apprising him of the risk of heart attack,   stroke and death.  The patient understood these risks and agreed to   proceed.     Procedure in detail:  The patient was brought to the cardiac catheterization laboratory and   prepped and draped in  the usual sterile fashion.  Adequate anesthesia was   obtained by infiltrating the right wrist with local lidocaine.  Using a   modified Seldinger technique a 5 Austrian sheath was placed in the right   radial artery.  A 5 Austrian Negrito catheter was used to engage the ostium of   the left main coronary artery and angiography was performed in varying   views using hand injection of contrast material.  After this a 5 Austrian   Negrito catheter was used to engage the ostium of the right coronary artery   and angiography was again performed in varying views using hand injection   of contrast material.  At the conclusion of the procedure the patient was   returned to the floor without evidence of complication.     Findings in detail:  Left Main coronary artery:  The left main coronary artery is a large vessel which bifurcates into the   LAD and circumflex arteries.  The left main coronary artery is free of   atherosclerotic disease.     Left anterior descending artery:  The left anterior descending artery is a large vessel which gives rise to   one large diagonal branch.  The LAD is noted to have a previously   implanted stent is proximal segment.  The stent is widely patent without   evidence of significant in-stent restenosis.     Circumflex artery:  The circumflex artery is a large vessel which gives rise to 2 obtuse   marginal branches the first of which is very large the next of which is   moderate.  The circumflex artery and its branches are free of   atherosclerotic disease.     Right coronary artery:  The right coronary artery is a large vessel gives rise to the posterior   descending artery and a posterior lateral branch.  The right coronary   artery and its branches are free of atherosclerotic disease.     Final impression and plan:  Overall it is my impression that Javier does not have hemodynamically   significant coronary artery disease and will undergo risk factor   modification as appropriate.         Procedures             Lab Results   Component Value Date     CHOL 137 11/01/2017     CHLPL 170 01/11/2016     CHLPL 230 (H) 01/14/2014            Lab Results   Component Value Date     TRIG 95 11/01/2017     TRIG 234 (H) 01/11/2016     TRIG 323 (H) 01/14/2014            Lab Results   Component Value Date     HDL 40 (L) 11/01/2017     HDL 25 (L) 01/11/2016            Lab Results   Component Value Date     LDL 78 11/01/2017     LDL 98 01/11/2016      Assessment and Plan        ASCVD with status post PCI to the LAD  Hypertension is stable  Intermittent episodes of shortness of breath.   Chronic tobacco abuse. Smoke 0.5 to 1 ppd.      Pt had intermediate stress test which showed apical infarct with mild ischemia, he had LAD PCI in past and due to ongoing anginal symptoms on GDMT, we will proceed with coronary angiogram   I have explained the risks associated with the procedure to the patient including but not limited to an allergic reaction to the contrast material or medications used during the procedure bleeding, infection, and bruising at the catheter insertion site blood clots, which may trigger heart attack, stroke,   damage to the artery where the catheter was inserted, or damage to the arteries as the catheter travels through your body, irregular heart rhythm arrhythmias, kidney damage caused by the contrast material.            Recommended increase activity to 30 minutes of walking daily, most days of the week.  Counseled the patient for 5 regarding smoking cessation.  Discussed tobacco use relationship to cardiac disease progression.  Offered smoking cessation medications. Denies to quit  Discussed diet and weight loss with patient.          I have explained the risks associated with the procedure to the patient including but not limited to an allergic reaction to the contrast material or medications used during the procedure bleeding, infection, and bruising at the catheter insertion site blood clots, which may trigger  heart attack, stroke,   damage to the artery where the catheter was inserted, or damage to the arteries as the catheter travels through your body, irregular heart rhythm arrhythmias, kidney damage caused by the contrast material.

## 2021-02-11 NOTE — DISCHARGE INSTR - ACTIVITY
Take it easy for the next several days to one week. No driving or signing legal documents for the next 24 hours. Keep cath site clean, dry, and covered. Do not submerge site underwater, no hot tubs, tub baths, or swimming pools for the next several days to one week. No heavy lifting with the right arm for the next several days to one week.

## 2021-02-15 DIAGNOSIS — M48.02 SPINAL STENOSIS IN CERVICAL REGION: Primary | ICD-10-CM

## 2021-02-15 NOTE — TELEPHONE ENCOUNTER
Provider:  Torey  Caller: Javier  Time of call: 10:53a     Phone #:  [phone]  Surgery:  C4-6 decompression  Surgery Date:  TBD  Last visit:   11/30/20  Next visit: TBD    JON:       10/21/2020 Hydrocodone/Acetaminophen  325MG/10MG  1965 [age] [facility] [place] 20 2  11/18/2020 Gabapentin 600MG 1965 90 30 [facility] 3  11/18/2020 Hydrocodone/Acetaminophen  325MG/10MG  1965 [age] [facility] 18 3  12/28/2020 Hydrocodone/Acetaminophen  325MG/10MG  1965 40 13 [facility] 31 3      Reason for call:       Pt left a message stating his heart doctor has cleared him for surgery.   Pt requesting a refill on pain meds to get him through until surgery is scheduled.      -Message sent to Cheryl as well for scheduling purposes.

## 2021-02-18 RX ORDER — HYDROCODONE BITARTRATE AND ACETAMINOPHEN 10; 325 MG/1; MG/1
1 TABLET ORAL EVERY 8 HOURS PRN
Qty: 40 TABLET | Refills: 0 | OUTPATIENT
Start: 2021-02-18

## 2021-02-18 NOTE — TELEPHONE ENCOUNTER
Per Dr. Lema, pt will need a referral to PM if he continues to need pain medication.  S/W pt. He agrees to a PM referral in [place]. Pt verbalized understanding that no more pain medication can be provided and to consult with his PCP in the meantime.     Referral placed.  Please contact pt once set up.

## 2021-02-24 ENCOUNTER — OFFICE VISIT (OUTPATIENT)
Dept: CARDIOLOGY | Facility: CLINIC | Age: 56
End: 2021-02-24

## 2021-02-24 ENCOUNTER — TELEPHONE (OUTPATIENT)
Dept: CARDIOLOGY | Facility: CLINIC | Age: 56
End: 2021-02-24

## 2021-02-24 VITALS
HEIGHT: 70 IN | BODY MASS INDEX: 23.94 KG/M2 | DIASTOLIC BLOOD PRESSURE: 90 MMHG | OXYGEN SATURATION: 98 % | SYSTOLIC BLOOD PRESSURE: 147 MMHG | WEIGHT: 167.2 LBS | HEART RATE: 64 BPM

## 2021-02-24 DIAGNOSIS — I25.10 CORONARY ARTERY DISEASE INVOLVING NATIVE CORONARY ARTERY OF NATIVE HEART WITHOUT ANGINA PECTORIS: Primary | ICD-10-CM

## 2021-02-24 DIAGNOSIS — R07.2 PRECORDIAL PAIN: ICD-10-CM

## 2021-02-24 DIAGNOSIS — E78.01 FAMILIAL HYPERCHOLESTEROLEMIA: ICD-10-CM

## 2021-02-24 DIAGNOSIS — R06.02 SHORTNESS OF BREATH: ICD-10-CM

## 2021-02-24 DIAGNOSIS — I10 ESSENTIAL HYPERTENSION: ICD-10-CM

## 2021-02-24 PROCEDURE — 99214 OFFICE O/P EST MOD 30 MIN: CPT | Performed by: INTERNAL MEDICINE

## 2021-02-24 RX ORDER — AMLODIPINE BESYLATE 5 MG/1
5 TABLET ORAL DAILY
COMMUNITY
End: 2021-02-24 | Stop reason: SDUPTHER

## 2021-02-24 RX ORDER — ISOSORBIDE MONONITRATE 30 MG/1
30 TABLET, EXTENDED RELEASE ORAL DAILY
COMMUNITY
End: 2021-02-24 | Stop reason: SDUPTHER

## 2021-02-24 RX ORDER — AMLODIPINE BESYLATE 5 MG/1
5 TABLET ORAL DAILY
Qty: 30 TABLET | Refills: 5 | Status: SHIPPED | OUTPATIENT
Start: 2021-02-24 | End: 2022-01-11

## 2021-02-24 RX ORDER — ISOSORBIDE MONONITRATE 30 MG/1
30 TABLET, EXTENDED RELEASE ORAL DAILY
Qty: 30 TABLET | Refills: 5 | Status: SHIPPED | OUTPATIENT
Start: 2021-02-24 | End: 2021-10-19

## 2021-02-24 NOTE — PROGRESS NOTES
Chief Complaint  No chief complaint on file.    Subjective         Javier Santiago presents to [facility] CARDIOLOGY for [facility]  History of Present Illness   He has a past medical history significant for CAD, essential hypertension, chronic tobacco abuse, dyslipidemia, and COPD   Chest pain:  Intermittent. Described as pressure. He is taking 3-5  EC ASA for relief. No change in pain since [facility]  CAD:  The patient states that he has been stable with his coronary artery disease. Patient is currently asymptomatic.     Status post PCI SHREE to the stenting of his LAD. [facility] 2-11-21 showed mild to moderate Non obstructive 50% in stent restenosis in the proximal LAD stent By report, there is good compliance with treatement, good tolerance of treatment and good symptom control.  Medications:  The patient is adherent with his medications.  Smoking:  Patient is a current every day smoker. He smokes up to O,25 ppd.  Denies to quit at this time.    The chart, PMH, FMH, social history, PSH, and medications were reviewed today. Problems above addressed this visit.    Objective     Vital Signs:   There were no vitals taken for this visit.      Physical Exam  Constitutional:       General: He is not in acute distress.     Appearance: Normal appearance.   HENT:      Head: Normocephalic.      Nose: Nose normal.      Mouth/Throat:      Pharynx: Oropharynx is clear. No posterior oropharyngeal erythema.   Eyes:      Pupils: Pupils are equal, round, and reactive to light.   Neck:      Musculoskeletal: Neck supple.      Vascular: No carotid bruit.   Cardiovascular:      Rate and Rhythm: Normal rate and regular rhythm.      Pulses: Normal pulses.      Heart sounds: Normal heart sounds. No murmur.   Pulmonary:      Effort: Pulmonary effort is normal. No respiratory distress.      Breath sounds: Normal breath sounds.   Abdominal:      General: Bowel sounds are normal.      Palpations: Abdomen is soft.   Musculoskeletal:      Right  lower leg: No edema.      Left lower leg: No edema.   Skin:     Findings: No bruising.   Neurological:      General: No focal deficit present.      Mental Status: He is alert and oriented to person, place, and time.   Psychiatric:         Mood and Affect: Mood normal.         Behavior: Behavior normal.         Judgment: Judgment normal.          Result Review :       Data reviewed: Cardiology studies 02/24/2021       Assessment  Chest pain  Shortness of breath   CAD status post PCI SHREE to the stenting of his LAD.  Elevated essential hypertension  Dyslipidemia    Plan   Start Imdur 30 mg daily and Norvasc 5 mg daily  Follow up in 3 months    He may proceed with surgery with moderate risk. He may hold Plavix 5-7 days prior to surgery.         Problem List Items Addressed This Visit     None            Follow Up     No follow-ups on file.  Patient was given instructions and counseling regarding his condition or for health maintenance advice. Please see specific information pulled into the AVS if appropriate.       Brent Degroot MD, [facility]  Interventional Cardiology    ANGELICA Liz, acting as scribe for Brent Degroot MD, [facility]    02/24/21  09:14 EST

## 2021-03-05 ENCOUNTER — PREP FOR SURGERY (OUTPATIENT)
Dept: OTHER | Facility: HOSPITAL | Age: 56
End: 2021-03-05

## 2021-03-05 ENCOUNTER — TELEPHONE (OUTPATIENT)
Dept: NEUROSURGERY | Facility: CLINIC | Age: 56
End: 2021-03-05

## 2021-03-05 DIAGNOSIS — M47.12 CERVICAL SPONDYLOSIS WITH MYELOPATHY: ICD-10-CM

## 2021-03-05 DIAGNOSIS — M47.812 CERVICAL SPONDYLOSIS WITHOUT MYELOPATHY: Primary | ICD-10-CM

## 2021-03-05 PROBLEM — G99.2 MYELOPATHY CONCURRENT WITH AND DUE TO SPINAL STENOSIS OF CERVICAL REGION: Status: ACTIVE | Noted: 2021-03-05

## 2021-03-05 PROBLEM — M48.02 MYELOPATHY CONCURRENT WITH AND DUE TO SPINAL STENOSIS OF CERVICAL REGION: Status: ACTIVE | Noted: 2021-03-05

## 2021-03-05 RX ORDER — CEFAZOLIN SODIUM 2 G/100ML
2 INJECTION, SOLUTION INTRAVENOUS ONCE
Status: CANCELLED | OUTPATIENT
Start: 2021-03-05 | End: 2021-03-05

## 2021-03-05 RX ORDER — SODIUM CHLORIDE 0.9 % (FLUSH) 0.9 %
3 SYRINGE (ML) INJECTION EVERY 12 HOURS SCHEDULED
Status: CANCELLED | OUTPATIENT
Start: 2021-03-05

## 2021-03-05 RX ORDER — ACETAMINOPHEN 325 MG/1
650 TABLET ORAL ONCE
Status: CANCELLED | OUTPATIENT
Start: 2021-03-05 | End: 2021-03-05

## 2021-03-05 RX ORDER — HYDROCODONE BITARTRATE AND ACETAMINOPHEN 7.5; 325 MG/1; MG/1
1 TABLET ORAL ONCE
Status: CANCELLED | OUTPATIENT
Start: 2021-03-05 | End: 2021-03-05

## 2021-03-05 RX ORDER — SODIUM CHLORIDE 0.9 % (FLUSH) 0.9 %
3-10 SYRINGE (ML) INJECTION AS NEEDED
Status: CANCELLED | OUTPATIENT
Start: 2021-03-05

## 2021-03-05 RX ORDER — SODIUM CHLORIDE, SODIUM LACTATE, POTASSIUM CHLORIDE, CALCIUM CHLORIDE 600; 310; 30; 20 MG/100ML; MG/100ML; MG/100ML; MG/100ML
100 INJECTION, SOLUTION INTRAVENOUS CONTINUOUS
Status: CANCELLED | OUTPATIENT
Start: 2021-03-05

## 2021-03-05 RX ORDER — IBUPROFEN 800 MG/1
800 TABLET ORAL ONCE
Status: CANCELLED | OUTPATIENT
Start: 2021-03-05 | End: 2021-03-05

## 2021-03-05 NOTE — H&P
Primary Care Provider: Lorene Velarde MD     Chief Complaint: Neck and upper extremity pain     History of Present Illness:  Mr. Santiago is a 55-year-old disabled gentleman who is known to Dr. Lema.  He underwent a cervical myelogram on 3/20/2019 which demonstrated multilevel disc narrowing and facet closure with foraminal closure particularly at C4-5 and C5-6.  In the absence of nuclear complaints at that time no intervention was recommended.  He presents today for evaluation of worsening symptoms.     Patient states that over the last 6-9 months he has developed increased pain in his neck and upper extremities.  Pain extends from his posterior cervical region and radiates to his hands, particularly his thumbs bilaterally.  He also reports some vague numbness in his arms and hands.  He reports sensory alteration in his left lower extremity as well which he has attributed to fibromyalgia in the past.  He denies overt gait disturbance, bowel or bladder difficulties.  He has tried physical therapy in the last year or so to no avail.  He presents today with a cervical MRI.        Past Medical and Surgical History:  Medical History        Past Medical History:   Diagnosis Date   • Arthritis       psoriatic   • ASCVD (arteriosclerotic cardiovascular disease)     • CHF (congestive heart failure) (CMS/[facility])     • Chronic low back pain     • COPD (chronic obstructive pulmonary disease) (CMS/[facility])     • Coronary artery disease     • History of EKG 01/26/2016     NORMAL   • Hyperlipidemia     • Hypertension     • Hypotension     • Insomnia          Surgical History         Past Surgical History:   Procedure Laterality Date   • BACK SURGERY       • CARDIAC CATHETERIZATION N/A 10/13/2017     Procedure: Left Heart Cath;  Surgeon: Chester Centeno MD;  Location: [facility] INVASIVE LOCATION;  Service:    • COLONOSCOPY       • CORONARY ANGIOPLASTY WITH STENT PLACEMENT       • ENDOSCOPY       • ENDOSCOPY N/A 2/9/2018     Procedure:  ESOPHAGOGASTRODUODENOSCOPY WITH DILATATION CPT CODE: 56797;  Surgeon: Everett Villanueva III, MD;  Location: [facility] OR;  Service:            Current Medications:     Current Outpatient Medications:   •  albuterol (PROVENTIL HFA;VENTOLIN HFA) 108 (90 BASE) MCG/ACT inhaler, Inhale 2 puffs Every 4 (Four) Hours As Needed for Wheezing., Disp: , Rfl:   •  aspirin 81 MG tablet, Take 1 tablet by mouth Daily., Disp: 30 tablet, Rfl: 11  •  atorvastatin (LIPITOR) 80 MG tablet, Take 80 mg by mouth daily., Disp: , Rfl:   •  baclofen (LIORESAL) 10 MG tablet, Take 1 tablet by mouth 2 (Two) Times a Day., Disp: 60 tablet, Rfl: 0  •  clopidogrel (PLAVIX) 75 MG tablet, Take  by mouth daily., Disp: , Rfl:   •  EPINEPHrine (EPIPEN IJ), Inject  as directed., Disp: , Rfl:   •  hydrALAZINE (APRESOLINE) 25 MG tablet, Take 25 mg by mouth 3 (Three) Times a Day., Disp: , Rfl:   •  meloxicam (MOBIC) 7.5 MG tablet, Take 1 tablet by mouth 2 (Two) Times a Day., Disp: 60 tablet, Rfl: 1  •  mometasone (NASONEX) 50 MCG/ACT nasal spray, 2 sprays into each nostril Daily., Disp: , Rfl:   •  nitroglycerin (NITROSTAT) 0.4 MG SL tablet, Place 0.4 mg under the tongue Every 5 (Five) Minutes As Needed for Chest Pain. Take no more than 3 doses in 15 minutes., Disp: , Rfl:   •  pantoprazole (PROTONIX) 40 MG EC tablet, Take 1 tablet by mouth 2 (Two) Times a Day Before Meals., Disp: 60 tablet, Rfl: 5  •  SYMBICORT 80-4.5 MCG/ACT inhaler, , Disp: , Rfl:   •  ustekinumab (STELARA) 45 MG/0.5ML injection, Inject 45 mg under the skin Every 30 (Thirty) Days., Disp: , Rfl:   •  chlorhexidine (HIBICLENS) 4 % external liquid, Shower each day with solution for 5 days beginning 5 days before surgery., Disp: 120 mL, Rfl: 0  •  diazePAM (VALIUM) 5 MG tablet, Take 5 mg by mouth 2 (Two) Times a Day As Needed for Anxiety., Disp: , Rfl:   •  gabapentin (NEURONTIN) 800 MG tablet, Take 800 mg by mouth 3 (three) times a day., Disp: , Rfl:   •  HYDROcodone-acetaminophen (NORCO)  " MG per tablet, Take 1 tablet by mouth Every 6 (Six) Hours As Needed for Moderate Pain ., Disp: , Rfl:   •  mupirocin (BACTROBAN) 2 % ointment, Apply to the inside of each nostril with a cotton swab two times daily, morning and evening, for 5 days before surgery., Disp: 15 g, Rfl: 0     Allergies:  No Known Allergies        Review of Systems   Musculoskeletal: Positive for back pain, joint swelling, neck pain and neck stiffness.   Neurological: Positive for numbness.   Psychiatric/Behavioral: Positive for sleep disturbance.            Physical Exam  Constitutional:       Appearance: Normal appearance.   HENT:      Head: Normocephalic and atraumatic.   Neck:      Musculoskeletal: Normal range of motion and neck supple.   Musculoskeletal: Normal range of motion.   Skin:     General: Skin is warm and dry.   Neurological:      Mental Status: He is alert and oriented to person, place, and time.      Motor: Motor function is intact.      Coordination: Coordination is intact. Romberg sign negative. Coordination normal.      Gait: Gait is intact.      Deep Tendon Reflexes:      Reflex Scores:       Bicep reflexes are 3+ on the right side and 3+ on the left side.       Brachioradialis reflexes are 3+ on the right side and 3+ on the left side.       Patellar reflexes are 3+ on the right side and 3+ on the left side.       Achilles reflexes are 3+ on the right side and 3+ on the left side.     Comments: Decreased  strength bilaterally  Decreased pinprick sensation over bilateral hands particularly thumb and index finger bilaterally  Positive Penn sign on the left   Psychiatric:         Mood and Affect: Mood normal.         Behavior: Behavior normal.               Vitals       Vitals:     11/30/20 1329   BP: 130/82   BP Location: Left arm   Patient Position: Sitting   Cuff Size: Adult   Weight: 71.2 kg (157 lb)   Height: 177.8 cm (70\")           Patient's Body mass index is 22.53 kg/m². BMI is within normal " parameters. No follow-up required..     Imaging Review:  Cervical MRI performed 6/25/2020 demonstrates moderate-severe canal narrowing at C4-5 and C5-6 to a somewhat lesser degree at C6-7.  Mod-severe foraminal narrowing is noted C4-5 and C5-6 and C6-7. Signal change is noted within the cord at the C4-6 levels.     Assessment:  1.  Cervical stenosis  2.  Cervical myelopathy     Plan:  Mr. Santiago is seen today for evaluation of worsening neck and upper extremity pain.  I reviewed his cervical imaging with Dr. Lema which demonstrates multilevel stenosis as well as signal change within the spinal cord at the C4-6 levels. This has progressed since myelogram imaging from March 2019.  Patient's examination is consistent with myelopathy.  As such Dr. Lema has recommended decompression C4-6 possibly to include C7.  The procedure as well as associated risks, possible complications and limitations were discussed at length. Patient elects to proceed.      Due to his cardiac history he will require formal cardiac clearance. He has previously been established with a cardiologist in [place].  I have placed a new referral if he should need this.  We will begin surgical planning once cardiac clearance arranged.  He will call with any questions, new/worsening or progressive symptoms in the interim.     Patient was counseled at length on the importance of smoking cessation for his best surgical outcome.     Will need laminectomy C3-7; he may require both anterior and posterior approach.

## 2021-03-05 NOTE — TELEPHONE ENCOUNTER
Provider:  Torey  Caller: self  Time of call:  09:00   Phone #:  [phone]  Surgery:    Surgery Date:    Last visit:   11/30/2020  Next visit: TBD    Reason for call:       Patient left message this morning in regards to a visit with his cardiologist for clearance on a procedure Dr. Lema mentioned.   Patient states his cardiologist has cleared him.    Patient would like to know what the next steps would be since he was cleared in February.

## 2021-03-16 ENCOUNTER — BULK ORDERING (OUTPATIENT)
Dept: CASE MANAGEMENT | Facility: OTHER | Age: 56
End: 2021-03-16

## 2021-03-16 DIAGNOSIS — Z23 IMMUNIZATION DUE: ICD-10-CM

## 2021-03-18 ENCOUNTER — TELEPHONE (OUTPATIENT)
Dept: NEUROSURGERY | Facility: CLINIC | Age: 56
End: 2021-03-18

## 2021-03-30 ENCOUNTER — DOCUMENTATION (OUTPATIENT)
Dept: CARDIAC REHAB | Facility: HOSPITAL | Age: 56
End: 2021-03-30

## 2021-03-30 NOTE — PROGRESS NOTES
Due to the Covid 19 pandemic [facility]  is open but we are social distancing that means space is limited  We will do our best to get  the patients that want to participate into the program scheduled. Patients are being notified of the the restrictions and  being placed on a waiting list at this time and if they are interested staff will schedule patients as soon as possible.    Left message for patient to call us back if he is interested in being the cardiac rehab program. We are scheduling for the end of May

## 2021-04-01 NOTE — TELEPHONE ENCOUNTER
Spoke with the patient and told him we just received all of his clearances. Informed Mr. Santiago that I'll ask Dr. Lema which doctor he wishes pt to see going forward, since he's retiring. Told him we'll call him back early next week with the information.

## 2021-04-09 ENCOUNTER — OFFICE VISIT (OUTPATIENT)
Dept: NEUROSURGERY | Facility: CLINIC | Age: 56
End: 2021-04-09

## 2021-04-09 ENCOUNTER — PREP FOR SURGERY (OUTPATIENT)
Dept: OTHER | Facility: HOSPITAL | Age: 56
End: 2021-04-09

## 2021-04-09 VITALS — TEMPERATURE: 97.1 F | BODY MASS INDEX: 23.88 KG/M2 | WEIGHT: 166.8 LBS | HEIGHT: 70 IN

## 2021-04-09 DIAGNOSIS — M50.30 DDD (DEGENERATIVE DISC DISEASE), CERVICAL: ICD-10-CM

## 2021-04-09 DIAGNOSIS — M47.12 CERVICAL SPONDYLOSIS WITH MYELOPATHY: Primary | ICD-10-CM

## 2021-04-09 PROCEDURE — 99213 OFFICE O/P EST LOW 20 MIN: CPT | Performed by: NEUROLOGICAL SURGERY

## 2021-04-09 RX ORDER — GABAPENTIN 600 MG/1
600 TABLET ORAL 3 TIMES DAILY
COMMUNITY
Start: 2021-03-25

## 2021-04-09 RX ORDER — CEFAZOLIN SODIUM 2 G/100ML
2 INJECTION, SOLUTION INTRAVENOUS ONCE
Status: CANCELLED | OUTPATIENT
Start: 2021-04-09 | End: 2021-04-09

## 2021-04-09 RX ORDER — HYDROCODONE BITARTRATE AND ACETAMINOPHEN 7.5; 325 MG/1; MG/1
1 TABLET ORAL 3 TIMES DAILY PRN
Status: ON HOLD | COMMUNITY
End: 2021-04-26 | Stop reason: SDUPTHER

## 2021-04-09 NOTE — H&P
Patient: Javier Santiago  : 1965     Primary Care Provider: Moi Segura APRN     Requesting Provider: As above           History     Chief Complaint: Neck and arm pain with sensory alteration and weakness.     History of Present Illness: Mr. Santiago is a 55-year-old disabled former  who is well-known to our service.  He has been followed by Dr. Lema for some time and is noted to have extensive cervical spondylosis.  He reports progressive pain in his neck and arms with weakness and numbness throughout.  If he turns or moves in certain positions he gets an electric shock feeling that extends down his neck into his whole body.  He has been dropping things.  His hands and arms are numb.  He has no bowel or bladder dysfunction.  He has cut his smoking down to 1/2 pack/day.     Review of Systems   Constitutional: Negative for activity change, appetite change, chills, diaphoresis, fatigue, fever and unexpected weight change.   HENT: Negative for congestion, dental problem, drooling, ear discharge, ear pain, facial swelling, hearing loss, mouth sores, nosebleeds, postnasal drip, rhinorrhea, sinus pressure, sinus pain, sneezing, sore throat, tinnitus, trouble swallowing and voice change.    Eyes: Negative for photophobia, pain, discharge, redness, itching and visual disturbance.   Respiratory: Negative for apnea, cough, choking, chest tightness, shortness of breath, wheezing and stridor.    Cardiovascular: Negative for chest pain, palpitations and leg swelling.   Gastrointestinal: Negative for abdominal distention, abdominal pain, anal bleeding, blood in stool, constipation, diarrhea, nausea, rectal pain and vomiting.   Endocrine: Negative for cold intolerance, heat intolerance, polydipsia, polyphagia and polyuria.   Genitourinary: Negative for decreased urine volume, difficulty urinating, discharge, dysuria, enuresis, flank pain, frequency, genital sores, hematuria, penile pain, penile swelling,  scrotal swelling, testicular pain and urgency.   Musculoskeletal: Positive for back pain, joint swelling, neck pain and neck stiffness. Negative for arthralgias, gait problem and myalgias.   Skin: Negative for color change, pallor, rash and wound.   Allergic/Immunologic: Negative for environmental allergies, food allergies and immunocompromised state.   Neurological: Positive for numbness. Negative for dizziness, tremors, seizures, syncope, facial asymmetry, speech difficulty, weakness, light-headedness and headaches.   Hematological: Negative for adenopathy. Does not bruise/bleed easily.   Psychiatric/Behavioral: Positive for sleep disturbance. Negative for agitation, behavioral problems, confusion, decreased concentration, dysphoric mood, hallucinations, self-injury and suicidal ideas. The patient is not nervous/anxious and is not hyperactive.          The patient's past medical history, past surgical history, family history, and social history have been reviewed at length in the electronic medical record.     Past Medical History:   Diagnosis Date   • Arthritis     psoriatic   • ASCVD (arteriosclerotic cardiovascular disease)    • CHF (congestive heart failure) (CMS/[facility])    • Chronic low back pain    • COPD (chronic obstructive pulmonary disease) (CMS/[facility])    • Coronary artery disease    • History of EKG 01/26/2016    NORMAL   • Hyperlipidemia    • Hypertension    • Hypotension    • Insomnia      Past Surgical History:   Procedure Laterality Date   • BACK SURGERY     • CARDIAC CATHETERIZATION N/A 10/13/2017    Procedure: Left Heart Cath;  Surgeon: Chester Centeno MD;  Location: [facility] INVASIVE LOCATION;  Service:    • CARDIAC CATHETERIZATION N/A 2/11/2021    Procedure: Left Heart Cath;  Surgeon: Brent Degroot MD;  Location: [facility] INVASIVE LOCATION;  Service: Cardiology;  Laterality: N/A;   • COLONOSCOPY     • CORONARY ANGIOPLASTY WITH STENT PLACEMENT     • ENDOSCOPY     • ENDOSCOPY N/A  2/9/2018    Procedure: ESOPHAGOGASTRODUODENOSCOPY WITH DILATATION CPT CODE: 23714;  Surgeon: Everett Villanueva III, MD;  Location: [facility];  Service:      Family History   Problem Relation Age of Onset   • Heart attack Mother         x 3   • Atrial fibrillation Mother    • Heart disease Mother    • Heart attack Father    • Heart disease Father         CABG     Social History     Socioeconomic History   • Marital status:      Spouse name: Coretta Bhatt   • Number of children: Not on file   • Years of education: Not on file   • Highest education level: Not on file   Tobacco Use   • Smoking status: Current Every Day Smoker     Packs/day: 0.50     Years: 35.00     Pack years: 17.50     Types: Cigarettes   • Smokeless tobacco: Current User     Types: Chew   Vaping Use   • Vaping Use: Never used   Substance and Sexual Activity   • Alcohol use: No   • Drug use: No   • Sexual activity: Defer       No Known Allergies    Current Outpatient Medications on File Prior to Visit   Medication Sig Dispense Refill   • albuterol (PROVENTIL HFA;VENTOLIN HFA) 108 (90 BASE) MCG/ACT inhaler Inhale 2 puffs Every 4 (Four) Hours As Needed for Wheezing.     • amLODIPine (NORVASC) 5 MG tablet Take 1 tablet by mouth Daily. 30 tablet 5   • aspirin 81 MG tablet Take 1 tablet by mouth Daily. (Patient taking differently: Take 325 mg by mouth Daily.) 30 tablet 11   • atorvastatin (LIPITOR) 80 MG tablet Take 80 mg by mouth daily.     • baclofen (LIORESAL) 10 MG tablet Take 1 tablet by mouth 2 (Two) Times a Day. 60 tablet 0   • clopidogrel (PLAVIX) 75 MG tablet Take  by mouth daily.     • DULoxetine (CYMBALTA) 30 MG capsule Take 30 mg by mouth Daily.     • EPINEPHrine (EPIPEN IJ) Inject  as directed.     • gabapentin (NEURONTIN) 300 MG capsule Take 300 mg by mouth 3 (Three) Times a Day.     • hydrALAZINE (APRESOLINE) 25 MG tablet Take 25 mg by mouth 3 (Three) Times a Day.     • HYDROcodone-acetaminophen (NORCO)  MG per tablet Take 1  "tablet by mouth Every 8 (Eight) Hours As Needed for Moderate Pain . 40 tablet 0   • HYDROcodone-acetaminophen (NORCO) 7.5-325 MG per tablet Take 1 tablet by mouth 3 (Three) Times a Day As Needed for Moderate Pain .     • hydrOXYzine (ATARAX) 25 MG tablet      • isosorbide mononitrate (IMDUR) 30 MG 24 hr tablet Take 1 tablet by mouth Daily. 30 tablet 5   • Loratadine 10 MG capsule Take  by mouth.     • meloxicam (MOBIC) 7.5 MG tablet Take 1 tablet by mouth 2 (Two) Times a Day. 60 tablet 1   • metoprolol succinate XL (TOPROL-XL) 25 MG 24 hr tablet Take 1 tablet by mouth Daily. 90 tablet 1   • mometasone (NASONEX) 50 MCG/ACT nasal spray 2 sprays into each nostril Daily.     • mupirocin (BACTROBAN) 2 % ointment Apply to the inside of each nostril with a cotton swab two times daily, morning and evening, for 5 days before surgery. 15 g 0   • mupirocin (BACTROBAN) 2 % ointment Apply to the inside of each nostril with a cotton swab two times daily, morning and evening, for 5 days before surgery. 15 g 0   • nitroglycerin (NITROSTAT) 0.4 MG SL tablet Place 0.4 mg under the tongue Every 5 (Five) Minutes As Needed for Chest Pain. Take no more than 3 doses in 15 minutes.     • pantoprazole (PROTONIX) 40 MG EC tablet Take 1 tablet by mouth 2 (Two) Times a Day Before Meals. 60 tablet 5   • SYMBICORT 80-4.5 MCG/ACT inhaler      • ustekinumab (STELARA) 45 MG/0.5ML injection Inject 45 mg under the skin Every 30 (Thirty) Days.       No current facility-administered medications on file prior to visit.        Physical Exam:   Temp 97.1 °F (36.2 °C)   Ht 177.8 cm (70\")   Wt 75.7 kg (166 lb 12.8 oz)   BMI 23.93 kg/m²   MUSCULOSKELETAL:  Neck tenderness to palpation is not observed.   ROM in neck is met in all directions.  NEUROLOGICAL:  Strength is intact in the upper and lower extremities to direct testing.  Muscle tone is increased throughout.  Station and gait are normal.  Sensation is fairly globally diminished in his arms, chest, " abdomen.  Deep tendon reflexes are 3+ and symmetrical.  Andres's Sign is positive bilaterally.            Medical Decision Making     Data Review:   MRI of the cervical spine dated 5/6/2020 demonstrates diffuse degenerative disc disease and spondylosis.  There is rather substantial signal change within the spinal cord behind the C4 and C5 vertebral segments.  Broad-based spurring is most prominent at C4-5 and slightly less so at C5-6.  Similar more modest findings are noted at C3-4 and C6-7.     Diagnosis:   Cervical spondylosis with progressive myelopathy.     Treatment Options:   I have recommended ACDF C4-6.  The goal of surgery is to stabilize his myelopathy and prevent progression of his symptoms.  Surgery may or may not correct some of his current neurologic deficits.  The nature of the procedure as well as the potential risks, complications, limitations, and alternatives to the procedure were discussed at length with the patient and the patient has agreed to proceed with surgery.  It is also conceivable that he may require additional dorsal surgery for additional decompression if we cannot achieve all that we need to from in front.  I have once again emphasized the importance of smoking cessation as it relates to a successful fusion.  Formal cardiac clearance will be required.  Aspirin can be continued but I would like him to be off his Plavix in the perioperative period if possible.          Diagnosis Plan   1. Cervical spondylosis with myelopathy      2. DDD (degenerative disc disease), cervical

## 2021-04-09 NOTE — PROGRESS NOTES
Patient: Javier Santiago  : 1965    Primary Care Provider: Moi Segura APRN    Requesting Provider: As above        History    Chief Complaint: Neck and arm pain with sensory alteration and weakness.    History of Present Illness: Mr. Santiago is a 55-year-old disabled former  who is well-known to our service.  He has been followed by Dr. Lema for some time and is noted to have extensive cervical spondylosis.  He reports progressive pain in his neck and arms with weakness and numbness throughout.  If he turns or moves in certain positions he gets an electric shock feeling that extends down his neck into his whole body.  He has been dropping things.  His hands and arms are numb.  He has no bowel or bladder dysfunction.  He has cut his smoking down to 1/2 pack/day.    Review of Systems   Constitutional: Negative for activity change, appetite change, chills, diaphoresis, fatigue, fever and unexpected weight change.   HENT: Negative for congestion, dental problem, drooling, ear discharge, ear pain, facial swelling, hearing loss, mouth sores, nosebleeds, postnasal drip, rhinorrhea, sinus pressure, sinus pain, sneezing, sore throat, tinnitus, trouble swallowing and voice change.    Eyes: Negative for photophobia, pain, discharge, redness, itching and visual disturbance.   Respiratory: Negative for apnea, cough, choking, chest tightness, shortness of breath, wheezing and stridor.    Cardiovascular: Negative for chest pain, palpitations and leg swelling.   Gastrointestinal: Negative for abdominal distention, abdominal pain, anal bleeding, blood in stool, constipation, diarrhea, nausea, rectal pain and vomiting.   Endocrine: Negative for cold intolerance, heat intolerance, polydipsia, polyphagia and polyuria.   Genitourinary: Negative for decreased urine volume, difficulty urinating, discharge, dysuria, enuresis, flank pain, frequency, genital sores, hematuria, penile pain, penile swelling, scrotal  "swelling, testicular pain and urgency.   Musculoskeletal: Positive for back pain, joint swelling, neck pain and neck stiffness. Negative for arthralgias, gait problem and myalgias.   Skin: Negative for color change, pallor, rash and wound.   Allergic/Immunologic: Negative for environmental allergies, food allergies and immunocompromised state.   Neurological: Positive for numbness. Negative for dizziness, tremors, seizures, syncope, facial asymmetry, speech difficulty, weakness, light-headedness and headaches.   Hematological: Negative for adenopathy. Does not bruise/bleed easily.   Psychiatric/Behavioral: Positive for sleep disturbance. Negative for agitation, behavioral problems, confusion, decreased concentration, dysphoric mood, hallucinations, self-injury and suicidal ideas. The patient is not nervous/anxious and is not hyperactive.        The patient's past medical history, past surgical history, family history, and social history have been reviewed at length in the electronic medical record.    Physical Exam:   Temp 97.1 °F (36.2 °C)   Ht 177.8 cm (70\")   Wt 75.7 kg (166 lb 12.8 oz)   BMI 23.93 kg/m²   MUSCULOSKELETAL:  Neck tenderness to palpation is not observed.   ROM in neck is met in all directions.  NEUROLOGICAL:  Strength is intact in the upper and lower extremities to direct testing.  Muscle tone is increased throughout.  Station and gait are normal.  Sensation is fairly globally diminished in his arms, chest, abdomen.  Deep tendon reflexes are 3+ and symmetrical.  Andres's Sign is positive bilaterally.         Medical Decision Making    Data Review:   MRI of the cervical spine dated 5/6/2020 demonstrates diffuse degenerative disc disease and spondylosis.  There is rather substantial signal change within the spinal cord behind the C4 and C5 vertebral segments.  Broad-based spurring is most prominent at C4-5 and slightly less so at C5-6.  Similar more modest findings are noted at C3-4 and " C6-7.    Diagnosis:   Cervical spondylosis with progressive myelopathy.    Treatment Options:   I have recommended ACDF C4-6.  The goal of surgery is to stabilize his myelopathy and prevent progression of his symptoms.  Surgery may or may not correct some of his current neurologic deficits.  The nature of the procedure as well as the potential risks, complications, limitations, and alternatives to the procedure were discussed at length with the patient and the patient has agreed to proceed with surgery.  It is also conceivable that he may require additional dorsal surgery for additional decompression if we cannot achieve all that we need to from in front.  I have once again emphasized the importance of smoking cessation as it relates to a successful fusion.       Diagnosis Plan   1. Cervical spondylosis with myelopathy     2. DDD (degenerative disc disease), cervical         Scribed for Chester Cowan MD by Malka Black MICHELLE 4/9/2021 12:56 EDT      I, Dr. Cowan, personally performed the services described in the documentation, as scribed in my presence, and it is both accurate and complete.

## 2021-04-23 ENCOUNTER — TELEPHONE (OUTPATIENT)
Dept: CARDIOLOGY | Facility: CLINIC | Age: 56
End: 2021-04-23

## 2021-04-23 ENCOUNTER — APPOINTMENT (OUTPATIENT)
Dept: PREADMISSION TESTING | Facility: HOSPITAL | Age: 56
End: 2021-04-23

## 2021-04-23 ENCOUNTER — PRE-ADMISSION TESTING (OUTPATIENT)
Dept: PREADMISSION TESTING | Facility: HOSPITAL | Age: 56
End: 2021-04-23

## 2021-04-23 VITALS — BODY MASS INDEX: 24.49 KG/M2 | HEIGHT: 70 IN | WEIGHT: 171.08 LBS

## 2021-04-23 LAB
DEPRECATED RDW RBC AUTO: 46.8 FL (ref 37–54)
ERYTHROCYTE [DISTWIDTH] IN BLOOD BY AUTOMATED COUNT: 13.7 % (ref 12.3–15.4)
HCT VFR BLD AUTO: 44.6 % (ref 37.5–51)
HGB BLD-MCNC: 15.2 G/DL (ref 13–17.7)
MCH RBC QN AUTO: 31.8 PG (ref 26.6–33)
MCHC RBC AUTO-ENTMCNC: 34.1 G/DL (ref 31.5–35.7)
MCV RBC AUTO: 93.3 FL (ref 79–97)
MRSA DNA SPEC QL NAA+PROBE: NEGATIVE
PLATELET # BLD AUTO: 347 10*3/MM3 (ref 140–450)
PMV BLD AUTO: 9.9 FL (ref 6–12)
POTASSIUM SERPL-SCNC: 4.7 MMOL/L (ref 3.5–5.2)
QT INTERVAL: 388 MS
QTC INTERVAL: 409 MS
RBC # BLD AUTO: 4.78 10*6/MM3 (ref 4.14–5.8)
WBC # BLD AUTO: 7.64 10*3/MM3 (ref 3.4–10.8)

## 2021-04-23 PROCEDURE — 87641 MR-STAPH DNA AMP PROBE: CPT

## 2021-04-23 PROCEDURE — U0004 COV-19 TEST NON-CDC HGH THRU: HCPCS

## 2021-04-23 PROCEDURE — 93010 ELECTROCARDIOGRAM REPORT: CPT | Performed by: INTERNAL MEDICINE

## 2021-04-23 PROCEDURE — 84132 ASSAY OF SERUM POTASSIUM: CPT

## 2021-04-23 PROCEDURE — 93005 ELECTROCARDIOGRAM TRACING: CPT

## 2021-04-23 PROCEDURE — 85027 COMPLETE CBC AUTOMATED: CPT

## 2021-04-23 PROCEDURE — 36415 COLL VENOUS BLD VENIPUNCTURE: CPT

## 2021-04-23 PROCEDURE — C9803 HOPD COVID-19 SPEC COLLECT: HCPCS

## 2021-04-23 NOTE — TELEPHONE ENCOUNTER
Daja from [facility] has called requesting instruction on ASA prior to surgery on Monday 4/26/21. He had previously been told he could hold Plavix for 5-7 days, however, nothing was noted about the ASA.     I have attempted to call Dr. Mukherjee who is out of country at this time, with no answer, and we have no provider in office at this time. I have also messaged him for clarification and awaiting response.  I have reviewed chart, which shows he had in stent restenosis in his LAD. I explained to her that because of this he will need to stay on ASA at this time. She is aware and understands.      Since this telephone call I have talked with Dr. Mukherjee who agrees he should stay on ASA for surgery.

## 2021-04-23 NOTE — PAT
An arrival time for procedure was not given during PAT visit. If patient had any questions or concerns about their arrival time, they were instructed to contact their surgeon/physician.  Additionally, if the patient referred to an arrival time that was acquired from their my chart account, patient was encouraged to verify that time with their surgeon/physician.  NO arrival times given in Pre Admission Testing Department.    The following information and instructions were given:    Do not eat, drink, smoke or chew gum after midnight the night before surgery. This includes no mints.  Take all routine, prescribed medications including heart and blood pressure medicines with a sip of water unless otherwise instructed by your physician.   Do NOT take diabetic medication unless instructed by your physician.    DO NOT shave for two days before your procedure.  Do not wear makeup.      DO NOT wear fingernail polish (gel/regular) and/or acrylic/artificial nails on the day of surgery.   If you had a recent manicure and would rather not remove polish or artificial nails, the minimum requirement is that the polish/artificial nails must be removed from the middle finger on each hand.      If you are having surgery/procedure on an upper extremity, fingernail polish/artificial fingernails must be removed for surgery.  NO EXCEPTIONS.      If you are having surgery/procedure on a lower extremity, toenail polish on both extremities must be removed for surgery.  NO EXCEPTIONS.    Remove all jewelry (advise to go to jeweler if unable to remove).  Jewelry, especially rings, can no longer be taped for surgery.    Leave anything you consider valuable at home.    Leave your suitcase in the car until after your surgery.    Bring the following with you the day of your procedure (when applicable):       -Picture ID and insurance cards       -Co-pay/deductible required by insurance       -Medications in the original bottles (not a list)  including all over-the-counter medications if not brought to PAT       -Copy of advance directive, living will or power of  documents if not brought to PAT       -CPAP or BIPAP mask and tubing (do not bring machine)       -Skin prep instruction(s) sheet       -PAT Pass    Education booklet, brochure, handout or binder related to procedure given to patient.  Education booklet also includes general information related to their recovery that mentions signs and symptoms of infection and when to call the doctor.    When applicable, an ERAS handout/booklet was given to patient.    Pain Control After Surgery handout given to patient.    Respirex use (handout given to patient) and pneumonia prevention education provided.    Signs and Symptoms of infection discussed with patient in Pre Admission Testing.  Patient instructed to call their doctor if any of the following symptoms are noted during recovery:  Fever of 100.4 F or higher, incision that is warm or has increasing bleeding, redness or drainage.    DVT Prevention instructions given verbally during Pre Admission Testing appointment that stress the importance of ambulation to improve blood circulation.  Also encouraged patient to perform foot exercises when in bed and application of a sequential device may be applied to lower extremities to improve circulation.      Please apply Chlorhexidine wipes to surgical area (if instructed) the night before procedure and the AM of procedure and document date/time of applications on skin prep instruction sheet.    Patient to apply Chlorhexadine wipes  to surgical area (as instructed) the night before procedure and the AM of procedure. Wipes provided.    CARDIAC CLEARANCE ON CHART FROM DR. DEJESUS DATED 2/24/21 AND INSTRUCTIONS TO STOP PLAVIX 5-7 DAYS PRIOR TO SURGERY. CALLED OFFICE FOR INSTRUCTIONS REGARDING ASPIRIN 81MG. WAS TOLD BY KATHY WHITTAKER MA THAT SHE WOULD CONTACT DR. DEJESUS FOR INSTRUCTIONS AND THAT  A NOTE WOULD BE PLACED IN Epic.     ECHO & STRESS IN Epic FROM 1/20/21.    CARDIAC CATH IN [facility] ON 2/11/21.

## 2021-04-24 LAB — SARS-COV-2 RNA NOSE QL NAA+PROBE: NOT DETECTED

## 2021-04-26 ENCOUNTER — ANESTHESIA EVENT (OUTPATIENT)
Dept: PERIOP | Facility: HOSPITAL | Age: 56
End: 2021-04-26

## 2021-04-26 ENCOUNTER — HOSPITAL ENCOUNTER (OUTPATIENT)
Facility: HOSPITAL | Age: 56
Discharge: HOME | End: 2021-04-26
Attending: NEUROLOGICAL SURGERY | Admitting: NEUROLOGICAL SURGERY

## 2021-04-26 ENCOUNTER — ANESTHESIA (OUTPATIENT)
Dept: PERIOP | Facility: HOSPITAL | Age: 56
End: 2021-04-26

## 2021-04-26 ENCOUNTER — APPOINTMENT (OUTPATIENT)
Dept: GENERAL RADIOLOGY | Facility: HOSPITAL | Age: 56
End: 2021-04-26

## 2021-04-26 VITALS
TEMPERATURE: 98.1 F | SYSTOLIC BLOOD PRESSURE: 111 MMHG | HEART RATE: 74 BPM | WEIGHT: 171 LBS | DIASTOLIC BLOOD PRESSURE: 72 MMHG | OXYGEN SATURATION: 96 % | BODY MASS INDEX: 24.48 KG/M2 | RESPIRATION RATE: 18 BRPM | HEIGHT: 70 IN

## 2021-04-26 DIAGNOSIS — M47.812 CERVICAL SPONDYLOSIS WITHOUT MYELOPATHY: ICD-10-CM

## 2021-04-26 DIAGNOSIS — M47.12 CERVICAL SPONDYLOSIS WITH MYELOPATHY: ICD-10-CM

## 2021-04-26 DIAGNOSIS — M48.02 SPINAL STENOSIS IN CERVICAL REGION: ICD-10-CM

## 2021-04-26 PROCEDURE — 22551 ARTHRD ANT NTRBDY CERVICAL: CPT | Performed by: PHYSICIAN ASSISTANT

## 2021-04-26 PROCEDURE — C1713 ANCHOR/SCREW BN/BN,TIS/BN: HCPCS | Performed by: NEUROLOGICAL SURGERY

## 2021-04-26 PROCEDURE — 25010000003 CEFAZOLIN IN DEXTROSE 2-4 GM/100ML-% SOLUTION: Performed by: NEUROLOGICAL SURGERY

## 2021-04-26 PROCEDURE — 25010000002 FENTANYL CITRATE (PF) 100 MCG/2ML SOLUTION: Performed by: NURSE ANESTHETIST, CERTIFIED REGISTERED

## 2021-04-26 PROCEDURE — 22551 ARTHRD ANT NTRBDY CERVICAL: CPT | Performed by: NEUROLOGICAL SURGERY

## 2021-04-26 PROCEDURE — C1889 IMPLANT/INSERT DEVICE, NOC: HCPCS | Performed by: NEUROLOGICAL SURGERY

## 2021-04-26 PROCEDURE — 22552 ARTHRD ANT NTRBD CERVICAL EA: CPT | Performed by: NEUROLOGICAL SURGERY

## 2021-04-26 PROCEDURE — 22845 INSERT SPINE FIXATION DEVICE: CPT | Performed by: PHYSICIAN ASSISTANT

## 2021-04-26 PROCEDURE — 22552 ARTHRD ANT NTRBD CERVICAL EA: CPT | Performed by: PHYSICIAN ASSISTANT

## 2021-04-26 PROCEDURE — 25010000002 DEXAMETHASONE: Performed by: NEUROLOGICAL SURGERY

## 2021-04-26 PROCEDURE — 20931 SP BONE ALGRFT STRUCT ADD-ON: CPT | Performed by: NEUROLOGICAL SURGERY

## 2021-04-26 PROCEDURE — 25010000002 HYDROMORPHONE PER 4 MG: Performed by: NURSE ANESTHETIST, CERTIFIED REGISTERED

## 2021-04-26 PROCEDURE — G0378 HOSPITAL OBSERVATION PER HR: HCPCS

## 2021-04-26 PROCEDURE — 25010000002 PROPOFOL 10 MG/ML EMULSION: Performed by: NURSE ANESTHETIST, CERTIFIED REGISTERED

## 2021-04-26 PROCEDURE — 76000 FLUOROSCOPY <1 HR PHYS/QHP: CPT

## 2021-04-26 PROCEDURE — 22845 INSERT SPINE FIXATION DEVICE: CPT | Performed by: NEUROLOGICAL SURGERY

## 2021-04-26 PROCEDURE — 25010000002 NEOSTIGMINE 10 MG/10ML SOLUTION: Performed by: NURSE ANESTHETIST, CERTIFIED REGISTERED

## 2021-04-26 DEVICE — PLATE 3002033 ZEVO 33MM 2 LVL
Type: IMPLANTABLE DEVICE | Site: SPINE CERVICAL | Status: FUNCTIONAL
Brand: ZEVO™ ANTERIOR CERVICAL PLATE SYSTEM

## 2021-04-26 DEVICE — BONE LORDOTIC ASR 5X14X11 FZD: Type: IMPLANTABLE DEVICE | Site: SPINE CERVICAL | Status: FUNCTIONAL

## 2021-04-26 DEVICE — FLOSEAL HEMOSTATIC MATRIX, 10ML
Type: IMPLANTABLE DEVICE | Site: SPINE CERVICAL | Status: FUNCTIONAL
Brand: FLOSEAL HEMOSTATIC MATRIX

## 2021-04-26 DEVICE — IMPLANTABLE DEVICE
Type: IMPLANTABLE DEVICE | Site: SPINE CERVICAL | Status: FUNCTIONAL
Brand: SURGIFOAM® ABSORBABLE GELATIN SPONGE, U.S.P.

## 2021-04-26 RX ORDER — HYDROCODONE BITARTRATE AND ACETAMINOPHEN 7.5; 325 MG/1; MG/1
1 TABLET ORAL ONCE
Status: DISCONTINUED | OUTPATIENT
Start: 2021-04-26 | End: 2021-04-26

## 2021-04-26 RX ORDER — HYDROMORPHONE HYDROCHLORIDE 1 MG/ML
0.5 INJECTION, SOLUTION INTRAMUSCULAR; INTRAVENOUS; SUBCUTANEOUS
Status: DISCONTINUED | OUTPATIENT
Start: 2021-04-26 | End: 2021-04-26 | Stop reason: HOSPADM

## 2021-04-26 RX ORDER — FAMOTIDINE 10 MG/ML
20 INJECTION, SOLUTION INTRAVENOUS ONCE
Status: CANCELLED | OUTPATIENT
Start: 2021-04-26 | End: 2021-04-26

## 2021-04-26 RX ORDER — SODIUM CHLORIDE, SODIUM LACTATE, POTASSIUM CHLORIDE, CALCIUM CHLORIDE 600; 310; 30; 20 MG/100ML; MG/100ML; MG/100ML; MG/100ML
9 INJECTION, SOLUTION INTRAVENOUS CONTINUOUS
Status: DISCONTINUED | OUTPATIENT
Start: 2021-04-26 | End: 2021-04-26 | Stop reason: HOSPADM

## 2021-04-26 RX ORDER — BUPIVACAINE HCL/0.9 % NACL/PF 0.125 %
PLASTIC BAG, INJECTION (ML) EPIDURAL AS NEEDED
Status: DISCONTINUED | OUTPATIENT
Start: 2021-04-26 | End: 2021-04-26 | Stop reason: SURG

## 2021-04-26 RX ORDER — LIDOCAINE HYDROCHLORIDE 10 MG/ML
0.5 INJECTION, SOLUTION EPIDURAL; INFILTRATION; INTRACAUDAL; PERINEURAL ONCE AS NEEDED
Status: COMPLETED | OUTPATIENT
Start: 2021-04-26 | End: 2021-04-26

## 2021-04-26 RX ORDER — LIDOCAINE HYDROCHLORIDE 10 MG/ML
INJECTION, SOLUTION EPIDURAL; INFILTRATION; INTRACAUDAL; PERINEURAL AS NEEDED
Status: DISCONTINUED | OUTPATIENT
Start: 2021-04-26 | End: 2021-04-26 | Stop reason: SURG

## 2021-04-26 RX ORDER — SODIUM CHLORIDE 0.9 % (FLUSH) 0.9 %
10 SYRINGE (ML) INJECTION EVERY 12 HOURS SCHEDULED
Status: DISCONTINUED | OUTPATIENT
Start: 2021-04-26 | End: 2021-04-26 | Stop reason: HOSPADM

## 2021-04-26 RX ORDER — CEFAZOLIN SODIUM 2 G/100ML
2 INJECTION, SOLUTION INTRAVENOUS ONCE
Status: COMPLETED | OUTPATIENT
Start: 2021-04-26 | End: 2021-04-26

## 2021-04-26 RX ORDER — MAGNESIUM HYDROXIDE 1200 MG/15ML
LIQUID ORAL AS NEEDED
Status: DISCONTINUED | OUTPATIENT
Start: 2021-04-26 | End: 2021-04-26 | Stop reason: HOSPADM

## 2021-04-26 RX ORDER — MIDAZOLAM HYDROCHLORIDE 1 MG/ML
1 INJECTION INTRAMUSCULAR; INTRAVENOUS
Status: DISCONTINUED | OUTPATIENT
Start: 2021-04-26 | End: 2021-04-26 | Stop reason: HOSPADM

## 2021-04-26 RX ORDER — SODIUM CHLORIDE 0.9 % (FLUSH) 0.9 %
3 SYRINGE (ML) INJECTION EVERY 12 HOURS SCHEDULED
Status: DISCONTINUED | OUTPATIENT
Start: 2021-04-26 | End: 2021-04-26

## 2021-04-26 RX ORDER — SODIUM CHLORIDE 0.9 % (FLUSH) 0.9 %
3-10 SYRINGE (ML) INJECTION AS NEEDED
Status: DISCONTINUED | OUTPATIENT
Start: 2021-04-26 | End: 2021-04-26

## 2021-04-26 RX ORDER — GLYCOPYRROLATE 0.2 MG/ML
INJECTION INTRAMUSCULAR; INTRAVENOUS AS NEEDED
Status: DISCONTINUED | OUTPATIENT
Start: 2021-04-26 | End: 2021-04-26 | Stop reason: SURG

## 2021-04-26 RX ORDER — HYDROCODONE BITARTRATE AND ACETAMINOPHEN 5; 325 MG/1; MG/1
1 TABLET ORAL ONCE AS NEEDED
Status: COMPLETED | OUTPATIENT
Start: 2021-04-26 | End: 2021-04-26

## 2021-04-26 RX ORDER — EPHEDRINE SULFATE 50 MG/ML
INJECTION, SOLUTION INTRAVENOUS AS NEEDED
Status: DISCONTINUED | OUTPATIENT
Start: 2021-04-26 | End: 2021-04-26 | Stop reason: SURG

## 2021-04-26 RX ORDER — ONDANSETRON 2 MG/ML
4 INJECTION INTRAMUSCULAR; INTRAVENOUS ONCE AS NEEDED
Status: DISCONTINUED | OUTPATIENT
Start: 2021-04-26 | End: 2021-04-26 | Stop reason: HOSPADM

## 2021-04-26 RX ORDER — FENTANYL CITRATE 50 UG/ML
INJECTION, SOLUTION INTRAMUSCULAR; INTRAVENOUS AS NEEDED
Status: DISCONTINUED | OUTPATIENT
Start: 2021-04-26 | End: 2021-04-26 | Stop reason: SURG

## 2021-04-26 RX ORDER — IBUPROFEN 800 MG/1
800 TABLET ORAL ONCE
Status: DISCONTINUED | OUTPATIENT
Start: 2021-04-26 | End: 2021-04-26

## 2021-04-26 RX ORDER — CLOPIDOGREL BISULFATE 75 MG/1
75 TABLET ORAL DAILY
Qty: 30 TABLET
Start: 2021-05-03

## 2021-04-26 RX ORDER — HYDROCODONE BITARTRATE AND ACETAMINOPHEN 7.5; 325 MG/1; MG/1
1 TABLET ORAL 3 TIMES DAILY PRN
Qty: 25 TABLET | Refills: 0 | Status: SHIPPED | OUTPATIENT
Start: 2021-04-26 | End: 2022-06-29

## 2021-04-26 RX ORDER — ACETAMINOPHEN 325 MG/1
650 TABLET ORAL ONCE
Status: DISCONTINUED | OUTPATIENT
Start: 2021-04-26 | End: 2021-04-26

## 2021-04-26 RX ORDER — ROCURONIUM BROMIDE 10 MG/ML
INJECTION, SOLUTION INTRAVENOUS AS NEEDED
Status: DISCONTINUED | OUTPATIENT
Start: 2021-04-26 | End: 2021-04-26 | Stop reason: SURG

## 2021-04-26 RX ORDER — SODIUM CHLORIDE 9 MG/ML
INJECTION, SOLUTION INTRAVENOUS AS NEEDED
Status: DISCONTINUED | OUTPATIENT
Start: 2021-04-26 | End: 2021-04-26 | Stop reason: HOSPADM

## 2021-04-26 RX ORDER — MIDAZOLAM HYDROCHLORIDE 1 MG/ML
2 INJECTION INTRAMUSCULAR; INTRAVENOUS
Status: DISCONTINUED | OUTPATIENT
Start: 2021-04-26 | End: 2021-04-26 | Stop reason: HOSPADM

## 2021-04-26 RX ORDER — FENTANYL CITRATE 50 UG/ML
50 INJECTION, SOLUTION INTRAMUSCULAR; INTRAVENOUS
Status: DISCONTINUED | OUTPATIENT
Start: 2021-04-26 | End: 2021-04-26 | Stop reason: HOSPADM

## 2021-04-26 RX ORDER — NEOSTIGMINE METHYLSULFATE 1 MG/ML
INJECTION, SOLUTION INTRAVENOUS AS NEEDED
Status: DISCONTINUED | OUTPATIENT
Start: 2021-04-26 | End: 2021-04-26 | Stop reason: SURG

## 2021-04-26 RX ORDER — SODIUM CHLORIDE, SODIUM LACTATE, POTASSIUM CHLORIDE, CALCIUM CHLORIDE 600; 310; 30; 20 MG/100ML; MG/100ML; MG/100ML; MG/100ML
100 INJECTION, SOLUTION INTRAVENOUS CONTINUOUS
Status: DISCONTINUED | OUTPATIENT
Start: 2021-04-26 | End: 2021-04-26

## 2021-04-26 RX ORDER — PROPOFOL 10 MG/ML
VIAL (ML) INTRAVENOUS AS NEEDED
Status: DISCONTINUED | OUTPATIENT
Start: 2021-04-26 | End: 2021-04-26 | Stop reason: SURG

## 2021-04-26 RX ORDER — FAMOTIDINE 20 MG/1
20 TABLET, FILM COATED ORAL ONCE
Status: COMPLETED | OUTPATIENT
Start: 2021-04-26 | End: 2021-04-26

## 2021-04-26 RX ORDER — SODIUM CHLORIDE, SODIUM LACTATE, POTASSIUM CHLORIDE, CALCIUM CHLORIDE 600; 310; 30; 20 MG/100ML; MG/100ML; MG/100ML; MG/100ML
100 INJECTION, SOLUTION INTRAVENOUS CONTINUOUS
Status: DISCONTINUED | OUTPATIENT
Start: 2021-04-26 | End: 2021-04-26 | Stop reason: SDUPTHER

## 2021-04-26 RX ORDER — SODIUM CHLORIDE 0.9 % (FLUSH) 0.9 %
10 SYRINGE (ML) INJECTION AS NEEDED
Status: DISCONTINUED | OUTPATIENT
Start: 2021-04-26 | End: 2021-04-26 | Stop reason: HOSPADM

## 2021-04-26 RX ADMIN — HYDROCODONE BITARTRATE AND ACETAMINOPHEN 1 TABLET: 5; 325 TABLET ORAL at 12:26

## 2021-04-26 RX ADMIN — LIDOCAINE HYDROCHLORIDE 0.5 ML: 10 INJECTION, SOLUTION EPIDURAL; INFILTRATION; INTRACAUDAL; PERINEURAL at 07:20

## 2021-04-26 RX ADMIN — FENTANYL CITRATE 50 MCG: 50 INJECTION, SOLUTION INTRAMUSCULAR; INTRAVENOUS at 08:25

## 2021-04-26 RX ADMIN — SODIUM CHLORIDE, POTASSIUM CHLORIDE, SODIUM LACTATE AND CALCIUM CHLORIDE: 600; 310; 30; 20 INJECTION, SOLUTION INTRAVENOUS at 09:50

## 2021-04-26 RX ADMIN — FENTANYL CITRATE 50 MCG: 50 INJECTION, SOLUTION INTRAMUSCULAR; INTRAVENOUS at 10:31

## 2021-04-26 RX ADMIN — SODIUM CHLORIDE, POTASSIUM CHLORIDE, SODIUM LACTATE AND CALCIUM CHLORIDE 9 ML/HR: 600; 310; 30; 20 INJECTION, SOLUTION INTRAVENOUS at 07:20

## 2021-04-26 RX ADMIN — ROCURONIUM BROMIDE 10 MG: 10 INJECTION INTRAVENOUS at 10:02

## 2021-04-26 RX ADMIN — ROCURONIUM BROMIDE 10 MG: 10 INJECTION INTRAVENOUS at 08:55

## 2021-04-26 RX ADMIN — FAMOTIDINE 20 MG: 20 TABLET ORAL at 07:20

## 2021-04-26 RX ADMIN — FENTANYL CITRATE 50 MCG: 50 INJECTION, SOLUTION INTRAMUSCULAR; INTRAVENOUS at 11:41

## 2021-04-26 RX ADMIN — GLYCOPYRROLATE 0.2 MG: 0.4 INJECTION INTRAMUSCULAR; INTRAVENOUS at 08:54

## 2021-04-26 RX ADMIN — NEOSTIGMINE 4 MG: 1 INJECTION INTRAVENOUS at 10:31

## 2021-04-26 RX ADMIN — Medication 80 MCG: at 09:11

## 2021-04-26 RX ADMIN — SODIUM CHLORIDE, POTASSIUM CHLORIDE, SODIUM LACTATE AND CALCIUM CHLORIDE: 600; 310; 30; 20 INJECTION, SOLUTION INTRAVENOUS at 08:25

## 2021-04-26 RX ADMIN — DEXAMETHASONE SODIUM PHOSPHATE 10 MG: 10 INJECTION INTRAMUSCULAR; INTRAVENOUS at 08:25

## 2021-04-26 RX ADMIN — EPHEDRINE SULFATE 10 MG: 50 INJECTION, SOLUTION INTRAVENOUS at 08:56

## 2021-04-26 RX ADMIN — FENTANYL CITRATE 50 MCG: 50 INJECTION, SOLUTION INTRAMUSCULAR; INTRAVENOUS at 11:05

## 2021-04-26 RX ADMIN — PROPOFOL 200 MG: 10 INJECTION, EMULSION INTRAVENOUS at 08:25

## 2021-04-26 RX ADMIN — EPHEDRINE SULFATE 5 MG: 50 INJECTION, SOLUTION INTRAVENOUS at 09:49

## 2021-04-26 RX ADMIN — EPHEDRINE SULFATE 5 MG: 50 INJECTION, SOLUTION INTRAVENOUS at 09:06

## 2021-04-26 RX ADMIN — ROCURONIUM BROMIDE 50 MG: 10 INJECTION INTRAVENOUS at 08:25

## 2021-04-26 RX ADMIN — HYDROMORPHONE HYDROCHLORIDE 0.5 MG: 1 INJECTION, SOLUTION INTRAMUSCULAR; INTRAVENOUS; SUBCUTANEOUS at 11:18

## 2021-04-26 RX ADMIN — GLYCOPYRROLATE 0.4 MG: 0.4 INJECTION INTRAMUSCULAR; INTRAVENOUS at 10:31

## 2021-04-26 RX ADMIN — CEFAZOLIN SODIUM 2 G: 2 INJECTION, SOLUTION INTRAVENOUS at 08:25

## 2021-04-26 RX ADMIN — LIDOCAINE HYDROCHLORIDE 50 MG: 10 INJECTION, SOLUTION EPIDURAL; INFILTRATION; INTRACAUDAL; PERINEURAL at 08:25

## 2021-04-26 RX ADMIN — HYDROMORPHONE HYDROCHLORIDE 0.5 MG: 1 INJECTION, SOLUTION INTRAMUSCULAR; INTRAVENOUS; SUBCUTANEOUS at 11:29

## 2021-04-26 RX ADMIN — EPHEDRINE SULFATE 5 MG: 50 INJECTION, SOLUTION INTRAVENOUS at 09:01

## 2021-04-26 RX ADMIN — FENTANYL CITRATE 50 MCG: 50 INJECTION, SOLUTION INTRAMUSCULAR; INTRAVENOUS at 11:00

## 2021-04-26 RX ADMIN — Medication 80 MCG: at 09:32

## 2021-04-26 NOTE — ANESTHESIA POSTPROCEDURE EVALUATION
Patient: Javier Santiago    Procedure Summary     Date: 04/26/21 Room / Location:  [facility]  [facility]    Anesthesia Start: 0824 Anesthesia Stop: 1043    Procedure: CERVICAL DISCECTOMY ANTERIOR WITH FUSION C4-6 (N/A Spine Cervical) Diagnosis:       Cervical spondylosis with myelopathy      (Cervical spondylosis with myelopathy [M47.12])    Surgeons: Chester Cowan MD Provider: Rahul Banks MD    Anesthesia Type: general ASA Status: 3          Anesthesia Type: general    Vitals  Vitals Value Taken Time   /70 04/26/21 1043   Temp 97.9 °F (36.6 °C) 04/26/21 1043   Pulse     Resp 18 04/26/21 1043   SpO2 96 % 04/26/21 1043           Post Anesthesia Care and Evaluation    Patient location during evaluation: PACU  Patient participation: waiting for patient participation  Level of consciousness: responsive to physical stimuli and lethargic  Pain management: adequate  Airway patency: patent  Anesthetic complications: No anesthetic complications  PONV Status: none  Cardiovascular status: hemodynamically stable and acceptable  Respiratory status: nonlabored ventilation, acceptable and nasal cannula  Hydration status: acceptable

## 2021-04-26 NOTE — ANESTHESIA PREPROCEDURE EVALUATION
" Anesthesia Evaluation     Patient summary reviewed and Nursing notes reviewed                Airway   Mallampati: I  TM distance: >3 FB  Neck ROM: full  No difficulty expected  Dental - normal exam   (+) upper dentures and lower dentures    Pulmonary - normal exam   (+) a smoker Current, COPD, sleep apnea,   Cardiovascular - normal exam    (+) hypertension, CAD, cardiac stents (asa/plavix tx; last plavix dose 6 days ago, ASA last night) hyperlipidemia,     ROS comment:   Echo 1/21: normal EF, no significant valve disease    LHC 2/21: mild-mod non-obstructive 50% in stent restenosis in LAD stent --> continue medical treatment    \"may proceed with surgery with moderate risk\" per cardiology    Neuro/Psych- negative ROS  GI/Hepatic/Renal/Endo    (+)  GERD,      Musculoskeletal (-) negative ROS    Abdominal  - normal exam    Bowel sounds: normal.   Substance History - negative use     OB/GYN negative ob/gyn ROS         Other                        Anesthesia Plan    ASA 3     general   (Glidescope/simmons)  intravenous induction     Anesthetic plan, all risks, benefits, and alternatives have been provided, discussed and informed consent has been obtained with: patient.    Plan discussed with CRNA.      "

## 2021-04-26 NOTE — ANESTHESIA PROCEDURE NOTES
Airway  Urgency: elective    Date/Time: 4/26/2021 8:27 AM  Airway not difficult    General Information and Staff    Patient location during procedure: OR  CRNA: Maverick Cordoba CRNA    Indications and Patient Condition  Indications for airway management: airway protection    Preoxygenated: yes  MILS not maintained throughout  Mask difficulty assessment: 2 - vent by mask + OA or adjuvant +/- NMBA    Final Airway Details  Final airway type: endotracheal airway      Successful airway: ETT  Cuffed: yes   Successful intubation technique: video laryngoscopy  Facilitating devices/methods: intubating stylet  Endotracheal tube insertion site: oral  Blade: Love  Blade size: 3  ETT size (mm): 7.5  Cormack-Lehane Classification: grade I - full view of glottis  Placement verified by: chest auscultation and capnometry   Measured from: lips  ETT/EBT  to lips (cm): 20  Number of attempts at approach: 1  Assessment: lips, teeth, and gum same as pre-op and atraumatic intubation    Additional Comments  Negative epigastric sounds, Breath sound equal bilaterally with symmetric chest rise and fall. Easy airway. For cervical protection only

## 2021-04-28 ENCOUNTER — TELEPHONE (OUTPATIENT)
Dept: NEUROSURGERY | Facility: CLINIC | Age: 56
End: 2021-04-28

## 2021-04-28 DIAGNOSIS — M47.12 CERVICAL SPONDYLOSIS WITH MYELOPATHY: Primary | ICD-10-CM

## 2021-04-28 DIAGNOSIS — M50.30 DDD (DEGENERATIVE DISC DISEASE), CERVICAL: ICD-10-CM

## 2021-05-03 ENCOUNTER — TELEPHONE (OUTPATIENT)
Dept: NEUROSURGERY | Facility: CLINIC | Age: 56
End: 2021-05-03

## 2021-05-03 NOTE — TELEPHONE ENCOUNTER
Provider:  Chapo  Caller: pt  Time of call:   11:49  Phone #:  [phone]  Surgery:  Cervical Discectomy  Surgery Date:  4-26-21  Last visit:   4-9-21  Next visit:   5-17-21  JON:         Reason for call:       Pt is calling and wanting someone to call and let him know what all was done in surgery? He said his brother talked to the dr but did not understand or could tell him what was done. Could someone please call and talk to him and his wife.Please Advise. Thank you.

## 2021-05-11 ENCOUNTER — HOSPITAL ENCOUNTER (OUTPATIENT)
Dept: GENERAL RADIOLOGY | Facility: HOSPITAL | Age: 56
Discharge: HOME | End: 2021-05-11
Admitting: PHYSICIAN ASSISTANT

## 2021-05-11 DIAGNOSIS — M50.30 DDD (DEGENERATIVE DISC DISEASE), CERVICAL: ICD-10-CM

## 2021-05-11 DIAGNOSIS — M47.12 CERVICAL SPONDYLOSIS WITH MYELOPATHY: ICD-10-CM

## 2021-05-11 PROCEDURE — 72050 X-RAY EXAM NECK SPINE 4/5VWS: CPT | Performed by: RADIOLOGY

## 2021-05-11 PROCEDURE — 72050 X-RAY EXAM NECK SPINE 4/5VWS: CPT

## 2021-05-17 ENCOUNTER — OFFICE VISIT (OUTPATIENT)
Dept: NEUROSURGERY | Facility: CLINIC | Age: 56
End: 2021-05-17

## 2021-05-17 VITALS
TEMPERATURE: 97.1 F | BODY MASS INDEX: 23.88 KG/M2 | HEIGHT: 70 IN | DIASTOLIC BLOOD PRESSURE: 80 MMHG | SYSTOLIC BLOOD PRESSURE: 120 MMHG | WEIGHT: 166.8 LBS

## 2021-05-17 DIAGNOSIS — M47.12 CERVICAL SPONDYLOSIS WITH MYELOPATHY: Primary | ICD-10-CM

## 2021-05-17 PROCEDURE — 99024 POSTOP FOLLOW-UP VISIT: CPT | Performed by: PHYSICIAN ASSISTANT

## 2021-05-17 NOTE — PROGRESS NOTES
"Patient: Javier Santiago  : 1965  Gender: male    Primary Care Provider: Moi Segura APRN    Chief Complaint: Neck and arm pain with sensory alteration and weakness    History of Present Illness:  Mr. Santiago is a 55-year-old gentleman who presented to our clinic with progressive neck and arm pain with sensory alteration and weakness.  Studies demonstrated cervical spondylosis with stenosis and associated signal change within the spinal cord.  He was felt to harbor a progressive myelopathy.  As such on 2021 he underwent C4-6 anterior cervical discectomy with fusion.  Surgery was without complication.    Mr. Santiago presents today 3 weeks postop.  He reports that he is doing well.  He has had complete improvement of \"electrical\" type sensations that occurred in his anterior chest region.  He reports some improvement of arm pain, however hand numbness persists.  He denies new gait disturbance or bowel or bladder difficulties.  His anterior cervical incision has healed well.  He denies hoarseness or dysphagia.  He continues taking Norco 7.5 when needed and gabapentin. Overall he is pleased with his progress.      Past Medical and Surgical History:  Past Medical History:   Diagnosis Date   • Arthritis     psoriatic   • ASCVD (arteriosclerotic cardiovascular disease)    • CHF (congestive heart failure) (CMS/[facility])    • Chronic low back pain    • COPD (chronic obstructive pulmonary disease) (CMS/[facility])    • Coronary artery disease    • History of EKG 2016    NORMAL   • Hyperlipidemia    • Hypertension    • Hypotension    • Insomnia    • Sleep apnea     cpap non compluant     Past Surgical History:   Procedure Laterality Date   • ANTERIOR CERVICAL DISCECTOMY W/ FUSION N/A 2021    Procedure: CERVICAL DISCECTOMY ANTERIOR WITH FUSION C4-6;  Surgeon: Chester Cowan MD;  Location: [facility];  Service: Neurosurgery;  Laterality: N/A;   • BACK SURGERY     • CARDIAC CATHETERIZATION N/A 10/13/2017    " Procedure: Left Heart Cath;  Surgeon: Chester Centeno MD;  Location:  [facility];  Service:    • CARDIAC CATHETERIZATION N/A 2/11/2021    Procedure: Left Heart Cath;  Surgeon: Brent Degroot MD;  Location:  [facility];  Service: Cardiology;  Laterality: N/A;   • COLONOSCOPY     • CORONARY ANGIOPLASTY WITH STENT PLACEMENT     • ENDOSCOPY     • ENDOSCOPY N/A 2/9/2018    Procedure: ESOPHAGOGASTRODUODENOSCOPY WITH DILATATION CPT CODE: 87462;  Surgeon: Eevrett Villanueva III, MD;  Location: [facility] OR;  Service:        Current Medications:    Current Outpatient Medications:   •  albuterol (PROVENTIL HFA;VENTOLIN HFA) 108 (90 BASE) MCG/ACT inhaler, Inhale 2 puffs Every 4 (Four) Hours As Needed for Wheezing., Disp: , Rfl:   •  amLODIPine (NORVASC) 5 MG tablet, Take 1 tablet by mouth Daily., Disp: 30 tablet, Rfl: 5  •  aspirin 81 MG tablet, Take 1 tablet by mouth Daily., Disp: 30 tablet, Rfl: 11  •  atorvastatin (LIPITOR) 80 MG tablet, Take 80 mg by mouth daily., Disp: , Rfl:   •  baclofen (LIORESAL) 10 MG tablet, Take 1 tablet by mouth 2 (Two) Times a Day., Disp: 60 tablet, Rfl: 0  •  clopidogrel (Plavix) 75 MG tablet, Take 1 tablet by mouth Daily., Disp: 30 tablet, Rfl:   •  DULoxetine (CYMBALTA) 30 MG capsule, Take 30 mg by mouth Daily., Disp: , Rfl:   •  EPINEPHrine (EPIPEN IJ), Inject 1 dose as directed As Needed (ALLERGIC REACTION)., Disp: , Rfl:   •  gabapentin (NEURONTIN) 300 MG capsule, Take 300 mg by mouth 3 (Three) Times a Day., Disp: , Rfl:   •  hydrALAZINE (APRESOLINE) 25 MG tablet, Take 25 mg by mouth 3 (Three) Times a Day., Disp: , Rfl:   •  HYDROcodone-acetaminophen (NORCO) 7.5-325 MG per tablet, Take 1 tablet by mouth 3 (Three) Times a Day As Needed for Moderate Pain ., Disp: 25 tablet, Rfl: 0  •  hydrOXYzine (ATARAX) 25 MG tablet, Take 25 mg by mouth Every 8 (Eight) Hours As Needed., Disp: , Rfl:   •  isosorbide mononitrate (IMDUR) 30 MG 24 hr tablet,  "Take 1 tablet by mouth Daily., Disp: 30 tablet, Rfl: 5  •  metoprolol succinate XL (TOPROL-XL) 25 MG 24 hr tablet, Take 1 tablet by mouth Daily., Disp: 90 tablet, Rfl: 1  •  nitroglycerin (NITROSTAT) 0.4 MG SL tablet, Place 0.4 mg under the tongue Every 5 (Five) Minutes As Needed for Chest Pain. Take no more than 3 doses in 15 minutes., Disp: , Rfl:   •  pantoprazole (PROTONIX) 40 MG EC tablet, Take 1 tablet by mouth 2 (Two) Times a Day Before Meals., Disp: 60 tablet, Rfl: 5  •  SYMBICORT 80-4.5 MCG/ACT inhaler, Inhale 2 puffs 2 (Two) Times a Day As Needed., Disp: , Rfl:   •  ustekinumab (STELARA) 45 MG/0.5ML injection, Inject 45 mg under the skin Every 30 (Thirty) Days., Disp: , Rfl:     Allergies:  No Known Allergies      Review of Systems   Neurological: Positive for weakness (both arms and hands) and numbness (both arms and hands).         Physical Exam  Patient is alert, oriented, no acute distress  Strength and ROM intact in upper extremities  Subjective numbness of hands bilaterally  Anterior cervical incision is clean, dry and intact.  No erythema, fluctuance or drainage is noted.    Vitals:    05/17/21 1348   BP: 120/80   BP Location: Left arm   Patient Position: Sitting   Cuff Size: Adult   Temp: 97.1 °F (36.2 °C)   Weight: 75.7 kg (166 lb 12.8 oz)   Height: 177.8 cm (70\")       Patient's Body mass index is 23.93 kg/m². indicating that he is within normal range (BMI 18.5-24.9). No BMI management plan needed..    Independent Review of Diagnostic Imaging:  Plain films of the cervical spine performed 5/11/2021 demonstrate stable postsurgical changes at the C4-6 levels without evidence of hardware malfunction.    Assessment:  1.  Cervical spondylosis with myelopathy  2.  Status post ACDF C4-6    Plan:  Mr. Santiago is seen today in weeks out from an uncomplicated ACDF C4-6 to address progressive myelopathy.  He is making good progress at this time.  He continues to have some numbness of his hands.  Despite this, " "he notes significant improvement of \"electrical\" type sensations throughout his body.  He is pleased with his progress.  We will arrange follow-up in 6-8 weeks with Dr. Cowan.  Should he develop new or progressive symptoms in the interim, we may consider MRI imaging prior to next follow-up given the degree of his preoperative myelopathy.        Naye Irving PA-C  "

## 2021-05-24 DIAGNOSIS — M48.02 SPINAL STENOSIS IN CERVICAL REGION: ICD-10-CM

## 2021-05-24 DIAGNOSIS — M47.12 CERVICAL SPONDYLOSIS WITH MYELOPATHY: Primary | ICD-10-CM

## 2021-05-24 DIAGNOSIS — M50.30 DDD (DEGENERATIVE DISC DISEASE), CERVICAL: ICD-10-CM

## 2021-05-24 RX ORDER — HYDROCODONE BITARTRATE AND ACETAMINOPHEN 7.5; 325 MG/1; MG/1
1 TABLET ORAL 3 TIMES DAILY PRN
Qty: 25 TABLET | Refills: 0 | Status: CANCELLED | OUTPATIENT
Start: 2021-05-24

## 2021-05-24 NOTE — TELEPHONE ENCOUNTER
Provider:  Chapo  Caller: patient  Time of call:  12:47   Phone #:  [phone]  Surgery:  ACDF  Surgery Date:  04/26/21  Last visit:   05/17/21  Next visit: 06/23/21    JON:     03/25/2021 Gabapentin 300MG 1965 84 28 VIGIL [facility] 3  04/07/2021 Hydrocodone/Acetaminophen  325MG/7.5MG  1965 84 28 ANGELIC GONGORA [facility] 23 3  04/22/2021 Gabapentin 300MG 1965 84 28 VIGIL [facility] 3  04/27/2021 Hydrocodone/Acetaminophen  325MG/7.5MG  1965 25 9 CHAPO Angulo [facility] 21 3  05/04/2021 Hydrocodone/Acetaminophen  325MG/7.5MG  1965 84 [address] ANGELIC GONGORA [facility] 23 3  05/21/2021 Gabapentin 300MG 1965 84 28 [facility] 3    Reason for call:     Patient requests refill on pain medication.

## 2021-05-25 ENCOUNTER — TELEPHONE (OUTPATIENT)
Dept: NEUROSURGERY | Facility: CLINIC | Age: 56
End: 2021-05-25

## 2021-05-25 NOTE — TELEPHONE ENCOUNTER
Provider:  Chapo  Caller: Patient  Time of call:   1:36pm  Phone #:  [phone]  Surgery:  CERVICAL DISCECTOMY ANTERIOR WITH FUSION C4-6  Surgery Date:  4-26-21  Last visit:   5-17-21/Naye  Next visit: 6-23-21/RENAN    JON:         04/27/2021 Hydrocodone/Acetaminophen  325MG/7.5MG  1965 25 9 CHAPO Angulo [facility] 21 3    05/04/2021 Hydrocodone/Acetaminophen  325MG/7.5MG  1965 84 28 ANGELIC [facility] 23 3    05/21/2021 Gabapentin 300MG 1965 84 28 MUSA MARIE [facility] 3        Reason for call:       Patient is requesting a refill on Hydrocodone/Acetaminophen 325mg/7.5mg    Patient received his last prescription of Hydrocodone/Acetaminophen 325mg/7.5mg in a qty of 84 on 5/4/21 at a 28 day supply from his PCP. Is it okay to continue to get his refills through PCP?

## 2021-05-25 NOTE — TELEPHONE ENCOUNTER
Attempted to reach patient to notify him that he can continue to get his prescription request, refills through his PCP. His VM is full.

## 2021-05-25 NOTE — TELEPHONE ENCOUNTER
Called and notified the patient that he would need to call his PCP. Patient verbalized understanding.

## 2021-06-17 DIAGNOSIS — I20.8 CHRONIC STABLE ANGINA (HCC): Primary | ICD-10-CM

## 2021-06-21 ENCOUNTER — TREATMENT (OUTPATIENT)
Dept: CARDIAC REHAB | Facility: HOSPITAL | Age: 56
End: 2021-06-21

## 2021-06-21 VITALS
BODY MASS INDEX: 24.98 KG/M2 | DIASTOLIC BLOOD PRESSURE: 70 MMHG | SYSTOLIC BLOOD PRESSURE: 118 MMHG | HEIGHT: 70 IN | HEART RATE: 75 BPM | RESPIRATION RATE: 13 BRPM | WEIGHT: 174.5 LBS | OXYGEN SATURATION: 98 %

## 2021-06-21 DIAGNOSIS — I20.8 CHRONIC STABLE ANGINA (HCC): Primary | ICD-10-CM

## 2021-06-21 PROCEDURE — 93798 PHYS/QHP OP CAR RHAB W/ECG: CPT

## 2021-06-21 RX ORDER — LORATADINE 10 MG/1
10 TABLET ORAL DAILY
COMMUNITY

## 2021-06-21 NOTE — PROGRESS NOTES
Cardiac Rehab Initial Assessment      Name: Javier Santiago  :1965 Allergies:Patient has no known allergies.   MRN: [id number] 55 y.o. Physician: Moi Segura APRN   Primary Diagnosis:    Diagnosis Plan   1. Chronic stable angina (CMS/HCC)      Event Date: 21 Specialist: Brent Mukherjee   Secondary Diagnosis: na Risk Stratification:Moderate Risk Note Author: Suzanna Ballard RN     Cardiovascular History: Previous MI     EXERCISE AT HOME  no    N/A    EF: 65%      Source: cath report 21          Ambulatory Status:Independent  Ambulatory Fall Risk Assessed on Initial Visit: yes 6 Minute Walk Pre- Cardiac Rehab:  Distance:1056ft      RPE:11  Max. HR: 80       SPO2:98    MET: 2.5  Resting BP: 120/72LA, 118/70 RA    Peak BP: 124/72  Recovery BP: 120/70  Comments: Patient tolerated 6 mwt well, no distress noted , no complaints voiced, denied chest pain, pressure and SOA      NUTRITION  Lipids:yes If yes, labs as follows;  Total: No components found for: CHOLESTEROL  HDL:   HDL Cholesterol   Date Value Ref Range Status   2017 40 (L) 60 - 100 mg/dL Final    Lipids continued:  LDL:  LDL Cholesterol    Date Value Ref Range Status   2017 78 0 - 100 mg/dL Final     Triglyceride: No components found for: TRIGLYCERIDE   Weight Management:                 Weight: 174.5 lb  Height: 70 in                                   BMI: There is no height or weight on file to calculate BMI.  Waist Circumference: 41  inches   Alcohol Use: none Diabetes:No    Last HGBA1C with date if applicable:No components found for: A1C         SOCIAL HISTORY  Social History     Socioeconomic History   • Marital status:      Spouse name: Coretta Bhatt   • Number of children: Not on file   • Years of education: Not on file   • Highest education level: Not on file   Tobacco Use   • Smoking status: Current Every Day Smoker     Packs/day: 1.00     Years: 35.00     Pack years: 35.00     Types: Cigarettes   • Smokeless  tobacco: Former User     Types: Chew   Vaping Use   • Vaping Use: Never used   Substance and Sexual Activity   • Alcohol use: No   • Drug use: No   • Sexual activity: Defer       Educational Level (choose one that applies) no high school Learning Barriers:Ready to Learn    Family Support:yes    Living Arrangement: lives with their family    Risk Factors: Stress  Yes, Clinical Depression  No, Heredity  Yes If Yes father and mother  and Hyperlipidemia  Yes     Tobacco Adjunct: No  Patient stated he has been smoking since about 1986 and has went from a pack a day to less than a half a pack a day. He stated he is trying to quit but decline smoking cessation referral.       Comorbidities: Previous MI     PSYCHOSOCIAL  Clinical Depression: no    Stress: yes     Assess presence or absence of depression using a valid screening tool: yes    PHQ score 7      PHYSICAL ASSESSMENT  Influenza vaccine: no  Pneumococcal vaccine: no    Covid Yes      Angina: no    Describe angina scale of 0 - 4: 0 = none    Today are you having incisional pain? No. If, Yes, Scale: na        Today are you having any other pain? No. If, Yes, Scale: na     Diagnosed with Hypertension:yes    Heart Sounds: S1 S2 no rubs or murmurs noted      Lung Sounds: normal air entry, lungs clear to auscultation         Assessment: Pt tolerated 6MWT well, NAD noted, no complaints voiced,  skin warm, pink and dry, resp within normal limits and even, denies chest discomfort, chest pressure ,SOA .  Monitor shows NSR , V/S WDL ,see [employer] documentation for exercise data.  Dr. Warren physician immediately available     Pt educated on Cardiac Rehab Program,  warm up, stretching, use of RPE scale, application of heart monitor, home exercise and exercise guidelines, pre exercise meal, Diabetic guidelines for Cardiac Rehab,  Dietary information provided by [employer] (dietary) Steps to your health,  s/s to report ,Cleaning and use of equipment, see Epic for all other  education completed with patient today.  Verbal teach back verified   Orthopedic Problems: Lower Back     Are you being hurt, hit, or frightened by anyone at home or in your life? no    Are you being neglected by a caregiver? No Shoulder flexibility/Range of motion: Average     Recommended arm activity: Any    Chair sit and reach within: 13 inches   Leg flexibility: Average    Leg Strength/Balance/Five times sit to stand: 16 seconds.     Chose one: Fall Risk    Recommended stretching: Standing    Assessment: see above assessment     Family attends IA: no Time of arrival: 0910  Time of departure: 1020     Patient Goals: Patient stated he would like to build heart muscle strength up.          6/21/2021  09:21 EDT  Suzanna Ballard RN

## 2021-06-21 NOTE — PATIENT INSTRUCTIONS
Steps to Quit Smoking  Smoking tobacco is the leading cause of preventable death. It can affect almost every organ in the body. Smoking puts you and people around you at risk for many serious, long-lasting (chronic) diseases. Quitting smoking can be hard, but it is one of the best things that you can do for your health. It is never too late to quit.  How do I get ready to quit?  When you decide to quit smoking, make a plan to help you succeed. Before you quit:  · Pick a date to quit. Set a date within the next 2 weeks to give you time to prepare.  · Write down the reasons why you are quitting. Keep this list in places where you will see it often.  · Tell your family, friends, and co-workers that you are quitting. Their support is important.  · Talk with your doctor about the choices that may help you quit.  · Find out if your health insurance will pay for these treatments.  · Know the people, places, things, and activities that make you want to smoke (triggers). Avoid them.  What first steps can I take to quit smoking?  · Throw away all cigarettes at home, at work, and in your car.  · Throw away the things that you use when you smoke, such as ashtrays and lighters.  · Clean your car. Make sure to empty the ashtray.  · Clean your home, including curtains and carpets.  What can I do to help me quit smoking?  Talk with your doctor about taking medicines and seeing a counselor at the same time. You are more likely to succeed when you do both.  · If you are pregnant or breastfeeding, talk with your doctor about counseling or other ways to quit smoking. Do not take medicine to help you quit smoking unless your doctor tells you to do so.  To quit smoking:  Quit right away  · Quit smoking totally, instead of slowly cutting back on how much you smoke over a period of time.  · Go to counseling. You are more likely to quit if you go to counseling sessions regularly.  Take medicine  You may take medicines to help you quit. Some  medicines need a prescription, and some you can buy over-the-counter. Some medicines may contain a drug called nicotine to replace the nicotine in cigarettes. Medicines may:  · Help you to stop having the desire to smoke (cravings).  · Help to stop the problems that come when you stop smoking (withdrawal symptoms).  Your doctor may ask you to use:  · Nicotine patches, gum, or lozenges.  · Nicotine inhalers or sprays.  · Non-nicotine medicine that is taken by mouth.  Find resources  Find resources and other ways to help you quit smoking and remain smoke-free after you quit. These resources are most helpful when you use them often. They include:  · Online chats with a counselor.  · Phone quitlines.  · Printed self-help materials.  · Support groups or group counseling.  · Text messaging programs.  · Mobile phone apps. Use apps on your mobile phone or tablet that can help you stick to your quit plan. There are many free apps for mobile phones and tablets as well as websites. Examples include Quit Guide from the CDC and smokefree.gov    What things can I do to make it easier to quit?    · Talk to your family and friends. Ask them to support and encourage you.  · Call a phone quitline (5-691-QUITNOW), reach out to support groups, or work with a counselor.  · Ask people who smoke to not smoke around you.  · Avoid places that make you want to smoke, such as:  ? Bars.  ? Parties.  ? Smoke-break areas at work.  · Spend time with people who do not smoke.  · Lower the stress in your life. Stress can make you want to smoke. Try these things to help your stress:  ? Getting regular exercise.  ? Doing deep-breathing exercises.  ? Doing yoga.  ? Meditating.  ? Doing a body scan. To do this, close your eyes, focus on one area of your body at a time from head to toe. Notice which parts of your body are tense. Try to relax the muscles in those areas.  How will I feel when I quit smoking?  Day 1 to 3 weeks  Within the first 24 hours,  you may start to have some problems that come from quitting tobacco. These problems are very bad 2-3 days after you quit, but they do not often last for more than 2-3 weeks. You may get these symptoms:  · Mood swings.  · Feeling restless, nervous, angry, or annoyed.  · Trouble concentrating.  · Dizziness.  · Strong desire for high-sugar foods and nicotine.  · Weight gain.  · Trouble pooping (constipation).  · Feeling like you may vomit (nausea).  · Coughing or a sore throat.  · Changes in how the medicines that you take for other issues work in your body.  · Depression.  · Trouble sleeping (insomnia).  Week 3 and afterward  After the first 2-3 weeks of quitting, you may start to notice more positive results, such as:  · Better sense of smell and taste.  · Less coughing and sore throat.  · Slower heart rate.  · Lower blood pressure.  · Clearer skin.  · Better breathing.  · Fewer sick days.  Quitting smoking can be hard. Do not give up if you fail the first time. Some people need to try a few times before they succeed. Do your best to stick to your quit plan, and talk with your doctor if you have any questions or concerns.  Summary  · Smoking tobacco is the leading cause of preventable death. Quitting smoking can be hard, but it is one of the best things that you can do for your health.  · When you decide to quit smoking, make a plan to help you succeed.  · Quit smoking right away, not slowly over a period of time.  · When you start quitting, seek help from your doctor, family, or friends.  This information is not intended to replace advice given to you by your health care provider. Make sure you discuss any questions you have with your health care provider.  Document Revised: 09/11/2020 Document Reviewed: 03/07/2020  [employer] Patient Education © 2021 [employer]    Managing the Challenge of Quitting Smoking  Quitting smoking is a physical and mental challenge. You will face cravings, withdrawal symptoms, and  temptation. Before quitting, work with your health care provider to make a plan that can help you manage quitting. Preparation can help you quit and keep you from giving in.  How to manage lifestyle changes  Managing stress  Stress can make you want to smoke, and wanting to smoke may cause stress. It is important to find ways to manage your stress. You might try some of the following:  · Practice relaxation techniques.  ? Breathe slowly and deeply, in through your nose and out through your mouth.  ? Listen to music.  ? Soak in a bath or take a shower.  ? Imagine a peaceful place or vacation.  · Get some support.  ? Talk with family or friends about your stress.  ? Join a support group.  ? Talk with a counselor or therapist.  · Get some physical activity.  ? Go for a walk, run, or bike ride.  ? Play a favorite sport.  ? Practice yoga.    Medicines  Talk with your health care provider about medicines that might help you deal with cravings and make quitting easier for you.  Relationships  Social situations can be difficult when you are quitting smoking. To manage this, you can:  · Avoid parties and other social situations where people might be smoking.  · Avoid alcohol.  · Leave right away if you have the urge to smoke.  · Explain to your family and friends that you are quitting smoking. Ask for support and let them know you might be a bit grumpy.  · Plan activities where smoking is not an option.  General instructions  Be aware that many people gain weight after they quit smoking. However, not everyone does. To keep from gaining weight, have a plan in place before you quit and stick to the plan after you quit. Your plan should include:  · Having healthy snacks. When you have a craving, it may help to:  ? Eat popcorn, carrots, celery, or other cut vegetables.  ? Chew sugar-free gum.  · Changing how you eat.  ? Eat small portion sizes at meals.  ? Eat 4-6 small meals throughout the day instead of 1-2 large meals a  day.  ? Be mindful when you eat. Do not watch television or do other things that might distract you as you eat.  · Exercising regularly.  ? Make time to exercise each day. If you do not have time for a long workout, do short bouts of exercise for 5-10 minutes several times a day.  ? Do some form of strengthening exercise, such as weight lifting.  ? Do some exercise that gets your heart beating and causes you to breathe deeply, such as walking fast, running, swimming, or biking. This is very important.  · Drinking plenty of water or other low-calorie or no-calorie drinks. Drink 6-8 glasses of water daily.    How to recognize withdrawal symptoms  Your body and mind may experience discomfort as you try to get used to not having nicotine in your system. These effects are called withdrawal symptoms. They may include:  · Feeling hungrier than normal.  · Having trouble concentrating.  · Feeling irritable or restless.  · Having trouble sleeping.  · Feeling depressed.  · Craving a cigarette.  To manage withdrawal symptoms:  · Avoid places, people, and activities that trigger your cravings.  · Remember why you want to quit.  · Get plenty of sleep.  · Avoid coffee and other caffeinated drinks. These may worsen some of your symptoms.  These symptoms may surprise you. But be assured that they are normal to have when quitting smoking.  How to manage cravings  Come up with a plan for how to deal with your cravings. The plan should include the following:  · A definition of the specific situation you want to deal with.  · An alternative action you will take.  · A clear idea for how this action will help.  · The name of someone who might help you with this.  Cravings usually last for 5-10 minutes. Consider taking the following actions to help you with your plan to deal with cravings:  · Keep your mouth busy.  ? Chew sugar-free gum.  ? Suck on hard candies or a straw.  ? Brush your teeth.  · Keep your hands and body busy.  ? Change to  a different activity right away.  ? Squeeze or play with a ball.  ? Do an activity or a hobby, such as making bead jewelry, practicing needlepoint, or working with wood.  ? Mix up your normal routine.  ? Take a short exercise break. Go for a quick walk or run up and down stairs.  · Focus on doing something kind or helpful for someone else.  · Call a friend or family member to talk during a craving.  · Join a support group.  · Contact a quitline.  Where to find support  To get help or find a support group:  · Call the [employer] [place]'s Smoking Quitline: 3-800-QUIT NOW (945-5095)  · Visit the website of the Substance Abuse and Mental Health Services Administration: www.samhsa.gov  · Text QUIT to SmokefreeTXT: 212741  Where to find more information  Visit these websites to find more information on quitting smoking:  · [employer] [place]: www.smokefree.gov  · American Lung Association: www.lung.org  · American Cancer Society: www.cancer.org  · Centers for Disease Control and Prevention: www.cdc.gov  · American Heart Association: www.heart.org  Contact a health care provider if:  · You want to change your plan for quitting.  · The medicines you are taking are not helping.  · Your eating feels out of control or you cannot sleep.  Get help right away if:  · You feel depressed or become very anxious.  Summary  · Quitting smoking is a physical and mental challenge. You will face cravings, withdrawal symptoms, and temptation to smoke again. Preparation can help you as you go through these challenges.  · Try different techniques to manage stress, handle social situations, and prevent weight gain.  · You can deal with cravings by keeping your mouth busy (such as by chewing gum), keeping your hands and body busy, calling family or friends, or contacting a quitline for people who want to quit smoking.  · You can deal with withdrawal symptoms by avoiding places where people smoke, getting plenty of rest, and  avoiding drinks with caffeine.  This information is not intended to replace advice given to you by your health care provider. Make sure you discuss any questions you have with your health care provider.  Document Revised: 10/06/2020 Document Reviewed: 10/06/2020  [employer] Patient Education © 2021 [employer]    Pre-Exercise Meal Plan  Eating certain foods and drinking fluids before exercising or physical activity can give you more energy and can help your muscles recover after activity. Your pre-exercise meal plan depends on:  · Your personal needs.  · Recommendations from your health care provider or a diet and nutrition specialist (dietitian).  · Any health conditions you have that affect what you can and cannot eat.  · How long your exercise session will be.  · What times you plan to eat and exercise.  What are tips for following this plan?    Reading food labels  · Avoid foods that have added sugar, fat, and salt (sodium).  · Avoid foods that are high in fiber.  Meal planning  · In general, you should eat your pre-exercise meal 3-4 hours before you exercise. If you eat more than 3-4 hours before exercising, you should eat more calories. Your meal should have enough calories so that you will not be hungry before you exercise.  · Include carbohydrates and a moderate amount of lean protein in your pre-exercise meal.  Lifestyle  30-60 minutes before you will be exercising:  · Stop eating.  · Only drink fluids, such as:  ? Water.  ? Sports drinks.  ? Fruit juice.  ? Sports gels.  General information  · Eat foods that you are familiar with and that your body digests without problems. Doing this can help you avoid stomach problems while exercising.  · Drink enough fluids to keep you well-hydrated before, during, and after exercising. Good choices include:  ? Water.  ? Sports drinks. These are only recommended if you exercise for more than 60 minutes at a time.  What foods should I eat?    Fruits  Banana. Apple.  Berries. Melon. Raisins.  Vegetables  Celery. Carrots. Baked potato.  Grains  Pasta. Bagel. Toast. Oatmeal.  Meats and other proteins  Fish. Poultry. Lean meat. Nut butters.  Dairy  Low-fat or fat-free dairy products, such as milk or yogurt.  Other foods  Foods that have both carbohydrates and protein, such as:  · Bagel with peanut butter.  · Turkey sandwich.  · Oatmeal with fruit and nuts.  The items listed above may not be a complete list of recommended foods and beverages. Contact a dietitian for more information.  What foods should I avoid?  Vegetables  Onions. Cauliflower. Cabbage. Leafy greens. Fried vegetables, including french fries.  Grains  Crackers. Pretzels. Donuts. Pastries.  Meats and other proteins  Fatty cuts of meat. Sausage. [place]. Fried meats. Dried beans. Legumes.  Dairy  Full-fat milk or yogurt. Cream. Sour cream. Ice cream.  Beverages  Carbonated soft drinks. Coffee. Black tea. Hot chocolate.  Sweets and desserts  Cakes. Cookies. Candy.  Other foods  Processed or pre-made foods. Pizza. Fried food. Fast food.  The items listed above may not be a complete list of foods and beverages to avoid. Contact a dietitian for more information.  Summary  · The best time to eat a pre-exercise meal depends on your personal needs and when you plan to exercise. In general, you should eat 3-4 hours before you exercise.  · Your pre-exercise meal should include carbohydrates, fluids, and lean proteins. Choose familiar foods that are easy to digest.  · Avoid foods that are high in salt (sodium), sugar, fat, or fiber in your pre-exercise meal.  This information is not intended to replace advice given to you by your health care provider. Make sure you discuss any questions you have with your health care provider.  Document Revised: 03/01/2019 Document Reviewed: 11/13/2018  [employer] Patient Education © 2021 [employer]    COVID-19  COVID-19 is a respiratory infection that is caused by a virus called severe acute  respiratory syndrome coronavirus 2 (SARS-CoV-2). The disease is also known as coronavirus disease or novel coronavirus. In some people, the virus may not cause any symptoms. In others, it may cause a serious infection. The infection can get worse quickly and can lead to complications, such as:  · Pneumonia, or infection of the lungs.  · Acute respiratory distress syndrome, or ARDS. This is a condition in which fluid buildup in the lungs prevents the lungs from filling with air and passing oxygen into the blood.  · Acute respiratory failure. This is a condition in which there is not enough oxygen passing from the lungs to the body or when carbon dioxide is not passing from the lungs out of the body.  · Sepsis or septic shock. This is a serious bodily reaction to an infection.  · Blood-clotting problems.  · Secondary infections due to bacteria or fungus.  · Organ failure. This is when your body's organs stop working.  The virus that causes COVID-19 is contagious. This means that it can spread from person to person through droplets from coughs and sneezes (respiratory secretions).  What are the causes?  This illness is caused by a virus. You may catch the virus by:  · Breathing in droplets from an infected person. Droplets can be spread by a person breathing, speaking, singing, coughing, or sneezing.  · Touching something, like a table or a doorknob, that was exposed to the virus (contaminated) and then touching your mouth, nose, or eyes.  What increases the risk?  Risk for infection  You are more likely to be infected with this virus if you:  · Are within 6 ft (2 m) of a person with COVID-19.  · Provide care for or live with a person who is infected with COVID-19.  · Spend time in crowded indoor spaces or live in shared housing.  Risk for serious illness  You are more likely to become seriously ill from the virus if you:  · Are 50 years of age or older. The higher your age, the more you are at risk for serious  "illness.  · Live in a nursing home or long-term care facility.  · Have cancer.  · Have a long-term (chronic) disease such as:  ? Chronic lung disease, including chronic obstructive pulmonary disease or asthma.  ? A long-term disease that lowers your body's ability to fight infection (immunocompromised).  ? Heart disease, including:  § Heart failure.  § Coronary artery disease, a condition in which the arteries that lead to the heart become narrow or blocked.  § A disease that makes the heart muscle thick, weak, or stiff (cardiomyopathy).  ? Diabetes.  ? Chronic kidney disease.  ? Sickle cell disease, a condition in which red blood cells have an abnormal \"sickle\" shape.  ? Liver disease.  · Are obese.  What are the signs or symptoms?  Symptoms of this condition can range from mild to severe. Symptoms may appear any time from 2 to 14 days after being exposed to the virus. They include:  · A fever or chills.  · A cough.  · Difficulty breathing or feeling short of breath.  · Feeling tired.  · Headaches, body aches, or muscle aches.  · Runny or stuffy (congested) nose.  · A sore throat.  · New loss of taste or smell.  Some people may also have stomach problems, such as nausea, vomiting, or diarrhea.  Other people may not have any symptoms of COVID-19.  How is this diagnosed?  This condition may be diagnosed based on:  · Your signs and symptoms, especially if:  ? You provide care for or live with a person who was diagnosed with COVID-19.  ? You were exposed to a person who was diagnosed with COVID-19.  · A physical exam.  · Lab tests, which may include:  ? Using a swab to take a sample of fluid from the back of your nose and throat (nasopharyngeal fluid), from your nose, or from your throat.  ? Testing a sample of saliva from your mouth.  ? Testing a sample of coughed-up mucus from your lungs (sputum).  ? Blood tests.  · Imaging tests, which may include X-rays, CT scan, or ultrasound.  How is this treated?  Your health " care provider will talk with you about ways to treat your symptoms. For most people, the infection is mild and can be managed at home with rest, fluids, and over-the-counter medicines. There are currently no prescription medicines that have been approved for treatment of people with mild cases of COVID-19. Some medicines that treat other diseases are being used on a trial basis to see if they are effective for treating mild cases of COVID-19.  Treatment for a serious infection usually takes place in a hospital intensive care unit (ICU). It may include one or more of the following treatments. These treatments are given until your symptoms improve.  · Receiving fluids and medicines through an IV.  ? Some medicines have been approved for the treatment of people with serious infection who are being treated in the hospital. In addition, certain medicines that treat other diseases are being used on a trial basis to see if they are effective for treating severe cases of COVID-19.  · Supplemental oxygen. Extra oxygen is given through a tube in the nose, a face mask, or a reeves.  · Positioning you to lie on your stomach (prone position). This makes it easier for oxygen to get into the lungs.  · Continuous positive airway pressure (CPAP) or bi-level positive airway pressure (BPAP) machine. This treatment uses mild air pressure to keep the airways open. A tube that is connected to a motor delivers oxygen to the body.  · Ventilator. This treatment moves air into and out of the lungs by using a tube that is placed in your windpipe.  · Tracheostomy. This is a procedure to create a hole in the neck so that a breathing tube can be inserted.  · Extracorporeal membrane oxygenation (ECMO). This procedure gives the lungs a chance to recover by taking over the functions of the heart and lungs. It supplies oxygen to the body and removes carbon dioxide.  Follow these instructions at home:  Lifestyle  · If you are sick, stay home except to  get medical care. Your health care provider will tell you how long to stay home. Call your health care provider before you go for medical care.  · Rest at home as told by your health care provider.  · Do not use any products that contain nicotine or tobacco, such as cigarettes, e-cigarettes, and chewing tobacco. If you need help quitting, ask your health care provider.  · Return to your normal activities as told by your health care provider. Ask your health care provider what activities are safe for you.  General instructions  · Take over-the-counter and prescription medicines only as told by your health care provider.  · Drink enough fluid to keep your urine pale yellow.  · Keep all follow-up visits as told by your health care provider. This is important.  How is this prevented?         Some vaccines to prevent the virus that causes COVID-19 have been authorized for emergency use. This means that the vaccines are still being tested, but they have been deemed safe for use and have been effective in preventing COVID-19 in trials. These vaccines may not be available to everyone right away. The vaccines will likely be given to certain groups at higher risk first until there is enough supply available for everyone. Until the vaccines are available for everyone, it is important to continue to take steps to protect yourself and others from this virus.  To protect yourself:   Take precautions to avoid infection.  · Stay away from people who are sick.  · Wash your hands often with soap and water for at least 20 seconds. If soap and water are not available, use an alcohol-based hand .  · Avoid touching your mouth, face, eyes, or nose.  · Avoid going out in public. Follow guidance from your state and local health authorities.  · If you must go out in public, wear a cloth face covering or face mask. Make sure your mask covers your nose and mouth.  · Avoid crowded indoor spaces. Stay at least 6 ft (2 m) away from  others.  · Disinfect objects and surfaces that are frequently touched every day. This may include:  ? Counters and tables.  ? Doorknobs and light switches.  ? Sinks and faucets.  ? Electronics such as phones, remote controls, keyboards, computers, and tablets.  To protect others:  If you have symptoms of COVID-19, take steps to prevent the virus from spreading to others.  · If you think you have a COVID-19 infection, contact your health care provider right away. Tell your health care team that you think you may have a COVID-19 infection.  · Stay home. Leave your house only to seek medical care. Do not use public transport, if possible.  · Do not travel while you are sick.  · Wash your hands often with soap and water for at least 20 seconds. If soap and water are not available, use alcohol-based hand .  · Stay away from other members of your household. Let healthy household members care for children and pets, if possible. If you have to care for children or pets, wash your hands often and wear a mask. If possible, stay in your own room, separate from others. Use a different bathroom.  · Make sure that all people in your household wash their hands well and often.  · Cough or sneeze into a tissue or your sleeve or elbow. Do not cough or sneeze into your hand or into the air.  · Wear a cloth face covering or face mask. Make sure your mask covers your nose and mouth.  Where to find more information  · Centers for Disease Control and Prevention: www.cdc.gov/coronavirus  · World Health Organization: www.who.int/health-topics/coronavirus  Contact a health care provider if:  · You have symptoms of COVID-19.  · You have been exposed to someone who has COVID-19, even if you do not have symptoms.  Get help right away if:  · You have trouble breathing.  · You have pain or pressure in your chest.  · You have confusion.  · You have bluish lips and fingernails.  · You have difficulty waking from sleep.  · You have symptoms  that get worse.  These symptoms may represent a serious problem that is an emergency. Do not wait to see if the symptoms will go away. Get medical help right away. Call your local emergency services (911 in the [place] Do not drive yourself to the hospital. Let the emergency medical personnel know if you think you have COVID-19.  Summary  · COVID-19 is a respiratory infection that is caused by a virus. It is also known as coronavirus disease or novel coronavirus. It can cause serious infections, such as pneumonia, acute respiratory distress syndrome, acute respiratory failure, or sepsis.  · The virus that causes COVID-19 is contagious. This means that it can spread from person to person through droplets from breathing, speaking, singing, coughing, or sneezing.  · You are more likely to develop a serious illness if you are 50 years of age or older, have a weak immune system, live in a nursing home, or have a chronic disease.  · There is no approved medicine to treat mild cases of COVID-19. Your health care provider will talk with you about ways to treat your symptoms.  · Take steps to protect yourself and others from infection. Wash your hands often and disinfect objects and surfaces that are frequently touched every day. Stay away from people who are sick, and wear a mask if you are sick.  This information is not intended to replace advice given to you by your health care provider. Make sure you discuss any questions you have with your health care provider.  Document Revised: 12/14/2020 Document Reviewed: 12/15/2020  [employer] Patient Education © 2021 [employer]    Managing Stress, Adult  Feeling a certain amount of stress is normal. Stress helps our body and mind get ready to deal with the demands of life. Stress hormones can motivate you to do well at work and meet your responsibilities. However severe or long-lasting (chronic) stress can affect your mental and physical health. Chronic stress puts you at higher  risk for anxiety, depression, and other health problems like digestive problems, muscle aches, heart disease, high blood pressure, and stroke.  What are the causes?  Common causes of stress include:  · Demands from work, such as deadlines, feeling overworked, or having long hours.  · Pressures at home, such as money issues, disagreements with a spouse, or parenting issues.  · Pressures from major life changes, such as divorce, moving, loss of a loved one, or chronic illness.  You may be at higher risk for stress-related problems if you do not get enough sleep, are in poor health, do not have emotional support, or have a mental health disorder like anxiety or depression.  How to recognize stress  Stress can make you:  · Have trouble sleeping.  · Feel sad, anxious, irritable, or overwhelmed.  · Lose your appetite.  · Overeat or want to eat unhealthy foods.  · Want to use drugs or alcohol.  Stress can also cause physical symptoms, such as:  · Sore, tense muscles, especially in the shoulders and neck.  · Headaches.  · Trouble breathing.  · A faster heart rate.  · Stomach pain, nausea, or vomiting.  · Diarrhea or constipation.  · Trouble concentrating.  Follow these instructions at home:  Lifestyle  · Identify the source of your stress and your reaction to it. See a therapist who can help you change your reactions.  · When there are stressful events:  ? Talk about it with family, friends, or co-workers.  ? Try to think realistically about stressful events and not ignore them or overreact.  ? Try to find the positives in a stressful situation and not focus on the negatives.  ? Cut back on responsibilities at work and home, if possible. Ask for help from friends or family members if you need it.  · Find ways to cope with stress, such as:  ? Meditation.  ? Deep breathing.  ? Yoga or talon chi.  ? Progressive muscle relaxation.  ? Doing art, playing music, or reading.  ? Making time for fun activities.  ? Spending time with  family and friends.  · Get support from family, friends, or spiritual resources.  Eating and drinking  · Eat a healthy diet. This includes:  ? Eating foods that are high in fiber, such as beans, whole grains, and fresh fruits and vegetables.  ? Limiting foods that are high in fat and processed sugars, such as fried and sweet foods.  · Do not skip meals or overeat.  · Drink enough fluid to keep your urine pale yellow.  Alcohol use  · Do not drink alcohol if:  ? Your health care provider tells you not to drink.  ? You are pregnant, may be pregnant, or are planning to become pregnant.  · Drinking alcohol is a way some people try to ease their stress. This can be dangerous, so if you drink alcohol:  ? Limit how much you use to:  § 0-1 drink a day for women.  § 0-2 drinks a day for men.  ? Be aware of how much alcohol is in your drink. In the [place] one drink equals one 12 oz bottle of beer (355 mL), one 5 oz glass of wine (148 mL), or one 1½ oz glass of hard liquor (44 mL).  Activity    · Include 30 minutes of exercise in your daily schedule. Exercise is a good stress reducer.  · Include time in your day for an activity that you find relaxing. Try taking a walk, going on a bike ride, reading a book, or listening to music.  · Schedule your time in a way that lowers stress, and keep a consistent schedule. Prioritize what is most important to get done.  General instructions  · Get enough sleep. Try to go to sleep and get up at about the same time every day.  · Take over-the-counter and prescription medicines only as told by your health care provider.  · Do not use any products that contain nicotine or tobacco, such as cigarettes, e-cigarettes, and chewing tobacco. If you need help quitting, ask your health care provider.  · Do not use drugs or smoke to cope with stress.  · Keep all follow-up visits as told by your health care provider. This is important.  Where to find support  · Talk with your health care provider about  stress management or finding a support group.  · Find a therapist to work with you on your stress management techniques.  Contact a health care provider if:  · Your stress symptoms get worse.  · You are unable to manage your stress at home.  · You are struggling to stop using drugs or alcohol.  Get help right away if:  · You may be a danger to yourself or others.  · You have any thoughts of death or suicide.  If you ever feel like you may hurt yourself or others, or have thoughts about taking your own life, get help right away. You can go to your nearest emergency department or call:  · Your local emergency services (911 in the [place]  · A suicide crisis helpline, such as the National Suicide Prevention Lifeline at 1-610.740.7124. This is open 24 hours a day.  Summary  · Feeling a certain amount of stress is normal, but severe or long-lasting (chronic) stress can affect your mental and physical health.  · Chronic stress can put you at higher risk for anxiety, depression, and other health problems like digestive problems, muscle aches, heart disease, high blood pressure, and stroke.  · You may be at higher risk for stress-related problems if you do not get enough sleep, are in poor health, lack emotional support, or have a mental health disorder like anxiety or depression.  · Identify the source of your stress and your reaction to it. Try talking about stressful events with family, friends, or co-workers, finding a coping method, or getting support from spiritual resources.  · If you need more help, talk with your health care provider about finding a support group or a mental health therapist.  This information is not intended to replace advice given to you by your health care provider. Make sure you discuss any questions you have with your health care provider.  Document Revised: 07/15/2020 Document Reviewed: 07/15/2020  [employer] Patient Education © 2021 [employer]    Managing Anxiety, Adult  After being  diagnosed with an anxiety disorder, you may be relieved to know why you have felt or behaved a certain way. You may also feel overwhelmed about the treatment ahead and what it will mean for your life. With care and support, you can manage this condition and recover from it.  How to manage lifestyle changes  Managing stress and anxiety    Stress is your body's reaction to life changes and events, both good and bad. Most stress will last just a few hours, but stress can be ongoing and can lead to more than just stress. Although stress can play a major role in anxiety, it is not the same as anxiety. Stress is usually caused by something external, such as a deadline, test, or competition. Stress normally passes after the triggering event has ended.   Anxiety is caused by something internal, such as imagining a terrible outcome or worrying that something will go wrong that will devastate you. Anxiety often does not go away even after the triggering event is over, and it can become long-term (chronic) worry. It is important to understand the differences between stress and anxiety and to manage your stress effectively so that it does not lead to an anxious response.  Talk with your health care provider or a counselor to learn more about reducing anxiety and stress. He or she may suggest tension reduction techniques, such as:  · Music therapy. This can include creating or listening to music that you enjoy and that inspires you.  · Mindfulness-based meditation. This involves being aware of your normal breaths while not trying to control your breathing. It can be done while sitting or walking.  · Centering prayer. This involves focusing on a word, phrase, or sacred image that means something to you and brings you peace.  · Deep breathing. To do this, expand your stomach and inhale slowly through your nose. Hold your breath for 3-5 seconds. Then exhale slowly, letting your stomach muscles relax.  · Self-talk. This involves  identifying thought patterns that lead to anxiety reactions and changing those patterns.  · Muscle relaxation. This involves tensing muscles and then relaxing them.  Choose a tension reduction technique that suits your lifestyle and personality. These techniques take time and practice. Set aside 5-15 minutes a day to do them. Therapists can offer counseling and training in these techniques. The training to help with anxiety may be covered by some insurance plans. Other things you can do to manage stress and anxiety include:  · Keeping a stress/anxiety diary. This can help you learn what triggers your reaction and then learn ways to manage your response.  · Thinking about how you react to certain situations. You may not be able to control everything, but you can control your response.  · Making time for activities that help you relax and not feeling guilty about spending your time in this way.  · Visual imagery and yoga can help you stay calm and relax.    Medicines  Medicines can help ease symptoms. Medicines for anxiety include:  · Anti-anxiety drugs.  · Antidepressants.  Medicines are often used as a primary treatment for anxiety disorder. Medicines will be prescribed by a health care provider. When used together, medicines, psychotherapy, and tension reduction techniques may be the most effective treatment.  Relationships  Relationships can play a big part in helping you recover. Try to spend more time connecting with trusted friends and family members. Consider going to couples counseling, taking family education classes, or going to family therapy. Therapy can help you and others better understand your condition.  How to recognize changes in your anxiety  Everyone responds differently to treatment for anxiety. Recovery from anxiety happens when symptoms decrease and stop interfering with your daily activities at home or work. This may mean that you will start to:  · Have better concentration and focus. Worry  will interfere less in your daily thinking.  · Sleep better.  · Be less irritable.  · Have more energy.  · Have improved memory.  It is important to recognize when your condition is getting worse. Contact your health care provider if your symptoms interfere with home or work and you feel like your condition is not improving.  Follow these instructions at home:  Activity  · Exercise. Most adults should do the following:  ? Exercise for at least 150 minutes each week. The exercise should increase your heart rate and make you sweat (moderate-intensity exercise).  ? Strengthening exercises at least twice a week.  · Get the right amount and quality of sleep. Most adults need 7-9 hours of sleep each night.  Lifestyle    · Eat a healthy diet that includes plenty of vegetables, fruits, whole grains, low-fat dairy products, and lean protein. Do not eat a lot of foods that are high in solid fats, added sugars, or salt.  · Make choices that simplify your life.  · Do not use any products that contain nicotine or tobacco, such as cigarettes, e-cigarettes, and chewing tobacco. If you need help quitting, ask your health care provider.  · Avoid caffeine, alcohol, and certain over-the-counter cold medicines. These may make you feel worse. Ask your pharmacist which medicines to avoid.  General instructions  · Take over-the-counter and prescription medicines only as told by your health care provider.  · Keep all follow-up visits as told by your health care provider. This is important.  Where to find support  You can get help and support from these sources:  · Self-help groups.  · Online and community organizations.  · A trusted spiritual leader.  · Couples counseling.  · Family education classes.  · Family therapy.  Where to find more information  You may find that joining a support group helps you deal with your anxiety. The following sources can help you locate counselors or support groups near you:  · Mental Health [place]:  "www.mentalhealthamer[place].net  · Anxiety and Depression Association of [place] (ADAA): www.adaa.org  · National [place] on Mental Illness (BAR[place]): www.barney.org  Contact a health care provider if you:  · Have a hard time staying focused or finishing daily tasks.  · Spend many hours a day feeling worried about everyday life.  · Become exhausted by worry.  · Start to have headaches, feel tense, or have nausea.  · Urinate more than normal.  · Have diarrhea.  Get help right away if you have:  · A racing heart and shortness of breath.  · Thoughts of hurting yourself or others.  If you ever feel like you may hurt yourself or others, or have thoughts about taking your own life, get help right away. You can go to your nearest emergency department or call:  · Your local emergency services (911 in the [place]  · A suicide crisis helpline, such as the National Suicide Prevention Lifeline at 1-847.547.4619. This is open 24 hours a day.  Summary  · Taking steps to learn and use tension reduction techniques can help calm you and help prevent triggering an anxiety reaction.  · When used together, medicines, psychotherapy, and tension reduction techniques may be the most effective treatment.  · Family, friends, and partners can play a big part in helping you recover from an anxiety disorder.  This information is not intended to replace advice given to you by your health care provider. Make sure you discuss any questions you have with your health care provider.  Document Revised: 05/19/2020 Document Reviewed: 05/19/2020  [employer] Patient Education © 2021 [employer] Inc.    http://APA.org/depression-guideline\"> https://clinicalkey.com\"> http://point-of-care.[employer]/skills/\"> http://point-of-care.[employer].com\">   Managing Depression, Adult  Depression is a mental health condition that affects your thoughts, feelings, and actions. Being diagnosed with depression can bring you relief if you did not " know why you have felt or behaved a certain way. It could also leave you feeling overwhelmed with uncertainty about your future. Preparing yourself to manage your symptoms can help you feel more positive about your future.  How to manage lifestyle changes  Managing stress    Stress is your body's reaction to life changes and events, both good and bad. Stress can add to your feelings of depression. Learning to manage your stress can help lessen your feelings of depression.  Try some of the following approaches to reducing your stress (stress reduction techniques):  · Listen to music that you enjoy and that inspires you.  · Try using a meditation lio or take a meditation class.  · Develop a practice that helps you connect with your spiritual self. Walk in nature, pray, or go to a place of [place].  · Do some deep breathing. To do this, inhale slowly through your nose. Pause at the top of your inhale for a few seconds and then exhale slowly, letting your muscles relax.  · Practice yoga to help relax and work your muscles.  Choose a stress reduction technique that suits your lifestyle and personality. These techniques take time and practice to develop. Set aside 5-15 minutes a day to do them. Therapists can offer training in these techniques. Other things you can do to manage stress include:  · Keeping a stress diary.  · Knowing your limits and saying no when you think something is too much.  · Paying attention to how you react to certain situations. You may not be able to control everything, but you can change your reaction.  · Adding humor to your life by watching funny films or TV shows.  · Making time for activities that you enjoy and that relax you.    Medicines  Medicines, such as antidepressants, are often a part of treatment for depression.  · Talk with your pharmacist or health care provider about all the medicines, supplements, and herbal products that you take, their possible side effects, and what medicines  and other products are safe to take together.  · Make sure to report any side effects you may have to your health care provider.  Relationships  Your health care provider may suggest family therapy, couples therapy, or individual therapy as part of your treatment.  How to recognize changes  Everyone responds differently to treatment for depression. As you recover from depression, you may start to:  · Have more interest in doing activities.  · Feel less hopeless.  · Have more energy.  · Overeat less often, or have a better appetite.  · Have better mental focus.  It is important to recognize if your depression is not getting better or is getting worse. The symptoms you had in the beginning may return, such as:  · Tiredness (fatigue) or low energy.  · Eating too much or too little.  · Sleeping too much or too little.  · Feeling restless, agitated, or hopeless.  · Trouble focusing or making decisions.  · Unexplained physical complaints.  · Feeling irritable, angry, or aggressive.  If you or your family members notice these symptoms coming back, let your health care provider know right away.  Follow these instructions at home:  Activity    · Try to get some form of exercise each day, such as walking, biking, swimming, or lifting weights.  · Practice stress reduction techniques.  · Engage your mind by taking a class or doing some volunteer work.  Lifestyle  · Get the right amount and quality of sleep.  · Cut down on using caffeine, tobacco, alcohol, and other potentially harmful substances.  · Eat a healthy diet that includes plenty of vegetables, fruits, whole grains, low-fat dairy products, and lean protein. Do not eat a lot of foods that are high in solid fats, added sugars, or salt (sodium).  General instructions  · Take over-the-counter and prescription medicines only as told by your health care provider.  · Keep all follow-up visits as told by your health care provider. This is important.  Where to find  support  Talking to others    Friends and family members can be sources of support and guidance. Talk to trusted friends or family members about your condition. Explain your symptoms to them, and let them know that you are working with a health care provider to treat your depression. Tell friends and family members how they also can be helpful.  Finances  · Find appropriate mental health providers that fit with your financial situation.  · Talk with your health care provider about options to get reduced prices on your medicines.  Where to find more information  You can find support in your area from:  · Anxiety and Depression Association of [place] (ADAA): www.adaa.org  · Mental Health [place]: www.mentalhealth[place].net  · National [place] on Mental Illness: www.kurt.org  Contact a health care provider if:  · You stop taking your antidepressant medicines, and you have any of these symptoms:  ? Nausea.  ? Headache.  ? Light-headedness.  ? Chills and body aches.  ? Not being able to sleep (insomnia).  · You or your friends and family think your depression is getting worse.  Get help right away if:  · You have thoughts of hurting yourself or others.  If you ever feel like you may hurt yourself or others, or have thoughts about taking your own life, get help right away. Go to your nearest emergency department or:  · Call your local emergency services (161 in the [place]  · Call a suicide crisis helpline, such as the National Suicide Prevention Lifeline at 1-407.186.8758. This is open 24 hours a day in the [place]  · Text the Crisis Text Line at 556837 (in the [place]  Summary  · If you are diagnosed with depression, preparing yourself to manage your symptoms is a good way to feel positive about your future.  · Work with your health care provider on a management plan that includes stress reduction techniques, medicines (if applicable), therapy, and healthy lifestyle habits.  · Keep talking with your health care provider  about how your treatment is working.  · If you have thoughts about taking your own life, call a suicide crisis helpline or text a crisis text line.  This information is not intended to replace advice given to you by your health care provider. Make sure you discuss any questions you have with your health care provider.  Document Revised: 10/28/2020 Document Reviewed: 10/28/2020  [employer] Patient Education © 2021 [employer]    Managing Your Hypertension  Hypertension, also called high blood pressure, is when the force of the blood pressing against the walls of the arteries is too strong. Arteries are blood vessels that carry blood from your heart throughout your body. Hypertension forces the heart to work harder to pump blood and may cause the arteries to become narrow or stiff.  Understanding blood pressure readings  Your personal target blood pressure may vary depending on your medical conditions, your age, and other factors. A blood pressure reading includes a higher number over a lower number. Ideally, your blood pressure should be below 120/80. You should know that:  · The first, or top, number is called the systolic pressure. It is a measure of the pressure in your arteries as your heart beats.  · The second, or bottom number, is called the diastolic pressure. It is a measure of the pressure in your arteries as the heart relaxes.  Blood pressure is classified into four stages. Based on your blood pressure reading, your health care provider may use the following stages to determine what type of treatment you need, if any. Systolic pressure and diastolic pressure are measured in a unit called mmHg.  Normal  · Systolic pressure: below 120.  · Diastolic pressure: below 80.  Elevated  · Systolic pressure: 120-129.  · Diastolic pressure: below 80.  Hypertension stage 1  · Systolic pressure: 130-139.  · Diastolic pressure: 80-89.  Hypertension stage 2  · Systolic pressure: 140 or above.  · Diastolic pressure: 90 or  above.  How can this condition affect me?  Managing your hypertension is an important responsibility. Over time, hypertension can damage the arteries and decrease blood flow to important parts of the body, including the brain, heart, and kidneys. Having untreated or uncontrolled hypertension can lead to:  · A heart attack.  · A stroke.  · A weakened blood vessel (aneurysm).  · Heart failure.  · Kidney damage.  · Eye damage.  · Metabolic syndrome.  · Memory and concentration problems.  · Vascular dementia.  What actions can I take to manage this condition?  Hypertension can be managed by making lifestyle changes and possibly by taking medicines. Your health care provider will help you make a plan to bring your blood pressure within a normal range.  Nutrition    · Eat a diet that is high in fiber and potassium, and low in salt (sodium), added sugar, and fat. An example eating plan is called the Dietary Approaches to Stop Hypertension (DASH) diet. To eat this way:  ? Eat plenty of fresh fruits and vegetables. Try to fill one-half of your plate at each meal with fruits and vegetables.  ? Eat whole grains, such as whole-wheat pasta, brown rice, or whole-grain bread. Fill about one-fourth of your plate with whole grains.  ? Eat low-fat dairy products.  ? Avoid fatty cuts of meat, processed or cured meats, and poultry with skin. Fill about one-fourth of your plate with lean proteins such as fish, chicken without skin, beans, eggs, and tofu.  ? Avoid pre-made and processed foods. These tend to be higher in sodium, added sugar, and fat.  · Reduce your daily sodium intake. Most people with hypertension should eat less than 1,500 mg of sodium a day.  Lifestyle    · Work with your health care provider to maintain a healthy body weight or to lose weight. Ask what an ideal weight is for you.  · Get at least 30 minutes of exercise that causes your heart to beat faster (aerobic exercise) most days of the week. Activities may  include walking, swimming, or biking.  · Include exercise to strengthen your muscles (resistance exercise), such as weight lifting, as part of your weekly exercise routine. Try to do these types of exercises for 30 minutes at least 3 days a week.  · Do not use any products that contain nicotine or tobacco, such as cigarettes, e-cigarettes, and chewing tobacco. If you need help quitting, ask your health care provider.  · Control any long-term (chronic) conditions you have, such as high cholesterol or diabetes.  · Identify your sources of stress and find ways to manage stress. This may include meditation, deep breathing, or making time for fun activities.  Alcohol use  · Do not drink alcohol if:  ? Your health care provider tells you not to drink.  ? You are pregnant, may be pregnant, or are planning to become pregnant.  · If you drink alcohol:  ? Limit how much you use to:  § 0-1 drink a day for women.  § 0-2 drinks a day for men.  ? Be aware of how much alcohol is in your drink. In the [place] one drink equals one 12 oz bottle of beer (355 mL), one 5 oz glass of wine (148 mL), or one 1½ oz glass of hard liquor (44 mL).  Medicines  Your health care provider may prescribe medicine if lifestyle changes are not enough to get your blood pressure under control and if:  · Your systolic blood pressure is 130 or higher.  · Your diastolic blood pressure is 80 or higher.  Take medicines only as told by your health care provider. Follow the directions carefully. Blood pressure medicines must be taken as told by your health care provider. The medicine does not work as well when you skip doses. Skipping doses also puts you at risk for problems.  Monitoring  Before you monitor your blood pressure:  · Do not smoke, drink caffeinated beverages, or exercise within 30 minutes before taking a measurement.  · Use the bathroom and empty your bladder (urinate).  · Sit quietly for at least 5 minutes before taking measurements.  Monitor your  blood pressure at home as told by your health care provider. To do this:  · Sit with your back straight and supported.  · Place your feet flat on the floor. Do not cross your legs.  · Support your arm on a flat surface, such as a table. Make sure your upper arm is at heart level.  · Each time you measure, take two or three readings one minute apart and record the results.  You may also need to have your blood pressure checked regularly by your health care provider.  General information  · Talk with your health care provider about your diet, exercise habits, and other lifestyle factors that may be contributing to hypertension.  · Review all the medicines you take with your health care provider because there may be side effects or interactions.  · Keep all visits as told by your health care provider. Your health care provider can help you create and adjust your plan for managing your high blood pressure.  Where to find more information  · National Heart, Lung, and Blood [place]: www.nhlbi.nih.gov  · American Heart Association: www.heart.org  Contact a health care provider if:  · You think you are having a reaction to medicines you have taken.  · You have repeated (recurrent) headaches.  · You feel dizzy.  · You have swelling in your ankles.  · You have trouble with your vision.  Get help right away if:  · You develop a severe headache or confusion.  · You have unusual weakness or numbness, or you feel faint.  · You have severe pain in your chest or abdomen.  · You vomit repeatedly.  · You have trouble breathing.  These symptoms may represent a serious problem that is an emergency. Do not wait to see if the symptoms will go away. Get medical help right away. Call your local emergency services (911 in the [place] Do not drive yourself to the hospital.  Summary  · Hypertension is when the force of blood pumping through your arteries is too strong. If this condition is not controlled, it may put you at risk for serious  complications.  · Your personal target blood pressure may vary depending on your medical conditions, your age, and other factors. For most people, a normal blood pressure is less than 120/80.  · Hypertension is managed by lifestyle changes, medicines, or both.  · Lifestyle changes to help manage hypertension include losing weight, eating a healthy, low-sodium diet, exercising more, stopping smoking, and limiting alcohol.  This information is not intended to replace advice given to you by your health care provider. Make sure you discuss any questions you have with your health care provider.  Document Revised: 01/22/2021 Document Reviewed: 11/17/2020  [employer] Patient Education © 2021 [employer]    High Cholesterol    High cholesterol is a condition in which the blood has high levels of a white, waxy substance similar to fat (cholesterol). The liver makes all the cholesterol that the body needs. The human body needs small amounts of cholesterol to help build cells. A person gets extra or excess cholesterol from the food that he or she eats.  The blood carries cholesterol from the liver to the rest of the body. If you have high cholesterol, deposits (plaques) may build up on the walls of your arteries. Arteries are the blood vessels that carry blood away from your heart. These plaques make the arteries narrow and stiff.  Cholesterol plaques increase your risk for heart attack and stroke. Work with your health care provider to keep your cholesterol levels in a healthy range.  What increases the risk?  The following factors may make you more likely to develop this condition:  · Eating foods that are high in animal fat (saturated fat) or cholesterol.  · Being overweight.  · Not getting enough exercise.  · A family history of high cholesterol (familial hypercholesterolemia).  · Use of tobacco products.  · Having diabetes.  What are the signs or symptoms?  There are no symptoms of this condition.  How is this  "diagnosed?  This condition may be diagnosed based on the results of a blood test.  · If you are older than 20 years of age, your health care provider may check your cholesterol levels every 4-6 years.  · You may be checked more often if you have high cholesterol or other risk factors for heart disease.  The blood test for cholesterol measures:  · \"Bad\" cholesterol, or LDL cholesterol. This is the main type of cholesterol that causes heart disease. The desired level is less than 100 mg/dL.  · \"Good\" cholesterol, or HDL cholesterol. HDL helps protect against heart disease by cleaning the arteries and carrying the LDL to the liver for processing. The desired level for HDL is 60 mg/dL or higher.  · Triglycerides. These are fats that your body can store or burn for energy. The desired level is less than 150 mg/dL.  · Total cholesterol. This measures the total amount of cholesterol in your blood and includes LDL, HDL, and triglycerides. The desired level is less than 200 mg/dL.  How is this treated?  This condition may be treated with:  · Diet changes. You may be asked to eat foods that have more fiber and less saturated fats or added sugar.  · Lifestyle changes. These may include regular exercise, maintaining a healthy weight, and quitting use of tobacco products.  · Medicines. These are given when diet and lifestyle changes have not worked. You may be prescribed a statin medicine to help lower your cholesterol levels.  Follow these instructions at home:  Eating and drinking    · Eat a healthy, balanced diet. This diet includes:  ? Daily servings of a variety of fresh, frozen, or canned fruits and vegetables.  ? Daily servings of whole grain foods that are rich in fiber.  ? Foods that are low in saturated fats and trans fats. These include poultry and fish without skin, lean cuts of meat, and low-fat dairy products.  ? A variety of fish, especially oily fish that contain omega-3 fatty acids. Aim to eat fish at least 2 " "times a week.  · Avoid foods and drinks that have added sugar.  · Use healthy cooking methods, such as roasting, grilling, broiling, baking, poaching, steaming, and stir-frying. Do not [place] your food except for stir-frying.  Lifestyle    · Get regular exercise. Aim to exercise for a total of 150 minutes a week. Increase your activity level by doing activities such as gardening, walking, and taking the stairs.  · Do not use any products that contain nicotine or tobacco, such as cigarettes, e-cigarettes, and chewing tobacco. If you need help quitting, ask your health care provider.  General instructions  · Take over-the-counter and prescription medicines only as told by your health care provider.  · Keep all follow-up visits as told by your health care provider. This is important.  Where to find more information  · American Heart Association: www.heart.org  · National Heart, Lung, and Blood [place]: www.nhlbi.nih.gov  Contact a health care provider if:  · You have trouble achieving or maintaining a healthy diet or weight.  · You are starting an exercise program.  · You are unable to stop smoking.  Get help right away if:  · You have chest pain.  · You have trouble breathing.  · You have any symptoms of a stroke. \"BE FAST\" is an easy way to remember the main warning signs of a stroke:  ? B - Balance. Signs are dizziness, sudden trouble walking, or loss of balance.  ? E - Eyes. Signs are trouble seeing or a sudden change in vision.  ? F - Face. Signs are sudden weakness or numbness of the face, or the face or eyelid drooping on one side.  ? A - Arms. Signs are weakness or numbness in an arm. This happens suddenly and usually on one side of the body.  ? S - Speech. Signs are sudden trouble speaking, slurred speech, or trouble understanding what people say.  ? T - Time. Time to call emergency services. Write down what time symptoms started.  · You have other signs of a stroke, such as:  ? A sudden, severe headache with " no known cause.  ? Nausea or vomiting.  ? Seizure.  These symptoms may represent a serious problem that is an emergency. Do not wait to see if the symptoms will go away. Get medical help right away. Call your local emergency services (911 in the [place] Do not drive yourself to the hospital.  Summary  · Cholesterol plaques increase your risk for heart attack and stroke. Work with your health care provider to keep your cholesterol levels in a healthy range.  · Eat a healthy, balanced diet, get regular exercise, and maintain a healthy weight.  · Do not use any products that contain nicotine or tobacco, such as cigarettes, e-cigarettes, and chewing tobacco.  · Get help right away if you have any symptoms of a stroke.  This information is not intended to replace advice given to you by your health care provider. Make sure you discuss any questions you have with your health care provider.  Document Revised: 11/16/2020 Document Reviewed: 11/16/2020  [employer] Patient Education © 2021 [employer]    Preventing High Cholesterol  Cholesterol is a white, waxy substance similar to fat that the human body needs to help build cells. The liver makes all the cholesterol that a person's body needs. Having high cholesterol (hypercholesterolemia) increases your risk for heart disease and stroke. Extra or excess cholesterol comes from the food that you eat.  High cholesterol can often be prevented with diet and lifestyle changes. If you already have high cholesterol, you can control it with diet, lifestyle changes, and medicines.  How can high cholesterol affect me?  If you have high cholesterol, fatty deposits (plaques) may build up on the walls of your blood vessels. The blood vessels that carry blood away from your heart are called arteries. Plaques make the arteries narrower and stiffer. This in turn can:  · Restrict or block blood flow and cause blood clots to form.  · Increase your risk for heart attack and stroke.  What can  increase my risk for high cholesterol?  This condition is more likely to develop in people who:  · Eat foods that are high in saturated fat or cholesterol. Saturated fat is mostly found in foods that come from animal sources.  · Are overweight.  · Are not getting enough exercise.  · Have a family history of high cholesterol (familial hypercholesterolemia).  What actions can I take to prevent this?  Nutrition    · Eat less saturated fat.  · Avoid trans fats (partially hydrogenated oils). These are often found in margarine and in some baked goods, fried foods, and snacks bought in packages.  · Avoid precooked or cured meat, such as catalan, sausages, or meat loaves.  · Avoid foods and drinks that have added sugars.  · Eat more fruits, vegetables, and whole grains.  · Choose healthy sources of protein, such as fish, poultry, lean cuts of red meat, beans, peas, lentils, and nuts.  · Choose healthy sources of fat, such as:  ? Nuts.  ? Vegetable oils, especially olive oil.  ? Fish that have healthy fats, such as omega-3 fatty acids. These fish include mackerel or salmon.  Lifestyle  · Lose weight if you are overweight. Maintaining a healthy body mass index (BMI) can help prevent or control high cholesterol. It can also lower your risk for diabetes and high blood pressure. Ask your health care provider to help you with a diet and exercise plan to lose weight safely.  · Do not use any products that contain nicotine or tobacco, such as cigarettes, e-cigarettes, and chewing tobacco. If you need help quitting, ask your health care provider.  Alcohol use  · Do not drink alcohol if:  ? Your health care provider tells you not to drink.  ? You are pregnant, may be pregnant, or are planning to become pregnant.  · If you drink alcohol:  ? Limit how much you use to:  § 0-1 drink a day for women.  § 0-2 drinks a day for men.  ? Be aware of how much alcohol is in your drink. In the [place] one drink equals one 12 oz bottle of beer (355  mL), one 5 oz glass of wine (148 mL), or one 1½ oz glass of hard liquor (44 mL).  Activity    · Get enough exercise. Do exercises as told by your health care provider.  · Each week, do at least 150 minutes of exercise that takes a medium level of effort (moderate-intensity exercise). This kind of exercise:  ? Makes your heart beat faster while allowing you to still be able to talk.  ? Can be done in short sessions several times a day or longer sessions a few times a week. For example, on 5 days each week, you could walk fast or ride your bike 3 times a day for 10 minutes each time.  Medicines  · Your health care provider may recommend medicines to help lower cholesterol. This may be a medicine to lower the amount of cholesterol that your liver makes. You may need medicine if:  ? Diet and lifestyle changes have not lowered your cholesterol enough.  ? You have high cholesterol and other risk factors for heart disease or stroke.  · Take over-the-counter and prescription medicines only as told by your health care provider.  General information  · Manage your risk factors for high cholesterol. Talk with your health care provider about all your risk factors and how to lower your risk.  · Manage other conditions that you have, such as diabetes or high blood pressure (hypertension).  · Have blood tests to check your cholesterol levels at regular points in time as told by your health care provider.  · Keep all follow-up visits as told by your health care provider. This is important.  Where to find more information  · American Heart Association: www.heart.org  · National Heart, Lung, and Blood [place]: www.nhlbi.nih.gov  Summary  · High cholesterol increases your risk for heart disease and stroke. By keeping your cholesterol level low, you can reduce your risk for these conditions.  · High cholesterol can often be prevented with diet and lifestyle changes.  · Work with your health care provider to manage your risk factors,  and have your blood tested regularly.  This information is not intended to replace advice given to you by your health care provider. Make sure you discuss any questions you have with your health care provider.  Document Revised: 09/29/2020 Document Reviewed: 09/29/2020  [employer] Patient Education © 2021 [employer]    Heart-Healthy Eating Plan  Heart-healthy meal planning includes:  · Eating less unhealthy fats.  · Eating more healthy fats.  · Making other changes in your diet.  Talk with your doctor or a diet specialist (dietitian) to create an eating plan that is right for you.  What is my plan?  Your doctor may recommend an eating plan that includes:  · Total fat: ______% or less of total calories a day.  · Saturated fat: ______% or less of total calories a day.  · Cholesterol: less than _________mg a day.  What are tips for following this plan?  Cooking  Avoid frying your food. Try to bake, boil, grill, or broil it instead. You can also reduce fat by:  · Removing the skin from poultry.  · Removing all visible fats from meats.  · Steaming vegetables in water or broth.  Meal planning    · At meals, divide your plate into four equal parts:  ? Fill one-half of your plate with vegetables and green salads.  ? Fill one-fourth of your plate with whole grains.  ? Fill one-fourth of your plate with lean protein foods.  · Eat 4-5 servings of vegetables per day. A serving of vegetables is:  ? 1 cup of raw or cooked vegetables.  ? 2 cups of raw leafy greens.  · Eat 4-5 servings of fruit per day. A serving of fruit is:  ? 1 medium whole fruit.  ? ¼ cup of dried fruit.  ? ½ cup of fresh, frozen, or canned fruit.  ? ½ cup of 100% fruit juice.  · Eat more foods that have soluble fiber. These are apples, broccoli, carrots, beans, peas, and barley. Try to get 20-30 g of fiber per day.  · Eat 4-5 servings of nuts, legumes, and seeds per week:  ? 1 serving of dried beans or legumes equals ½ cup after being cooked.  ? 1 serving of  nuts is ¼ cup.  ? 1 serving of seeds equals 1 tablespoon.  General information  · Eat more home-cooked food. Eat less restaurant, buffet, and fast food.  · Limit or avoid alcohol.  · Limit foods that are high in starch and sugar.  · Avoid fried foods.  · Lose weight if you are overweight.  · Keep track of how much salt (sodium) you eat. This is important if you have high blood pressure. Ask your doctor to tell you more about this.  · Try to add vegetarian meals each week.  Fats  · Choose healthy fats. These include olive oil and canola oil, flaxseeds, walnuts, almonds, and seeds.  · Eat more omega-3 fats. These include salmon, mackerel, sardines, tuna, flaxseed oil, and ground flaxseeds. Try to eat fish at least 2 times each week.  · Check food labels. Avoid foods with trans fats or high amounts of saturated fat.  · Limit saturated fats.  ? These are often found in animal products, such as meats, butter, and cream.  ? These are also found in plant foods, such as palm oil, palm kernel oil, and coconut oil.  · Avoid foods with partially hydrogenated oils in them. These have trans fats. Examples are stick margarine, some tub margarines, cookies, crackers, and other baked goods.  What foods can I eat?  Fruits  All fresh, canned (in natural juice), or frozen fruits.  Vegetables  Fresh or frozen vegetables (raw, steamed, roasted, or grilled). Green salads.  Grains  Most grains. Choose whole wheat and whole grains most of the time. Rice and pasta, including brown rice and pastas made with whole wheat.  Meats and other proteins  Lean, well-trimmed beef, veal, pork, and lamb. Chicken and turkey without skin. All fish and shellfish. Wild duck, rabbit, pheasant, and venison. Egg whites or low-cholesterol egg substitutes. Dried beans, peas, lentils, and tofu. Seeds and most nuts.  Dairy  Low-fat or nonfat cheeses, including ricotta and mozzarella. Skim or 1% milk that is liquid, powdered, or evaporated. Buttermilk that is made  with low-fat milk. Nonfat or low-fat yogurt.  Fats and oils  Non-hydrogenated (trans-free) margarines. Vegetable oils, including soybean, sesame, sunflower, olive, peanut, safflower, corn, canola, and cottonseed. Salad dressings or mayonnaise made with a vegetable oil.  Beverages  Mineral water. Coffee and tea. Diet carbonated beverages.  Sweets and desserts  Sherbet, gelatin, and fruit ice. Small amounts of dark chocolate.  Limit all sweets and desserts.  Seasonings and condiments  All seasonings and condiments.  The items listed above may not be a complete list of foods and drinks you can eat. Contact a dietitian for more options.  What foods should I avoid?  Fruits  Canned fruit in heavy syrup. Fruit in cream or butter sauce. Fried fruit. Limit coconut.  Vegetables  Vegetables cooked in cheese, cream, or butter sauce. Fried vegetables.  Grains  Breads that are made with saturated or trans fats, oils, or whole milk. Croissants. Sweet rolls. Donuts. High-fat crackers, such as cheese crackers.  Meats and other proteins  Fatty meats, such as hot dogs, ribs, sausage, catalan, rib-eye roast or steak. High-fat deli meats, such as salami and bologna. Caviar. Domestic duck and goose. Organ meats, such as liver.  Dairy  Cream, sour cream, cream cheese, and creamed cottage cheese. Whole-milk cheeses. Whole or 2% milk that is liquid, evaporated, or condensed. Whole buttermilk. Cream sauce or high-fat cheese sauce. Yogurt that is made from whole milk.  Fats and oils  Meat fat, or shortening. Cocoa butter, hydrogenated oils, palm oil, coconut oil, palm kernel oil. Solid fats and shortenings, including [place] fat, salt pork, lard, and butter. Nondairy cream substitutes. Salad dressings with cheese or sour cream.  Beverages  Regular sodas and juice drinks with added sugar.  Sweets and desserts  Frosting. Pudding. Cookies. Cakes. Pies. Milk chocolate or white chocolate. Buttered syrups. Full-fat ice cream or ice cream drinks.  The  items listed above may not be a complete list of foods and drinks to avoid. Contact a dietitian for more information.  Summary  · Heart-healthy meal planning includes eating less unhealthy fats, eating more healthy fats, and making other changes in your diet.  · Eat a balanced diet. This includes fruits and vegetables, low-fat or nonfat dairy, lean protein, nuts and legumes, whole grains, and heart-healthy oils and fats.  This information is not intended to replace advice given to you by your health care provider. Make sure you discuss any questions you have with your health care provider.  Document Revised: 02/21/2019 Document Reviewed: 01/25/2019  [employer] Patient Education © 2021 [employer]    Six-Minute Walk Test  A 6-minute walk test measures how far you can walk in 6 minutes on a hard, flat surface. Before, during, and after the test, you will have the following measurements:  · Blood pressure.  · Heart rate.  · The amount of oxygen in your blood (oxygen saturation).  · Level of breathlessness (dyspnea score).  You may have this test to help determine how severe your breathing problem is and how it affects your ability to exercise. This test can also be used to check how well your pulmonary rehabilitation program or medicines are working.  Tell a health care provider about:  · Any allergies you have.  · All medicines you are taking, including vitamins, herbs, eye drops, creams, and over-the-counter medicines.  · Any blood disorders you have.  · Any surgeries you have had.  · Any medical conditions you have.  · Any problems you have with your legs, hips, or back.  · Whether you are pregnant or may be pregnant.  What are the risks?  Generally, this is a safe procedure. However, problems may occur, including:  · Shortness of breath.  · Abnormal heart rate.  · Dizziness or fainting.  What happens before the procedure?    · Ask your health care provider if you should take your medicines on the day of your test.  This includes any inhaled medicines.  · Follow instructions from your health care provider about eating and drinking restrictions. This may include:  ? Eating a light meal two or more hours before your test.  · Wear comfortable shoes and clothing.  · Bring along any assistive walking devices, such as a cane or walker. It is likely that you will use them during your test.  · Do not do vigorous exercise during the 2 hours before your test.  · Do not use any products that contain nicotine or tobacco, such as cigarettes and e-cigarettes. Using these before the test may interfere with the test results.  What happens during the procedure?  · Before the exercise part of the test, you will sit and rest for a few minutes. Your blood pressure, oxygen saturation, heart rate, and dyspnea score will be measured.  · A timer will be started. You may be instructed to:  ? Walk on a hard, flat surface for 6 minutes.  ? Wear a portable oxygen monitor while you are walking.  ? Do a different type of exercise for a different length of time.  · The distance that you walk within the given time period will be recorded.  · After the test, the same measurements that were taken before the test will be taken again.  The procedure may vary among health care providers and hospitals.  What happens after the procedure?  · You may return to your normal diet, medicines, and activities as directed by your health care provider.  · Your health care provider will go over your test results with you and recommend treatments.  · You may need to have this test done again to check how well your treatments are working.  Summary  · The 6-minute walk test measures how far you can walk in 6 minutes on a hard, flat surface.  · You may have this test to determine how severe your breathing problem is and how well your treatments are working.  · Before the exercise part of the test, you will sit and rest for a few minutes. Your blood pressure, heart rate, oxygen  saturation, and level of breathlessness will be measured. These measurements will be taken again after the test.  · Talk with your health care provider about what your test results mean.  This information is not intended to replace advice given to you by your health care provider. Make sure you discuss any questions you have with your health care provider.  Document Revised: 02/06/2019 Document Reviewed: 02/06/2019  [employer]vier Patient Education © 2021 [employer]

## 2021-06-23 ENCOUNTER — OFFICE VISIT (OUTPATIENT)
Dept: NEUROSURGERY | Facility: CLINIC | Age: 56
End: 2021-06-23

## 2021-06-23 ENCOUNTER — TREATMENT (OUTPATIENT)
Dept: CARDIAC REHAB | Facility: HOSPITAL | Age: 56
End: 2021-06-23

## 2021-06-23 VITALS — WEIGHT: 174.6 LBS | BODY MASS INDEX: 25 KG/M2 | HEIGHT: 70 IN

## 2021-06-23 VITALS — SYSTOLIC BLOOD PRESSURE: 102 MMHG | HEART RATE: 54 BPM | DIASTOLIC BLOOD PRESSURE: 74 MMHG

## 2021-06-23 DIAGNOSIS — Z98.1 S/P CERVICAL SPINAL FUSION: Primary | ICD-10-CM

## 2021-06-23 DIAGNOSIS — M47.12 CERVICAL SPONDYLOSIS WITH MYELOPATHY: ICD-10-CM

## 2021-06-23 DIAGNOSIS — I20.9 ANGINA, CLASS II (HCC): ICD-10-CM

## 2021-06-23 PROCEDURE — 99024 POSTOP FOLLOW-UP VISIT: CPT | Performed by: NEUROLOGICAL SURGERY

## 2021-06-23 PROCEDURE — 93798 PHYS/QHP OP CAR RHAB W/ECG: CPT

## 2021-06-23 NOTE — PROGRESS NOTES
"Patient: Javier Santiago  : 1965    Primary Care Provider: Moi Segura APRN    Requesting Provider: As above        History    Chief Complaint: Neck and arm pain with sensory alteration and weakness.    History of Present Illness: Mr. Santiago is a 55-year-old gentleman who presented to our clinic with progressive neck and arm pain with sensory alteration and weakness.  Studies demonstrated cervical spondylosis with stenosis and associated signal change within the spinal cord.  He was felt to harbor a progressive myelopathy.  As such on 2021 he underwent C4-6 anterior cervical discectomy with fusion.  Surgery was without complication.  The \"electrical\" sensations occurring in his anterior chest region have resolved.  He still has significant dysesthesia and numbness in his arms and hands.  He continues to smoke.  He is undergoing cardiac rehab.  He continues on gabapentin.    Review of Systems   Constitutional: Negative for activity change, appetite change, chills, diaphoresis, fatigue, fever and unexpected weight change.   HENT: Negative for congestion, dental problem, drooling, ear discharge, ear pain, facial swelling, hearing loss, mouth sores, nosebleeds, postnasal drip, rhinorrhea, sinus pressure, sinus pain, sneezing, sore throat, tinnitus, trouble swallowing and voice change.    Eyes: Negative for photophobia, pain, discharge, redness, itching and visual disturbance.   Respiratory: Positive for apnea. Negative for cough, choking, chest tightness, shortness of breath, wheezing and stridor.    Cardiovascular: Negative for chest pain, palpitations and leg swelling.   Gastrointestinal: Negative for abdominal distention, abdominal pain, anal bleeding, blood in stool, constipation, diarrhea, nausea, rectal pain and vomiting.   Endocrine: Negative for cold intolerance, heat intolerance, polydipsia, polyphagia and polyuria.   Genitourinary: Negative for decreased urine volume, difficulty urinating, " "dysuria, enuresis, flank pain, frequency, genital sores, hematuria and urgency.   Musculoskeletal: Positive for arthralgias, back pain, neck pain and neck stiffness. Negative for gait problem, joint swelling and myalgias.   Skin: Negative for color change, pallor, rash and wound.   Allergic/Immunologic: Negative for environmental allergies, food allergies and immunocompromised state.   Neurological: Positive for numbness (Bilateral arm and hands). Negative for dizziness, tremors, seizures, syncope, facial asymmetry, speech difficulty, weakness, light-headedness and headaches.   Hematological: Negative for adenopathy. Does not bruise/bleed easily.   Psychiatric/Behavioral: Negative for agitation, behavioral problems, confusion, decreased concentration, dysphoric mood, hallucinations, self-injury, sleep disturbance and suicidal ideas. The patient is not nervous/anxious and is not hyperactive.        The patient's past medical history, past surgical history, family history, and social history have been reviewed at length in the electronic medical record.    Physical Exam:   Ht 177.8 cm (70\")   Wt 79.2 kg (174 lb 9.6 oz)   BMI 25.05 kg/m²   Cervical incision looks great.  Andres sign remains quite positive in the right hand, less so in the left hand.    Medical Decision Making    Diagnosis:   Cervical spondylosis with myelopathy status post C4-6 ACDF.    Treatment Options:   Hopefully with time some of the patient's upper extremity difficulties will improve.  He will follow-up in our clinic in about 3 months to check on his progress.  I have discussed the importance of smoking cessation.  About 8-10 months after surgery we will want to check some follow-up plain films of the cervical spine.       Diagnosis Plan   1. S/P cervical spinal fusion     2. Cervical spondylosis with myelopathy         Scribed for Chester Cowan MD by Malka Black, [facility] 6/23/2021 16:34 EDT      I, Dr. Cowan, personally performed the " services described in the documentation, as scribed in my presence, and it is both accurate and complete.

## 2021-06-23 NOTE — PROGRESS NOTES
Pt attended phase II visit.  Tolerated exercise well, NAD noted, no complaints voiced, skin warm, pink, dry, resp nl and even, denies chest discomfort, denies SOA.   Monitor shows SB-NSR, V/S WDL ,see [employer] documentation for exercise data.  Dr. Warren physician immediately available.    Patient attended first day of exercise. Educated on warm up, cool down, cleaning and use of equipment, signs and symptoms to report, Cardiac Rehab Protocol, Applying the heart monitor.

## 2021-06-25 ENCOUNTER — TREATMENT (OUTPATIENT)
Dept: CARDIAC REHAB | Facility: HOSPITAL | Age: 56
End: 2021-06-25

## 2021-06-25 VITALS — HEART RATE: 67 BPM | OXYGEN SATURATION: 98 % | SYSTOLIC BLOOD PRESSURE: 120 MMHG | DIASTOLIC BLOOD PRESSURE: 76 MMHG

## 2021-06-25 DIAGNOSIS — I20.9 ANGINA, CLASS II (HCC): ICD-10-CM

## 2021-06-25 PROCEDURE — 93798 PHYS/QHP OP CAR RHAB W/ECG: CPT

## 2021-06-25 NOTE — PROGRESS NOTES
Pt attended phase II visit.  Tolerated exercise well, NAD noted, no complaints voiced, skin warm, pink, dry, resp nl and even, denies chest discomfort, denies SOA.   Monitor shows SB-NSR, V/S WDL ,see [employer] documentation for exercise data.  Dr. Warren physician immediately available.

## 2021-06-28 ENCOUNTER — TREATMENT (OUTPATIENT)
Dept: CARDIAC REHAB | Facility: HOSPITAL | Age: 56
End: 2021-06-28

## 2021-06-28 VITALS — OXYGEN SATURATION: 100 % | HEART RATE: 61 BPM | SYSTOLIC BLOOD PRESSURE: 118 MMHG | DIASTOLIC BLOOD PRESSURE: 74 MMHG

## 2021-06-28 DIAGNOSIS — I20.9 ANGINA, CLASS II (HCC): ICD-10-CM

## 2021-06-28 PROCEDURE — 93798 PHYS/QHP OP CAR RHAB W/ECG: CPT

## 2021-06-28 NOTE — PROGRESS NOTES
Pt attended phase II visit.  Tolerated exercise well, NAD noted, no complaints voiced, skin warm, pink, dry, resp nl and even, denies chest discomfort, denies SOA.   Monitor shows SB-NSR, V/S WDL ,see [employer] documentation for exercise data.  Dr. Mukherjee  physician immediately available.

## 2021-06-30 ENCOUNTER — APPOINTMENT (OUTPATIENT)
Dept: CARDIAC REHAB | Facility: HOSPITAL | Age: 56
End: 2021-06-30

## 2021-07-02 ENCOUNTER — APPOINTMENT (OUTPATIENT)
Dept: CARDIAC REHAB | Facility: HOSPITAL | Age: 56
End: 2021-07-02

## 2021-07-07 ENCOUNTER — APPOINTMENT (OUTPATIENT)
Dept: CARDIAC REHAB | Facility: HOSPITAL | Age: 56
End: 2021-07-07

## 2021-07-09 ENCOUNTER — APPOINTMENT (OUTPATIENT)
Dept: CARDIAC REHAB | Facility: HOSPITAL | Age: 56
End: 2021-07-09

## 2021-07-12 ENCOUNTER — APPOINTMENT (OUTPATIENT)
Dept: CARDIAC REHAB | Facility: HOSPITAL | Age: 56
End: 2021-07-12

## 2021-07-12 ENCOUNTER — DOCUMENTATION (OUTPATIENT)
Dept: CARDIAC REHAB | Facility: HOSPITAL | Age: 56
End: 2021-07-12

## 2021-07-14 ENCOUNTER — APPOINTMENT (OUTPATIENT)
Dept: CARDIAC REHAB | Facility: HOSPITAL | Age: 56
End: 2021-07-14

## 2021-07-16 ENCOUNTER — APPOINTMENT (OUTPATIENT)
Dept: CARDIAC REHAB | Facility: HOSPITAL | Age: 56
End: 2021-07-16

## 2021-07-19 ENCOUNTER — APPOINTMENT (OUTPATIENT)
Dept: CARDIAC REHAB | Facility: HOSPITAL | Age: 56
End: 2021-07-19

## 2021-07-21 ENCOUNTER — APPOINTMENT (OUTPATIENT)
Dept: CARDIAC REHAB | Facility: HOSPITAL | Age: 56
End: 2021-07-21

## 2021-07-23 ENCOUNTER — APPOINTMENT (OUTPATIENT)
Dept: CARDIAC REHAB | Facility: HOSPITAL | Age: 56
End: 2021-07-23

## 2021-07-26 ENCOUNTER — APPOINTMENT (OUTPATIENT)
Dept: CARDIAC REHAB | Facility: HOSPITAL | Age: 56
End: 2021-07-26

## 2021-07-28 ENCOUNTER — APPOINTMENT (OUTPATIENT)
Dept: CARDIAC REHAB | Facility: HOSPITAL | Age: 56
End: 2021-07-28

## 2021-07-29 ENCOUNTER — OFFICE VISIT (OUTPATIENT)
Dept: CARDIOLOGY | Facility: CLINIC | Age: 56
End: 2021-07-29

## 2021-07-29 VITALS
HEIGHT: 70 IN | DIASTOLIC BLOOD PRESSURE: 75 MMHG | OXYGEN SATURATION: 97 % | SYSTOLIC BLOOD PRESSURE: 122 MMHG | HEART RATE: 82 BPM | WEIGHT: 177.6 LBS | BODY MASS INDEX: 25.43 KG/M2

## 2021-07-29 DIAGNOSIS — Z72.0 TOBACCO USE: ICD-10-CM

## 2021-07-29 DIAGNOSIS — I25.119 CORONARY ARTERY DISEASE INVOLVING NATIVE CORONARY ARTERY OF NATIVE HEART WITH ANGINA PECTORIS (HCC): Primary | ICD-10-CM

## 2021-07-29 DIAGNOSIS — I10 ESSENTIAL HYPERTENSION: ICD-10-CM

## 2021-07-29 DIAGNOSIS — E78.5 DYSLIPIDEMIA: ICD-10-CM

## 2021-07-29 PROCEDURE — 99214 OFFICE O/P EST MOD 30 MIN: CPT | Performed by: INTERNAL MEDICINE

## 2021-07-29 RX ORDER — VARENICLINE TARTRATE 1 MG/1
1 TABLET, FILM COATED ORAL 2 TIMES DAILY
Qty: 56 TABLET | Refills: 4 | Status: SHIPPED | OUTPATIENT
Start: 2021-08-26 | End: 2021-09-22

## 2021-07-29 RX ORDER — BETAMETHASONE DIPROPIONATE 0.05 %
OINTMENT (GRAM) TOPICAL 2 TIMES DAILY
COMMUNITY

## 2021-07-30 ENCOUNTER — APPOINTMENT (OUTPATIENT)
Dept: CARDIAC REHAB | Facility: HOSPITAL | Age: 56
End: 2021-07-30

## 2021-08-02 ENCOUNTER — APPOINTMENT (OUTPATIENT)
Dept: CARDIAC REHAB | Facility: HOSPITAL | Age: 56
End: 2021-08-02

## 2021-08-04 ENCOUNTER — APPOINTMENT (OUTPATIENT)
Dept: CARDIAC REHAB | Facility: HOSPITAL | Age: 56
End: 2021-08-04

## 2021-08-06 ENCOUNTER — APPOINTMENT (OUTPATIENT)
Dept: CARDIAC REHAB | Facility: HOSPITAL | Age: 56
End: 2021-08-06

## 2021-08-09 ENCOUNTER — APPOINTMENT (OUTPATIENT)
Dept: CARDIAC REHAB | Facility: HOSPITAL | Age: 56
End: 2021-08-09

## 2021-08-11 ENCOUNTER — APPOINTMENT (OUTPATIENT)
Dept: CARDIAC REHAB | Facility: HOSPITAL | Age: 56
End: 2021-08-11

## 2021-08-13 ENCOUNTER — APPOINTMENT (OUTPATIENT)
Dept: CARDIAC REHAB | Facility: HOSPITAL | Age: 56
End: 2021-08-13

## 2021-08-16 ENCOUNTER — APPOINTMENT (OUTPATIENT)
Dept: CARDIAC REHAB | Facility: HOSPITAL | Age: 56
End: 2021-08-16

## 2021-08-18 ENCOUNTER — APPOINTMENT (OUTPATIENT)
Dept: CARDIAC REHAB | Facility: HOSPITAL | Age: 56
End: 2021-08-18

## 2021-08-20 ENCOUNTER — APPOINTMENT (OUTPATIENT)
Dept: CARDIAC REHAB | Facility: HOSPITAL | Age: 56
End: 2021-08-20

## 2021-08-23 ENCOUNTER — APPOINTMENT (OUTPATIENT)
Dept: CARDIAC REHAB | Facility: HOSPITAL | Age: 56
End: 2021-08-23

## 2021-08-25 ENCOUNTER — APPOINTMENT (OUTPATIENT)
Dept: CARDIAC REHAB | Facility: HOSPITAL | Age: 56
End: 2021-08-25

## 2021-08-27 ENCOUNTER — APPOINTMENT (OUTPATIENT)
Dept: CARDIAC REHAB | Facility: HOSPITAL | Age: 56
End: 2021-08-27

## 2021-08-30 ENCOUNTER — APPOINTMENT (OUTPATIENT)
Dept: CARDIAC REHAB | Facility: HOSPITAL | Age: 56
End: 2021-08-30

## 2021-09-01 ENCOUNTER — APPOINTMENT (OUTPATIENT)
Dept: CARDIAC REHAB | Facility: HOSPITAL | Age: 56
End: 2021-09-01

## 2021-09-03 ENCOUNTER — APPOINTMENT (OUTPATIENT)
Dept: CARDIAC REHAB | Facility: HOSPITAL | Age: 56
End: 2021-09-03

## 2021-09-08 ENCOUNTER — APPOINTMENT (OUTPATIENT)
Dept: CARDIAC REHAB | Facility: HOSPITAL | Age: 56
End: 2021-09-08

## 2021-09-10 ENCOUNTER — APPOINTMENT (OUTPATIENT)
Dept: CARDIAC REHAB | Facility: HOSPITAL | Age: 56
End: 2021-09-10

## 2021-09-13 ENCOUNTER — APPOINTMENT (OUTPATIENT)
Dept: CARDIAC REHAB | Facility: HOSPITAL | Age: 56
End: 2021-09-13

## 2021-09-15 ENCOUNTER — APPOINTMENT (OUTPATIENT)
Dept: CARDIAC REHAB | Facility: HOSPITAL | Age: 56
End: 2021-09-15

## 2021-09-22 ENCOUNTER — OFFICE VISIT (OUTPATIENT)
Dept: NEUROSURGERY | Facility: CLINIC | Age: 56
End: 2021-09-22

## 2021-09-22 VITALS — BODY MASS INDEX: 24.28 KG/M2 | WEIGHT: 169.6 LBS | HEIGHT: 70 IN | TEMPERATURE: 97.1 F

## 2021-09-22 DIAGNOSIS — Z71.6 ENCOUNTER FOR SMOKING CESSATION COUNSELING: ICD-10-CM

## 2021-09-22 DIAGNOSIS — Z98.1 S/P CERVICAL SPINAL FUSION: Primary | ICD-10-CM

## 2021-09-22 DIAGNOSIS — M48.02 STENOSIS OF CERVICAL SPINE WITH MYELOPATHY (HCC): ICD-10-CM

## 2021-09-22 DIAGNOSIS — G99.2 STENOSIS OF CERVICAL SPINE WITH MYELOPATHY (HCC): ICD-10-CM

## 2021-09-22 DIAGNOSIS — Z72.0 TOBACCO USE: ICD-10-CM

## 2021-09-22 DIAGNOSIS — M50.30 DDD (DEGENERATIVE DISC DISEASE), CERVICAL: ICD-10-CM

## 2021-09-22 PROCEDURE — 99213 OFFICE O/P EST LOW 20 MIN: CPT | Performed by: PHYSICIAN ASSISTANT

## 2021-09-22 RX ORDER — ADALIMUMAB 40MG/0.4ML
40 KIT SUBCUTANEOUS
COMMUNITY
End: 2022-04-20

## 2021-09-22 NOTE — PROGRESS NOTES
"Patient: Javier Santiago  : 1965  GENDER: male    Primary Care Provider: Moi Segura APRN    Requesting Provider: As above      History    Chief Complaint: neck and bilateral arm pain with sensory alteration and weakness     History of Present Illness: Mr. Santiago is a 56-year-old disabled former  who presented to our service with progressive neck and bilateral upper extremity pain with sensory alteration and weakness.  Preoperative studies revealed cervical spondylosis with stenosis and signal change within the cord.  He was felt to harbor a progressive myelopathy.  As such, patient underwent ACDF from C4-6 on 2021 in attempts to stabilize his early myelopathy.      Presently, Mr. Santiago is 5.5 months postop.  He is overall pleased with his current postoperative progress.  He no longer complains of the \"electrical\" sensation occurring within his anterior chest region.  He still harbors a significant amount of sensory alteration, pain and weakness to his bilateral upper extremities.  He reports dropping items quite frequently.  He is continued on gabapentin 600 mg TID with minimal benefit.  He has been working on cutting back on smoking.  He was previously smoking 2-3 ppd, and is now smoking approximately 1 pack/day.  He is more motivated to quit at this time.  He has no other complaints.        Review of Systems   Constitutional: Negative for activity change, appetite change, chills, diaphoresis, fatigue, fever and unexpected weight change.   HENT: Negative for congestion, dental problem, drooling, ear discharge, ear pain, facial swelling, hearing loss, mouth sores, nosebleeds, postnasal drip, rhinorrhea, sinus pressure, sinus pain, sneezing, sore throat, tinnitus, trouble swallowing and voice change.    Eyes: Negative for photophobia, pain, discharge, redness, itching and visual disturbance.   Respiratory: Positive for apnea. Negative for cough, choking, chest tightness, shortness of breath, " wheezing and stridor.    Cardiovascular: Negative for chest pain, palpitations and leg swelling.   Gastrointestinal: Negative for abdominal distention, abdominal pain, anal bleeding, blood in stool, constipation, diarrhea, nausea, rectal pain and vomiting.   Endocrine: Negative for cold intolerance, heat intolerance, polydipsia, polyphagia and polyuria.   Genitourinary: Negative for decreased urine volume, difficulty urinating, dysuria, enuresis, flank pain, frequency, genital sores, hematuria and urgency.   Musculoskeletal: Positive for arthralgias, back pain, neck pain and neck stiffness. Negative for gait problem, joint swelling and myalgias.   Skin: Negative for color change, pallor, rash and wound.   Allergic/Immunologic: Negative for environmental allergies, food allergies and immunocompromised state.   Neurological: Positive for numbness (Bilateral arm and hands). Negative for dizziness, tremors, seizures, syncope, facial asymmetry, speech difficulty, weakness, light-headedness and headaches.   Hematological: Negative for adenopathy. Does not bruise/bleed easily.   Psychiatric/Behavioral: Negative for agitation, behavioral problems, confusion, decreased concentration, dysphoric mood, hallucinations, self-injury, sleep disturbance and suicidal ideas. The patient is not nervous/anxious and is not hyperactive.        Past Medical History:   Diagnosis Date   • Arthritis     psoriatic   • ASCVD (arteriosclerotic cardiovascular disease)    • CHF (congestive heart failure) (CMS/[facility])    • Chronic low back pain    • COPD (chronic obstructive pulmonary disease) (CMS/[facility])    • Coronary artery disease    • History of EKG 01/26/2016    NORMAL   • Hyperlipidemia    • Hypertension    • Hypotension    • Insomnia    • Sleep apnea     cpap non compluant     Past Surgical History:   Procedure Laterality Date   • ANTERIOR CERVICAL DISCECTOMY W/ FUSION N/A 4/26/2021    Procedure: CERVICAL DISCECTOMY ANTERIOR WITH FUSION C4-6;   Surgeon: Chester Cowan MD;  Location:  [facility];  Service: Neurosurgery;  Laterality: N/A;   • BACK SURGERY     • CARDIAC CATHETERIZATION N/A 10/13/2017    Procedure: Left Heart Cath;  Surgeon: Chesetr Centeno MD;  Location:  [facility];  Service:    • CARDIAC CATHETERIZATION N/A 2/11/2021    Procedure: Left Heart Cath;  Surgeon: Brent Degroot MD;  [facility]:  [facility];  Service: Cardiology;  Laterality: N/A;   • COLONOSCOPY     • CORONARY ANGIOPLASTY WITH STENT PLACEMENT     • ENDOSCOPY     • ENDOSCOPY N/A 2/9/2018    Procedure: ESOPHAGOGASTRODUODENOSCOPY WITH DILATATION CPT CODE: 09784;  Surgeon: Everett Villanueva III, MD;  Location:  [facility];  Service:        Current Outpatient Medications:   •  Adalimumab (Humira) 40 MG/0.4ML Prefilled Syringe Kit injection, Inject 40 mg under the skin into the appropriate area as directed Every 14 (Fourteen) Days., Disp: , Rfl:   •  albuterol (PROVENTIL HFA;VENTOLIN HFA) 108 (90 BASE) MCG/ACT inhaler, Inhale 2 puffs Every 4 (Four) Hours As Needed for Wheezing., Disp: , Rfl:   •  amLODIPine (NORVASC) 5 MG tablet, Take 1 tablet by mouth Daily., Disp: 30 tablet, Rfl: 5  •  aspirin 81 MG tablet, Take 1 tablet by mouth Daily., Disp: 30 tablet, Rfl: 11  •  atorvastatin (LIPITOR) 80 MG tablet, Take 80 mg by mouth daily., Disp: , Rfl:   •  baclofen (LIORESAL) 10 MG tablet, Take 1 tablet by mouth 2 (Two) Times a Day., Disp: 60 tablet, Rfl: 0  •  betamethasone dipropionate (DIPROLENE) 0.05 % ointment, Apply  topically to the appropriate area as directed 2 (Two) Times a Day., Disp: , Rfl:   •  clopidogrel (Plavix) 75 MG tablet, Take 1 tablet by mouth Daily., Disp: 30 tablet, Rfl:   •  DULoxetine (CYMBALTA) 30 MG capsule, Take 30 mg by mouth Daily., Disp: , Rfl:   •  EPINEPHrine (EPIPEN IJ), Inject 1 dose as directed As Needed (ALLERGIC REACTION)., Disp: , Rfl:   •  gabapentin (NEURONTIN) 300 MG capsule, Take 600 mg by mouth 3  "(Three) Times a Day., Disp: , Rfl:   •  hydrALAZINE (APRESOLINE) 25 MG tablet, Take 25 mg by mouth 3 (Three) Times a Day., Disp: , Rfl:   •  HYDROcodone-acetaminophen (NORCO) 7.5-325 MG per tablet, Take 1 tablet by mouth 3 (Three) Times a Day As Needed for Moderate Pain ., Disp: 25 tablet, Rfl: 0  •  hydrOXYzine (ATARAX) 25 MG tablet, Take 25 mg by mouth Every 8 (Eight) Hours As Needed., Disp: , Rfl:   •  isosorbide mononitrate (IMDUR) 30 MG 24 hr tablet, Take 1 tablet by mouth Daily., Disp: 30 tablet, Rfl: 5  •  loratadine (CLARITIN) 10 MG tablet, Take 10 mg by mouth Daily., Disp: , Rfl:   •  metoprolol succinate XL (TOPROL-XL) 25 MG 24 hr tablet, Take 1 tablet by mouth Daily., Disp: 90 tablet, Rfl: 1  •  nitroglycerin (NITROSTAT) 0.4 MG SL tablet, Place 0.4 mg under the tongue Every 5 (Five) Minutes As Needed for Chest Pain. Take no more than 3 doses in 15 minutes., Disp: , Rfl:   •  pantoprazole (PROTONIX) 40 MG EC tablet, Take 1 tablet by mouth 2 (Two) Times a Day Before Meals., Disp: 60 tablet, Rfl: 5  •  SYMBICORT 80-4.5 MCG/ACT inhaler, Inhale 2 puffs 2 (Two) Times a Day As Needed., Disp: , Rfl:   •  ustekinumab (STELARA) 45 MG/0.5ML injection, Inject 45 mg under the skin Every 30 (Thirty) Days., Disp: , Rfl:     No Known Allergies    The patient's review of systems, past medical history, past surgical history, family history, and social history have been reviewed at length in the electronic medical record.    Physical Exam:   Temp 97.1 °F (36.2 °C) (Infrared)   Ht 177.8 cm (70\")   Wt 76.9 kg (169 lb 9.6 oz)   BMI 24.34 kg/m²   CONSTITUTIONAL:   - Patient is well-nourished  - Pleasant and appears stated age.  PSYCHIATRIC:  - Normal mood and affect  - Behavior is normal.  - Thought content is normal  HENT:   Head: Normocephalic and Atraumatic.   Eyes:     - Pupils are equal, round, and reactive to light.     - EOM are normal.   CV:   - Regular rate and rhythm on palpable radial pulse   - No murmur " appreciated   PULMONARY:   - Speaking in full sentences  - No use of accessory muscles   - Breathing is non-labored   - No wheezing   SKIN:  - Clean, dry and intact  - Well healed surgical incision   MUSCULOSKELETAL:  - Neck tenderness to palpation is not observed.   - ROM in neck is normal.  NEUROLOGICAL:  - A&Ox3  - Attention span, language function, and cognition are intact.  - Strength is intact in the upper and lower extremities to direct testing.  - Muscle tone is normal throughout.  - Station and gait are normal.  - Sensation is intact to light touch testing throughout.  - Deep tendon reflexes are 2+ and symmetrical.    - Penn's Sign is positive bilaterally (R>L).  - No clonus is elicited.  - Coordination is intact.    Patient's Body mass index is 24.34 kg/m². indicating that he is within normal range (BMI 18.5-24.9). No BMI management plan needed..         Medical Decision Making    Data Review:   - no new imaging to review       Diagnosis/Treatment Options:  1. S/P cervical spinal fusion  2. Stenosis of cervical spine with myelopathy (HCC)  3. DDD (degenerative disc disease), cervical  4. Tobacco use  5. Encounter for smoking cessation counseling    - XR Spine Cervical 2 View       Follow up:  Mr. Santiago is seen today in follow-up 5.5 months after undergoing an uncomplicated ACDF with allografting and plating from C4-6 to stabilize his early progressive myelopathy on 4/26/2021.  Patient is very pleased with his current progress.  He still harbors sensory alteration and weakness to his bilateral upper extremities.  We went over some general do's/don'ts moving forward - including the importance of smoking cessation.  Patient verbalized understanding and is working diligently towards quitting.  He was recently started on nicotine patches and gum, his insurance is having issues with the Chantix.  He will continue to observe and will be seen back in the office in 3-4 months with new plain films of the cervical  spine to assess for fusion progression.    Neli Torres PA-C   9/22/2021   15:58 EDT

## 2021-09-22 NOTE — PATIENT INSTRUCTIONS

## 2021-10-19 RX ORDER — ISOSORBIDE MONONITRATE 30 MG/1
TABLET, EXTENDED RELEASE ORAL
Qty: 90 TABLET | Refills: 3 | Status: SHIPPED | OUTPATIENT
Start: 2021-10-19

## 2022-01-11 RX ORDER — AMLODIPINE BESYLATE 5 MG/1
TABLET ORAL
Qty: 30 TABLET | Refills: 5 | Status: SHIPPED | OUTPATIENT
Start: 2022-01-11

## 2022-04-18 ENCOUNTER — HOSPITAL ENCOUNTER (OUTPATIENT)
Dept: GENERAL RADIOLOGY | Facility: HOSPITAL | Age: 57
Discharge: HOME | End: 2022-04-18
Admitting: PHYSICIAN ASSISTANT

## 2022-04-18 PROCEDURE — 72040 X-RAY EXAM NECK SPINE 2-3 VW: CPT | Performed by: RADIOLOGY

## 2022-04-18 PROCEDURE — 72040 X-RAY EXAM NECK SPINE 2-3 VW: CPT

## 2022-04-20 ENCOUNTER — OFFICE VISIT (OUTPATIENT)
Dept: NEUROSURGERY | Facility: CLINIC | Age: 57
End: 2022-04-20

## 2022-04-20 VITALS — HEIGHT: 70 IN | BODY MASS INDEX: 24.25 KG/M2 | RESPIRATION RATE: 16 BRPM | WEIGHT: 169.4 LBS

## 2022-04-20 DIAGNOSIS — M48.02 MYELOPATHY CONCURRENT WITH AND DUE TO SPINAL STENOSIS OF CERVICAL REGION: Primary | ICD-10-CM

## 2022-04-20 DIAGNOSIS — G99.2 MYELOPATHY CONCURRENT WITH AND DUE TO SPINAL STENOSIS OF CERVICAL REGION: Primary | ICD-10-CM

## 2022-04-20 DIAGNOSIS — M47.12 CERVICAL SPONDYLOSIS WITH MYELOPATHY: ICD-10-CM

## 2022-04-20 DIAGNOSIS — Z98.1 S/P CERVICAL SPINAL FUSION: ICD-10-CM

## 2022-04-20 DIAGNOSIS — R20.8 DYSESTHESIA AFFECTING BOTH SIDES OF BODY: ICD-10-CM

## 2022-04-20 PROCEDURE — 99213 OFFICE O/P EST LOW 20 MIN: CPT | Performed by: PHYSICIAN ASSISTANT

## 2022-04-20 RX ORDER — USTEKINUMAB 45 MG/.5ML
INJECTION, SOLUTION SUBCUTANEOUS
COMMUNITY
Start: 2022-04-06 | End: 2023-01-25 | Stop reason: SDUPTHER

## 2022-04-20 NOTE — PROGRESS NOTES
"Subjective     Chief Complaint: hand, arm, and leg weakness with upper extremity, abdominal, and lower extremity sensory alteration    Patient ID: Javier Santiago is a 56 y.o. male is here today for follow-up.    History of Present Illness   : Mr. Santiago is a 56-year-old disabled former  who presented to our service with progressive neck and bilateral upper extremity pain with sensory alteration and weakness.  Preoperative studies revealed cervical spondylosis with stenosis and signal change within the cord.  He was felt to harbor a progressive myelopathy.  As such, patient underwent ACDF from C4-6 on 4/26/2021 in attempts to stabilize his early myelopathy.      He was last seen in follow-up at 5.5 months out in September 2021.  At that time he still had numbness and tingling in his arms with weakness and pain but his \"electrical shock\" sensation that he was experiencing in his anterior trunk whenever he raised his arms had stopped occurring.  Today he states that over the last 6 months he has been having increasing numbness and tingling in his hands and arms along with pain.  He has also had severe sensitivity of the skin of his trunk, both anterior abdominal wall and posteriorly.  Any touch or hot water from shower etc. causes pain.  His legs bilaterally are also sensitive to touch.  He notes more pain and weakness in the left leg on the right.  He has had a lumbar discectomy in the past more than 30 years ago   he also continues to have numbness and tingling in his legs with weakness there as well.  He is here today with x-rays for us to review     .  He is continued on gabapentin 600 mg TID with minimal benefit.  He has been working on [employer] back on smoking.  He was previously smoking 2-3 ppd, and is now smoking approximately 1 pack/day.  He is more motivated to quit at this time.  He has no other complaints.      He has not had any new trauma or falls.  He has not had any fevers chills or problems with his " incision site    The following portions of the patient's history were reviewed and updated as appropriate: allergies, current medications, past family history, past medical history, past social history, past surgical history and problem list.    Past Medical History:   Diagnosis Date   • Arthritis     psoriatic   • ASCVD (arteriosclerotic cardiovascular disease)    • CHF (congestive heart failure) ([facility])    • Chronic low back pain    • COPD (chronic obstructive pulmonary disease) ([facility])    • Coronary artery disease    • History of EKG 01/26/2016    NORMAL   • Hyperlipidemia    • Hypertension    • Hypotension    • Insomnia    • Sleep apnea     cpap non compluant     Past Surgical History:   Procedure Laterality Date   • ANTERIOR CERVICAL DISCECTOMY W/ FUSION N/A 4/26/2021    Procedure: CERVICAL DISCECTOMY ANTERIOR WITH FUSION C4-6;  Surgeon: Chester Cowan MD;  Location: [facility];  Service: Neurosurgery;  Laterality: N/A;   • BACK SURGERY     • CARDIAC CATHETERIZATION N/A 10/13/2017    Procedure: Left Heart Cath;  Surgeon: Chester Centeno MD;  Location: [facility] CATH INVASIVE LOCATION;  Service:    • CARDIAC CATHETERIZATION N/A 2/11/2021    Procedure: Left Heart Cath;  Surgeon: Brent Degroot MD;  Location: [facility] CATH INVASIVE LOCATION;  Service: Cardiology;  Laterality: N/A;   • COLONOSCOPY     • CORONARY ANGIOPLASTY WITH STENT PLACEMENT     • ENDOSCOPY     • ENDOSCOPY N/A 2/9/2018    Procedure: ESOPHAGOGASTRODUODENOSCOPY WITH DILATATION CPT CODE: 15201;  Surgeon: Everett Villanueva III, MD;  Location: [facility];  Service:      Current Outpatient Medications on File Prior to Visit   Medication Sig Dispense Refill   • albuterol (PROVENTIL HFA;VENTOLIN HFA) 108 (90 BASE) MCG/ACT inhaler Inhale 2 puffs Every 4 (Four) Hours As Needed for Wheezing.     • amLODIPine (NORVASC) 5 MG tablet TAKE ONE TABLET BY MOUTH DAILY FOR FOR BLOOD  PRESSURE 30 tablet 5   • aspirin 81 MG tablet Take 1 tablet by mouth  Daily. 30 tablet 11   • atorvastatin (LIPITOR) 80 MG tablet Take 80 mg by mouth daily.     • baclofen (LIORESAL) 10 MG tablet Take 1 tablet by mouth 2 (Two) Times a Day. 60 tablet 0   • betamethasone dipropionate (DIPROLENE) 0.05 % ointment Apply  topically to the appropriate area as directed 2 (Two) Times a Day.     • clopidogrel (Plavix) 75 MG tablet Take 1 tablet by mouth Daily. 30 tablet    • DULoxetine (CYMBALTA) 30 MG capsule Take 30 mg by mouth Daily.     • EPINEPHrine (EPIPEN IJ) Inject 1 dose as directed As Needed (ALLERGIC REACTION).     • gabapentin (NEURONTIN) 600 MG tablet Take 600 mg by mouth 3 (Three) Times a Day.     • hydrALAZINE (APRESOLINE) 25 MG tablet Take 25 mg by mouth 3 (Three) Times a Day.     • HYDROcodone-acetaminophen (NORCO) 7.5-325 MG per tablet Take 1 tablet by mouth 3 (Three) Times a Day As Needed for Moderate Pain . 25 tablet 0   • hydrOXYzine (ATARAX) 25 MG tablet Take 25 mg by mouth Every 8 (Eight) Hours As Needed.     • isosorbide mononitrate (IMDUR) 30 MG 24 hr tablet TAKE ONE TABLET BY MOUTH DAILY FOR HEART 90 tablet 3   • loratadine (CLARITIN) 10 MG tablet Take 10 mg by mouth Daily.     • metoprolol succinate XL (TOPROL-XL) 25 MG 24 hr tablet Take 1 tablet by mouth Daily. 90 tablet 1   • nitroglycerin (NITROSTAT) 0.4 MG SL tablet Place 0.4 mg under the tongue Every 5 (Five) Minutes As Needed for Chest Pain. Take no more than 3 doses in 15 minutes.     • pantoprazole (PROTONIX) 40 MG EC tablet Take 1 tablet by mouth 2 (Two) Times a Day Before Meals. 60 tablet 5   • Stelara 45 MG/0.5ML solution prefilled syringe Injection      • SYMBICORT 80-4.5 MCG/ACT inhaler Inhale 2 puffs 2 (Two) Times a Day As Needed.     • ustekinumab (STELARA) 45 MG/0.5ML injection Inject 45 mg under the skin Every 30 (Thirty) Days.     • [DISCONTINUED] Adalimumab (Humira) 40 MG/0.4ML Prefilled Syringe Kit injection Inject 40 mg under the skin into the appropriate area as directed Every 14 (Fourteen) Days.        No current facility-administered medications on file prior to visit.       Family history:   Family History   Problem Relation Age of Onset   • Heart attack Mother         x 3   • Atrial fibrillation Mother    • Heart disease Mother    • Heart attack Father    • Heart disease Father         CABG       Social history:   Social History     Socioeconomic History   • Marital status:      Spouse name: Coretta Bhatt   Tobacco Use   • Smoking status: Current Every Day Smoker     Packs/day: 1.00     Years: 35.00     Pack years: 35.00     Types: Cigarettes   • Smokeless tobacco: Former User     Types: Chew   Vaping Use   • Vaping Use: Never used   Substance and Sexual Activity   • Alcohol use: No   • Drug use: No   • Sexual activity: Defer       Review of Systems   Constitutional: Negative for activity change, appetite change, chills, diaphoresis, fatigue, fever and unexpected weight change.   HENT: Negative for congestion, dental problem, drooling, ear discharge, ear pain, facial swelling, hearing loss, mouth sores, nosebleeds, postnasal drip, rhinorrhea, sinus pressure, sneezing, sore throat, tinnitus, trouble swallowing and voice change.    Eyes: Negative for photophobia, pain, discharge, redness, itching and visual disturbance.   Respiratory: Negative for apnea, cough, choking, chest tightness, shortness of breath, wheezing and stridor.    Cardiovascular: Negative for chest pain, palpitations and leg swelling.   Gastrointestinal: Negative for abdominal distention, abdominal pain, anal bleeding, blood in stool, constipation, diarrhea, nausea, rectal pain and vomiting.   Endocrine: Negative for cold intolerance, heat intolerance, polydipsia, polyphagia and polyuria.   Genitourinary: Negative for decreased urine volume, difficulty urinating, dysuria, enuresis, flank pain, frequency, genital sores, hematuria and urgency.   Musculoskeletal: Positive for neck pain and neck stiffness. Negative for arthralgias,  "back pain, gait problem, joint swelling and myalgias.   Skin: Negative for color change, pallor, rash and wound.   Allergic/Immunologic: Negative for environmental allergies, food allergies and immunocompromised state.   Neurological: Negative for dizziness, tremors, seizures, syncope, facial asymmetry, speech difficulty, weakness, light-headedness, numbness and headaches.   Hematological: Negative for adenopathy. Does not bruise/bleed easily.   Psychiatric/Behavioral: Negative for agitation, behavioral problems, confusion, decreased concentration, dysphoric mood, hallucinations, self-injury, sleep disturbance and suicidal ideas. The patient is not nervous/anxious and is not hyperactive.    All other systems reviewed and are negative.      Objective   Resp. rate 16, height 177.8 cm (70\"), weight 76.8 kg (169 lb 6.4 oz).  Body mass index is 24.31 kg/m².    Physical Exam  CONSTITUTIONAL:   - Patient is well-nourished  - Pleasant and appears stated age.  PSYCHIATRIC:  - Normal mood and affect  - Behavior is normal.  - Thought content is normal  HENT:   Head: Normocephalic and Atraumatic.   Eyes:     - Pupils are equal, round, and reactive to light.     - EOM are normal.   CV:   - Regular rate and rhythm on palpable radial pulse   PULMONARY:    - Breathing is non-labored   - No wheezing   SKIN:  - Clean, dry and intact  - Well healed surgical incision   MUSCULOSKELETAL:  - Neck tenderness to palpation is not observed.   - ROM in neck is normal.  Gait is slow halting and antalgic.  He favors his left leg.  There is no spasticity noted in his gait.  He does not use cane walker or other support.  Upper extremity strength is 4 out of 5 and equal bilaterally.   strength is 3-4 out of 5 bilaterally.  Lower extremity strength is 4 out of 5 and equal bilaterally  NEUROLOGICAL:  - A&Ox3  - Attention span, language function, and cognition are intact.  - Muscle tone is reduced throughout.  - Sensation is diminished or " dysesthetic to light touch testing throughout.  - Deep tendon reflexes are 2-3+ and symmetrical.    - Penn's Sign is mildly positive bilaterally (R>L).  - No clonus is elicited.  AP and lateral x-rays of the cervical spine were performed today.  They show excellent placement of the anterior cervical fusion hardware.  There is no misalignment hardware complication.  There are degenerative changes at C6-7 below his prior fusion      Assessment/Plan       Mr. Santiago has a known history of cervical stenosis with myelopathy.  He had been doing reasonably well after his ACDF last April, but reports a 6-month history of increasing dysesthetic nerve pain with numbness tingling and weakness.  We will order an urgent MRI of his cervical and thoracic spine with and without contrast and see him back here in the next week or so to review these images.  Since his leg pain is worse unilaterally on the left, he may need follow-up work-up of lumbar spine, but since he has global dysesthetic pain and weakness, we will hold off on the lumbar spine for now and evaluate his cervical and thoracic spine as quickly as possible    Diagnoses and all orders for this visit:    1. Myelopathy concurrent with and due to spinal stenosis of cervical region (HCC) (Primary)  -     MRI Cervical Spine With & Without Contrast; Future  -     MRI Thoracic Spine With & Without Contrast; Future    2. Cervical spondylosis with myelopathy  -     MRI Cervical Spine With & Without Contrast; Future  -     MRI Thoracic Spine With & Without Contrast; Future    3. S/P cervical spinal fusion  -     MRI Cervical Spine With & Without Contrast; Future  -     MRI Thoracic Spine With & Without Contrast; Future    4. Dysesthesia affecting both sides of body        Return in about 1 week (around 4/27/2022).    Daiana Ford PA-C

## 2022-05-05 ENCOUNTER — TRANSCRIBE ORDERS (OUTPATIENT)
Dept: ADMINISTRATIVE | Facility: HOSPITAL | Age: 57
End: 2022-05-05

## 2022-05-05 DIAGNOSIS — R13.10 PROBLEMS WITH SWALLOWING AND MASTICATION: Primary | ICD-10-CM

## 2022-05-10 ENCOUNTER — HOSPITAL ENCOUNTER (OUTPATIENT)
Dept: GENERAL RADIOLOGY | Facility: HOSPITAL | Age: 57
Discharge: HOME | End: 2022-05-10

## 2022-05-10 ENCOUNTER — HOSPITAL ENCOUNTER (OUTPATIENT)
Dept: MRI IMAGING | Facility: HOSPITAL | Age: 57
Discharge: HOME | End: 2022-05-10

## 2022-05-10 DIAGNOSIS — G99.2 MYELOPATHY CONCURRENT WITH AND DUE TO SPINAL STENOSIS OF CERVICAL REGION: ICD-10-CM

## 2022-05-10 DIAGNOSIS — Z98.1 S/P CERVICAL SPINAL FUSION: ICD-10-CM

## 2022-05-10 DIAGNOSIS — M48.02 MYELOPATHY CONCURRENT WITH AND DUE TO SPINAL STENOSIS OF CERVICAL REGION: ICD-10-CM

## 2022-05-10 DIAGNOSIS — R13.10 PROBLEMS WITH SWALLOWING AND MASTICATION: ICD-10-CM

## 2022-05-10 DIAGNOSIS — M47.12 CERVICAL SPONDYLOSIS WITH MYELOPATHY: ICD-10-CM

## 2022-05-10 PROCEDURE — 72156 MRI NECK SPINE W/O & W/DYE: CPT | Performed by: RADIOLOGY

## 2022-05-10 PROCEDURE — 74220 X-RAY XM ESOPHAGUS 1CNTRST: CPT

## 2022-05-10 PROCEDURE — 72156 MRI NECK SPINE W/O & W/DYE: CPT

## 2022-05-10 PROCEDURE — 74220 X-RAY XM ESOPHAGUS 1CNTRST: CPT | Performed by: RADIOLOGY

## 2022-05-10 PROCEDURE — 72157 MRI CHEST SPINE W/O & W/DYE: CPT | Performed by: RADIOLOGY

## 2022-05-10 PROCEDURE — 0 GADOBENATE DIMEGLUMINE 529 MG/ML SOLUTION: Performed by: PHYSICIAN ASSISTANT

## 2022-05-10 PROCEDURE — 72157 MRI CHEST SPINE W/O & W/DYE: CPT

## 2022-05-10 PROCEDURE — A9577 INJ MULTIHANCE: HCPCS | Performed by: PHYSICIAN ASSISTANT

## 2022-05-10 RX ADMIN — GADOBENATE DIMEGLUMINE 15 ML: 529 INJECTION, SOLUTION INTRAVENOUS at 10:23

## 2022-05-11 ENCOUNTER — OFFICE VISIT (OUTPATIENT)
Dept: NEUROSURGERY | Facility: CLINIC | Age: 57
End: 2022-05-11

## 2022-05-11 VITALS — HEIGHT: 70 IN | WEIGHT: 169.8 LBS | TEMPERATURE: 97 F | BODY MASS INDEX: 24.31 KG/M2

## 2022-05-11 DIAGNOSIS — R20.8 DYSESTHESIA OF MULTIPLE SITES: ICD-10-CM

## 2022-05-11 DIAGNOSIS — Z98.1 S/P CERVICAL SPINAL FUSION: Primary | ICD-10-CM

## 2022-05-11 DIAGNOSIS — M48.02 SPINAL STENOSIS IN CERVICAL REGION: ICD-10-CM

## 2022-05-11 PROCEDURE — 99213 OFFICE O/P EST LOW 20 MIN: CPT | Performed by: NEUROLOGICAL SURGERY

## 2022-05-11 NOTE — PROGRESS NOTES
Patient: Javier Santiago  : 1965    Primary Care Provider: Moi Segura APRN    Requesting Provider: As above        History    Chief Complaint: Diffuse dysesthesias.    History of Present Illness: Mr. Santiago is a 56-year-old disabled  who presented with progressive neck and bilateral upper extremity pain and sensory alteration.  Studies demonstrate spondylosis and stenosis with cyndi signal change within the spinal cord.  On 2021 he underwent ACDF C4-6.  When I saw him last he still had some numbness and tingling in his hands but the electric shock sensations into his chest had gone away.  Over the last several months he has developed increasing dysesthesias involving his arms, chest, abdomen, and legs.  Even when the water in the shower strikes his abdomen it causes severe pain.  He is followed in pain clinic setting and is on Norco and gabapentin 600 mg 3 times a day.    Review of Systems   Constitutional: Negative for activity change, appetite change, chills, diaphoresis, fatigue, fever and unexpected weight change.   HENT: Negative for congestion, dental problem, drooling, ear discharge, ear pain, facial swelling, hearing loss, mouth sores, nosebleeds, postnasal drip, rhinorrhea, sinus pressure, sneezing, sore throat, tinnitus, trouble swallowing and voice change.    Eyes: Negative for photophobia, pain, discharge, redness, itching and visual disturbance.   Respiratory: Negative for apnea, cough, choking, chest tightness, shortness of breath, wheezing and stridor.    Cardiovascular: Negative for chest pain, palpitations and leg swelling.   Gastrointestinal: Negative for abdominal distention, abdominal pain, anal bleeding, blood in stool, constipation, diarrhea, nausea, rectal pain and vomiting.   Endocrine: Negative for cold intolerance, heat intolerance, polydipsia, polyphagia and polyuria.   Genitourinary: Negative for decreased urine volume, difficulty urinating, dysuria, enuresis,  "flank pain, frequency, genital sores, hematuria and urgency.   Musculoskeletal: Positive for neck pain and neck stiffness. Negative for arthralgias, back pain, gait problem, joint swelling and myalgias.   Skin: Negative for color change, pallor, rash and wound.   Allergic/Immunologic: Negative for environmental allergies, food allergies and immunocompromised state.   Neurological: Negative for dizziness, tremors, seizures, syncope, facial asymmetry, speech difficulty, weakness, light-headedness, numbness and headaches.   Hematological: Negative for adenopathy. Does not bruise/bleed easily.   Psychiatric/Behavioral: Negative for agitation, behavioral problems, confusion, decreased concentration, dysphoric mood, hallucinations, self-injury, sleep disturbance and suicidal ideas. The patient is not nervous/anxious and is not hyperactive.    All other systems reviewed and are negative.      The patient's past medical history, past surgical history, family history, and social history have been reviewed at length in the electronic medical record.    Physical Exam:   Temp 97 °F (36.1 °C) (Infrared)   Ht 177.8 cm (70\")   Wt 77 kg (169 lb 12.8 oz)   BMI 24.36 kg/m²   MUSCULOSKELETAL:  Neck tenderness to palpation is not observed.   ROM in the neck is normal.  NEUROLOGICAL:  Strength is intact in the upper and lower extremities to direct testing.  Muscle tone is normal throughout.  Station and gait are normal.  Sensation is altered throughout his entire body.  Once again he is hypersensitive to touch throughout.  Deep tendon reflexes are approximately 1+ on the left and 2-3+ on the right.  Andres sign is positive in the right hand.        Medical Decision Making    Data Review:   (All imaging studies were personally reviewed unless stated otherwise)  MRI of the cervical spine demonstrates changes related to his prior fusion.  There is some residual stenosis at the upper C4-5 level and to a lesser extent at C3-4.  I would " not call this pronounced.    MRI of the thoracic spine demonstrates some degenerative change without cord compromise.    Diagnosis:   The patient has rather diffuse symptoms.  We could be dealing with a peripheral neuropathic process.  Its not impossible that the residual stenosis in the upper cervical region is an issue.    Treatment Options:   I have referred him for electrodiagnostic studies.  He will follow-up thereafter.  If he does not have a peripheral neuropathy then we may need to consider dorsal decompression and fusion from C3-5.  He continues to smoke 1/2 pack of tobacco daily.       Diagnosis Plan   1. S/P cervical spinal fusion     2. Spinal stenosis in cervical region     3. Dysesthesia of multiple sites  EMG & Nerve Conduction Test       Scribed for Chester Cowan MD by Ale Diez CMA on 5/11/2022 12:24 EDT       I, Dr. Cowan, personally performed the services described in the documentation, as scribed in my presence, and it is both accurate and complete.

## 2022-05-22 ENCOUNTER — APPOINTMENT (OUTPATIENT)
Dept: MRI IMAGING | Facility: HOSPITAL | Age: 57
End: 2022-05-22

## 2022-06-29 ENCOUNTER — PREP FOR SURGERY (OUTPATIENT)
Dept: OTHER | Facility: HOSPITAL | Age: 57
End: 2022-06-29

## 2022-06-29 ENCOUNTER — OFFICE VISIT (OUTPATIENT)
Dept: NEUROSURGERY | Facility: CLINIC | Age: 57
End: 2022-06-29

## 2022-06-29 ENCOUNTER — HOSPITAL ENCOUNTER (OUTPATIENT)
Dept: NEUROLOGY | Facility: HOSPITAL | Age: 57
Discharge: HOME | End: 2022-06-29
Admitting: NEUROLOGICAL SURGERY

## 2022-06-29 VITALS — WEIGHT: 166 LBS | BODY MASS INDEX: 23.77 KG/M2 | TEMPERATURE: 98.1 F | HEIGHT: 70 IN

## 2022-06-29 DIAGNOSIS — M47.12 CERVICAL SPONDYLOSIS WITH MYELOPATHY: Primary | ICD-10-CM

## 2022-06-29 DIAGNOSIS — M48.02 SPINAL STENOSIS IN CERVICAL REGION: ICD-10-CM

## 2022-06-29 DIAGNOSIS — Z72.0 TOBACCO USE: ICD-10-CM

## 2022-06-29 DIAGNOSIS — R20.8 DYSESTHESIA OF MULTIPLE SITES: ICD-10-CM

## 2022-06-29 DIAGNOSIS — M50.30 DDD (DEGENERATIVE DISC DISEASE), CERVICAL: ICD-10-CM

## 2022-06-29 PROCEDURE — 95886 MUSC TEST DONE W/N TEST COMP: CPT

## 2022-06-29 PROCEDURE — 99214 OFFICE O/P EST MOD 30 MIN: CPT | Performed by: NEUROLOGICAL SURGERY

## 2022-06-29 PROCEDURE — 95913 NRV CNDJ TEST 13/> STUDIES: CPT

## 2022-06-29 RX ORDER — CEFAZOLIN SODIUM 2 G/100ML
2 INJECTION, SOLUTION INTRAVENOUS ONCE
Status: CANCELLED | OUTPATIENT
Start: 2022-06-29 | End: 2022-06-29

## 2022-06-29 RX ORDER — HYDROCODONE BITARTRATE AND ACETAMINOPHEN 10; 325 MG/1; MG/1
TABLET ORAL
Status: ON HOLD | COMMUNITY
Start: 2022-06-28 | End: 2022-08-06 | Stop reason: SDUPTHER

## 2022-06-29 RX ORDER — CHLORHEXIDINE GLUCONATE 4 G/100ML
SOLUTION TOPICAL
Qty: 120 ML | Refills: 0 | Status: ON HOLD | OUTPATIENT
Start: 2022-06-29 | End: 2022-08-04

## 2022-06-29 RX ORDER — FAMOTIDINE 20 MG/1
20 TABLET, FILM COATED ORAL
Status: CANCELLED | OUTPATIENT
Start: 2022-06-29

## 2022-06-29 RX ORDER — NICOTINE 21 MG/24HR
1 PATCH, TRANSDERMAL 24 HOURS TRANSDERMAL EVERY 24 HOURS
Qty: 30 PATCH | Refills: 1 | Status: SHIPPED | OUTPATIENT
Start: 2022-06-29

## 2022-06-29 RX ORDER — ALPRAZOLAM 0.5 MG/1
TABLET ORAL
COMMUNITY
Start: 2022-06-13 | End: 2023-01-25 | Stop reason: SDUPTHER

## 2022-06-29 NOTE — H&P
Patient: Javier Santiago  : 1965     Primary Care Provider: Moi Segura APRN     Requesting Provider: As above           History     Chief Complaint: Diffuse dysesthesias.     History of Present Illness: Mr. Santiago is a 56-year-old disabled  who presented with progressive neck and bilateral upper extremity pain and sensory alteration.  Studies demonstrate spondylosis and stenosis with cyndi signal change within the spinal cord.  On 2021 he underwent ACDF C4-6.  When I saw him last he still had some numbness and tingling in his hands but the electric shock sensations into his chest had gone away.  Over the last several months he has developed increasing dysesthesias involving his arms, chest, abdomen, and legs.  Even when the water in the shower strikes his abdomen it causes severe pain.  He is followed in pain clinic setting and is on Norco and gabapentin 600 mg 3 times a day.  He reports no change in his symptoms.  He has decreased his smoking to 1/2 pack daily.     Review of Systems   Constitutional: Negative for activity change, appetite change, chills, diaphoresis, fatigue, fever and unexpected weight change.   HENT: Negative for congestion, dental problem, drooling, ear discharge, ear pain, facial swelling, hearing loss, mouth sores, nosebleeds, postnasal drip, rhinorrhea, sinus pressure, sinus pain, sneezing, sore throat, tinnitus, trouble swallowing and voice change.    Eyes: Negative for photophobia, pain, discharge, redness, itching and visual disturbance.   Respiratory: Negative for apnea, cough, choking, chest tightness, shortness of breath, wheezing and stridor.    Cardiovascular: Negative for chest pain, palpitations and leg swelling.   Gastrointestinal: Negative for abdominal distention, abdominal pain, anal bleeding, blood in stool, constipation, diarrhea, nausea, rectal pain and vomiting.   Endocrine: Negative for cold intolerance, heat intolerance, polydipsia, polyphagia and  polyuria.   Genitourinary: Negative for decreased urine volume, difficulty urinating, dysuria, enuresis, flank pain, frequency, genital sores, hematuria, penile discharge, penile pain, penile swelling, scrotal swelling, testicular pain and urgency.   Musculoskeletal: Positive for arthralgias and back pain. Negative for gait problem, joint swelling, myalgias, neck pain and neck stiffness.   Skin: Negative for color change, pallor, rash and wound.   Allergic/Immunologic: Negative for environmental allergies, food allergies and immunocompromised state.   Neurological: Positive for weakness and numbness. Negative for dizziness, tremors, seizures, syncope, facial asymmetry, speech difficulty, light-headedness and headaches.   Hematological: Negative for adenopathy. Does not bruise/bleed easily.   Psychiatric/Behavioral: Negative for agitation, behavioral problems, confusion, decreased concentration, dysphoric mood, hallucinations, self-injury, sleep disturbance and suicidal ideas. The patient is not nervous/anxious and is not hyperactive.          The patient's past medical history, past surgical history, family history, and social history have been reviewed at length in the electronic medical record.     Past Medical History:   Diagnosis Date   • Arthritis     psoriatic   • ASCVD (arteriosclerotic cardiovascular disease)    • CHF (congestive heart failure) ([facility])    • Chronic low back pain    • COPD (chronic obstructive pulmonary disease) ([facility])    • Coronary artery disease    • History of EKG 01/26/2016    NORMAL   • Hyperlipidemia    • Hypertension    • Hypotension    • Insomnia    • Sleep apnea     cpap non compluant     Past Surgical History:   Procedure Laterality Date   • ANTERIOR CERVICAL DISCECTOMY W/ FUSION N/A 4/26/2021    Procedure: CERVICAL DISCECTOMY ANTERIOR WITH FUSION C4-6;  Surgeon: Chester Cowan MD;  Location: [facility];  Service: Neurosurgery;  Laterality: N/A;   • BACK SURGERY     • CARDIAC  CATHETERIZATION N/A 10/13/2017    Procedure: Left Heart Cath;  Surgeon: Chester Centeno MD;  Location:  [facility];  Service:    • CARDIAC CATHETERIZATION N/A 2/11/2021    Procedure: Left Heart Cath;  Surgeon: Brent Degroot MD;  Location:  [facility];  Service: Cardiology;  Laterality: N/A;   • COLONOSCOPY     • CORONARY ANGIOPLASTY WITH STENT PLACEMENT     • ENDOSCOPY     • ENDOSCOPY N/A 2/9/2018    Procedure: ESOPHAGOGASTRODUODENOSCOPY WITH DILATATION CPT CODE: 40683;  Surgeon: Everett Villanueva III, MD;  Location:  [facility] OR;  Service:      Family History   Problem Relation Age of Onset   • Heart attack Mother         x 3   • Atrial fibrillation Mother    • Heart disease Mother    • Heart attack Father    • Heart disease Father         CABG     Social History     Socioeconomic History   • Marital status:      Spouse name: Coretta Bhatt   Tobacco Use   • Smoking status: Current Every Day Smoker     Packs/day: 1.00     Years: 35.00     Pack years: 35.00     Types: Cigarettes   • Smokeless tobacco: Former User     Types: Chew   Vaping Use   • Vaping Use: Never used   Substance and Sexual Activity   • Alcohol use: No   • Drug use: No   • Sexual activity: Defer           No Known Allergies    Current Outpatient Medications on File Prior to Visit   Medication Sig Dispense Refill   • albuterol (PROVENTIL HFA;VENTOLIN HFA) 108 (90 BASE) MCG/ACT inhaler Inhale 2 puffs Every 4 (Four) Hours As Needed for Wheezing.     • ALPRAZolam (XANAX) 0.5 MG tablet      • amLODIPine (NORVASC) 5 MG tablet TAKE ONE TABLET BY MOUTH DAILY FOR FOR BLOOD  PRESSURE 30 tablet 5   • aspirin 81 MG tablet Take 1 tablet by mouth Daily. 30 tablet 11   • atorvastatin (LIPITOR) 80 MG tablet Take 80 mg by mouth daily.     • baclofen (LIORESAL) 10 MG tablet Take 1 tablet by mouth 2 (Two) Times a Day. 60 tablet 0   • betamethasone dipropionate (DIPROLENE) 0.05 % ointment Apply  topically to  "the appropriate area as directed 2 (Two) Times a Day.     • clopidogrel (Plavix) 75 MG tablet Take 1 tablet by mouth Daily. 30 tablet    • DULoxetine (CYMBALTA) 30 MG capsule Take 30 mg by mouth Daily.     • EPINEPHrine (EPIPEN IJ) Inject 1 dose as directed As Needed (ALLERGIC REACTION).     • gabapentin (NEURONTIN) 600 MG tablet Take 600 mg by mouth 3 (Three) Times a Day.     • hydrALAZINE (APRESOLINE) 25 MG tablet Take 25 mg by mouth 3 (Three) Times a Day.     • HYDROcodone-acetaminophen (NORCO)  MG per tablet      • hydrOXYzine (ATARAX) 25 MG tablet Take 25 mg by mouth Every 8 (Eight) Hours As Needed.     • isosorbide mononitrate (IMDUR) 30 MG 24 hr tablet TAKE ONE TABLET BY MOUTH DAILY FOR HEART 90 tablet 3   • loratadine (CLARITIN) 10 MG tablet Take 10 mg by mouth Daily.     • metoprolol succinate XL (TOPROL-XL) 25 MG 24 hr tablet Take 1 tablet by mouth Daily. 90 tablet 1   • nicotine (NICODERM CQ) 14 MG/24HR patch Place 1 patch on the skin as directed by provider Daily. 30 patch 1   • nitroglycerin (NITROSTAT) 0.4 MG SL tablet Place 0.4 mg under the tongue Every 5 (Five) Minutes As Needed for Chest Pain. Take no more than 3 doses in 15 minutes.     • pantoprazole (PROTONIX) 40 MG EC tablet Take 1 tablet by mouth 2 (Two) Times a Day Before Meals. 60 tablet 5   • Stelara 45 MG/0.5ML solution prefilled syringe Injection      • SYMBICORT 80-4.5 MCG/ACT inhaler Inhale 2 puffs 2 (Two) Times a Day As Needed.     • Ustekinumab (STELARA IV) Infuse  into a venous catheter.     • ustekinumab (STELARA) 45 MG/0.5ML injection Inject 45 mg under the skin Every 30 (Thirty) Days.     • [DISCONTINUED] HYDROcodone-acetaminophen (NORCO) 7.5-325 MG per tablet Take 1 tablet by mouth 3 (Three) Times a Day As Needed for Moderate Pain . 25 tablet 0     No current facility-administered medications on file prior to visit.         Physical Exam:   Temp 98.1 °F (36.7 °C) (Infrared)   Ht 177.8 cm (70\")   Wt 75.3 kg (166 lb)   BMI " 23.82 kg/m²   His gait is independent and not overtly spastic.  Reflexes are 2+ throughout.  Andres sign is positive in both hands.     Medical Decision Making     Data Review:   (All imaging studies were personally reviewed unless stated otherwise)  MRI of the cervical spine demonstrates changes related to his prior fusion.  There is some residual stenosis at the upper C4-5 level and to a lesser extent at C3-4.  I would not call this pronounced.     MRI of the thoracic spine demonstrates some degenerative change without cord compromise.     Electrodiagnostic studies demonstrate mild right carpal tunnel syndrome, very mild ulnar neuropathy at the elbow on the left, and mild ulnar neuropathy at the elbow on the right.  Chronic C5/C6 radiculopathy is noted and this is unchanged from a study in 2019.  His legs were normal.     Diagnosis:   1.  Ongoing cervical myelopathy.  2.  Right carpal tunnel syndrome.  3.  Mild bilateral ulnar neuropathy at the elbows.  4.  History of cervical radiculopathy.     Treatment Options:   Given his complaints and examination I think we have to assume that some of his ongoing symptoms are related to residual stenosis.  I have recommended dorsal decompression with fusion and stabilization from C3-5.  The nature of the procedure as well as the potential risks, complications, limitations, and alternatives to the procedure were discussed at length with the patient and the patient has agreed to proceed with surgery.  I have prescribed NicoDerm patches.  Given his cardiac history he will require formal cardiac clearance.          Diagnosis Plan   1. Cervical spondylosis with myelopathy      2. Spinal stenosis in cervical region      3. DDD (degenerative disc disease), cervical      4. Tobacco use

## 2022-06-29 NOTE — PROGRESS NOTES
Patient: Javier Santiago  : 1965    Primary Care Provider: Moi Segura APRN    Requesting Provider: As above        History    Chief Complaint: Diffuse dysesthesias.    History of Present Illness: Mr. Santiago is a 56-year-old disabled  who presented with progressive neck and bilateral upper extremity pain and sensory alteration.  Studies demonstrate spondylosis and stenosis with cyndi signal change within the spinal cord.  On 2021 he underwent ACDF C4-6.  When I saw him last he still had some numbness and tingling in his hands but the electric shock sensations into his chest had gone away.  Over the last several months he has developed increasing dysesthesias involving his arms, chest, abdomen, and legs.  Even when the water in the shower strikes his abdomen it causes severe pain.  He is followed in pain clinic setting and is on Norco and gabapentin 600 mg 3 times a day.  He reports no change in his symptoms.  He has decreased his smoking to 1/2 pack daily.    Review of Systems   Constitutional: Negative for activity change, appetite change, chills, diaphoresis, fatigue, fever and unexpected weight change.   HENT: Negative for congestion, dental problem, drooling, ear discharge, ear pain, facial swelling, hearing loss, mouth sores, nosebleeds, postnasal drip, rhinorrhea, sinus pressure, sinus pain, sneezing, sore throat, tinnitus, trouble swallowing and voice change.    Eyes: Negative for photophobia, pain, discharge, redness, itching and visual disturbance.   Respiratory: Negative for apnea, cough, choking, chest tightness, shortness of breath, wheezing and stridor.    Cardiovascular: Negative for chest pain, palpitations and leg swelling.   Gastrointestinal: Negative for abdominal distention, abdominal pain, anal bleeding, blood in stool, constipation, diarrhea, nausea, rectal pain and vomiting.   Endocrine: Negative for cold intolerance, heat intolerance, polydipsia, polyphagia and  "polyuria.   Genitourinary: Negative for decreased urine volume, difficulty urinating, dysuria, enuresis, flank pain, frequency, genital sores, hematuria, penile discharge, penile pain, penile swelling, scrotal swelling, testicular pain and urgency.   Musculoskeletal: Positive for arthralgias and back pain. Negative for gait problem, joint swelling, myalgias, neck pain and neck stiffness.   Skin: Negative for color change, pallor, rash and wound.   Allergic/Immunologic: Negative for environmental allergies, food allergies and immunocompromised state.   Neurological: Positive for weakness and numbness. Negative for dizziness, tremors, seizures, syncope, facial asymmetry, speech difficulty, light-headedness and headaches.   Hematological: Negative for adenopathy. Does not bruise/bleed easily.   Psychiatric/Behavioral: Negative for agitation, behavioral problems, confusion, decreased concentration, dysphoric mood, hallucinations, self-injury, sleep disturbance and suicidal ideas. The patient is not nervous/anxious and is not hyperactive.        The patient's past medical history, past surgical history, family history, and social history have been reviewed at length in the electronic medical record.      Physical Exam:   Temp 98.1 °F (36.7 °C) (Infrared)   Ht 177.8 cm (70\")   Wt 75.3 kg (166 lb)   BMI 23.82 kg/m²   His gait is independent and not overtly spastic.  Reflexes are 2+ throughout.  Andres sign is positive in both hands.    Medical Decision Making    Data Review:   (All imaging studies were personally reviewed unless stated otherwise)  MRI of the cervical spine demonstrates changes related to his prior fusion.  There is some residual stenosis at the upper C4-5 level and to a lesser extent at C3-4.  I would not call this pronounced.     MRI of the thoracic spine demonstrates some degenerative change without cord compromise.    Electrodiagnostic studies demonstrate mild right carpal tunnel syndrome, very " mild ulnar neuropathy at the elbow on the left, and mild ulnar neuropathy at the elbow on the right.  Chronic C5/C6 radiculopathy is noted and this is unchanged from a study in 2019.  His legs were normal.    Diagnosis:   1.  Ongoing cervical myelopathy.  2.  Right carpal tunnel syndrome.  3.  Mild bilateral ulnar neuropathy at the elbows.  4.  History of cervical radiculopathy.    Treatment Options:   Given his complaints and examination I think we have to assume that some of his ongoing symptoms are related to residual stenosis.  I have recommended dorsal decompression with fusion and stabilization from C3-5.  The nature of the procedure as well as the potential risks, complications, limitations, and alternatives to the procedure were discussed at length with the patient and the patient has agreed to proceed with surgery.  I have prescribed NicoDerm patches.       Diagnosis Plan   1. Cervical spondylosis with myelopathy     2. Spinal stenosis in cervical region     3. DDD (degenerative disc disease), cervical     4. Tobacco use         Scribed for Chester Cowan MD by Ale Diez CMA on 6/29/2022 12:22 EDT       I, Dr. Cowan, personally performed the services described in the documentation, as scribed in my presence, and it is both accurate and complete.

## 2022-08-02 ENCOUNTER — PRE-ADMISSION TESTING (OUTPATIENT)
Dept: PREADMISSION TESTING | Facility: HOSPITAL | Age: 57
End: 2022-08-02

## 2022-08-02 ENCOUNTER — TELEPHONE (OUTPATIENT)
Dept: NEUROSURGERY | Facility: CLINIC | Age: 57
End: 2022-08-02

## 2022-08-02 VITALS — HEIGHT: 70 IN | BODY MASS INDEX: 24.62 KG/M2 | WEIGHT: 171.96 LBS

## 2022-08-02 DIAGNOSIS — M47.12 CERVICAL SPONDYLOSIS WITH MYELOPATHY: ICD-10-CM

## 2022-08-02 LAB
DEPRECATED RDW RBC AUTO: 47.3 FL (ref 37–54)
ERYTHROCYTE [DISTWIDTH] IN BLOOD BY AUTOMATED COUNT: 13.9 % (ref 12.3–15.4)
HBA1C MFR BLD: 5.8 % (ref 4.8–5.6)
HCT VFR BLD AUTO: 45.8 % (ref 37.5–51)
HGB BLD-MCNC: 15.9 G/DL (ref 13–17.7)
INR PPP: 0.89 (ref 0.84–1.13)
MCH RBC QN AUTO: 31.9 PG (ref 26.6–33)
MCHC RBC AUTO-ENTMCNC: 34.7 G/DL (ref 31.5–35.7)
MCV RBC AUTO: 92 FL (ref 79–97)
MRSA DNA SPEC QL NAA+PROBE: POSITIVE
PLATELET # BLD AUTO: 308 10*3/MM3 (ref 140–450)
PMV BLD AUTO: 10.7 FL (ref 6–12)
POTASSIUM SERPL-SCNC: 4.7 MMOL/L (ref 3.5–5.2)
PROTHROMBIN TIME: 11.9 SECONDS (ref 11.4–14.4)
QT INTERVAL: 394 MS
QTC INTERVAL: 399 MS
RBC # BLD AUTO: 4.98 10*6/MM3 (ref 4.14–5.8)
SARS-COV-2 RNA PNL SPEC NAA+PROBE: NOT DETECTED
WBC NRBC COR # BLD: 6.3 10*3/MM3 (ref 3.4–10.8)

## 2022-08-02 PROCEDURE — 83036 HEMOGLOBIN GLYCOSYLATED A1C: CPT

## 2022-08-02 PROCEDURE — C9803 HOPD COVID-19 SPEC COLLECT: HCPCS

## 2022-08-02 PROCEDURE — 85027 COMPLETE CBC AUTOMATED: CPT

## 2022-08-02 PROCEDURE — 84132 ASSAY OF SERUM POTASSIUM: CPT

## 2022-08-02 PROCEDURE — 93005 ELECTROCARDIOGRAM TRACING: CPT

## 2022-08-02 PROCEDURE — 93010 ELECTROCARDIOGRAM REPORT: CPT | Performed by: INTERNAL MEDICINE

## 2022-08-02 PROCEDURE — U0004 COV-19 TEST NON-CDC HGH THRU: HCPCS

## 2022-08-02 PROCEDURE — 85610 PROTHROMBIN TIME: CPT

## 2022-08-02 PROCEDURE — 87641 MR-STAPH DNA AMP PROBE: CPT

## 2022-08-02 PROCEDURE — 36415 COLL VENOUS BLD VENIPUNCTURE: CPT

## 2022-08-02 NOTE — PAT
"Cardiac clearance on chart with blood thinner stoppage informed- pt aware.     covid in pat.     mrsa swab done in pat since pt hasnt started bactroban yet.     Consent dos as dr hsu has to update consent order - message left for preop to address dos and have pt sign then.     promis survey completed in pat but cervical survey on chart since unable to add to epic at this time.     Prescription for Bactroban and Chlorhexidine shower called into patient's pharmacy or [facility] pharmacy by patient's surgeon.  Reinforced with patient to  the prescription from applicable pharmacy if they haven't already.  Verbal and written instructions given regarding proper use of the Bactroban and Chlorhexidine to patient and/or famlily during PAT visit. Patient/family also instructed to complete checklist and return it to Pre-op on the day of surgery.  Patient and/or family verbalized understanding. - patient's personal pharmacy wasn't able to fill Bactroban as stated \"it is no longer being made in nose ointment form\"- after pat confirmed with pharmacy here was informed that was incorrect and they can fill it-  Informed dr hsu office and they stated they will send RX to [facility] asap and pt to fill there. Pt informed to use for remaining days- pt verbalize understanding. Pt was directed to retail pharmacy.           Patient to apply Chlorhexadine wipes  to surgical area (as instructed) the night before procedure and the AM of procedure. Wipes provided.    Patient viewed general PAT education video as instructed in their preoperative information received from their surgeon.  Patient stated the general PAT education video was viewed in its entirety and survey completed.  Copies of [facility] general education handouts (Incentive Spirometry, Meds to Beds Program, Patient Belongings, Pre-op skin preparation instructions, Blood Glucose testing, Visitor policy, Surgery FAQ, Code H) distributed to patient if not printed. " Education related to the PAT pass and skin preparation for surgery (if applicable) completed in PAT as a reinforcement to PAT education video. Patient instructed to return PAT pass provided today as well as completed skin preparation sheet (if applicable) on the day of procedure.     Additionally if patient had not viewed video yet but intended to view it at home or in our waiting area, then referred them to the handout with QR code/link provided during PAT visit.  Instructed patient to complete survey after viewing the video in its entirety.  Encouraged patient/family to read PAT general education handouts thoroughly and notify PAT staff with any questions or concerns. Patient verbalized understanding of all information and priority content.    Patient instructed to drink 20 ounces (or until full) of Gatorade and it needs to be completed 1 hour (for Main OR patients) or 2 hours (scheduled  section patients) before given arrival time for procedure (NO RED Gatorade)    Patient verbalized understanding.

## 2022-08-02 NOTE — TELEPHONE ENCOUNTER
Patients normal Pharmacy did not have the bactroban ointment. Rx sent to [facility] pharmacy.

## 2022-08-03 ENCOUNTER — ANESTHESIA EVENT (OUTPATIENT)
Dept: PERIOP | Facility: HOSPITAL | Age: 57
End: 2022-08-03

## 2022-08-03 RX ORDER — SODIUM CHLORIDE 0.9 % (FLUSH) 0.9 %
10 SYRINGE (ML) INJECTION AS NEEDED
Status: CANCELLED | OUTPATIENT
Start: 2022-08-03

## 2022-08-03 RX ORDER — SODIUM CHLORIDE 0.9 % (FLUSH) 0.9 %
10 SYRINGE (ML) INJECTION EVERY 12 HOURS SCHEDULED
Status: CANCELLED | OUTPATIENT
Start: 2022-08-03

## 2022-08-04 ENCOUNTER — APPOINTMENT (OUTPATIENT)
Dept: GENERAL RADIOLOGY | Facility: HOSPITAL | Age: 57
End: 2022-08-04

## 2022-08-04 ENCOUNTER — ANESTHESIA (OUTPATIENT)
Dept: PERIOP | Facility: HOSPITAL | Age: 57
End: 2022-08-04

## 2022-08-04 ENCOUNTER — HOSPITAL ENCOUNTER (INPATIENT)
Facility: HOSPITAL | Age: 57
LOS: 2 days | Discharge: HOME HEALTH | End: 2022-08-06
Attending: NEUROLOGICAL SURGERY | Admitting: NEUROLOGICAL SURGERY

## 2022-08-04 DIAGNOSIS — K29.50 CHRONIC GASTRITIS, PRESENCE OF BLEEDING UNSPECIFIED, UNSPECIFIED GASTRITIS TYPE: ICD-10-CM

## 2022-08-04 DIAGNOSIS — G99.2 MYELOPATHY CONCURRENT WITH AND DUE TO SPINAL STENOSIS OF CERVICAL REGION: Primary | ICD-10-CM

## 2022-08-04 DIAGNOSIS — M48.02 MYELOPATHY CONCURRENT WITH AND DUE TO SPINAL STENOSIS OF CERVICAL REGION: Primary | ICD-10-CM

## 2022-08-04 DIAGNOSIS — Z98.1 S/P CERVICAL SPINAL FUSION: ICD-10-CM

## 2022-08-04 DIAGNOSIS — M47.12 CERVICAL SPONDYLOSIS WITH MYELOPATHY: ICD-10-CM

## 2022-08-04 PROCEDURE — 63015 REMOVE SPINE LAMINA >2 CRVCL: CPT | Performed by: PHYSICIAN ASSISTANT

## 2022-08-04 PROCEDURE — 22614 ARTHRD PST TQ 1NTRSPC EA ADD: CPT | Performed by: PHYSICIAN ASSISTANT

## 2022-08-04 PROCEDURE — C1713 ANCHOR/SCREW BN/BN,TIS/BN: HCPCS | Performed by: NEUROLOGICAL SURGERY

## 2022-08-04 PROCEDURE — 61783 SCAN PROC SPINAL: CPT | Performed by: NEUROLOGICAL SURGERY

## 2022-08-04 PROCEDURE — 63015 REMOVE SPINE LAMINA >2 CRVCL: CPT | Performed by: NEUROLOGICAL SURGERY

## 2022-08-04 PROCEDURE — 25010000002 VANCOMYCIN 1 G RECONSTITUTED SOLUTION: Performed by: NEUROLOGICAL SURGERY

## 2022-08-04 PROCEDURE — 22614 ARTHRD PST TQ 1NTRSPC EA ADD: CPT | Performed by: NEUROLOGICAL SURGERY

## 2022-08-04 PROCEDURE — 25010000002 DEXAMETHASONE SODIUM PHOSPHATE 100 MG/10ML SOLUTION

## 2022-08-04 PROCEDURE — 25010000002 FENTANYL CITRATE (PF) 50 MCG/ML SOLUTION

## 2022-08-04 PROCEDURE — 22600 ARTHRD PST TQ 1NTRSPC CRV: CPT | Performed by: NEUROLOGICAL SURGERY

## 2022-08-04 PROCEDURE — 0 BUPIVACAINE LIPOSOME 1.3 % SUSPENSION: Performed by: NEUROLOGICAL SURGERY

## 2022-08-04 PROCEDURE — 22842 INSERT SPINE FIXATION DEVICE: CPT | Performed by: PHYSICIAN ASSISTANT

## 2022-08-04 PROCEDURE — 00NW0ZZ RELEASE CERVICAL SPINAL CORD, OPEN APPROACH: ICD-10-PCS | Performed by: NEUROLOGICAL SURGERY

## 2022-08-04 PROCEDURE — 0RG2071 FUSION OF 2 OR MORE CERVICAL VERTEBRAL JOINTS WITH AUTOLOGOUS TISSUE SUBSTITUTE, POSTERIOR APPROACH, POSTERIOR COLUMN, OPEN APPROACH: ICD-10-PCS | Performed by: NEUROLOGICAL SURGERY

## 2022-08-04 PROCEDURE — 25010000002 PROPOFOL 10 MG/ML EMULSION: Performed by: NURSE ANESTHETIST, CERTIFIED REGISTERED

## 2022-08-04 PROCEDURE — 8E09XBZ COMPUTER ASSISTED PROCEDURE OF HEAD AND NECK REGION: ICD-10-PCS | Performed by: NEUROLOGICAL SURGERY

## 2022-08-04 PROCEDURE — 25010000002 FENTANYL CITRATE (PF) 50 MCG/ML SOLUTION: Performed by: NURSE ANESTHETIST, CERTIFIED REGISTERED

## 2022-08-04 PROCEDURE — 25010000002 VANCOMYCIN 10 G RECONSTITUTED SOLUTION: Performed by: NEUROLOGICAL SURGERY

## 2022-08-04 PROCEDURE — C9290 INJ, BUPIVACAINE LIPOSOME: HCPCS | Performed by: NEUROLOGICAL SURGERY

## 2022-08-04 PROCEDURE — 22842 INSERT SPINE FIXATION DEVICE: CPT | Performed by: NEUROLOGICAL SURGERY

## 2022-08-04 PROCEDURE — 25010000002 ALBUMIN HUMAN 5% PER 50 ML: Performed by: NURSE ANESTHETIST, CERTIFIED REGISTERED

## 2022-08-04 PROCEDURE — P9041 ALBUMIN (HUMAN),5%, 50ML: HCPCS | Performed by: NURSE ANESTHETIST, CERTIFIED REGISTERED

## 2022-08-04 PROCEDURE — 76000 FLUOROSCOPY <1 HR PHYS/QHP: CPT

## 2022-08-04 PROCEDURE — 25010000002 HYDROMORPHONE 1 MG/ML SOLUTION

## 2022-08-04 PROCEDURE — 25010000002 CEFAZOLIN PER 500 MG: Performed by: PHYSICIAN ASSISTANT

## 2022-08-04 PROCEDURE — 22600 ARTHRD PST TQ 1NTRSPC CRV: CPT | Performed by: PHYSICIAN ASSISTANT

## 2022-08-04 DEVICE — HEMOST ABS SURGIFOAM SZ100 8X12 10MM: Type: IMPLANTABLE DEVICE | Site: SPINE CERVICAL | Status: FUNCTIONAL

## 2022-08-04 DEVICE — DBM T44145 5CC ORTHOBLEND SMALL DEFGRAFT
Type: IMPLANTABLE DEVICE | Site: SPINE CERVICAL | Status: FUNCTIONAL
Brand: GRAFTON®AND GRAFTON PLUS®DEMINERALIZED BONE MATRIX (DBM)

## 2022-08-04 DEVICE — ROD 3603740 PRE-CUT 3.5MM X 40MM
Type: IMPLANTABLE DEVICE | Site: SPINE CERVICAL | Status: FUNCTIONAL
Brand: INFINITY™ OCCIPITOCERVICAL UPPER THORACIC SYSTEM

## 2022-08-04 DEVICE — BIOLOGIC 7600105 85 BTCP 15 HA 5CC VIAL
Type: IMPLANTABLE DEVICE | Site: SPINE CERVICAL | Status: FUNCTIONAL
Brand: MASTERGRAFT® GRANULES

## 2022-08-04 RX ORDER — ISOSORBIDE MONONITRATE 30 MG/1
30 TABLET, EXTENDED RELEASE ORAL
Status: DISCONTINUED | OUTPATIENT
Start: 2022-08-05 | End: 2022-08-06 | Stop reason: HOSPADM

## 2022-08-04 RX ORDER — SODIUM CHLORIDE 9 MG/ML
INJECTION, SOLUTION INTRAVENOUS AS NEEDED
Status: DISCONTINUED | OUTPATIENT
Start: 2022-08-04 | End: 2022-08-04 | Stop reason: HOSPADM

## 2022-08-04 RX ORDER — MORPHINE SULFATE 2 MG/ML
2 INJECTION, SOLUTION INTRAMUSCULAR; INTRAVENOUS EVERY 4 HOURS PRN
Status: DISCONTINUED | OUTPATIENT
Start: 2022-08-04 | End: 2022-08-06 | Stop reason: HOSPADM

## 2022-08-04 RX ORDER — NALOXONE HCL 0.4 MG/ML
0.4 VIAL (ML) INJECTION
Status: DISCONTINUED | OUTPATIENT
Start: 2022-08-04 | End: 2022-08-06 | Stop reason: HOSPADM

## 2022-08-04 RX ORDER — NALOXONE HCL 0.4 MG/ML
0.4 VIAL (ML) INJECTION AS NEEDED
Status: DISCONTINUED | OUTPATIENT
Start: 2022-08-04 | End: 2022-08-06 | Stop reason: HOSPADM

## 2022-08-04 RX ORDER — NICOTINE 21 MG/24HR
1 PATCH, TRANSDERMAL 24 HOURS TRANSDERMAL EVERY 24 HOURS
Status: DISCONTINUED | OUTPATIENT
Start: 2022-08-04 | End: 2022-08-06 | Stop reason: HOSPADM

## 2022-08-04 RX ORDER — FENTANYL CITRATE 50 UG/ML
INJECTION, SOLUTION INTRAMUSCULAR; INTRAVENOUS
Status: COMPLETED
Start: 2022-08-04 | End: 2022-08-04

## 2022-08-04 RX ORDER — ATORVASTATIN CALCIUM 40 MG/1
80 TABLET, FILM COATED ORAL DAILY
Status: DISCONTINUED | OUTPATIENT
Start: 2022-08-05 | End: 2022-08-06 | Stop reason: HOSPADM

## 2022-08-04 RX ORDER — DROPERIDOL 2.5 MG/ML
0.62 INJECTION, SOLUTION INTRAMUSCULAR; INTRAVENOUS ONCE AS NEEDED
Status: DISCONTINUED | OUTPATIENT
Start: 2022-08-04 | End: 2022-08-06 | Stop reason: HOSPADM

## 2022-08-04 RX ORDER — IPRATROPIUM BROMIDE AND ALBUTEROL SULFATE 2.5; .5 MG/3ML; MG/3ML
3 SOLUTION RESPIRATORY (INHALATION) ONCE AS NEEDED
Status: DISCONTINUED | OUTPATIENT
Start: 2022-08-04 | End: 2022-08-06 | Stop reason: HOSPADM

## 2022-08-04 RX ORDER — MEPERIDINE HYDROCHLORIDE 25 MG/ML
12.5 INJECTION INTRAMUSCULAR; INTRAVENOUS; SUBCUTANEOUS
Status: ACTIVE | OUTPATIENT
Start: 2022-08-04 | End: 2022-08-05

## 2022-08-04 RX ORDER — ONDANSETRON 2 MG/ML
4 INJECTION INTRAMUSCULAR; INTRAVENOUS ONCE AS NEEDED
Status: DISCONTINUED | OUTPATIENT
Start: 2022-08-04 | End: 2022-08-06 | Stop reason: HOSPADM

## 2022-08-04 RX ORDER — HYDROMORPHONE HYDROCHLORIDE 1 MG/ML
0.5 INJECTION, SOLUTION INTRAMUSCULAR; INTRAVENOUS; SUBCUTANEOUS
Status: COMPLETED | OUTPATIENT
Start: 2022-08-04 | End: 2022-08-04

## 2022-08-04 RX ORDER — BUPIVACAINE HCL/0.9 % NACL/PF 0.1 %
2 PLASTIC BAG, INJECTION (ML) EPIDURAL EVERY 8 HOURS
Status: COMPLETED | OUTPATIENT
Start: 2022-08-04 | End: 2022-08-05

## 2022-08-04 RX ORDER — IBUPROFEN 600 MG/1
600 TABLET ORAL EVERY 8 HOURS SCHEDULED
Status: DISCONTINUED | OUTPATIENT
Start: 2022-08-04 | End: 2022-08-06 | Stop reason: HOSPADM

## 2022-08-04 RX ORDER — DULOXETIN HYDROCHLORIDE 30 MG/1
30 CAPSULE, DELAYED RELEASE ORAL DAILY
Status: DISCONTINUED | OUTPATIENT
Start: 2022-08-05 | End: 2022-08-06 | Stop reason: HOSPADM

## 2022-08-04 RX ORDER — SODIUM CHLORIDE, SODIUM LACTATE, POTASSIUM CHLORIDE, CALCIUM CHLORIDE 600; 310; 30; 20 MG/100ML; MG/100ML; MG/100ML; MG/100ML
9 INJECTION, SOLUTION INTRAVENOUS CONTINUOUS
Status: DISCONTINUED | OUTPATIENT
Start: 2022-08-04 | End: 2022-08-06 | Stop reason: HOSPADM

## 2022-08-04 RX ORDER — FAMOTIDINE 20 MG/1
20 TABLET, FILM COATED ORAL
Status: COMPLETED | OUTPATIENT
Start: 2022-08-04 | End: 2022-08-04

## 2022-08-04 RX ORDER — DIPHENHYDRAMINE HCL 25 MG
25 CAPSULE ORAL NIGHTLY PRN
Status: DISCONTINUED | OUTPATIENT
Start: 2022-08-04 | End: 2022-08-06 | Stop reason: HOSPADM

## 2022-08-04 RX ORDER — HYDRALAZINE HYDROCHLORIDE 20 MG/ML
5 INJECTION INTRAMUSCULAR; INTRAVENOUS
Status: DISCONTINUED | OUTPATIENT
Start: 2022-08-04 | End: 2022-08-06 | Stop reason: HOSPADM

## 2022-08-04 RX ORDER — MAGNESIUM HYDROXIDE 1200 MG/15ML
LIQUID ORAL AS NEEDED
Status: DISCONTINUED | OUTPATIENT
Start: 2022-08-04 | End: 2022-08-04 | Stop reason: HOSPADM

## 2022-08-04 RX ORDER — SODIUM CHLORIDE 0.9 % (FLUSH) 0.9 %
3-10 SYRINGE (ML) INJECTION AS NEEDED
Status: DISCONTINUED | OUTPATIENT
Start: 2022-08-04 | End: 2022-08-06 | Stop reason: HOSPADM

## 2022-08-04 RX ORDER — FENTANYL CITRATE 50 UG/ML
50 INJECTION, SOLUTION INTRAMUSCULAR; INTRAVENOUS
Status: DISCONTINUED | OUTPATIENT
Start: 2022-08-04 | End: 2022-08-06 | Stop reason: HOSPADM

## 2022-08-04 RX ORDER — METOPROLOL SUCCINATE 25 MG/1
25 TABLET, EXTENDED RELEASE ORAL DAILY
Status: DISCONTINUED | OUTPATIENT
Start: 2022-08-05 | End: 2022-08-06 | Stop reason: HOSPADM

## 2022-08-04 RX ORDER — SODIUM CHLORIDE 0.9 % (FLUSH) 0.9 %
3 SYRINGE (ML) INJECTION EVERY 12 HOURS SCHEDULED
Status: DISCONTINUED | OUTPATIENT
Start: 2022-08-04 | End: 2022-08-06 | Stop reason: HOSPADM

## 2022-08-04 RX ORDER — AMLODIPINE BESYLATE 5 MG/1
5 TABLET ORAL
Status: DISCONTINUED | OUTPATIENT
Start: 2022-08-05 | End: 2022-08-06 | Stop reason: HOSPADM

## 2022-08-04 RX ORDER — MIDAZOLAM HYDROCHLORIDE 1 MG/ML
1 INJECTION INTRAMUSCULAR; INTRAVENOUS
Status: DISCONTINUED | OUTPATIENT
Start: 2022-08-04 | End: 2022-08-04 | Stop reason: HOSPADM

## 2022-08-04 RX ORDER — BACLOFEN 10 MG/1
10 TABLET ORAL 2 TIMES DAILY
Status: DISCONTINUED | OUTPATIENT
Start: 2022-08-04 | End: 2022-08-06 | Stop reason: HOSPADM

## 2022-08-04 RX ORDER — LIDOCAINE HYDROCHLORIDE 10 MG/ML
INJECTION, SOLUTION EPIDURAL; INFILTRATION; INTRACAUDAL; PERINEURAL AS NEEDED
Status: DISCONTINUED | OUTPATIENT
Start: 2022-08-04 | End: 2022-08-04 | Stop reason: SURG

## 2022-08-04 RX ORDER — VANCOMYCIN HYDROCHLORIDE 1 G/20ML
INJECTION, POWDER, LYOPHILIZED, FOR SOLUTION INTRAVENOUS AS NEEDED
Status: DISCONTINUED | OUTPATIENT
Start: 2022-08-04 | End: 2022-08-04 | Stop reason: HOSPADM

## 2022-08-04 RX ORDER — ESMOLOL HYDROCHLORIDE 10 MG/ML
INJECTION INTRAVENOUS AS NEEDED
Status: DISCONTINUED | OUTPATIENT
Start: 2022-08-04 | End: 2022-08-04 | Stop reason: SURG

## 2022-08-04 RX ORDER — SODIUM CHLORIDE 0.9 % (FLUSH) 0.9 %
10 SYRINGE (ML) INJECTION AS NEEDED
Status: DISCONTINUED | OUTPATIENT
Start: 2022-08-04 | End: 2022-08-06 | Stop reason: HOSPADM

## 2022-08-04 RX ORDER — SODIUM CHLORIDE, SODIUM LACTATE, POTASSIUM CHLORIDE, CALCIUM CHLORIDE 600; 310; 30; 20 MG/100ML; MG/100ML; MG/100ML; MG/100ML
75 INJECTION, SOLUTION INTRAVENOUS CONTINUOUS
Status: DISCONTINUED | OUTPATIENT
Start: 2022-08-04 | End: 2022-08-05

## 2022-08-04 RX ORDER — HYDROCODONE BITARTRATE AND ACETAMINOPHEN 10; 325 MG/1; MG/1
1 TABLET ORAL EVERY 4 HOURS PRN
Status: COMPLETED | OUTPATIENT
Start: 2022-08-04 | End: 2022-08-05

## 2022-08-04 RX ORDER — ROCURONIUM BROMIDE 10 MG/ML
INJECTION, SOLUTION INTRAVENOUS AS NEEDED
Status: DISCONTINUED | OUTPATIENT
Start: 2022-08-04 | End: 2022-08-04 | Stop reason: SURG

## 2022-08-04 RX ORDER — ONDANSETRON 2 MG/ML
4 INJECTION INTRAMUSCULAR; INTRAVENOUS EVERY 6 HOURS PRN
Status: DISCONTINUED | OUTPATIENT
Start: 2022-08-04 | End: 2022-08-06 | Stop reason: HOSPADM

## 2022-08-04 RX ORDER — ALPRAZOLAM 0.25 MG/1
0.25 TABLET ORAL 2 TIMES DAILY PRN
Status: DISCONTINUED | OUTPATIENT
Start: 2022-08-04 | End: 2022-08-06 | Stop reason: HOSPADM

## 2022-08-04 RX ORDER — HYDROCODONE BITARTRATE AND ACETAMINOPHEN 5; 325 MG/1; MG/1
1 TABLET ORAL ONCE AS NEEDED
Status: DISCONTINUED | OUTPATIENT
Start: 2022-08-04 | End: 2022-08-06 | Stop reason: HOSPADM

## 2022-08-04 RX ORDER — PROPOFOL 10 MG/ML
VIAL (ML) INTRAVENOUS AS NEEDED
Status: DISCONTINUED | OUTPATIENT
Start: 2022-08-04 | End: 2022-08-04 | Stop reason: SURG

## 2022-08-04 RX ORDER — PROMETHAZINE HYDROCHLORIDE 25 MG/1
25 TABLET ORAL ONCE AS NEEDED
Status: DISCONTINUED | OUTPATIENT
Start: 2022-08-04 | End: 2022-08-06 | Stop reason: HOSPADM

## 2022-08-04 RX ORDER — LIDOCAINE HYDROCHLORIDE 10 MG/ML
0.5 INJECTION, SOLUTION EPIDURAL; INFILTRATION; INTRACAUDAL; PERINEURAL ONCE AS NEEDED
Status: COMPLETED | OUTPATIENT
Start: 2022-08-04 | End: 2022-08-04

## 2022-08-04 RX ORDER — FENTANYL CITRATE 50 UG/ML
INJECTION, SOLUTION INTRAMUSCULAR; INTRAVENOUS AS NEEDED
Status: DISCONTINUED | OUTPATIENT
Start: 2022-08-04 | End: 2022-08-04 | Stop reason: SURG

## 2022-08-04 RX ORDER — ALBUMIN, HUMAN INJ 5% 5 %
SOLUTION INTRAVENOUS CONTINUOUS PRN
Status: DISCONTINUED | OUTPATIENT
Start: 2022-08-04 | End: 2022-08-04 | Stop reason: SURG

## 2022-08-04 RX ORDER — GABAPENTIN 300 MG/1
300 CAPSULE ORAL EVERY 8 HOURS SCHEDULED
Status: DISCONTINUED | OUTPATIENT
Start: 2022-08-04 | End: 2022-08-06 | Stop reason: HOSPADM

## 2022-08-04 RX ORDER — DROPERIDOL 2.5 MG/ML
0.62 INJECTION, SOLUTION INTRAMUSCULAR; INTRAVENOUS
Status: DISCONTINUED | OUTPATIENT
Start: 2022-08-04 | End: 2022-08-06 | Stop reason: HOSPADM

## 2022-08-04 RX ORDER — PANTOPRAZOLE SODIUM 40 MG/1
40 TABLET, DELAYED RELEASE ORAL
Status: DISCONTINUED | OUTPATIENT
Start: 2022-08-05 | End: 2022-08-06 | Stop reason: HOSPADM

## 2022-08-04 RX ORDER — HYDRALAZINE HYDROCHLORIDE 25 MG/1
25 TABLET, FILM COATED ORAL 3 TIMES DAILY
Status: DISCONTINUED | OUTPATIENT
Start: 2022-08-04 | End: 2022-08-06 | Stop reason: HOSPADM

## 2022-08-04 RX ORDER — PROMETHAZINE HYDROCHLORIDE 25 MG/1
25 SUPPOSITORY RECTAL ONCE AS NEEDED
Status: DISCONTINUED | OUTPATIENT
Start: 2022-08-04 | End: 2022-08-06 | Stop reason: HOSPADM

## 2022-08-04 RX ORDER — ACETAMINOPHEN 325 MG/1
650 TABLET ORAL EVERY 4 HOURS PRN
Status: DISCONTINUED | OUTPATIENT
Start: 2022-08-04 | End: 2022-08-06 | Stop reason: HOSPADM

## 2022-08-04 RX ORDER — BUPIVACAINE HCL/0.9 % NACL/PF 0.125 %
PLASTIC BAG, INJECTION (ML) EPIDURAL AS NEEDED
Status: DISCONTINUED | OUTPATIENT
Start: 2022-08-04 | End: 2022-08-04 | Stop reason: SURG

## 2022-08-04 RX ORDER — NITROGLYCERIN 0.4 MG/1
0.4 TABLET SUBLINGUAL
Status: DISCONTINUED | OUTPATIENT
Start: 2022-08-04 | End: 2022-08-06 | Stop reason: HOSPADM

## 2022-08-04 RX ORDER — LABETALOL HYDROCHLORIDE 5 MG/ML
5 INJECTION, SOLUTION INTRAVENOUS
Status: DISCONTINUED | OUTPATIENT
Start: 2022-08-04 | End: 2022-08-06 | Stop reason: HOSPADM

## 2022-08-04 RX ADMIN — ALBUMIN HUMAN: 0.05 INJECTION, SOLUTION INTRAVENOUS at 14:57

## 2022-08-04 RX ADMIN — FENTANYL CITRATE 50 MCG: 50 INJECTION, SOLUTION INTRAMUSCULAR; INTRAVENOUS at 16:25

## 2022-08-04 RX ADMIN — SODIUM CHLORIDE, POTASSIUM CHLORIDE, SODIUM LACTATE AND CALCIUM CHLORIDE 9 ML/HR: 600; 310; 30; 20 INJECTION, SOLUTION INTRAVENOUS at 15:51

## 2022-08-04 RX ADMIN — FAMOTIDINE 20 MG: 20 TABLET ORAL at 11:26

## 2022-08-04 RX ADMIN — PROPOFOL 160 MG: 10 INJECTION, EMULSION INTRAVENOUS at 12:40

## 2022-08-04 RX ADMIN — SODIUM CHLORIDE, POTASSIUM CHLORIDE, SODIUM LACTATE AND CALCIUM CHLORIDE 9 ML/HR: 600; 310; 30; 20 INJECTION, SOLUTION INTRAVENOUS at 11:16

## 2022-08-04 RX ADMIN — HYDROMORPHONE HYDROCHLORIDE 0.5 MG: 1 INJECTION, SOLUTION INTRAMUSCULAR; INTRAVENOUS; SUBCUTANEOUS at 19:23

## 2022-08-04 RX ADMIN — CEFAZOLIN 2 G: 10 INJECTION, POWDER, FOR SOLUTION INTRAVENOUS at 20:50

## 2022-08-04 RX ADMIN — HYDROMORPHONE HYDROCHLORIDE 0.5 MG: 1 INJECTION, SOLUTION INTRAMUSCULAR; INTRAVENOUS; SUBCUTANEOUS at 16:00

## 2022-08-04 RX ADMIN — HYDRALAZINE HYDROCHLORIDE 25 MG: 25 TABLET, FILM COATED ORAL at 20:46

## 2022-08-04 RX ADMIN — ROCURONIUM BROMIDE 10 MG: 10 INJECTION, SOLUTION INTRAVENOUS at 14:01

## 2022-08-04 RX ADMIN — FENTANYL CITRATE 50 MCG: 50 INJECTION, SOLUTION INTRAMUSCULAR; INTRAVENOUS at 15:35

## 2022-08-04 RX ADMIN — ROCURONIUM BROMIDE 20 MG: 10 INJECTION, SOLUTION INTRAVENOUS at 13:32

## 2022-08-04 RX ADMIN — FENTANYL CITRATE 50 MCG: 50 INJECTION, SOLUTION INTRAMUSCULAR; INTRAVENOUS at 12:44

## 2022-08-04 RX ADMIN — FENTANYL CITRATE 50 MCG: 50 INJECTION, SOLUTION INTRAMUSCULAR; INTRAVENOUS at 12:36

## 2022-08-04 RX ADMIN — HYDROMORPHONE HYDROCHLORIDE 0.5 MG: 1 INJECTION, SOLUTION INTRAMUSCULAR; INTRAVENOUS; SUBCUTANEOUS at 15:50

## 2022-08-04 RX ADMIN — SUGAMMADEX 200 MG: 100 INJECTION, SOLUTION INTRAVENOUS at 14:59

## 2022-08-04 RX ADMIN — HYDROMORPHONE HYDROCHLORIDE 0.5 MG: 1 INJECTION, SOLUTION INTRAMUSCULAR; INTRAVENOUS; SUBCUTANEOUS at 16:10

## 2022-08-04 RX ADMIN — BACLOFEN 10 MG: 10 TABLET ORAL at 20:46

## 2022-08-04 RX ADMIN — SODIUM CHLORIDE, POTASSIUM CHLORIDE, SODIUM LACTATE AND CALCIUM CHLORIDE 75 ML/HR: 600; 310; 30; 20 INJECTION, SOLUTION INTRAVENOUS at 20:50

## 2022-08-04 RX ADMIN — PROPOFOL 25 MCG/KG/MIN: 10 INJECTION, EMULSION INTRAVENOUS at 13:06

## 2022-08-04 RX ADMIN — ROCURONIUM BROMIDE 50 MG: 10 INJECTION, SOLUTION INTRAVENOUS at 12:40

## 2022-08-04 RX ADMIN — GABAPENTIN 300 MG: 300 CAPSULE ORAL at 20:46

## 2022-08-04 RX ADMIN — VANCOMYCIN HYDROCHLORIDE 1250 MG: 10 INJECTION, POWDER, LYOPHILIZED, FOR SOLUTION INTRAVENOUS at 11:26

## 2022-08-04 RX ADMIN — Medication 50 MCG: at 14:13

## 2022-08-04 RX ADMIN — ESMOLOL HYDROCHLORIDE 40 MG: 10 INJECTION, SOLUTION INTRAVENOUS at 12:44

## 2022-08-04 RX ADMIN — FENTANYL CITRATE 50 MCG: 50 INJECTION, SOLUTION INTRAMUSCULAR; INTRAVENOUS at 15:40

## 2022-08-04 RX ADMIN — ALBUMIN HUMAN: 0.05 INJECTION, SOLUTION INTRAVENOUS at 14:40

## 2022-08-04 RX ADMIN — IBUPROFEN 600 MG: 600 TABLET ORAL at 20:46

## 2022-08-04 RX ADMIN — NICOTINE 1 PATCH: 14 PATCH, EXTENDED RELEASE TRANSDERMAL at 20:46

## 2022-08-04 RX ADMIN — LIDOCAINE HYDROCHLORIDE 0.5 ML: 10 INJECTION, SOLUTION EPIDURAL; INFILTRATION; INTRACAUDAL; PERINEURAL at 11:16

## 2022-08-04 RX ADMIN — Medication 50 MCG: at 14:24

## 2022-08-04 RX ADMIN — LIDOCAINE HYDROCHLORIDE 40 MG: 10 INJECTION, SOLUTION EPIDURAL; INFILTRATION; INTRACAUDAL; PERINEURAL at 12:40

## 2022-08-04 RX ADMIN — ROCURONIUM BROMIDE 10 MG: 10 INJECTION, SOLUTION INTRAVENOUS at 14:27

## 2022-08-04 RX ADMIN — FENTANYL CITRATE 100 MCG: 50 INJECTION, SOLUTION INTRAMUSCULAR; INTRAVENOUS at 15:02

## 2022-08-04 NOTE — ANESTHESIA PROCEDURE NOTES
Airway  Urgency: elective    Date/Time: 8/4/2022 12:42 PM  Airway not difficult    General Information and Staff    Patient location during procedure: OR  CRNA/CAA: Sean Rodriguez, CRNA    Indications and Patient Condition  Indications for airway management: airway protection    Preoxygenated: yes  MILS not maintained throughout  Mask difficulty assessment: 1 - vent by mask    Final Airway Details  Final airway type: endotracheal airway      Successful airway: ETT and JEANIE tube  Cuffed: yes   Successful intubation technique: video laryngoscopy  Facilitating devices/methods: intubating stylet  Endotracheal tube insertion site: oral  Blade: Love (Elective)  Blade size: 3  ETT size (mm): 7.5  Cormack-Lehane Classification: grade I - full view of glottis  Placement verified by: chest auscultation and capnometry   Cuff volume (mL): 8  Measured from: lips  ETT/EBT  to lips (cm): 20  Number of attempts at approach: 1  Assessment: lips, teeth, and gum same as pre-op and atraumatic intubation    Additional Comments  Negative epigastric sounds, Breath sound equal bilaterally with symmetric chest rise and fall

## 2022-08-04 NOTE — OP NOTE
NEUROSURGICAL OPERATIVE NOTE        PREOPERATIVE DIAGNOSIS:    Cervical spondylosis and stenosis with myelopathy      POSTOPERATIVE DIAGNOSIS:  Same      PROCEDURE:  1.  Posterior arthrodesis C3--5  2.  Cervical laminectomy C3-6  3.  Segmental cervical stabilization utilizing Infinity system C3-5  4.  Use of Tina, Mastergraft, local autograft  5.  Stealth frameless stereotaxy utilized in conjunction with O arm imaging      SURGEON:  Chester Cowan M.D.      ASSISTANT: Daiana Ford PA-C    PAC assisted with:   Suctioning   Retraction   Tying   Suturing   Closing   Application of dressing   Skilled neurosurgery PA assistance was necessary to perform this procedure.        ANESTHESIA:  General      ESTIMATED BLOOD LOSS: 300 mL      SPECIMEN: None      DRAINS: 7 flat ROHAN      COMPLICATIONS:  None      CLINICAL NOTE:  The patient is a 56-year-old gentleman with a history of cervical myelopathy who previously underwent an anterior intervention last year.  Some of his symptoms have improved but have now recurred.  He continues to have a fair amount of ongoing cervical stenosis and now presents for a dorsal decompression with fusion and stabilization.  The nature of the procedure as well as the potential risks, complications, limitations, and alternatives to the procedure were discussed at length with the patient and the patient has agreed to proceed with surgery.      TECHNICAL NOTE:  The patient was brought to the operating room and while on his cart general endotracheal anesthesia was achieved. [place] head crum was affixed to his head and he was log rolled onto the operating table. His arms were padded and tucked at his sides. His neck was maintained in a neutral inline position, head crum was affixed to the operating table in standard fashion. C-arm was brought into use and confirmed good alignment of his neck. His dorsal neck was then prepared and draped in usual fashion. O-arm imaging ensued and these  images were downloaded into the [facility]. Underlying tissues were divided with cautery to provide exposure to the posterior spinal elements from C2 to C6. Using Stealth frameless stereotaxy, each of the lateral mass screw sites entry points were marked, this was at C3, C4, and C5 bilaterally. Using the entry points each of the  holes were drilled, then tapped, the lateral masses were decorticated and 3.5 mm x 14 mm lateral mass screws were then placed at C3, C4, and C5, once again on each side. [place] rongeur had been utilized to remove the spinous processes at C3, C4, C5, and C6. Drill was utilized to fashion central trough and remove the lamina at C3, C4, C5, and approximately 60% of C6. The lower aspect of C2 was slightly undercut as well. Overgrown ligament and bone were removed using drill and Kerrison punches. Good decompression was achieved. Bleeding points were controlled with Floseal and bone wax. The wound was washed out with saline solution. Rods 40 mm in length were then affixed to the screw heads on each side, set screws were applied and tightened. A combination of [place], local autograft, and Mastergraft was used as on onlay fusion over the lateral masses and residual lamina. Some thin strips of Gelfoam were left in the gutters. Vancomycin powder was sprinkled in the depths. A 7 flat ROHAN drain was brought in through a separate stab incision and left in the deep subcutaneous space and epidural space. The paraspinous muscle and fascia were reapproximated in interrupted fashion with 0 Vicryl suture. Exparel was injected into the paraspinous musculature and subcutaneous tissues. Subcutaneous tissues were closed in layers with 2-0 followed by 3-0 Vicryl suture. The skin was closed in running subcuticular fashion with 3-0 nylon suture. Sterile dressing was applied. The patient was subsequently rolled onto his cart and the head crum removed. Prior to turning him, completion fluoroscopy  confirmed good positioning of the construct. He did receive preoperative antibiotics. There were no overt intraoperative complications.             Chester Cowan M.D.

## 2022-08-04 NOTE — ANESTHESIA PREPROCEDURE EVALUATION
Anesthesia Evaluation                  Airway   Mallampati: I  TM distance: >3 FB  Neck ROM: full  No difficulty expected  Dental      Pulmonary    (+) COPD, shortness of breath, sleep apnea,   Cardiovascular     ECG reviewed    (+) hypertension, past MI , CAD, cardiac stents angina,     ROS comment: Non obstructive in stent LAD stenosis    Neuro/Psych  (+) numbness, psychiatric history Anxiety and Depression,    GI/Hepatic/Renal/Endo    (+)  hiatal hernia, GERD,      Musculoskeletal     Abdominal    Substance History      OB/GYN          Other                        Anesthesia Plan    ASA 3     general     intravenous induction     Anesthetic plan, risks, benefits, and alternatives have been provided, discussed and informed consent has been obtained with: patient.    Plan discussed with CRNA.        CODE STATUS:

## 2022-08-04 NOTE — H&P
Pre-Op H&P  Javier Santiago  [id number]  1965      Chief complaint: Neck pain      Subjective:  Patient is a 56 y.o.male presents for scheduled surgery by Dr. Cowan. He anticipates a CERVICAL POSTERIOR FUSION WITH INSTRUMENTATION C3-5 today. He has had worsening neck pain over the last year. He has numbness BUE. He has developed  increasing dysesthesias involving his arms, chest, abdomen, and legs. He had previous cervical fusion 4/26/21.      Review of Systems:  Constitutional-- No fever, chills or sweats. No fatigue.  CV-- No chest pain, palpitation or syncope. +HTN, HLD, CHF  Resp-- No cough, hemoptysis. +SOB, COPD, ALEJANDRA no cpap   Skin--No rashes or lesions      Allergies: No Known Allergies      Home Meds:  Medications Prior to Admission   Medication Sig Dispense Refill Last Dose   • albuterol (PROVENTIL HFA;VENTOLIN HFA) 108 (90 BASE) MCG/ACT inhaler Inhale 2 puffs Every 4 (Four) Hours As Needed for Wheezing.   Past Week at Unknown time   • ALPRAZolam (XANAX) 0.5 MG tablet    Past Week at Unknown time   • amLODIPine (NORVASC) 5 MG tablet TAKE ONE TABLET BY MOUTH DAILY FOR FOR BLOOD  PRESSURE 30 tablet 5 8/4/2022 at Unknown time   • atorvastatin (LIPITOR) 80 MG tablet Take 80 mg by mouth daily.   8/4/2022 at Unknown time   • baclofen (LIORESAL) 10 MG tablet Take 1 tablet by mouth 2 (Two) Times a Day. 60 tablet 0 Past Week at Unknown time   • chlorhexidine (HIBICLENS) 4 % external liquid Shower each day with solution for 5 days beginning 5 days before surgery. 120 mL 0 8/4/2022 at Unknown time   • DULoxetine (CYMBALTA) 30 MG capsule Take 30 mg by mouth Daily.   Past Week at Unknown time   • gabapentin (NEURONTIN) 600 MG tablet Take 600 mg by mouth 3 (Three) Times a Day.   8/4/2022 at Unknown time   • hydrALAZINE (APRESOLINE) 25 MG tablet Take 25 mg by mouth 3 (Three) Times a Day.   8/4/2022 at Unknown time   • HYDROcodone-acetaminophen (NORCO)  MG per tablet    8/3/2022 at Unknown time   • hydrOXYzine  (ATARAX) 25 MG tablet Take 25 mg by mouth Every 8 (Eight) Hours As Needed.   8/3/2022 at Unknown time   • isosorbide mononitrate (IMDUR) 30 MG 24 hr tablet TAKE ONE TABLET BY MOUTH DAILY FOR HEART 90 tablet 3 8/4/2022 at Unknown time   • loratadine (CLARITIN) 10 MG tablet Take 10 mg by mouth Daily.   8/3/2022 at Unknown time   • metoprolol succinate XL (TOPROL-XL) 25 MG 24 hr tablet Take 1 tablet by mouth Daily. 90 tablet 1 8/3/2022 at 2000   • mupirocin (BACTROBAN) 2 % ointment Apply to the inside of each nostril with a cotton swab twice daily (morning and evening) starting 5 days before surgery. 22 g 0 8/4/2022 at Unknown time   • nicotine (NICODERM CQ) 14 MG/24HR patch Place 1 patch on the skin as directed by provider Daily. 30 patch 1 8/3/2022 at Unknown time   • pantoprazole (PROTONIX) 40 MG EC tablet Take 1 tablet by mouth 2 (Two) Times a Day Before Meals. 60 tablet 5 Past Month at Unknown time   • SYMBICORT 80-4.5 MCG/ACT inhaler Inhale 2 puffs 2 (Two) Times a Day As Needed.   Past Week at Unknown time   • aspirin 81 MG tablet Take 1 tablet by mouth Daily. 30 tablet 11 7/27/2022   • betamethasone dipropionate (DIPROLENE) 0.05 % ointment Apply  topically to the appropriate area as directed 2 (Two) Times a Day.      • clopidogrel (Plavix) 75 MG tablet Take 1 tablet by mouth Daily. (Patient taking differently: Take 75 mg by mouth Daily. Holding for 5 days- letter on chart) 30 tablet  7/27/2022   • EPINEPHrine (EPIPEN IJ) Inject 1 dose as directed As Needed (ALLERGIC REACTION).      • nitroglycerin (NITROSTAT) 0.4 MG SL tablet Place 0.4 mg under the tongue Every 5 (Five) Minutes As Needed for Chest Pain. Take no more than 3 doses in 15 minutes.      • Stelara 45 MG/0.5ML solution prefilled syringe Injection       • Ustekinumab (STELARA IV) Infuse  into a venous catheter.      • ustekinumab (STELARA) 45 MG/0.5ML injection Inject 45 mg under the skin Every 30 (Thirty) Days.   7/12/2022         PMH:   Past Medical  History:   Diagnosis Date   • Anxiety    • Arthritis     psoriatic   • ASCVD (arteriosclerotic cardiovascular disease)    • CHF (congestive heart failure) ([facility])    • Chronic low back pain    • COPD (chronic obstructive pulmonary disease) ([facility])    • Coronary artery disease    • Fibromyalgia    • Full dentures    • GERD (gastroesophageal reflux disease)    • History of EKG 01/26/2016    NORMAL   • Hyperlipidemia    • Hypertension    • Hypotension    • Insomnia    • MI, old 2015   • Psoriasis    • Psoriatic arthritis ([facility])    • RA (rheumatoid arthritis) ([facility])    • Sciatic nerve pain    • Skin pain     from nerve pain   • Sleep apnea     cpap non compliant   • Tinnitus    • Wears glasses     readers     PSH:    Past Surgical History:   Procedure Laterality Date   • ANTERIOR CERVICAL DISCECTOMY W/ FUSION N/A 04/26/2021    Procedure: CERVICAL DISCECTOMY ANTERIOR WITH FUSION C4-6;  Surgeon: Chester Cowan MD;  Location: [facility];  Service: Neurosurgery;  Laterality: N/A;   • BACK SURGERY      lumbar 1995   • CARDIAC CATHETERIZATION N/A 10/13/2017    Procedure: Left Heart Cath;  Surgeon: Chester Centeno MD;  Location: [facility] CATH INVASIVE LOCATION;  Service:    • CARDIAC CATHETERIZATION N/A 02/11/2021    Procedure: Left Heart Cath;  Surgeon: Brent Degroot MD;  Location: [facility] CATH INVASIVE LOCATION;  Service: Cardiology;  Laterality: N/A;   • CATARACT EXTRACTION, BILATERAL Bilateral    • COLONOSCOPY     • CORONARY ANGIOPLASTY WITH STENT PLACEMENT      x2-3 stent in place   • ENDOSCOPY     • ENDOSCOPY N/A 02/09/2018    Procedure: ESOPHAGOGASTRODUODENOSCOPY WITH DILATATION CPT CODE: 48634;  Surgeon: Everett Villanueva III, MD;  Location: [facility];  Service:        Immunization History:  Influenza: 2021  Pneumococcal: No  Tetanus: UTD  Covid x3: 2021    Social History:   Tobacco:   Social History     Tobacco Use   Smoking Status Current Every Day Smoker   • Packs/day: 1.00   • Years: 35.00   • Pack  "years: 35.00   • Types: Cigarettes   Smokeless Tobacco Former User   • Types: Chew   • Quit date: 2018   Tobacco Comment    down 0.5 ppd now      Alcohol:     Social History     Substance and Sexual Activity   Alcohol Use No         Physical Exam:/99 (BP Location: Right arm, Patient Position: Lying)   Pulse 80   Temp 97.8 °F (36.6 °C) (Temporal)   Resp 18   Ht 177.8 cm (70\")   Wt 77.6 kg (171 lb)   SpO2 96%   BMI 24.54 kg/m²       General Appearance:    Alert, cooperative, no distress, appears stated age   Head:    Normocephalic, without obvious abnormality, atraumatic   Lungs:     Clear to auscultation bilaterally, respirations unlabored    Heart:   Regular rate and rhythm, S1 and S2 normal    Abdomen:    Soft without tenderness   Extremities:   Extremities normal, atraumatic, no cyanosis or edema   Skin:   Skin color, texture, turgor normal, no rashes or lesions   Neurologic:   Grossly intact     Results Review:     LABS:  Lab Results   Component Value Date    WBC 6.30 08/02/2022    HGB 15.9 08/02/2022    HCT 45.8 08/02/2022    MCV 92.0 08/02/2022     08/02/2022    NEUTROABS 3.62 02/11/2021    GLUCOSE 118 (H) 02/11/2021    BUN 7 02/11/2021    CREATININE 1.05 02/11/2021    EGFRIFNONA 73 02/11/2021     02/11/2021    K 4.7 08/02/2022     02/11/2021    CO2 24.2 02/11/2021    CALCIUM 10.2 02/11/2021    ALBUMIN 4.30 02/11/2021    AST 21 02/11/2021    ALT 21 02/11/2021    BILITOT 0.2 02/11/2021       RADIOLOGY:  Imaging Results (Last 72 Hours)     ** No results found for the last 72 hours. **          I reviewed the patient's new clinical results.    Cancer Staging (if applicable)  Cancer Patient: __ yes __no __unknown; If yes, clinical stage T:__ N:__M:__, stage group or __N/A      Impression: Cervical spondylosis with myelopathy       Plan: CERVICAL POSTERIOR FUSION WITH INSTRUMENTATION C3-5      OBDULIO Henriquez   8/4/2022   11:41 EDT  "

## 2022-08-04 NOTE — ANESTHESIA POSTPROCEDURE EVALUATION
Patient: Javier Santiago    Procedure Summary     Date: 08/04/22 [facility] / [facility]    Anesthesia Start: 1235 Anesthesia Stop: 1523    Procedure: CERVICAL POSTERIOR FUSION WITH INSTRUMENTATION C3-5 (N/A Spine Cervical) Diagnosis:       Cervical spondylosis with myelopathy      (Cervical spondylosis with myelopathy [M47.12])    Surgeons: Chester Cowan MD Provider: Thomas Gonzalez MD    Anesthesia Type: general ASA Status: 3          Anesthesia Type: general    Vitals  Vitals Value Taken Time   /67 08/04/22 1521   Temp     Pulse 77 08/04/22 1522   Resp     SpO2 95 % 08/04/22 1522   Vitals shown include unvalidated device data.        Post Anesthesia Care and Evaluation    Patient location during evaluation: PACU  Patient participation: complete - patient participated  Level of consciousness: awake and alert  Pain management: adequate    Airway patency: patent  Anesthetic complications: No anesthetic complications  PONV Status: none  Cardiovascular status: hemodynamically stable and acceptable  Respiratory status: nonlabored ventilation, acceptable and nasal cannula  Hydration status: acceptable

## 2022-08-05 ENCOUNTER — ANESTHESIA (OUTPATIENT)
Dept: GASTROENTEROLOGY | Facility: HOSPITAL | Age: 57
End: 2022-08-05

## 2022-08-05 ENCOUNTER — DOCUMENTATION (OUTPATIENT)
Dept: GASTROENTEROLOGY | Facility: CLINIC | Age: 57
End: 2022-08-05

## 2022-08-05 ENCOUNTER — ANESTHESIA EVENT (OUTPATIENT)
Dept: GASTROENTEROLOGY | Facility: HOSPITAL | Age: 57
End: 2022-08-05

## 2022-08-05 PROCEDURE — 25010000002 ONDANSETRON PER 1 MG: Performed by: ANESTHESIOLOGY

## 2022-08-05 PROCEDURE — 25010000002 FENTANYL CITRATE (PF) 50 MCG/ML SOLUTION: Performed by: ANESTHESIOLOGY

## 2022-08-05 PROCEDURE — 0DC78ZZ EXTIRPATION OF MATTER FROM STOMACH, PYLORUS, VIA NATURAL OR ARTIFICIAL OPENING ENDOSCOPIC: ICD-10-PCS | Performed by: INTERNAL MEDICINE

## 2022-08-05 PROCEDURE — 99024 POSTOP FOLLOW-UP VISIT: CPT | Performed by: NEUROLOGICAL SURGERY

## 2022-08-05 PROCEDURE — 25010000002 PROPOFOL 10 MG/ML EMULSION: Performed by: ANESTHESIOLOGY

## 2022-08-05 PROCEDURE — 97530 THERAPEUTIC ACTIVITIES: CPT

## 2022-08-05 PROCEDURE — 97165 OT EVAL LOW COMPLEX 30 MIN: CPT

## 2022-08-05 PROCEDURE — 25010000002 SUCCINYLCHOLINE PER 20 MG: Performed by: ANESTHESIOLOGY

## 2022-08-05 PROCEDURE — 43247 EGD REMOVE FOREIGN BODY: CPT | Performed by: INTERNAL MEDICINE

## 2022-08-05 PROCEDURE — 97161 PT EVAL LOW COMPLEX 20 MIN: CPT

## 2022-08-05 PROCEDURE — 25010000002 MORPHINE PER 10 MG: Performed by: PHYSICIAN ASSISTANT

## 2022-08-05 PROCEDURE — 25010000002 DEXAMETHASONE PER 1 MG: Performed by: ANESTHESIOLOGY

## 2022-08-05 PROCEDURE — 25010000002 CEFAZOLIN PER 500 MG: Performed by: PHYSICIAN ASSISTANT

## 2022-08-05 RX ORDER — ASPIRIN 81 MG/1
81 TABLET, CHEWABLE ORAL DAILY
Status: DISCONTINUED | OUTPATIENT
Start: 2022-08-05 | End: 2022-08-06 | Stop reason: HOSPADM

## 2022-08-05 RX ORDER — PROPOFOL 10 MG/ML
VIAL (ML) INTRAVENOUS AS NEEDED
Status: DISCONTINUED | OUTPATIENT
Start: 2022-08-05 | End: 2022-08-05 | Stop reason: SURG

## 2022-08-05 RX ORDER — SUCCINYLCHOLINE CHLORIDE 20 MG/ML
INJECTION INTRAMUSCULAR; INTRAVENOUS AS NEEDED
Status: DISCONTINUED | OUTPATIENT
Start: 2022-08-05 | End: 2022-08-05 | Stop reason: SURG

## 2022-08-05 RX ORDER — DEXAMETHASONE SODIUM PHOSPHATE 10 MG/ML
INJECTION INTRAMUSCULAR; INTRAVENOUS AS NEEDED
Status: DISCONTINUED | OUTPATIENT
Start: 2022-08-05 | End: 2022-08-05 | Stop reason: SURG

## 2022-08-05 RX ORDER — LIDOCAINE HYDROCHLORIDE 20 MG/ML
INJECTION, SOLUTION INFILTRATION; PERINEURAL AS NEEDED
Status: DISCONTINUED | OUTPATIENT
Start: 2022-08-05 | End: 2022-08-05 | Stop reason: SURG

## 2022-08-05 RX ORDER — FENTANYL CITRATE 50 UG/ML
INJECTION, SOLUTION INTRAMUSCULAR; INTRAVENOUS AS NEEDED
Status: DISCONTINUED | OUTPATIENT
Start: 2022-08-05 | End: 2022-08-05 | Stop reason: SURG

## 2022-08-05 RX ORDER — FENTANYL CITRATE 50 UG/ML
50 INJECTION, SOLUTION INTRAMUSCULAR; INTRAVENOUS
Status: DISCONTINUED | OUTPATIENT
Start: 2022-08-05 | End: 2022-08-06 | Stop reason: HOSPADM

## 2022-08-05 RX ORDER — HYDROMORPHONE HYDROCHLORIDE 1 MG/ML
0.5 INJECTION, SOLUTION INTRAMUSCULAR; INTRAVENOUS; SUBCUTANEOUS
Status: DISCONTINUED | OUTPATIENT
Start: 2022-08-05 | End: 2022-08-06 | Stop reason: HOSPADM

## 2022-08-05 RX ORDER — BUPIVACAINE HCL/0.9 % NACL/PF 0.125 %
PLASTIC BAG, INJECTION (ML) EPIDURAL AS NEEDED
Status: DISCONTINUED | OUTPATIENT
Start: 2022-08-05 | End: 2022-08-05 | Stop reason: SURG

## 2022-08-05 RX ORDER — ONDANSETRON 2 MG/ML
INJECTION INTRAMUSCULAR; INTRAVENOUS AS NEEDED
Status: DISCONTINUED | OUTPATIENT
Start: 2022-08-05 | End: 2022-08-05 | Stop reason: SURG

## 2022-08-05 RX ORDER — SODIUM CHLORIDE, SODIUM LACTATE, POTASSIUM CHLORIDE, CALCIUM CHLORIDE 600; 310; 30; 20 MG/100ML; MG/100ML; MG/100ML; MG/100ML
INJECTION, SOLUTION INTRAVENOUS CONTINUOUS PRN
Status: DISCONTINUED | OUTPATIENT
Start: 2022-08-05 | End: 2022-08-05 | Stop reason: SURG

## 2022-08-05 RX ADMIN — PROPOFOL 200 MG: 10 INJECTION, EMULSION INTRAVENOUS at 21:52

## 2022-08-05 RX ADMIN — Medication 3 ML: at 20:09

## 2022-08-05 RX ADMIN — GABAPENTIN 300 MG: 300 CAPSULE ORAL at 14:11

## 2022-08-05 RX ADMIN — Medication 3 ML: at 20:08

## 2022-08-05 RX ADMIN — ONDANSETRON 4 MG: 2 INJECTION INTRAMUSCULAR; INTRAVENOUS at 21:52

## 2022-08-05 RX ADMIN — Medication 100 MCG: at 21:58

## 2022-08-05 RX ADMIN — ASPIRIN 81 MG CHEWABLE TABLET 81 MG: 81 TABLET CHEWABLE at 09:53

## 2022-08-05 RX ADMIN — LIDOCAINE HYDROCHLORIDE 50 MG: 20 INJECTION, SOLUTION INFILTRATION; PERINEURAL at 21:52

## 2022-08-05 RX ADMIN — PANTOPRAZOLE SODIUM 40 MG: 40 TABLET, DELAYED RELEASE ORAL at 09:54

## 2022-08-05 RX ADMIN — DEXAMETHASONE SODIUM PHOSPHATE 4 MG: 10 INJECTION INTRAMUSCULAR; INTRAVENOUS at 21:52

## 2022-08-05 RX ADMIN — DULOXETINE 30 MG: 30 CAPSULE, DELAYED RELEASE ORAL at 09:53

## 2022-08-05 RX ADMIN — IBUPROFEN 600 MG: 600 TABLET ORAL at 05:39

## 2022-08-05 RX ADMIN — CEFAZOLIN 2 G: 10 INJECTION, POWDER, FOR SOLUTION INTRAVENOUS at 05:39

## 2022-08-05 RX ADMIN — Medication 140 MG: at 21:52

## 2022-08-05 RX ADMIN — FENTANYL CITRATE 100 MCG: 50 INJECTION, SOLUTION INTRAMUSCULAR; INTRAVENOUS at 21:52

## 2022-08-05 RX ADMIN — HYDRALAZINE HYDROCHLORIDE 25 MG: 25 TABLET, FILM COATED ORAL at 09:53

## 2022-08-05 RX ADMIN — IBUPROFEN 600 MG: 600 TABLET ORAL at 14:12

## 2022-08-05 RX ADMIN — SODIUM CHLORIDE, POTASSIUM CHLORIDE, SODIUM LACTATE AND CALCIUM CHLORIDE: 600; 310; 30; 20 INJECTION, SOLUTION INTRAVENOUS at 21:43

## 2022-08-05 RX ADMIN — HYDROCODONE BITARTRATE AND ACETAMINOPHEN 1 TABLET: 5; 325 TABLET ORAL at 02:33

## 2022-08-05 RX ADMIN — HYDROCODONE BITARTRATE AND ACETAMINOPHEN 1 TABLET: 10; 325 TABLET ORAL at 14:11

## 2022-08-05 RX ADMIN — METOPROLOL SUCCINATE 25 MG: 25 TABLET, EXTENDED RELEASE ORAL at 09:53

## 2022-08-05 RX ADMIN — ATORVASTATIN CALCIUM 80 MG: 40 TABLET, FILM COATED ORAL at 09:53

## 2022-08-05 RX ADMIN — Medication 3 ML: at 09:00

## 2022-08-05 RX ADMIN — ISOSORBIDE MONONITRATE 30 MG: 30 TABLET, EXTENDED RELEASE ORAL at 09:53

## 2022-08-05 RX ADMIN — GABAPENTIN 300 MG: 300 CAPSULE ORAL at 05:39

## 2022-08-05 RX ADMIN — AMLODIPINE BESYLATE 5 MG: 5 TABLET ORAL at 09:53

## 2022-08-05 RX ADMIN — HYDROCODONE BITARTRATE AND ACETAMINOPHEN 1 TABLET: 5; 325 TABLET ORAL at 09:53

## 2022-08-05 RX ADMIN — BACLOFEN 10 MG: 10 TABLET ORAL at 09:53

## 2022-08-05 RX ADMIN — NICOTINE 1 PATCH: 14 PATCH, EXTENDED RELEASE TRANSDERMAL at 20:07

## 2022-08-05 RX ADMIN — MORPHINE SULFATE 2 MG: 2 INJECTION, SOLUTION INTRAMUSCULAR; INTRAVENOUS at 20:05

## 2022-08-05 NOTE — PROGRESS NOTES
"Was called by bedside nurse that patient was not able to swallow. He was eating a roast beef sandwich at dinner (bread and sliced meat) at about 1715 est and a bolus got lodged in his esophagus below the level of his trachea.  He is breathing easily and comfortably and can talk with no difficulty.     However, he is unable to move bolus up or down.  He is unable to swallow water to assist. He is minimally able to handle his own secretions. I allowed some time to elapse to see if it would soften/clear on its own, but he continues to have a complete block. I also had him reposition with his chin forward, sitting on the side of the bed, but this also was ineffective at relieving the blockage.     He has a history of difficulty swallowing and \"weak swallowing muscles\". He has had several EGDs in the past for dilatation, most recently 6 mos ago at [facility].     I spoke with the hospitalist to let them know and they recommended I call the gastroenterologist on call gisela, who is Dr. Sg Black. He has been paged through the exchange. Dr. Cowna has also been updated with the situation.     Patient is stable from a neurosurgical standpoint. Dr. Black indicated that he would need to be intubated. We recommend glidescope for minimal manipulation of the neck, but no other restrictions as his cervical stenosis has been fully decompressed.     Daiana Ford PA-C   "

## 2022-08-05 NOTE — PLAN OF CARE
Problem: Adult Inpatient Plan of Care  Goal: Plan of Care Review  Recent Flowsheet Documentation  Taken 8/5/2022 1636 by Mirian Weber OT  Progress: improving  Plan of Care Reviewed With: patient  Outcome Evaluation: OT IE completed. Pt is A/Ox4 and participates in therapy with encouragement. Up in room & hallway with RW support and CGAx1. Education initiated for spinal precautions, logroll sequencing, and ADL retraining. Blue Foam Block HEP initiated to support  and pinch. OT will follow IP. Recommend home with family support when medically ready.

## 2022-08-05 NOTE — PROGRESS NOTES
"NEUROSURGERY PROGRESS NOTE     LOS: 1 day   Patient Care Team:  Moi Segura APRN as PCP - General (Family Medicine)  Brent Degroot MD as Consulting Physician (Interventional Cardiology)    Chief Complaint: Numbness in the hands with electric type sensations in the chest.    POD#: 1 Day Post-Op  Procedures:  C3-6 decompression with fusion and stabilization dorsally C3-5.    Interval History:   Patient is ambulated and is voiding independently.  Patient Complaints: Significant incisional pain.  Patient Denies: Has some numbness in his hands but it is considerably improved compared to preop particularly on the right.    Vital Signs: Blood pressure 119/68, pulse 89, temperature 98.1 °F (36.7 °C), temperature source Oral, resp. rate 16, height 177.8 cm (70\"), weight 77.6 kg (171 lb), SpO2 98 %.  Intake/Output:     Intake/Output Summary (Last 24 hours) at 8/5/2022 0619  Last data filed at 8/5/2022 0333  Gross per 24 hour   Intake 1400 ml   Output 1860 ml   Net -460 ml     Drain output: 40/20 mL.    Physical Exam:  Patient is awake and alert.   strength is intact.  There is mild heme staining on his dressing.    Assessment/Plan:  1.  Cervical spondylosis and stenosis with progressive myelopathy status post dorsal decompression with fusion and stabilization.  2.  History of ACDF C4-6.  3.  History of coronary artery disease and congestive heart failure.  4.  History of hypertension.  5.  Disposition: Mobilize patient.  I anticipate that the patient will be discharged home tomorrow.      Chester Cowan MD  08/05/22  06:19 EDT    "

## 2022-08-05 NOTE — PLAN OF CARE
Goal Outcome Evaluation:                 Problem: Adult Inpatient Plan of Care  Goal: Absence of Hospital-Acquired Illness or Injury  Intervention: Identify and Manage Fall Risk  Recent Flowsheet Documentation  Taken 8/5/2022 0200 by Shawn Fraizer RN  Safety Promotion/Fall Prevention:   activity supervised   safety round/check completed   toileting scheduled  Taken 8/5/2022 0000 by Shawn Frazier RN  Safety Promotion/Fall Prevention:   activity supervised   safety round/check completed   toileting scheduled  Taken 8/4/2022 2200 by Shawn Frazier RN  Safety Promotion/Fall Prevention:   activity supervised   safety round/check completed   toileting scheduled  Taken 8/4/2022 2000 by Shawn Frazier RN  Safety Promotion/Fall Prevention:   activity supervised   safety round/check completed   toileting scheduled  Intervention: Prevent Skin Injury  Recent Flowsheet Documentation  Taken 8/5/2022 0200 by Shawn Frazier RN  Body Position: supine  Taken 8/5/2022 0000 by Shawn Frazier RN  Body Position: supine  Taken 8/4/2022 2000 by Shawn Frazier RN  Body Position: supine  Intervention: Prevent and Manage VTE (Venous Thromboembolism) Risk  Recent Flowsheet Documentation  Taken 8/5/2022 0200 by Shawn Frazier RN  VTE Prevention/Management:   bilateral   sequential compression devices on  Taken 8/5/2022 0000 by Shawn Frazier RN  VTE Prevention/Management:   bilateral   sequential compression devices on  Taken 8/4/2022 2200 by Shawn Frazier RN  VTE Prevention/Management:   bilateral   sequential compression devices on  Taken 8/4/2022 2000 by Shawn Frazier RN  VTE Prevention/Management:   bilateral   sequential compression devices on  Intervention: Prevent Infection  Recent Flowsheet Documentation  Taken 8/5/2022 0200 by Shawn Frazier RN  Infection Prevention: environmental surveillance performed  Taken 8/5/2022 0000 by Shawn Frazier RN  Infection Prevention: environmental surveillance  performed  Taken 8/4/2022 2200 by Shawn Frazier RN  Infection Prevention: environmental surveillance performed  Taken 8/4/2022 2000 by Shawn Frazier RN  Infection Prevention: environmental surveillance performed  Goal: Optimal Comfort and Wellbeing  Intervention: Monitor Pain and Promote Comfort  Recent Flowsheet Documentation  Taken 8/5/2022 0200 by Shawn Frazier RN  Pain Management Interventions: quiet environment facilitated  Taken 8/5/2022 0000 by Shawn Frazier RN  Pain Management Interventions: quiet environment facilitated  Taken 8/4/2022 2200 by Shawn Frazier RN  Pain Management Interventions: quiet environment facilitated  Taken 8/4/2022 2000 by Shawn Frazier RN  Pain Management Interventions: quiet environment facilitated  Intervention: Provide Person-Centered Care  Recent Flowsheet Documentation  Taken 8/5/2022 0200 by Shawn Frazier RN  Trust Relationship/Rapport: care explained  Taken 8/5/2022 0000 by Shawn Frazier RN  Trust Relationship/Rapport: care explained  Taken 8/4/2022 2200 by Shawn Frazier RN  Trust Relationship/Rapport: care explained  Taken 8/4/2022 2000 by Shawn Frazier RN  Trust Relationship/Rapport: care explained  Goal: Readiness for Transition of Care  Intervention: Mutually Develop Transition Plan  Recent Flowsheet Documentation  Taken 8/4/2022 2000 by Shawn Frazier RN  Transportation Anticipated: family or friend will provide  Transportation Concerns: none  Patient/Family Anticipated Services at Transition:   Patient/Family Anticipates Transition to: home with family     Problem: Asthma Comorbidity  Goal: Maintenance of Asthma Control  Intervention: Maintain Asthma Symptom Control  Recent Flowsheet Documentation  Taken 8/5/2022 0200 by Shawn Frazier RN  Medication Review/Management: medications reviewed  Taken 8/5/2022 0000 by Shawn Frazier RN  Medication Review/Management: medications reviewed  Taken 8/4/2022 2200 by Shawn Frazier  RN  Medication Review/Management: medications reviewed  Taken 8/4/2022 2000 by Shawn Frazier RN  Medication Review/Management: medications reviewed     Problem: Behavioral Health Comorbidity  Goal: Maintenance of Behavioral Health Symptom Control  Intervention: Maintain Behavioral Health Symptom Control  Recent Flowsheet Documentation  Taken 8/5/2022 0200 by Shawn Frazier RN  Medication Review/Management: medications reviewed  Taken 8/5/2022 0000 by Shawn Frazier RN  Medication Review/Management: medications reviewed  Taken 8/4/2022 2200 by Shawn Frazier RN  Medication Review/Management: medications reviewed  Taken 8/4/2022 2000 by Shawn Frazier RN  Medication Review/Management: medications reviewed     Problem: COPD (Chronic Obstructive Pulmonary Disease) Comorbidity  Goal: Maintenance of COPD Symptom Control  Intervention: Maintain COPD-Symptom Control  Recent Flowsheet Documentation  Taken 8/5/2022 0200 by Shawn Frazier RN  Medication Review/Management: medications reviewed  Taken 8/5/2022 0000 by Shawn Frazier RN  Medication Review/Management: medications reviewed  Taken 8/4/2022 2200 by Shawn Frazier RN  Medication Review/Management: medications reviewed  Taken 8/4/2022 2000 by Shawn Frazier RN  Medication Review/Management: medications reviewed     Problem: Heart Failure Comorbidity  Goal: Maintenance of Heart Failure Symptom Control  Intervention: Maintain Heart Failure-Management  Recent Flowsheet Documentation  Taken 8/5/2022 0200 by Shawn Frazier RN  Medication Review/Management: medications reviewed  Taken 8/5/2022 0000 by Shawn Frazier RN  Medication Review/Management: medications reviewed  Taken 8/4/2022 2200 by Shawn Frazier RN  Medication Review/Management: medications reviewed  Taken 8/4/2022 2000 by Shawn Frazier RN  Medication Review/Management: medications reviewed     Problem: Hypertension Comorbidity  Goal: Blood Pressure in Desired Range  Intervention:  Maintain Blood Pressure Management  Recent Flowsheet Documentation  Taken 8/5/2022 0200 by Shawn Frazier RN  Medication Review/Management: medications reviewed  Taken 8/5/2022 0000 by Shawn Frazier RN  Medication Review/Management: medications reviewed  Taken 8/4/2022 2200 by Shawn Frazier RN  Medication Review/Management: medications reviewed  Taken 8/4/2022 2000 by Shawn Frazier RN  Medication Review/Management: medications reviewed     Problem: Osteoarthritis Comorbidity  Goal: Maintenance of Osteoarthritis Symptom Control  Intervention: Maintain Osteoarthritis Symptom Control  Recent Flowsheet Documentation  Taken 8/5/2022 0200 by Shawn Frazier RN  Medication Review/Management: medications reviewed  Taken 8/5/2022 0000 by Shawn Frazier RN  Medication Review/Management: medications reviewed  Taken 8/4/2022 2200 by Shawn Frazier RN  Medication Review/Management: medications reviewed  Taken 8/4/2022 2000 by Shawn Frazier RN  Medication Review/Management: medications reviewed     Problem: Pain Chronic (Persistent) (Comorbidity Management)  Goal: Acceptable Pain Control and Functional Ability  Intervention: Manage Persistent Pain  Recent Flowsheet Documentation  Taken 8/5/2022 0200 by Shawn Frazier RN  Medication Review/Management: medications reviewed  Taken 8/5/2022 0000 by Shawn Frazier RN  Medication Review/Management: medications reviewed  Taken 8/4/2022 2200 by Shawn Frazier RN  Medication Review/Management: medications reviewed  Taken 8/4/2022 2000 by Shawn Frazier RN  Medication Review/Management: medications reviewed  Intervention: Develop Pain Management Plan  Recent Flowsheet Documentation  Taken 8/5/2022 0200 by Shawn Frazier RN  Pain Management Interventions: quiet environment facilitated  Taken 8/5/2022 0000 by Shawn Frazier RN  Pain Management Interventions: quiet environment facilitated  Taken 8/4/2022 2200 by Shawn Frazier RN  Pain Management  Interventions: quiet environment facilitated  Taken 8/4/2022 2000 by Shawn Frazier, RN  Pain Management Interventions: quiet environment facilitated     VSS, voids well, rested throughout the night, pain managed with PRN medications, ambulates with assistance, will continue to monitor for changes.

## 2022-08-05 NOTE — PLAN OF CARE
Goal Outcome Evaluation:  Plan of Care Reviewed With: patient        Progress: no change  Outcome Evaluation: PT eval completed. Patient alert and oriented x 4. Presentng 1 day post op elective C3-C6 fusion. Demonstrating deficits in standing balance and activity tolerance as related to pain effecting functional mobility below baseline. Requirng Shauna for bed mobility, CGA for transfers with UE support, CGA for ambulation x 5' from bed to chair with UE support. Patient will benefit from skilled IP PT services to address impairments for return to PLOF. Recommend home with assist at [place].

## 2022-08-05 NOTE — THERAPY EVALUATION
Patient Name: Javier Santiago  : 1965    MRN: [id number]                              Today's Date: 2022       Admit Date: 2022    Visit Dx:     ICD-10-CM ICD-9-CM   1. Cervical spondylosis with myelopathy  M47.12 721.1     Patient Active Problem List   Diagnosis   • Hiatal hernia   • Abdominal discomfort, epigastric   • Essential hypertension   • Familial hypercholesterolemia   • Atypical chest pain   • Obstructive sleep apnea syndrome   • Coronary artery disease involving native coronary artery of native heart with angina pectoris ([facility])   • Tobacco abuse   • Unstable angina ([facility])   • On clopidogrel therapy   • Gastroesophageal reflux disease   • Dysphagia   • Chest pain   • Angina, class II ([facility])   • Preoperative cardiovascular examination   • Coronary artery disease involving native coronary artery of native heart without angina pectoris   • Shortness of breath   • Dyslipidemia   • Myelopathy concurrent with and due to spinal stenosis of cervical region ([facility])   • Cervical spondylosis with myelopathy   • S/P cervical spinal fusion   • Dysesthesia affecting both sides of body     Past Medical History:   Diagnosis Date   • Anxiety    • Arthritis     psoriatic   • ASCVD (arteriosclerotic cardiovascular disease)    • CHF (congestive heart failure) ([facility])    • Chronic low back pain    • COPD (chronic obstructive pulmonary disease) ([facility])    • Coronary artery disease    • Fibromyalgia    • Full dentures    • GERD (gastroesophageal reflux disease)    • History of EKG 2016    NORMAL   • Hyperlipidemia    • Hypertension    • Hypotension    • Insomnia    • MI, old    • Psoriasis    • Psoriatic arthritis ([facility])    • RA (rheumatoid arthritis) ([facility])    • Sciatic nerve pain    • Skin pain     from nerve pain   • Sleep apnea     cpap non compliant   • Tinnitus    • Wears glasses     readers     Past Surgical History:   Procedure Laterality Date   • ANTERIOR CERVICAL DISCECTOMY W/ FUSION N/A 2021     Procedure: CERVICAL DISCECTOMY ANTERIOR WITH FUSION C4-6;  Surgeon: Chester Cowan MD;  Location:  [facility];  Service: Neurosurgery;  Laterality: N/A;   • BACK SURGERY      lumbar 1995   • CARDIAC CATHETERIZATION N/A 10/13/2017    Procedure: Left Heart Cath;  Surgeon: Chester Centeno MD;  [facility];  Service:    • CARDIAC CATHETERIZATION N/A 02/11/2021    Procedure: Left Heart Cath;  Surgeon: Brent Degroot MD;  [facility]  [facility];  Service: Cardiology;  Laterality: N/A;   • CATARACT EXTRACTION, BILATERAL Bilateral    • CERVICAL DISCECTOMY POSTERIOR FUSION WITH BRAIN LAB N/A 8/4/2022    Procedure: CERVICAL POSTERIOR FUSION WITH INSTRUMENTATION C3-5;  Surgeon: Chester Cowan MD;  Location:  [facility];  Service: Neurosurgery;  Laterality: N/A;   • COLONOSCOPY     • CORONARY ANGIOPLASTY WITH STENT PLACEMENT      x2-3 stent in place   • ENDOSCOPY     • ENDOSCOPY N/A 02/09/2018    Procedure: ESOPHAGOGASTRODUODENOSCOPY WITH DILATATION CPT CODE: 92746;  Surgeon: Everett Villanueva III, MD;  Location:  [facility];  Service:       General Information     Row Name 08/05/22 1625          OT Time and Intention    Document Type evaluation;therapy note (daily note)  -TB     Mode of Treatment occupational therapy;individual therapy  -TB     Row Name 08/05/22 1625          General Information    Patient Profile Reviewed yes  -TB     Prior Level of Function independent:;all household mobility;community mobility;ADL's;driving;shopping;using stairs  Increased pain and effort all activities, R hand numbness and diminished FMC  -TB     Existing Precautions/Restrictions spinal;other (see comments)  ROHAN drain  -TB     Barriers to Rehab none identified  -TB     Row Name 08/05/22 1625          Occupational Profile    Reason for Services/Referral (Occupational Profile) Occupational decline  -TB     Environmental Supports and Barriers (Occupational Profile) Pt lives alone  in an ADA apt. 1-step to enter. Tub/shower with grab bars. Comfort ht commode.  -TB     Row Name 08/05/22 1625          Living Environment    People in Home alone  -TB     Row Name 08/05/22 1625          Home Main Entrance    Number of Stairs, Main Entrance one  -TB     Stair Railings, Main Entrance none  -TB     Row Name 08/05/22 1625          Stairs Within Home, Primary    Number of Stairs, Within Home, Primary none  -TB     Row Name 08/05/22 1625          Cognition    Orientation Status (Cognition) oriented x 4  -TB     Row Name 08/05/22 1625          Safety Issues, Functional Mobility    Safety Issues Affecting Function (Mobility) awareness of need for assistance  -TB     Impairments Affecting Function (Mobility) balance;endurance/activity tolerance;pain;strength;range of motion (ROM)  -TB     Comment, Safety Issues/Impairments (Mobility) Pt up with RW support and CGAx1  -TB           User Key  (r) = Recorded By, (t) = Taken By, (c) = Cosigned By    Initials Name Provider Type    TB Mirian Weber OT Occupational Therapist                 Mobility/ADL's     Row Name 08/05/22 1628          Bed Mobility    Bed Mobility rolling right;sidelying-sit;sit-sidelying  -TB     Rolling Right [place] (Bed Mobility) standby assist;verbal cues  -TB     Sidelying-Sit [place] (Bed Mobility) verbal cues;standby assist  -TB     Sit-Sidelying [place] (Bed Mobility) standby assist;verbal cues  -TB     Assistive Device (Bed Mobility) bed rails  -TB     Comment, (Bed Mobility) Education reinforced for spinal precautions and logroll sequencing. Pt demonstrates good understanding  -TB     Row Name 08/05/22 1628          Transfers    Transfers sit-stand transfer  -TB     Comment, (Transfers) Education and cues for hand placement  -TB     Sit-Stand [place] (Transfers) contact guard;1 person assist;verbal cues  -TB     Row Name 08/05/22 1628          Sit-Stand Transfer    Assistive Device (Sit-Stand Transfers)  walker, front-wheeled  -TB     Row Name 08/05/22 1628          Functional Mobility    Functional Mobility- Ind. Level contact guard assist;verbal cues required  -TB     Functional Mobility- Device walker, front-wheeled  -TB     Functional Mobility-Distance (Feet) 100  -TB     Functional Mobility- Safety Issues balance decreased during turns  -TB     Functional Mobility- Comment Good effort. Slow pace, no LOB.  -TB     Row Name 08/05/22 1628          Activities of Daily Living    BADL Assessment/Intervention bathing;upper body dressing;lower body dressing;feeding;toileting  -TB     Row Name 08/05/22 1628          Bathing Assessment/Intervention    [place] Level (Bathing) set up;distal lower extremities/feet  -TB     Assistive Devices (Bathing) long-handled sponge  -TB     Position (Bathing) supported sitting  -TB     Comment, (Bathing) Education initiated for spinal precautions and ADL retraining.  -     Row Name 08/05/22 1628          Upper Body Dressing Assessment/Training    [place] Level (Upper Body Dressing) set up;pajama/robe  -TB     Position (Upper Body Dressing) edge of bed sitting  -TB     Row Name 08/05/22 1628          Lower Body Dressing Assessment/Training    Position (Lower Body Dressing) edge of bed sitting  -TB     Comment, (Lower Body Dressing) Education initiated for spinal precautions and ADL retraining. Pt able to don/doff socks via cross-legged method  -     Row Name 08/05/22 1628          Self-Feeding Assessment/Training    [place] Level (Feeding) independent;liquids to mouth  -TB     Position (Self-Feeding) sitting up in bed;supported sitting  -TB     Row Name 08/05/22 1628          Toileting Assessment/Training    [place] Level (Toileting) independent  -TB     Assistive Devices (Toileting) urinal  -TB           User Key  (r) = Recorded By, (t) = Taken By, (c) = Cosigned By    Initials Name Provider Type    TB Mirian Weber OT Occupational Therapist                Obj/Interventions     Row Name 08/05/22 1631          Sensory Assessment (Somatosensory)    Sensory Assessment (Somatosensory) bilateral UE  -TB     Bilateral UE Sensory Assessment light touch awareness;impaired  -TB     Sensory Subjective Reports numbness;tingling  -TB     Sensory Assessment Pt reports improving since surgery  -     Row Name 08/05/22 1631          Vision Assessment/Intervention    Visual Impairment/Limitations WFL  -TB     Row Name 08/05/22 1631          Range of Motion Comprehensive    General Range of Motion bilateral upper extremity ROM WFL  -TB     Comment, General Range of Motion B shoulder AROM limited by referred cervical pain  -     Row Name 08/05/22 1631          Strength Comprehensive (MMT)    General Manual Muscle Testing (MMT) Assessment hand strength deficits identified;upper extremity strength deficits identified  -     Hand Strength Assessment hand  strength  Blue foam block HEP initiated to support function  -     Row Name 08/05/22 1631          Shoulder (Therapeutic Exercise)    Shoulder (Therapeutic Exercise) AROM (active range of motion)  -     Shoulder AROM (Therapeutic Exercise) bilateral;flexion;extension;supine;10 repetitions  -     Row Name 08/05/22 1631          Hand (Therapeutic Exercise)    Hand (Therapeutic Exercise) strengthening exercise  -     Hand Strengthening (Therapeutic Exercise) bilateral;squeeze ball/egg;other (see comments);10 repetitions  Blue Foam Block HEP initiated for  and pinch  -     Row Name 08/05/22 1631          Motor Skills    Therapeutic Exercise hand;shoulder  Education initiated for benefits of OOB activity/therapy, role of OT, and HEP to support self-care.  -     Row Name 08/05/22 1631          Balance    Balance Assessment sitting dynamic balance;sit to stand dynamic balance;standing dynamic balance  -     Dynamic Sitting Balance standby assist  -     Position, Sitting Balance unsupported;sitting edge of bed   -TB     Sit to Stand Dynamic Balance contact guard;1-person assist;verbal cues  -TB     Dynamic Standing Balance contact guard;1-person assist;verbal cues  -TB     Position/Device Used, Standing Balance walker, front-wheeled;supported  -TB     Balance Interventions sitting;standing;sit to stand;supported;dynamic;occupation based/functional task  -TB           User Key  (r) = Recorded By, (t) = Taken By, (c) = Cosigned By    Initials Name Provider Type    TB Mirian Weber OT Occupational Therapist               Goals/Plan     Row Name 08/05/22 1639          Transfer Goal 1 (OT)    Activity/Assistive Device (Transfer Goal 1, OT) toilet  -TB     [place] Level/Cues Needed (Transfer Goal 1, OT) modified independence  -TB     Time Frame (Transfer Goal 1, OT) by discharge  -TB     Strategies/Barriers (Transfers Goal 1, OT) spinal precautions  -TB     Progress/Outcome (Transfer Goal 1, OT) goal ongoing  -TB     Row Name 08/05/22 1639          Dressing Goal 1 (OT)    Activity/Device (Dressing Goal 1, OT) lower body dressing  -TB     [place]/Cues Needed (Dressing Goal 1, OT) minimum assist (75% or more patient effort);verbal cues required  -TB     Time Frame (Dressing Goal 1, OT) by discharge  -TB     Strategies/Barriers (Dressing Goal 1, OT) spinal precautions  -TB     Progress/Outcome (Dressing Goal 1, OT) goal ongoing  -TB     Row Name 08/05/22 1639          Strength Goal 1 (OT)    Strength Goal 1 (OT) Pt is independent with Blue Foam Block HEP.  -TB     Time Frame (Strength Goal 1, OT) by discharge  -TB     Strategies/Barriers (Strength Goal 1, OT) spinal precautions  -TB     Progress/Outcome (Strength Goal 1, OT) goal ongoing  -TB           User Key  (r) = Recorded By, (t) = Taken By, (c) = Cosigned By    Initials Name Provider Type    TB Mirian Weber OT Occupational Therapist               Clinical Impression     Row Name 08/05/22 1636          Pain Assessment    Pretreatment Pain Rating  8/10  -TB     Posttreatment Pain Rating 9/10  -TB     Pain Location - neck  -TB     Pre/Posttreatment Pain Comment Pt tolerates OOB activity with encouragement  -TB     Pain Intervention(s) Ambulation/increased activity;Repositioned;Cold pack  -TB     Row Name 08/05/22 1636          Plan of Care Review    Plan of Care Reviewed With patient  -TB     Progress improving  -TB     Outcome Evaluation OT IE completed. Pt is A/Ox4 and participates in therapy with encouragement. Education initiated for spinal precautions, logroll sequencing, and ADL retraining. Up in room & hallway with RW support and CGAx1. Blue Foam Block HEP initiated to support  and pinch. OT will follow IP. Recommend home with family support when medically ready.  -TB     Row Name 08/05/22 1636          Therapy Assessment/Plan (OT)    Rehab Potential (OT) good, to achieve stated therapy goals  -TB     Criteria for Skilled Therapeutic Interventions Met (OT) yes;skilled treatment is necessary  -TB     Therapy Frequency (OT) daily  -TB     Row Name 08/05/22 1636          Therapy Plan Review/Discharge Plan (OT)    Anticipated Discharge Disposition (OT) home with assist  -TB     Row Name 08/05/22 1636          Vital Signs    Pre Systolic BP Rehab --  RN cleared OT, VSS per tele  -TB     Pre SpO2 (%) 96  -TB     O2 Delivery Pre Treatment room air  -TB     O2 Delivery Intra Treatment room air  -TB     O2 Delivery Post Treatment room air  -TB     Pre Patient Position Supine  -TB     Intra Patient Position Standing  -TB     Post Patient Position Supine  -TB     Row Name 08/05/22 1636          Positioning and Restraints    Pre-Treatment Position in bed  -TB     Post Treatment Position bed  -TB     In Bed notified nsg;fowlers;call light within reach;encouraged to call for assist;exit alarm on  -TB           User Key  (r) = Recorded By, (t) = Taken By, (c) = Cosigned By    Initials Name Provider Type    Mirian Leon, OT Occupational Therapist                Outcome Measures     Row Name 08/05/22 1641          How much help from another is currently needed...    Putting on and taking off regular lower body clothing? 2  -TB     Bathing (including washing, rinsing, and drying) 3  -TB     Toileting (which includes using toilet bed pan or urinal) 3  -TB     Putting on and taking off regular upper body clothing 3  -TB     Taking care of personal grooming (such as brushing teeth) 3  -TB     Eating meals 4  -TB     AM-PAC 6 Clicks Score (OT) 18  -TB     Row Name 08/05/22 1324          How much help from another person do you currently need...    Turning from your back to your side while in flat bed without using bedrails? 3  -LO     Moving from lying on back to sitting on the side of a flat bed without bedrails? 3  -LO     Moving to and from a bed to a chair (including a wheelchair)? 3  -LO     Standing up from a chair using your arms (e.g., wheelchair, bedside chair)? 3  -LO     Climbing 3-5 steps with a railing? 3  -LO     To walk in hospital room? 3  -LO     AM-PAC 6 Clicks Score (PT) 18  -LO     Highest level of mobility 6 --> Walked 10 steps or more  -LO     Row Name 08/05/22 1641 08/05/22 1324       Functional Assessment    Outcome Measure Options AM-PAC 6 Clicks Daily Activity (OT)  -TB AM-PAC 6 Clicks Basic Mobility (PT)  -LO          User Key  (r) = Recorded By, (t) = Taken By, (c) = Cosigned By    Initials Name Provider Type    Mirian Leon, OT Occupational Therapist    Cony Garcia, PT Physical Therapist                Occupational Therapy Education                 Title: PT OT SLP Therapies (In Progress)     Topic: Occupational Therapy (In Progress)     Point: ADL training (Done)     Description:   Instruct learner(s) on proper safety adaptation and remediation techniques during self care or transfers.   Instruct in proper use of assistive devices.              Learning Progress Summary           Patient Acceptance, E,D,TB, VU,DU,NR by TB at  8/5/2022 1641                   Point: Home exercise program (Done)     Description:   Instruct learner(s) on appropriate technique for monitoring, assisting and/or progressing therapeutic exercises/activities.              Learning Progress Summary           Patient Acceptance, E,D,TB, VU,DU,NR by TB at 8/5/2022 1641                   Point: Precautions (Done)     Description:   Instruct learner(s) on prescribed precautions during self-care and functional transfers.              Learning Progress Summary           Patient Acceptance, E,D,TB, VU,DU,NR by TB at 8/5/2022 1641                   Point: Body mechanics (Not Started)     Description:   Instruct learner(s) on proper positioning and spine alignment during self-care, functional mobility activities and/or exercises.              Learner Progress:  Not documented in this visit.                      User Key     Initials Effective Dates Name Provider Type Discipline     06/16/21 -  Mirian Weber, OT Occupational Therapist OT              OT Recommendation and Plan  Therapy Frequency (OT): daily  Plan of Care Review  Plan of Care Reviewed With: patient  Progress: improving  Outcome Evaluation: OT IE completed. Pt is A/Ox4 and participates in therapy with encouragement. Education initiated for spinal precautions, logroll sequencing, and ADL retraining. Up in room & hallway with RW support and CGAx1. Blue Foam Block HEP initiated to support  and pinch. OT will follow IP. Recommend home with family support when medically ready.     Time Calculation:    Time Calculation- OT     Row Name 08/05/22 1544 08/05/22 1141          Time Calculation- OT    OT Start Time 1544  -TB --     OT Received On 08/05/22  -TB --     OT Goal Re-Cert Due Date 08/15/22  -TB --            Timed Charges    91378 - Gait Training Minutes  -- 4  -LO     22436 - OT Therapeutic Activity Minutes 20  -TB --            Untimed Charges    OT Eval/Re-eval Minutes 45  -TB --             Total Minutes    Timed Charges Total Minutes 20  -TB 4  -LO     Untimed Charges Total Minutes 45  -TB --      Total Minutes 65  -TB 4  -LO           User Key  (r) = Recorded By, (t) = Taken By, (c) = Cosigned By    Initials Name Provider Type    TB Mirian Weber, OT Occupational Therapist    Cony Garcia, PT Physical Therapist              Therapy Charges for Today     Code Description Service Date Service Provider Modifiers Qty    [id number]  OT THERAPEUTIC ACT EA 15 MIN 8/5/2022 Mirian Weber, OT GO 1    [id number]  OT EVAL LOW COMPLEXITY 3 8/5/2022 Mirian Weber, OT GO 1               Mirian Weber OT  8/5/2022

## 2022-08-05 NOTE — NURSING NOTE
Patient still with complaint of unable to swallow, not tolerating po intake, anterior neck with increased swelling.  Page out to OBDULIO Adan. Awaiting call back

## 2022-08-05 NOTE — PLAN OF CARE
Goal Outcome Evaluation:  Plan of Care Reviewed With: patient        Progress: improving  Outcome Evaluation: plan to dc home tommorrow, ROHAN drain to cervical spine, dressing with some old dry drainage but intaci c/o pain prn meds given

## 2022-08-05 NOTE — CASE MANAGEMENT/SOCIAL WORK
Discharge Planning Assessment  [facility]     Patient Name: Javier Santiago  MRN: [id number]  Today's Date: 8/5/2022    Admit Date: 8/4/2022     Discharge Needs Assessment     Row Name 08/05/22 1106       Living Environment    People in Home alone    Current Living Arrangements home    Primary Care Provided by self    Provides Primary Care For no one    Family Caregiver if Needed sibling(s)    Quality of Family Relationships unable to assess    Able to Return to Prior Arrangements yes       Resource/Environmental Concerns    Resource/Environmental Concerns none       Transition Planning    Patient/Family Anticipates Transition to home    Patient/Family Anticipated Services at Transition none    Transportation Anticipated family or friend will provide       Discharge Needs Assessment    Readmission Within the Last 30 Days no previous admission in last 30 days    Equipment Currently Used at Home none    Concerns to be Addressed discharge planning    Anticipated Changes Related to Illness none    Equipment Needed After Discharge none               Discharge Plan     Row Name 08/05/22 1107       Plan    Plan home    Patient/Family in Agreement with Plan yes    Plan Comments Pt lives in [facility]. He reports he is independent with ADLs and denies current use of any DME/HH. Pt is followed by his PCP and has drug coverage. At this time his plan for discharge is to return home. He denies any discharge needs at this time.    Final Discharge Disposition Code 01 - home or self-care              Continued Care and Services - Admitted Since 8/4/2022    Coordination has not been started for this encounter.          Demographic Summary     Row Name 08/05/22 1106       General Information    Admission Type inpatient    Referral Source physician    Reason for Consult discharge planning    General Information Comments PCP Moi Segura       Contact Information    Permission Granted to Share Info With family/designee    Contact  Information Comments Kasi De La [phone]               Functional Status     Row Name 08/05/22 1106       Functional Status, IADL    Medications independent    Meal Preparation independent    Housekeeping independent    Laundry independent    Shopping independent       Mental Status    General Appearance WDL WDL       Mental Status Summary    Recent Changes in Mental Status/Cognitive Functioning no changes               Psychosocial    No documentation.                Abuse/Neglect    No documentation.                Legal    No documentation.                Substance Abuse    No documentation.                Patient Forms    No documentation.                   Brooklyn Will RN

## 2022-08-05 NOTE — THERAPY EVALUATION
Patient Name: Javier Santiago  : 1965    MRN: [id number]                              Today's Date: 2022       Admit Date: 2022    Visit Dx:     ICD-10-CM ICD-9-CM   1. Cervical spondylosis with myelopathy  M47.12 721.1     Patient Active Problem List   Diagnosis   • Hiatal hernia   • Abdominal discomfort, epigastric   • Essential hypertension   • Familial hypercholesterolemia   • Atypical chest pain   • Obstructive sleep apnea syndrome   • Coronary artery disease involving native coronary artery of native heart with angina pectoris (Formerly [facility])   • Tobacco abuse   • Unstable angina (Formerly [facility])   • On clopidogrel therapy   • Gastroesophageal reflux disease   • Dysphagia   • Chest pain   • Angina, class II (Formerly [facility])   • Preoperative cardiovascular examination   • Coronary artery disease involving native coronary artery of native heart without angina pectoris   • Shortness of breath   • Dyslipidemia   • Myelopathy concurrent with and due to spinal stenosis of cervical region (Formerly [facility])   • Cervical spondylosis with myelopathy   • S/P cervical spinal fusion   • Dysesthesia affecting both sides of body     Past Medical History:   Diagnosis Date   • Anxiety    • Arthritis     psoriatic   • ASCVD (arteriosclerotic cardiovascular disease)    • CHF (congestive heart failure) (Formerly [facility])    • Chronic low back pain    • COPD (chronic obstructive pulmonary disease) ([facility]ly [facility])    • Coronary artery disease    • Fibromyalgia    • Full dentures    • GERD (gastroesophageal reflux disease)    • History of EKG 2016    NORMAL   • Hyperlipidemia    • Hypertension    • Hypotension    • Insomnia    • MI, old    • Psoriasis    • Psoriatic arthritis ([facility]ly [facility])    • RA (rheumatoid arthritis) ([facility]ly [facility])    • Sciatic nerve pain    • Skin pain     from nerve pain   • Sleep apnea     cpap non compliant   • Tinnitus    • Wears glasses     readers     Past Surgical History:   Procedure Laterality Date   • ANTERIOR CERVICAL DISCECTOMY W/ FUSION N/A 2021     Procedure: CERVICAL DISCECTOMY ANTERIOR WITH FUSION C4-6;  Surgeon: Chester Cowan MD;  Location:  [facility];  Service: Neurosurgery;  Laterality: N/A;   • BACK SURGERY      lumbar 1995   • CARDIAC CATHETERIZATION N/A 10/13/2017    Procedure: Left Heart Cath;  Surgeon: Chester Centeno MD;  [facility];  Service:    • CARDIAC CATHETERIZATION N/A 02/11/2021    Procedure: Left Heart Cath;  Surgeon: Brent Degroot MD;  [facility]  [facility];  Service: Cardiology;  Laterality: N/A;   • CATARACT EXTRACTION, BILATERAL Bilateral    • CERVICAL DISCECTOMY POSTERIOR FUSION WITH BRAIN LAB N/A 8/4/2022    Procedure: CERVICAL POSTERIOR FUSION WITH INSTRUMENTATION C3-5;  Surgeon: Chester Cowan MD;  Location:  [facility];  Service: Neurosurgery;  Laterality: N/A;   • COLONOSCOPY     • CORONARY ANGIOPLASTY WITH STENT PLACEMENT      x2-3 stent in place   • ENDOSCOPY     • ENDOSCOPY N/A 02/09/2018    Procedure: ESOPHAGOGASTRODUODENOSCOPY WITH DILATATION CPT CODE: 32543;  Surgeon: Everett Villanueva III, MD;  Location:  [facility];  Service:       General Information     Row Name 08/05/22 1306          Physical Therapy Time and Intention    Document Type evaluation  -LO     Mode of Treatment individual therapy;physical therapy  -LO     Row Name 08/05/22 1306          General Information    Patient Profile Reviewed yes  -LO     Prior Level of Function independent:;all household mobility;gait;transfer;driving;bed mobility  -LO     Existing Precautions/Restrictions spinal  -LO     Barriers to Rehab none identified  -LO     Row Name 08/05/22 1306          Living Environment    People in Home alone  -LO     Row Name 08/05/22 1306          Home Main Entrance    Number of Stairs, Main Entrance one  -LO     Stair Railings, Main Entrance none  -LO     Row Name 08/05/22 1306          Stairs Within Home, Primary    Number of Stairs, Within Home, Primary none  -LO     Row Name  08/05/22 1306          Cognition    Orientation Status (Cognition) oriented x 4  -LO     Row Name 08/05/22 1306          Safety Issues, Functional Mobility    Safety Issues Affecting Function (Mobility) other (see comments)  none  -LO     Impairments Affecting Function (Mobility) balance;endurance/activity tolerance;range of motion (ROM);pain  -LO           User Key  (r) = Recorded By, (t) = Taken By, (c) = Cosigned By    Initials Name Provider Type    Cony Garcia PT Physical Therapist               Mobility     Row Name 08/05/22 1308          Bed Mobility    Bed Mobility rolling right;sidelying-sit  -LO     Rolling Right [place] (Bed Mobility) minimum assist (75% patient effort)  -LO     Sidelying-Sit [place] (Bed Mobility) verbal cues  -LO     Assistive Device (Bed Mobility) bed rails;head of bed elevated  -LO     Comment, (Bed Mobility) Assist to push trunk into sitting with log roll technique  -LO     Row Name 08/05/22 1308          Transfers    Comment, (Transfers) EOB>FWW>recliner with vc for HP  -LO     Row Name 08/05/22 1308          Bed-Chair Transfer    Bed-Chair [place] (Transfers) contact guard  -LO     Assistive Device (Bed-Chair Transfers) other (see comments)  UE support  -LO     Row Name 08/05/22 1308          Sit-Stand Transfer    Sit-Stand [place] (Transfers) contact guard  -LO     Assistive Device (Sit-Stand Transfers) other (see comments)  UE support  -LO     Row Name 08/05/22 1308          Gait/Stairs (Locomotion)    [place] Level (Gait) contact guard  -LO     Assistive Device (Gait) other (see comments)  UE support  -LO     Distance in Feet (Gait) 5  -LO     Deviations/Abnormal Patterns (Gait) bilateral deviations;gait speed decreased;stride length decreased  -LO     Comment, (Gait/Stairs) Ambulates from EOB to chair with UE support, ambulation distance pain limited  -LO           User Key  (r) = Recorded By, (t) = Taken By, (c) = Cosigned By    Initials Name  Provider Type    Cony Garcia, [facility] Physical Therapist               Obj/Interventions     Row Name 08/05/22 1312          Range of Motion Comprehensive    General Range of Motion other (see comments)  -     Comment, General Range of Motion cervical ROM limited by surgery  -     Row Name 08/05/22 1312          Strength Comprehensive (MMT)    General Manual Muscle Testing (MMT) Assessment no strength deficits identified  -     Row Name 08/05/22 1312          Motor Skills    Motor Skills functional endurance  -LO     Functional Endurance tolerates 1-2 min of activity before needs to rest due to pain  -     Row Name 08/05/22 1312          Balance    Balance Assessment sitting static balance;sitting dynamic balance;standing static balance;standing dynamic balance  -LO     Static Sitting Balance contact guard  -LO     Dynamic Sitting Balance contact guard  -LO     Position, Sitting Balance supported;sitting in chair  -LO     Static Standing Balance contact guard  -LO     Dynamic Standing Balance contact guard  -LO     Position/Device Used, Standing Balance supported;other (see comments)  UE support  -LO     Comment, Balance Requires UE support and CGA for safety in standing  -     Row Name 08/05/22 1312          Sensory Assessment (Somatosensory)    Sensory Assessment (Somatosensory) sensation intact  -           User Key  (r) = Recorded By, (t) = Taken By, (c) = Cosigned By    Initials Name Provider Type    Cony Garcia, [facility] Physical Therapist               Goals/Plan     Row Name 08/05/22 1321          Bed Mobility Goal 1 (PT)    Activity/Assistive Device (Bed Mobility Goal 1, PT) sidelying to sit;sit to sidelying;rolling to left;rolling to right;scooting;sit to supine  -     [place] Level/Cues Needed (Bed Mobility Goal 1, PT) modified independence  -     Time Frame (Bed Mobility Goal 1, PT) long term goal (LTG);10 days  -     Row Name 08/05/22 1321          Transfer Goal 1 (PT)     Activity/Assistive Device (Transfer Goal 1, PT) sit-to-stand/stand-to-sit;bed-to-chair/chair-to-bed;car transfer  -LO     [place] Level/Cues Needed (Transfer Goal 1, PT) modified independence  -LO     Time Frame (Transfer Goal 1, PT) long term goal (LTG);10 days  -LO     Row Name 08/05/22 1321          Gait Training Goal 1 (PT)    Activity/Assistive Device (Gait Training Goal 1, PT) gait (walking locomotion);assistive device use;decrease fall risk;diminish gait deviation;improve balance and speed  -LO     [place] Level (Gait Training Goal 1, PT) modified independence  -LO     Distance (Gait Training Goal 1, PT) 100  -LO     Time Frame (Gait Training Goal 1, PT) long term goal (LTG);10 days  -LO     Row Name 08/05/22 1321          Stairs Goal 1 (PT)    Activity/Assistive Device (Stairs Goal 1, PT) ascending stairs;descending stairs  -LO     [place] Level/Cues Needed (Stairs Goal 1, PT) modified independence  -LO     Number of Stairs (Stairs Goal 1, PT) 1  -LO     Row Name 08/05/22 1321          Patient Education Goal (PT)    Activity (Patient Education Goal, PT) Patient will demonstrate safe log rolling technique  -LO     [place]/Cues/Accuracy (Memory Goal 2, PT) independent  -LO     Time Frame (Patient Education Goal, PT) long term goal (LTG);10 days  -LO     Row Name 08/05/22 1321          Therapy Assessment/Plan (PT)    Planned Therapy Interventions (PT) balance training;bed mobility training;gait training;home exercise program;strengthening;stair training;patient/family education;transfer training  -LO           User Key  (r) = Recorded By, (t) = Taken By, (c) = Cosigned By    Initials Name Provider Type    LO Cony Clancy, PT Physical Therapist               Clinical Impression     Row Name 08/05/22 1316          Pain    Pretreatment Pain Rating 7/10  -LO     Posttreatment Pain Rating 7/10  -LO     Pain Location - Side/Orientation Bilateral  -LO     Pain Location - neck  -LO     Pain  Intervention(s) Cold pack  -LO     Row Name 08/05/22 1316          Plan of Care Review    Plan of Care Reviewed With patient  -LO     Progress no change  -LO     Outcome Evaluation PT eval completed. Patient alert and oriented x 4. Presentng 1 day post op elective C3-C6 fusion. Demonstrating deficits in standing balance and activity tolerance as related to pain effecting functional mobility below baseline. Requirng Shauna for bed mobility, CGA for transfers with UE support, CGA for ambulation x 5' from bed to chair with UE support. Patient will benefit from skilled IP PT services to address impairments for return to PLOF. Recommend home with assist at [place].  -LO     Row Name 08/05/22 1316          Therapy Assessment/Plan (PT)    Patient/Family Therapy Goals Statement (PT) go home  -LO     Rehab Potential (PT) good, to achieve stated therapy goals  -     Criteria for Skilled Interventions Met (PT) yes;skilled treatment is necessary  -LO     Therapy Frequency (PT) daily  -LO     Row Name 08/05/22 1316          Vital Signs    Pre Systolic BP Rehab 101  -LO     Pre Treatment Diastolic BP 64  -LO     Pretreatment Heart Rate (beats/min) 69  -LO     Pre SpO2 (%) 97  -LO     O2 Delivery Pre Treatment room air  -LO     O2 Delivery Intra Treatment room air  -LO     O2 Delivery Post Treatment room air  -LO     Pre Patient Position Supine  -LO     Intra Patient Position Standing  -LO     Post Patient Position Sitting  -LO     Rest Breaks  1  -LO     Row Name 08/05/22 1316          Positioning and Restraints    Pre-Treatment Position in bed  -LO     Post Treatment Position chair  -LO     In Chair notified nsg;reclined;call light within reach;encouraged to call for assist;exit alarm on  -LO           User Key  (r) = Recorded By, (t) = Taken By, (c) = Cosigned By    Initials Name Provider Type    Cony Garcia, PT Physical Therapist               Outcome Measures     Row Name 08/05/22 1324          How much help from another  person do you currently need...    Turning from your back to your side while in flat bed without using bedrails? 3  -LO     Moving from lying on back to sitting on the side of a flat bed without bedrails? 3  -LO     Moving to and from a bed to a chair (including a wheelchair)? 3  -LO     Standing up from a chair using your arms (e.g., wheelchair, bedside chair)? 3  -LO     Climbing 3-5 steps with a railing? 3  -LO     To walk in hospital room? 3  -LO     AM-PAC 6 Clicks Score (PT) 18  -LO     Highest level of mobility 6 --> Walked 10 steps or more  -     Row Name 08/05/22 1324          Functional Assessment    Outcome Measure Options AM-PAC 6 Clicks Basic Mobility (PT)  -           User Key  (r) = Recorded By, (t) = Taken By, (c) = Cosigned By    Initials Name Provider Type    Cony Garcia, PT Physical Therapist                             Physical Therapy Education                 Title: PT OT SLP Therapies (Done)     Topic: Physical Therapy (Done)     Point: Mobility training (Done)     Learning Progress Summary           Patient Acceptance, E, VU,NR by  at 8/5/2022 1141    Comment: Patient education regarding log rolling technique and PT POC                   Point: Home exercise program (Done)     Learning Progress Summary           Patient Acceptance, E, VU,NR by  at 8/5/2022 1141    Comment: Patient education regarding log rolling technique and PT POC                   Point: Body mechanics (Done)     Learning Progress Summary           Patient Acceptance, E, VU,NR by  at 8/5/2022 1141    Comment: Patient education regarding log rolling technique and PT POC                   Point: Precautions (Done)     Learning Progress Summary           Patient Acceptance, E, VU,NR by  at 8/5/2022 1141    Comment: Patient education regarding log rolling technique and PT POC                               User Key     Initials Effective Dates Name Provider Type Discipline     06/16/21 -  Cony Clancy, [facility]  Physical Therapist PT              PT Recommendation and Plan  Planned Therapy Interventions (PT): balance training, bed mobility training, gait training, home exercise program, strengthening, stair training, patient/family education, transfer training  Plan of Care Reviewed With: patient  Progress: no change  Outcome Evaluation: PT eval completed. Patient alert and oriented x 4. Presentng 1 day post op elective C3-C6 fusion. Demonstrating deficits in standing balance and activity tolerance as related to pain effecting functional mobility below baseline. Requirng Shauna for bed mobility, CGA for transfers with UE support, CGA for ambulation x 5' from bed to chair with UE support. Patient will benefit from skilled IP PT services to address impairments for return to PLOF. Recommend home with assist at [place].     Time Calculation:    PT Charges     Row Name 08/05/22 1141             Time Calculation    Start Time 1141  -LO      PT Received On 08/05/22  -LO      PT Goal Re-Cert Due Date 08/15/22  -LO              Timed Charges    02082 - Gait Training Minutes  4  -LO      99045 - PT Therapeutic Activity Minutes 10  -LO              Untimed Charges    PT Eval/Re-eval Minutes 38  -LO              Total Minutes    Timed Charges Total Minutes 14  -LO      Untimed Charges Total Minutes 38  -LO       Total Minutes 52  -LO            User Key  (r) = Recorded By, (t) = Taken By, (c) = Cosigned By    Initials Name Provider Type    LO Cony Clancy, PT Physical Therapist              Therapy Charges for Today     Code Description Service Date Service Provider Modifiers Qty    51379609214 HC PT THERAPEUTIC ACT EA 15 MIN 8/5/2022 Cony Clancy, PT GP 1    [id number] HC PT EVAL LOW COMPLEXITY 3 8/5/2022 Cony Clancy, PT GP 1          PT G-Codes  Outcome Measure Options: AM-PAC 6 Clicks Basic Mobility (PT)  AM-PAC 6 Clicks Score (PT): 18    Cony Clancy PT  8/5/2022

## 2022-08-06 VITALS
RESPIRATION RATE: 18 BRPM | TEMPERATURE: 98.2 F | DIASTOLIC BLOOD PRESSURE: 74 MMHG | SYSTOLIC BLOOD PRESSURE: 124 MMHG | OXYGEN SATURATION: 95 % | HEIGHT: 70 IN | WEIGHT: 171 LBS | HEART RATE: 54 BPM | BODY MASS INDEX: 24.48 KG/M2

## 2022-08-06 PROBLEM — K29.70 GASTRITIS: Status: ACTIVE | Noted: 2022-08-06

## 2022-08-06 PROBLEM — K22.2 ESOPHAGEAL STRICTURE: Status: ACTIVE | Noted: 2022-08-06

## 2022-08-06 PROCEDURE — 99024 POSTOP FOLLOW-UP VISIT: CPT | Performed by: PHYSICIAN ASSISTANT

## 2022-08-06 PROCEDURE — 25010000002 MORPHINE PER 10 MG: Performed by: INTERNAL MEDICINE

## 2022-08-06 RX ORDER — HYDROCODONE BITARTRATE AND ACETAMINOPHEN 10; 325 MG/1; MG/1
1 TABLET ORAL 3 TIMES DAILY PRN
Qty: 30 TABLET | Refills: 0 | Status: SHIPPED | OUTPATIENT
Start: 2022-08-06

## 2022-08-06 RX ADMIN — AMLODIPINE BESYLATE 5 MG: 5 TABLET ORAL at 10:46

## 2022-08-06 RX ADMIN — MORPHINE SULFATE 2 MG: 2 INJECTION, SOLUTION INTRAMUSCULAR; INTRAVENOUS at 14:45

## 2022-08-06 RX ADMIN — MORPHINE SULFATE 2 MG: 2 INJECTION, SOLUTION INTRAMUSCULAR; INTRAVENOUS at 03:00

## 2022-08-06 RX ADMIN — GABAPENTIN 300 MG: 300 CAPSULE ORAL at 14:36

## 2022-08-06 RX ADMIN — Medication 3 ML: at 08:52

## 2022-08-06 RX ADMIN — DULOXETINE 30 MG: 30 CAPSULE, DELAYED RELEASE ORAL at 12:25

## 2022-08-06 RX ADMIN — ASPIRIN 81 MG CHEWABLE TABLET 81 MG: 81 TABLET CHEWABLE at 10:46

## 2022-08-06 RX ADMIN — HYDROCODONE BITARTRATE AND ACETAMINOPHEN 1 TABLET: 5; 325 TABLET ORAL at 16:17

## 2022-08-06 RX ADMIN — HYDRALAZINE HYDROCHLORIDE 25 MG: 25 TABLET, FILM COATED ORAL at 10:47

## 2022-08-06 RX ADMIN — BACLOFEN 10 MG: 10 TABLET ORAL at 08:52

## 2022-08-06 RX ADMIN — PANTOPRAZOLE SODIUM 40 MG: 40 TABLET, DELAYED RELEASE ORAL at 16:17

## 2022-08-06 RX ADMIN — ATORVASTATIN CALCIUM 80 MG: 40 TABLET, FILM COATED ORAL at 12:25

## 2022-08-06 RX ADMIN — PANTOPRAZOLE SODIUM 40 MG: 40 TABLET, DELAYED RELEASE ORAL at 08:52

## 2022-08-06 RX ADMIN — IBUPROFEN 600 MG: 600 TABLET ORAL at 14:36

## 2022-08-06 RX ADMIN — HYDROCODONE BITARTRATE AND ACETAMINOPHEN 1 TABLET: 5; 325 TABLET ORAL at 12:25

## 2022-08-06 RX ADMIN — METOPROLOL SUCCINATE 25 MG: 25 TABLET, EXTENDED RELEASE ORAL at 08:52

## 2022-08-06 RX ADMIN — MORPHINE SULFATE 2 MG: 2 INJECTION, SOLUTION INTRAMUSCULAR; INTRAVENOUS at 08:53

## 2022-08-06 NOTE — DISCHARGE PLACEMENT REQUEST
"Javier Humphrey (56 y.o. Male)     Pt will be staying with his ex-wife at [address] [facility]      Brooklyn Will RN  [phone]                Date of Birth   1965    Social Security Number       Address   [address] [address]    Home Phone   [phone]    MRN   [id number]       Congregation   Unknown    Marital Status                               Admission Date   8/4/22    Admission Type   Elective    Admitting Provider   Chester Cowan MD    Attending Provider   Chester Cowan MD    Department, Room/Bed   [facility] 3G, S366/1       Discharge Date       Discharge Disposition   Home or Self Care    Discharge Destination                               Attending Provider: Chester Cowan MD    Allergies: No Known Allergies    Isolation: None   Infection: MRSA (08/02/22)   Code Status: CPR   Advance Care Planning Activity    Ht: 177.8 cm (70\")   Wt: 77.6 kg (171 lb)    Admission Cmt: None   Principal Problem: Cervical spondylosis with myelopathy [M47.12]                 Active Insurance as of 8/4/2022     Primary Coverage     Payor Plan Insurance Group Employer/Plan Group    [place] [facility] MEDICARE REPLACEMENT [facility] DUAL COMPLETE MEDICARE REPLACEMENT [id number]     Payor Plan Address Payor Plan Phone Number Payor Plan Fax Number Effective Dates    [address] [phone]  6/1/2022 - None Entered    [facility] [address]       Subscriber Name Subscriber Birth Date Member ID       JAVIER HUMPHREY 1965 [id number]           Secondary Coverage     Payor Plan Insurance Group Employer/Plan Group    [employer] ANTH MEDICAID [id number]     Payor Plan Address Payor Plan Phone Number Payor Plan Fax Number Effective Dates    [address] [address] [phone]  1/1/2020 - None Entered    [facility] [address]       Subscriber Name Subscriber Birth Date Member ID       JAVIER HUMPHREY 1965 [id number]                 Emergency Contacts     Contact " Person (Rel.) Home Phone Work Phone Mobile Phone    Coretta Humphrey (Partner) [phone] -- [phone]    BARTOLOME HUMPHREY (Brother) [phone] -- --    dada humphrey (Brother) -- -- [phone]            [address] [address]  Phone:  [phone]  Fax:   Date: Aug 6, 2022      Ambulatory Referral to Home Health (Hospital)     Patient:  Javier Humphrey MRN:  [id number]   [address] [id number] :  1965  SSN:    Phone: [phone] Sex:  M      INSURANCE PAYOR PLAN GROUP # SUBSCRIBER ID   Primary:  Secondary:    [employer] MEDICARE REPLACEMENT  [facility] MEDICAID [id number]  [id number]  [id number]  [id number]      Referring Provider Information:  JOSIAH ARANGO Phone: [phone] Fax: [phone]       Referral Information:   # Visits:  999 Referral Type: Home Health [42]   Urgency:  Routine Referral Reason: Specialty Services Required   Start Date: Aug 6, 2022 End Date:  To be determined by Insurer   Diagnosis: Cervical spondylosis with myelopathy (M47.12 [ICD-10-CM] 721.1 [ICD-9-CM])  Myelopathy concurrent with and due to spinal stenosis of cervical region (HCC) (M48.02,G99.2 [ICD-10-CM] 723.0,336.3 [ICD-9-CM])  S/P cervical spinal fusion (Z98.1 [ICD-10-CM] V45.4 [ICD-9-CM])      Refer to Dept:   Refer to Provider:   Refer to Provider Phone:   Refer to Facility:       Face to Face Visit Date: 2022  Follow-up provider for Plan of Care? I will be treating the patient on an ongoing basis.  Please send me the Plan of Care for signature.  Follow-up provider: ABBIE MOHAN [[id number]]  Reason/Clinical Findings: cervical myelopathy  Describe mobility limitations that make leaving home difficult: cervical myelopathy, s/p cervical decompression  Nursing/Therapeutic Services Requested: Skilled Nursing  Nursing/Therapeutic Services Requested: Physical Therapy  Nursing/Therapeutic Services Requested: Occupational Therapy  Skilled  nursing orders: Wound care dressing/changes  PT orders: Therapeutic exercise  PT orders: Gait Training  PT orders: Strengthening  PT orders: Transfer training  PT orders: Home safety assessment  Weight Bearing Status: As Tolerated  Frequency: 1 Week 1     This document serves as a request of services and does not constitute Insurance authorization or approval of services.  To determine eligibility, please contact the members Insurance carrier to verify and review coverage.     If you have medical questions regarding this request for services. Please contact [address] at [phone] during normal business hours.        Authorizing Provider:Daiana Ford PA-C  Authorizing Provider's NPI: [id number]  Order Entered By: Daiana Ford PA-C 8/6/2022 12:08 PM     Electronically signed by: Daiana Ford PA-C 8/6/2022 12:08 PM       Emergency Contact Information     Name Relation Home Work Mobile    Coretta Humphrey Partner [phone]    BARTOLOME HUMPHREYer [phone]      humphreydada morton   [phone]          Insurance Information                [facility] MEDICARE REPLACEMENT/[facility] DUAL COMPLETE MEDICARE REPLACEMENT Phone: [phone]    Subscriber: Javier Humphrey Subscriber#: [id number]    Group#: [id number] Precert#: --        [employer] MEDICAID/[employer] MEDICAID Phone: [phone]    Subscriber: Javier Humphrey Subscriber#: [id number]    Group#: [id number] Precert#: --             History & Physical      Sg Black MD at 08/05/22 2115          Gastroenterology Consult Note    Reason for Consultation: Dysphagia    Patient Care Team:  Moi Segura APRN as PCP - General (Family Medicine)  Brent Degroot MD as Consulting Physician (Interventional Cardiology)    Chief complaint: Meat impaction      History of present illness: The patient is a 56-year-old gentleman who has a history of dysphagia.  He has been dilated multiple times for what  sounds like an esophageal stricture.  He points to his neck.  I do not have those reports.  He thinks he has had about 4 dilations in the past.  He does have significant reflux.  He was eating a roast beef sandwich tonight and felt the food lodged in his throat.  We are called to relieve the impaction.  He just had a posterior cervical spine surgery 8/4/2022.    No Known Allergies  Prior to Admission medications    Medication Sig Start Date End Date Taking? Authorizing Provider   albuterol (PROVENTIL HFA;VENTOLIN HFA) 108 (90 BASE) MCG/ACT inhaler Inhale 2 puffs Every 4 (Four) Hours As Needed for Wheezing.   Yes Annalee Elena MD   ALPRAZolam (XANAX) 0.5 MG tablet  6/13/22  Yes Annalee Elena MD   amLODIPine (NORVASC) 5 MG tablet TAKE ONE TABLET BY MOUTH DAILY FOR FOR BLOOD  PRESSURE 1/11/22  Yes Brent Degroot MD   atorvastatin (LIPITOR) 80 MG tablet Take 80 mg by mouth daily. 4/28/16  Yes Annalee Elena MD   baclofen (LIORESAL) 10 MG tablet Take 1 tablet by mouth 2 (Two) Times a Day. 2/28/19  Yes Dagoberto Lema MD   DULoxetine (CYMBALTA) 30 MG capsule Take 30 mg by mouth Daily.   Yes Annalee Elena MD   gabapentin (NEURONTIN) 600 MG tablet Take 600 mg by mouth 3 (Three) Times a Day. 3/25/21  Yes Annalee Elena MD   hydrALAZINE (APRESOLINE) 25 MG tablet Take 25 mg by mouth 3 (Three) Times a Day.   Yes Annalee Elena MD   HYDROcodone-acetaminophen (NORCO)  MG per tablet  6/28/22  Yes Annalee Elena MD   hydrOXYzine (ATARAX) 25 MG tablet Take 25 mg by mouth Every 8 (Eight) Hours As Needed. 12/16/20  Yes Annalee Elena MD   isosorbide mononitrate (IMDUR) 30 MG 24 hr tablet TAKE ONE TABLET BY MOUTH DAILY FOR HEART 10/19/21  Yes Brent Degroot MD   loratadine (CLARITIN) 10 MG tablet Take 10 mg by mouth Daily.   Yes Annalee Elena MD   metoprolol succinate XL (TOPROL-XL) 25 MG 24 hr tablet Take 1 tablet by mouth Daily.  12/29/20  Yes Brent Degroot MD   mupirocin (BACTROBAN) 2 % ointment Apply to the inside of each nostril with a cotton swab twice daily (morning and evening) starting 5 days before surgery. 8/2/22  Yes Chester Cowan MD   nicotine (NICODERM CQ) 14 MG/24HR patch Place 1 patch on the skin as directed by provider Daily. 6/29/22  Yes Chester Cowan MD   pantoprazole (PROTONIX) 40 MG EC tablet Take 1 tablet by mouth 2 (Two) Times a Day Before Meals. 12/11/17  Yes Holley Summers PA-C   SYMBICORT 80-4.5 MCG/ACT inhaler Inhale 2 puffs 2 (Two) Times a Day As Needed. 4/9/18  Yes Annalee Elena MD   aspirin 81 MG tablet Take 1 tablet by mouth Daily. 5/14/18   Iza Muhammad APRN   betamethasone dipropionate (DIPROLENE) 0.05 % ointment Apply  topically to the appropriate area as directed 2 (Two) Times a Day.    Annalee Elena MD   clopidogrel (Plavix) 75 MG tablet Take 1 tablet by mouth Daily.  Patient taking differently: Take 75 mg by mouth Daily. Holding for 5 days- letter on chart 5/3/21   Chester Cowan MD   EPINEPHrine (EPIPEN IJ) Inject 1 dose as directed As Needed (ALLERGIC REACTION).    Annalee Elena MD   nitroglycerin (NITROSTAT) 0.4 MG SL tablet Place 0.4 mg under the tongue Every 5 (Five) Minutes As Needed for Chest Pain. Take no more than 3 doses in 15 minutes.    Annalee Elena MD   Stelara 45 MG/0.5ML solution prefilled syringe Injection  4/6/22   Annalee Elena MD   Ustekinumab (STELARA IV) Infuse  into a venous catheter.    Annalee Elena MD   ustekinumab (STELARA) 45 MG/0.5ML injection Inject 45 mg under the skin Every 30 (Thirty) Days.    Annalee Elena MD      Current Facility-Administered Medications   Medication Dose Route Frequency Provider Last Rate Last Admin   • acetaminophen (TYLENOL) tablet 650 mg  650 mg Oral Q4H PRN Naye Irving PA-C       • ALPRAZolam (XANAX) tablet 0.25 mg  0.25 mg Oral BID PRN Naye Irving PA-C        • amLODIPine (NORVASC) tablet 5 mg  5 mg Oral Q24H Naye Irving PA-C   5 mg at 08/05/22 0953   • aspirin chewable tablet 81 mg  81 mg Oral Daily Chester Cowan MD   81 mg at 08/05/22 0953   • atorvastatin (LIPITOR) tablet 80 mg  80 mg Oral Daily Naye Irving PA-C   80 mg at 08/05/22 0953   • baclofen (LIORESAL) tablet 10 mg  10 mg Oral BID Naye Irving PA-C   10 mg at 08/05/22 0953   • diphenhydrAMINE (BENADRYL) capsule 25 mg  25 mg Oral Nightly PRN Naye Irving PA-C       • droperidol (INAPSINE) injection 0.625 mg  0.625 mg Intravenous Q15 Min PRN Naye Irving PA-C        Or   • droperidol (INAPSINE) injection 0.625 mg  0.625 mg Intramuscular Once PRN Naye Irving PA-C       • DULoxetine (CYMBALTA) DR capsule 30 mg  30 mg Oral Daily Naye Irving PA-C   30 mg at 08/05/22 0953   • fentaNYL citrate (PF) (SUBLIMAZE) injection 50 mcg  50 mcg Intravenous Q5 Min PRN Naye Irving PA-C   50 mcg at 08/04/22 1625   • gabapentin (NEURONTIN) capsule 300 mg  300 mg Oral Q8H Naye Irving PA-C   300 mg at 08/05/22 1411   • hydrALAZINE (APRESOLINE) injection 5 mg  5 mg Intravenous Q10 Min PRN Naye Irving PA-C       • hydrALAZINE (APRESOLINE) tablet 25 mg  25 mg Oral TID Naye Irving PA-C   25 mg at 08/05/22 0953   • HYDROcodone-acetaminophen (NORCO) 5-325 MG per tablet 1 tablet  1 tablet Oral Once PRN Naye Irving PA-C   1 tablet at 08/05/22 0953   • ibuprofen (ADVIL,MOTRIN) tablet 600 mg  600 mg Oral Q8H Naye Irving PA-C   600 mg at 08/05/22 1412   • ipratropium-albuterol (DUO-NEB) nebulizer solution 3 mL  3 mL Nebulization Once PRN Naye Irving PA-C       • isosorbide mononitrate (IMDUR) 24 hr tablet 30 mg  30 mg Oral Q24H Naye Irving PA-C   30 mg at 08/05/22 0953   • labetalol (NORMODYNE,TRANDATE) injection 5 mg  5 mg Intravenous Q5 Min PRN Naye Irving PA-C       • lactated ringers bolus 500 mL  500 mL Intravenous Once PRN Naye Irving  GEOVANNY LOPEZ       • lactated ringers infusion  9 mL/hr Intravenous Continuous Naye Irving PA-C 9 mL/hr at 08/04/22 1551 9 mL/hr at 08/04/22 1551   • meperidine (DEMEROL) injection 12.5 mg  12.5 mg Intravenous Q5 Min PRN Naye Irving PA-C       • metoprolol succinate XL (TOPROL-XL) 24 hr tablet 25 mg  25 mg Oral Daily Naye Irving PA-C   25 mg at 08/05/22 0953   • morphine injection 2 mg  2 mg Intravenous Q4H PRN Naye Irving PA-C   2 mg at 08/05/22 2005    And   • naloxone (NARCAN) injection 0.4 mg  0.4 mg Intravenous Q5 Min PRN Naye Irving PA-C       • naloxone (NARCAN) injection 0.4 mg  0.4 mg Intravenous PRN Naye Irving PA-C       • nicotine (NICODERM CQ) 14 MG/24HR patch 1 patch  1 patch Transdermal Q24H Naye Irving PA-C   1 patch at 08/05/22 2007   • nitroglycerin (NITROSTAT) SL tablet 0.4 mg  0.4 mg Sublingual Q5 Min PRN Naye Irving PA-C       • ondansetron (ZOFRAN) injection 4 mg  4 mg Intravenous Once PRN Naye Irving PA-C       • ondansetron (ZOFRAN) injection 4 mg  4 mg Intravenous Q6H PRN Naye Irving PA-C       • pantoprazole (PROTONIX) EC tablet 40 mg  40 mg Oral BID AC Naye Irving PA-C   40 mg at 08/05/22 0954   • promethazine (PHENERGAN) suppository 25 mg  25 mg Rectal Once PRN Naye Irving PA-C        Or   • promethazine (PHENERGAN) tablet 25 mg  25 mg Oral Once PRN Naye Irving PA-C       • sodium chloride 0.9 % flush 10 mL  10 mL Intravenous PRN Naye Irving PA-C       • sodium chloride 0.9 % flush 3 mL  3 mL Intravenous Q12H Naye Irving PA-C   3 mL at 08/05/22 2009   • sodium chloride 0.9 % flush 3 mL  3 mL Intravenous Q12H Naye Irving PA-C   3 mL at 08/05/22 2008   • sodium chloride 0.9 % flush 3-10 mL  3-10 mL Intravenous PRN Naye Irving PA-C          Past Medical History:   Diagnosis Date   • Anxiety    • Arthritis     psoriatic   • ASCVD (arteriosclerotic cardiovascular disease)    • CHF (congestive heart  failure) ([facility])    • Chronic low back pain    • COPD (chronic obstructive pulmonary disease) ([facility])    • Coronary artery disease    • Fibromyalgia    • Full dentures    • GERD (gastroesophageal reflux disease)    • History of EKG 01/26/2016    NORMAL   • Hyperlipidemia    • Hypertension    • Hypotension    • Insomnia    • MI, old 2015   • Psoriasis    • Psoriatic arthritis ([facility])    • RA (rheumatoid arthritis) ([facility])    • Sciatic nerve pain    • Skin pain     from nerve pain   • Sleep apnea     cpap non compliant   • Tinnitus    • Wears glasses     readers     Past Surgical History:   Procedure Laterality Date   • ANTERIOR CERVICAL DISCECTOMY W/ FUSION N/A 04/26/2021    Procedure: CERVICAL DISCECTOMY ANTERIOR WITH FUSION C4-6;  Surgeon: Chester Cowan MD;  Location:  [facility];  Service: Neurosurgery;  Laterality: N/A;   • BACK SURGERY      lumbar 1995   • CARDIAC CATHETERIZATION N/A 10/13/2017    Procedure: Left Heart Cath;  Surgeon: Chester Centeno MD;  [facility];  Service:    • CARDIAC CATHETERIZATION N/A 02/11/2021    Procedure: Left Heart Cath;  Surgeon: Brent Degroot MD;  [facility]  [facility];  Service: Cardiology;  Laterality: N/A;   • CATARACT EXTRACTION, BILATERAL Bilateral    • CERVICAL DISCECTOMY POSTERIOR FUSION WITH BRAIN LAB N/A 8/4/2022    Procedure: CERVICAL POSTERIOR FUSION WITH INSTRUMENTATION C3-5;  Surgeon: Chester Cowan MD;  Location:  [facility];  Service: Neurosurgery;  Laterality: N/A;   • COLONOSCOPY     • CORONARY ANGIOPLASTY WITH STENT PLACEMENT      x2-3 stent in place   • ENDOSCOPY     • ENDOSCOPY N/A 02/09/2018    Procedure: ESOPHAGOGASTRODUODENOSCOPY WITH DILATATION CPT CODE: 67903;  Surgeon: Everett Villanueva III, MD;  Location:  [facility];  Service:      Family History   Problem Relation Age of Onset   • Heart attack Mother         x 3   • Atrial fibrillation Mother    • Heart disease Mother    • Heart attack Father     • Heart disease Father         CABG     GI Family History  No family history of colon polyps or cancers  Social History     Tobacco Use   Smoking Status Current Every Day Smoker   • Packs/day: 1.00   • Years: 35.00   • Pack years: 35.00   • Types: Cigarettes   Smokeless Tobacco Former User   • Types: Chew   • Quit date: 2018   Tobacco Comment    down 0.5 ppd now     Social History     Substance and Sexual Activity   Alcohol Use No      Social History     Substance and Sexual Activity   Drug Use No        ------  Review of Systems    Vital Signs   Temp:  [98.1 °F (36.7 °C)-98.6 °F (37 °C)] 98.1 °F (36.7 °C)  Heart Rate:  [64-89] 64  Resp:  [14-16] 16  BP: (101-122)/(64-76) 122/72    Physical Exam:   General Appearance: Alert, in no acute distress  Head: Normocephalic, without obvious abnormality, atraumatic  Eyes: Lids and lashes normal, conjunctivae and sclerae normal, no icterus, no pallor, corneas clear, PERRLA  Lungs: respirations regular, even and unlabored Heart: Regular rhythm and normal rate  Chest Wall: Symmetrical respiratory expansion  Abdomen: No masses, no organomegaly, soft nontender, nondistended, no guarding  Extremities:  Moves all extremities well, no edema, no cyanosis, no redness  Skin: No bleeding, bruising or rash  Neurologic: Cranial nerves 2 - 12 grossly intact, no focal deficits       LABS:   Lab Results (last 48 hours)     ** No results found for the last 48 hours. **        RADIOLOGY:  Imaging Results (Last 24 Hours)     ** No results found for the last 24 hours. **          Assessment & Plan       Cervical spondylosis with myelopathy      Impressions and plan #1 dysphagia and meat impaction: Presently he may be handling his own secretions better.  I do not see him spitting into a container.  We are going to recommend evaluation.  We will try and remove the impaction.  I did discuss the case with the neurosurgical physician assistant.  She recommends a glide scope for minimal manipulation  of the neck with intubation.    I discussed the patient's findings and my recommendations with patient and with neurosurgery.    Sg Black MD  08/05/22  21:15 EDT                          Electronically signed by Sg Black MD at 08/05/22 2122     Annie Peña APRN at 08/04/22 1141     Attestation signed by Chester Cowan MD at 08/04/22 1149    .                  Pre-Op H&P  Javier Santiago  [id number]  1965      Chief complaint: Neck pain      Subjective:  Patient is a 56 y.o.male presents for scheduled surgery by Dr. Cowan. He anticipates a CERVICAL POSTERIOR FUSION WITH INSTRUMENTATION C3-5 today. He has had worsening neck pain over the last year. He has numbness BUE. He has developed  increasing dysesthesias involving his arms, chest, abdomen, and legs. He had previous cervical fusion 4/26/21.      Review of Systems:  Constitutional-- No fever, chills or sweats. No fatigue.  CV-- No chest pain, palpitation or syncope. +HTN, HLD, CHF  Resp-- No cough, hemoptysis. +SOB, COPD, ALEJANDRA no cpap   Skin--No rashes or lesions      Allergies: No Known Allergies      Home Meds:  Medications Prior to Admission   Medication Sig Dispense Refill Last Dose   • albuterol (PROVENTIL HFA;VENTOLIN HFA) 108 (90 BASE) MCG/ACT inhaler Inhale 2 puffs Every 4 (Four) Hours As Needed for Wheezing.   Past Week at Unknown time   • ALPRAZolam (XANAX) 0.5 MG tablet    Past Week at Unknown time   • amLODIPine (NORVASC) 5 MG tablet TAKE ONE TABLET BY MOUTH DAILY FOR FOR BLOOD  PRESSURE 30 tablet 5 8/4/2022 at Unknown time   • atorvastatin (LIPITOR) 80 MG tablet Take 80 mg by mouth daily.   8/4/2022 at Unknown time   • baclofen (LIORESAL) 10 MG tablet Take 1 tablet by mouth 2 (Two) Times a Day. 60 tablet 0 Past Week at Unknown time   • chlorhexidine (HIBICLENS) 4 % external liquid Shower each day with solution for 5 days beginning 5 days before surgery. 120 mL 0 8/4/2022 at Unknown time   • DULoxetine (CYMBALTA) 30  MG capsule Take 30 mg by mouth Daily.   Past Week at Unknown time   • gabapentin (NEURONTIN) 600 MG tablet Take 600 mg by mouth 3 (Three) Times a Day.   8/4/2022 at Unknown time   • hydrALAZINE (APRESOLINE) 25 MG tablet Take 25 mg by mouth 3 (Three) Times a Day.   8/4/2022 at Unknown time   • HYDROcodone-acetaminophen (NORCO)  MG per tablet    8/3/2022 at Unknown time   • hydrOXYzine (ATARAX) 25 MG tablet Take 25 mg by mouth Every 8 (Eight) Hours As Needed.   8/3/2022 at Unknown time   • isosorbide mononitrate (IMDUR) 30 MG 24 hr tablet TAKE ONE TABLET BY MOUTH DAILY FOR HEART 90 tablet 3 8/4/2022 at Unknown time   • loratadine (CLARITIN) 10 MG tablet Take 10 mg by mouth Daily.   8/3/2022 at Unknown time   • metoprolol succinate XL (TOPROL-XL) 25 MG 24 hr tablet Take 1 tablet by mouth Daily. 90 tablet 1 8/3/2022 at 2000   • mupirocin (BACTROBAN) 2 % ointment Apply to the inside of each nostril with a cotton swab twice daily (morning and evening) starting 5 days before surgery. 22 g 0 8/4/2022 at Unknown time   • nicotine (NICODERM CQ) 14 MG/24HR patch Place 1 patch on the skin as directed by provider Daily. 30 patch 1 8/3/2022 at Unknown time   • pantoprazole (PROTONIX) 40 MG EC tablet Take 1 tablet by mouth 2 (Two) Times a Day Before Meals. 60 tablet 5 Past Month at Unknown time   • SYMBICORT 80-4.5 MCG/ACT inhaler Inhale 2 puffs 2 (Two) Times a Day As Needed.   Past Week at Unknown time   • aspirin 81 MG tablet Take 1 tablet by mouth Daily. 30 tablet 11 7/27/2022   • betamethasone dipropionate (DIPROLENE) 0.05 % ointment Apply  topically to the appropriate area as directed 2 (Two) Times a Day.      • clopidogrel (Plavix) 75 MG tablet Take 1 tablet by mouth Daily. (Patient taking differently: Take 75 mg by mouth Daily. Holding for 5 days- letter on chart) 30 tablet  7/27/2022   • EPINEPHrine (EPIPEN IJ) Inject 1 dose as directed As Needed (ALLERGIC REACTION).      • nitroglycerin (NITROSTAT) 0.4 MG SL tablet  Place 0.4 mg under the tongue Every 5 (Five) Minutes As Needed for Chest Pain. Take no more than 3 doses in 15 minutes.      • Stelara 45 MG/0.5ML solution prefilled syringe Injection       • Ustekinumab (STELARA IV) Infuse  into a venous catheter.      • ustekinumab (STELARA) 45 MG/0.5ML injection Inject 45 mg under the skin Every 30 (Thirty) Days.   7/12/2022         PMH:   Past Medical History:   Diagnosis Date   • Anxiety    • Arthritis     psoriatic   • ASCVD (arteriosclerotic cardiovascular disease)    • CHF (congestive heart failure) ([facility])    • Chronic low back pain    • COPD (chronic obstructive pulmonary disease) ([facility])    • Coronary artery disease    • Fibromyalgia    • Full dentures    • GERD (gastroesophageal reflux disease)    • History of EKG 01/26/2016    NORMAL   • Hyperlipidemia    • Hypertension    • Hypotension    • Insomnia    • MI, old 2015   • Psoriasis    • Psoriatic arthritis ([facility])    • RA (rheumatoid arthritis) ([facility])    • Sciatic nerve pain    • Skin pain     from nerve pain   • Sleep apnea     cpap non compliant   • Tinnitus    • Wears glasses     readers     PSH:    Past Surgical History:   Procedure Laterality Date   • ANTERIOR CERVICAL DISCECTOMY W/ FUSION N/A 04/26/2021    Procedure: CERVICAL DISCECTOMY ANTERIOR WITH FUSION C4-6;  Surgeon: Chester Cowan MD;  Location: [facility];  Service: Neurosurgery;  Laterality: N/A;   • BACK SURGERY      lumbar 1995   • CARDIAC CATHETERIZATION N/A 10/13/2017    Procedure: Left Heart Cath;  Surgeon: Chester Centeno MD;  [facility]  [facility];  Service:    • CARDIAC CATHETERIZATION N/A 02/11/2021    Procedure: Left Heart Cath;  Surgeon: Brent Degroot MD;  [facility]:  [facility];  Service: Cardiology;  Laterality: N/A;   • CATARACT EXTRACTION, BILATERAL Bilateral    • COLONOSCOPY     • CORONARY ANGIOPLASTY WITH STENT PLACEMENT      x2-3 stent in place   • ENDOSCOPY     • ENDOSCOPY N/A 02/09/2018     "Procedure: ESOPHAGOGASTRODUODENOSCOPY WITH DILATATION CPT CODE: 05370;  Surgeon: Everett Villanueva III, MD;  Location: [facility] OR;  Service:        Immunization History:  Influenza: 2021  Pneumococcal: No  Tetanus: UTD  Covid x3: 2021    Social History:   Tobacco:   Social History     Tobacco Use   Smoking Status Current Every Day Smoker   • Packs/day: 1.00   • Years: 35.00   • Pack years: 35.00   • Types: Cigarettes   Smokeless Tobacco Former User   • Types: Chew   • Quit date: 2018   Tobacco Comment    down 0.5 ppd now      Alcohol:     Social History     Substance and Sexual Activity   Alcohol Use No         Physical Exam:/99 (BP Location: Right arm, Patient Position: Lying)   Pulse 80   Temp 97.8 °F (36.6 °C) (Temporal)   Resp 18   Ht 177.8 cm (70\")   Wt 77.6 kg (171 lb)   SpO2 96%   BMI 24.54 kg/m²       General Appearance:    Alert, cooperative, no distress, appears stated age   Head:    Normocephalic, without obvious abnormality, atraumatic   Lungs:     Clear to auscultation bilaterally, respirations unlabored    Heart:   Regular rate and rhythm, S1 and S2 normal    Abdomen:    Soft without tenderness   Extremities:   Extremities normal, atraumatic, no cyanosis or edema   Skin:   Skin color, texture, turgor normal, no rashes or lesions   Neurologic:   Grossly intact     Results Review:     LABS:  Lab Results   Component Value Date    WBC 6.30 08/02/2022    HGB 15.9 08/02/2022    HCT 45.8 08/02/2022    MCV 92.0 08/02/2022     08/02/2022    NEUTROABS 3.62 02/11/2021    GLUCOSE 118 (H) 02/11/2021    BUN 7 02/11/2021    CREATININE 1.05 02/11/2021    EGFRIFNONA 73 02/11/2021     02/11/2021    K 4.7 08/02/2022     02/11/2021    CO2 24.2 02/11/2021    CALCIUM 10.2 02/11/2021    ALBUMIN 4.30 02/11/2021    AST 21 02/11/2021    ALT 21 02/11/2021    BILITOT 0.2 02/11/2021       RADIOLOGY:  Imaging Results (Last 72 Hours)     ** No results found for the last 72 hours. **          I " reviewed the patient's new clinical results.    Cancer Staging (if applicable)  Cancer Patient: __ yes __no __unknown; If yes, clinical stage T:__ N:__M:__, stage group or __N/A      Impression: Cervical spondylosis with myelopathy       Plan: CERVICAL POSTERIOR FUSION WITH INSTRUMENTATION C3-5      OBDULIO Henriquez   8/4/2022   11:41 EDT    Electronically signed by Chester Cowan MD at 08/04/22 1149            Discharge Summary      Daiana Ford PA-C at 08/06/22 1152              [facility]    Date of Admission: 8/4/2022  Date of Discharge:  8/6/2022    Discharge Diagnosis: Cervical spondylosis and stenosis with myelopathy    Procedures Performed  Procedure(s):  C3-6 decompression with fusion and stabilization dorsally C3-5.  ESOPHAGOGASTRODUODENOSCOPY WITH FOREIGN BODY REMOVAL       Presenting Problem/History of Present Illness  Mr Santiago is a 56-year-old gentleman with a history of cervical myelopathy who previously underwent an anterior intervention last year.  Some of his symptoms  improved but recently he had a recurrence and imaging showed significant ongoing cervical stenosis.  He presented on 8/4/2022 for dorsal decompression with fusion and stabilization.  This was done at levels C3-5.    Hospital Course:   Surgery was done on 8/4/2022 and was without complication.  He was discharged to recovery and then to the surgical floor in good condition.  He participated with physical therapy and inpatient rehab was recommended, however patient wanted to go home.  He will be sent home with a walker and case management will work on arranging home health and home physical therapy.    On the evening of postoperative day 2, he was eating dinner when a food bolus became lodged in his esophagus below the level of the trachea.  He was able to breathe and talk without difficulty but was unable to pass this bolus or handle his secretions.  Ultimately,  gastroenterology was called and performed an EGD under anesthesia.  The food bolus was removed.  They noted esophageal strictures, gastritis and duodenitis and recommended PPI therapy as well as outpatient follow-up for upper endoscopy  On the morning of postoperative day 3, he was doing better.  He was able to tolerate a soft diet again.  ROHAN output from his cervical surgery had diminished and his drain was able to be removed.  He was discharged to home on postoperative day 3 with plan for case management to arrange home health and home physical therapy to begin sometime next week    Condition on Discharge:  Stable  Discharge to: home with walker. Patient will need home health and home PT.     PATIENT SPECIFIC EDUCATION/PLAN:  1. Follow-up with neurosurgery PA in 310-12 days with PA for suture removal, with AP/lateral xrays of the cervical spine prior to appointment  2. No driving until follow-up.  3. Patient discharged with Aquacel dressing. May remove this and get incision itself wet in the shower starting 8/9/22  4. NO tub bathing or swimming until follow-up  5. Ice pack to incision(s) as needed for associated pain or swelling   6. Ambulate frequently at home  7. No lifting greater than 5 lbs  8. Keep head of bed at 30 degrees or higher x5 days  9. Ambulate frequently at home; walker ordered at discharge  10 continue soft diet per gastroenterology  11. Case management to arrange home health and home PT       Discharge Medications     Discharge Medications      Changes to Medications      Instructions Start Date   clopidogrel 75 MG tablet  Commonly known as: Plavix  What changed: additional instructions   75 mg, Oral, Daily      HYDROcodone-acetaminophen  MG per tablet  Commonly known as: NORCO  What changed:   · how much to take  · how to take this  · when to take this  · reasons to take this   1 tablet, Oral, 3 Times Daily PRN         Continue These Medications      Instructions Start Date   albuterol  sulfate  (90 Base) MCG/ACT inhaler  Commonly known as: PROVENTIL HFA;VENTOLIN HFA;PROAIR HFA   2 puffs, Inhalation, Every 4 Hours PRN      ALPRAZolam 0.5 MG tablet  Commonly known as: XANAX   No dose, route, or frequency recorded.      amLODIPine 5 MG tablet  Commonly known as: NORVASC   TAKE ONE TABLET BY MOUTH DAILY FOR FOR BLOOD  PRESSURE      aspirin 81 MG tablet   81 mg, Oral, Daily      atorvastatin 80 MG tablet  Commonly known as: LIPITOR   80 mg, Oral, Daily      baclofen 10 MG tablet  Commonly known as: LIORESAL   10 mg, Oral, 2 Times Daily      betamethasone dipropionate 0.05 % ointment  Commonly known as: DIPROLENE   Topical, 2 Times Daily      DULoxetine 30 MG capsule  Commonly known as: CYMBALTA   30 mg, Oral, Daily      EPIPEN IJ   1 dose, Injection, As Needed      gabapentin 600 MG tablet  Commonly known as: NEURONTIN   600 mg, Oral, 3 Times Daily      hydrALAZINE 25 MG tablet  Commonly known as: APRESOLINE   25 mg, Oral, 3 Times Daily      hydrOXYzine 25 MG tablet  Commonly known as: ATARAX   25 mg, Oral, Every 8 Hours PRN      isosorbide mononitrate 30 MG 24 hr tablet  Commonly known as: IMDUR   TAKE ONE TABLET BY MOUTH DAILY FOR HEART      loratadine 10 MG tablet  Commonly known as: CLARITIN   10 mg, Oral, Daily      metoprolol succinate XL 25 MG 24 hr tablet  Commonly known as: TOPROL-XL   25 mg, Oral, Daily      mupirocin 2 % ointment  Commonly known as: BACTROBAN   Apply to the inside of each nostril with a cotton swab twice daily (morning and evening) starting 5 days before surgery.      nicotine 14 MG/24HR patch  Commonly known as: NICODERM CQ   1 patch, Transdermal, Every 24 Hours      nitroglycerin 0.4 MG SL tablet  Commonly known as: NITROSTAT   0.4 mg, Sublingual, Every 5 Minutes PRN, Take no more than 3 doses in 15 minutes.       pantoprazole 40 MG EC tablet  Commonly known as: Protonix   40 mg, Oral, 2 Times Daily Before Meals      STELARA IV   Intravenous      Symbicort 80-4.5  MCG/ACT inhaler  Generic drug: budesonide-formoterol   2 puffs, Inhalation, 2 Times Daily PRN      ustekinumab 45 MG/0.5ML injection  Commonly known as: STELARA   45 mg, Subcutaneous, Every 30 Days      Stelara 45 MG/0.5ML solution prefilled syringe Injection  Generic drug: Ustekinumab   No dose, route, or frequency recorded.             Follow-up Appointments  No future appointments.  Additional Instructions for the Follow-ups that You Need to Schedule     Ambulatory Referral to Home Health (Hospital)   As directed      Face to Face Visit Date: 8/6/2022    Follow-up provider for Plan of Care?: I will be treating the patient on an ongoing basis.  Please send me the Plan of Care for signature.    Follow-up provider: ABBIE MOHAN [[id number]]    Reason/Clinical Findings: cervical myelopathy    Describe mobility limitations that make leaving home difficult: cervical myelopathy, s/p cervical decompression    Nursing/Therapeutic Services Requested: Skilled Nursing Physical Therapy Occupational Therapy    Skilled nursing orders: Wound care dressing/changes    PT orders: Therapeutic exercise Gait Training Strengthening Transfer training Home safety assessment    Weight Bearing Status: As Tolerated    Frequency: 1 Week 1         Discharge Follow-up with Specified Provider: Chapo; 1 Week   As directed      To: Chapo    Follow Up: 1 Week    Follow Up Details: Follow-up with physician's assistant in approximately 10-12 days for suture removal             Ambulatory referral to Gastroenterology      Referring Provider  MD SELENA Houston Rodney Travis, APRN Talitha L Hunt, PA-C  08/06/22  12:08 EDT                Electronically signed by Daiana Ford PA-C at 08/06/22 7624

## 2022-08-06 NOTE — H&P
Gastroenterology Consult Note    Reason for Consultation: Dysphagia    Patient Care Team:  Moi Segura APRN as PCP - General (Family Medicine)  Brent Degroot MD as Consulting Physician (Interventional Cardiology)    Chief complaint: Meat impaction      History of present illness: The patient is a 56-year-old gentleman who has a history of dysphagia.  He has been dilated multiple times for what sounds like an esophageal stricture.  He points to his neck.  I do not have those reports.  He thinks he has had about 4 dilations in the past.  He does have significant reflux.  He was eating a roast beef sandwich tonight and felt the food lodged in his throat.  We are called to relieve the impaction.  He just had a posterior cervical spine surgery 8/4/2022.    No Known Allergies  Prior to Admission medications    Medication Sig Start Date End Date Taking? Authorizing Provider   albuterol (PROVENTIL HFA;VENTOLIN HFA) 108 (90 BASE) MCG/ACT inhaler Inhale 2 puffs Every 4 (Four) Hours As Needed for Wheezing.   Yes Annalee Elena MD   ALPRAZolam (XANAX) 0.5 MG tablet  6/13/22  Yes Annalee Elena MD   amLODIPine (NORVASC) 5 MG tablet TAKE ONE TABLET BY MOUTH DAILY FOR FOR BLOOD  PRESSURE 1/11/22  Yes Brent Degroot MD   atorvastatin (LIPITOR) 80 MG tablet Take 80 mg by mouth daily. 4/28/16  Yes Annalee Elena MD   baclofen (LIORESAL) 10 MG tablet Take 1 tablet by mouth 2 (Two) Times a Day. 2/28/19  Yes Dagoberto Lema MD   DULoxetine (CYMBALTA) 30 MG capsule Take 30 mg by mouth Daily.   Yes Annalee Elena MD   gabapentin (NEURONTIN) 600 MG tablet Take 600 mg by mouth 3 (Three) Times a Day. 3/25/21  Yes Annalee Elena MD   hydrALAZINE (APRESOLINE) 25 MG tablet Take 25 mg by mouth 3 (Three) Times a Day.   Yes Annalee Elena MD   HYDROcodone-acetaminophen (NORCO)  MG per tablet  6/28/22  Yes Annalee Elena MD   hydrOXYzine (ATARAX) 25 MG  tablet Take 25 mg by mouth Every 8 (Eight) Hours As Needed. 12/16/20  Yes Annalee Elena MD   isosorbide mononitrate (IMDUR) 30 MG 24 hr tablet TAKE ONE TABLET BY MOUTH DAILY FOR HEART 10/19/21  Yes Brent Degroot MD   loratadine (CLARITIN) 10 MG tablet Take 10 mg by mouth Daily.   Yes Annalee Elena MD   metoprolol succinate XL (TOPROL-XL) 25 MG 24 hr tablet Take 1 tablet by mouth Daily. 12/29/20  Yes Brent Degroot MD   mupirocin (BACTROBAN) 2 % ointment Apply to the inside of each nostril with a cotton swab twice daily (morning and evening) starting 5 days before surgery. 8/2/22  Yes Chester Cowan MD   nicotine (NICODERM CQ) 14 MG/24HR patch Place 1 patch on the skin as directed by provider Daily. 6/29/22  Yes Chester Cowan MD   pantoprazole (PROTONIX) 40 MG EC tablet Take 1 tablet by mouth 2 (Two) Times a Day Before Meals. 12/11/17  Yes Holley Summers PA-C   SYMBICORT 80-4.5 MCG/ACT inhaler Inhale 2 puffs 2 (Two) Times a Day As Needed. 4/9/18  Yes Annalee Elena MD   aspirin 81 MG tablet Take 1 tablet by mouth Daily. 5/14/18   Iza Muhammad APRN   betamethasone dipropionate (DIPROLENE) 0.05 % ointment Apply  topically to the appropriate area as directed 2 (Two) Times a Day.    ProviderAnnalee MD   clopidogrel (Plavix) 75 MG tablet Take 1 tablet by mouth Daily.  Patient taking differently: Take 75 mg by mouth Daily. Holding for 5 days- letter on chart 5/3/21   Chester Cowan MD   EPINEPHrine (EPIPEN IJ) Inject 1 dose as directed As Needed (ALLERGIC REACTION).    Annalee Elena MD   nitroglycerin (NITROSTAT) 0.4 MG SL tablet Place 0.4 mg under the tongue Every 5 (Five) Minutes As Needed for Chest Pain. Take no more than 3 doses in 15 minutes.    Annalee Elena MD   Stelara 45 MG/0.5ML solution prefilled syringe Injection  4/6/22   Provider, MD Annalee   Ustekinumab (STELARA IV) Infuse  into a venous catheter.    Provider,  MD Annalee   ustekinumab (STELARA) 45 MG/0.5ML injection Inject 45 mg under the skin Every 30 (Thirty) Days.    Provider, MD Annalee      Current Facility-Administered Medications   Medication Dose Route Frequency Provider Last Rate Last Admin   • acetaminophen (TYLENOL) tablet 650 mg  650 mg Oral Q4H PRN Naye Irving PA-C       • ALPRAZolam (XANAX) tablet 0.25 mg  0.25 mg Oral BID PRN Naye Irving PA-C       • amLODIPine (NORVASC) tablet 5 mg  5 mg Oral Q24H Naye Irving PA-C   5 mg at 08/05/22 0953   • aspirin chewable tablet 81 mg  81 mg Oral Daily Chester Cowan MD   81 mg at 08/05/22 0953   • atorvastatin (LIPITOR) tablet 80 mg  80 mg Oral Daily Naye Irving PA-C   80 mg at 08/05/22 0953   • baclofen (LIORESAL) tablet 10 mg  10 mg Oral BID Naye Irving PA-C   10 mg at 08/05/22 0953   • diphenhydrAMINE (BENADRYL) capsule 25 mg  25 mg Oral Nightly PRN Naye Irving PA-C       • droperidol (INAPSINE) injection 0.625 mg  0.625 mg Intravenous Q15 Min PRN Naye Irving PA-C        Or   • droperidol (INAPSINE) injection 0.625 mg  0.625 mg Intramuscular Once PRN Naye Irving PA-C       • DULoxetine (CYMBALTA) DR capsule 30 mg  30 mg Oral Daily Naye Irving PA-C   30 mg at 08/05/22 0953   • fentaNYL citrate (PF) (SUBLIMAZE) injection 50 mcg  50 mcg Intravenous Q5 Min PRN Naye Irving PA-C   50 mcg at 08/04/22 1625   • gabapentin (NEURONTIN) capsule 300 mg  300 mg Oral Q8H Naye Irving PA-C   300 mg at 08/05/22 1411   • hydrALAZINE (APRESOLINE) injection 5 mg  5 mg Intravenous Q10 Min PRN Naye Irving PA-C       • hydrALAZINE (APRESOLINE) tablet 25 mg  25 mg Oral TID Naye Irving PA-C   25 mg at 08/05/22 0953   • HYDROcodone-acetaminophen (NORCO) 5-325 MG per tablet 1 tablet  1 tablet Oral Once PRN Naye Irving PA-C   1 tablet at 08/05/22 0953   • ibuprofen (ADVIL,MOTRIN) tablet 600 mg  600 mg Oral Q8H Naye Irving PA-C   600 mg at  08/05/22 1412   • ipratropium-albuterol (DUO-NEB) nebulizer solution 3 mL  3 mL Nebulization Once PRN Naye Irving PA-C       • isosorbide mononitrate (IMDUR) 24 hr tablet 30 mg  30 mg Oral Q24H Naye Irving PA-C   30 mg at 08/05/22 0953   • labetalol (NORMODYNE,TRANDATE) injection 5 mg  5 mg Intravenous Q5 Min PRN Naey Irving PA-C       • lactated ringers bolus 500 mL  500 mL Intravenous Once PRN Naye Irving PA-C       • lactated ringers infusion  9 mL/hr Intravenous Continuous Naye Irving PA-C 9 mL/hr at 08/04/22 1551 9 mL/hr at 08/04/22 1551   • meperidine (DEMEROL) injection 12.5 mg  12.5 mg Intravenous Q5 Min PRN Naye Irving PA-C       • metoprolol succinate XL (TOPROL-XL) 24 hr tablet 25 mg  25 mg Oral Daily Naye Irving PA-C   25 mg at 08/05/22 0953   • morphine injection 2 mg  2 mg Intravenous Q4H PRN Naye Irving PA-C   2 mg at 08/05/22 2005    And   • naloxone (NARCAN) injection 0.4 mg  0.4 mg Intravenous Q5 Min PRN Naye Irving PA-C       • naloxone (NARCAN) injection 0.4 mg  0.4 mg Intravenous PRN Naye Irving PA-C       • nicotine (NICODERM CQ) 14 MG/24HR patch 1 patch  1 patch Transdermal Q24H Naye Irving PA-C   1 patch at 08/05/22 2007   • nitroglycerin (NITROSTAT) SL tablet 0.4 mg  0.4 mg Sublingual Q5 Min PRN Naye Irving PA-C       • ondansetron (ZOFRAN) injection 4 mg  4 mg Intravenous Once PRN Naye Irving PA-C       • ondansetron (ZOFRAN) injection 4 mg  4 mg Intravenous Q6H PRN Naye Irving PA-C       • pantoprazole (PROTONIX) EC tablet 40 mg  40 mg Oral BID AC Naye Irving PA-C   40 mg at 08/05/22 0954   • promethazine (PHENERGAN) suppository 25 mg  25 mg Rectal Once PRN Naye Irving PA-C        Or   • promethazine (PHENERGAN) tablet 25 mg  25 mg Oral Once PRN Naye Irving PA-C       • sodium chloride 0.9 % flush 10 mL  10 mL Intravenous PRN Naye Irving PA-C       • sodium chloride 0.9 % flush 3 mL   3 mL Intravenous Q12H Naye Irving PA-C   3 mL at 08/05/22 2009   • sodium chloride 0.9 % flush 3 mL  3 mL Intravenous Q12H Naye Irving PA-C   3 mL at 08/05/22 2008   • sodium chloride 0.9 % flush 3-10 mL  3-10 mL Intravenous PRN Nyae Irving PA-C          Past Medical History:   Diagnosis Date   • Anxiety    • Arthritis     psoriatic   • ASCVD (arteriosclerotic cardiovascular disease)    • CHF (congestive heart failure) ([facility])    • Chronic low back pain    • COPD (chronic obstructive pulmonary disease) ([facility])    • Coronary artery disease    • Fibromyalgia    • Full dentures    • GERD (gastroesophageal reflux disease)    • History of EKG 01/26/2016    NORMAL   • Hyperlipidemia    • Hypertension    • Hypotension    • Insomnia    • MI, old 2015   • Psoriasis    • Psoriatic arthritis ([facility])    • RA (rheumatoid arthritis) ([facility])    • Sciatic nerve pain    • Skin pain     from nerve pain   • Sleep apnea     cpap non compliant   • Tinnitus    • Wears glasses     readers     Past Surgical History:   Procedure Laterality Date   • ANTERIOR CERVICAL DISCECTOMY W/ FUSION N/A 04/26/2021    Procedure: CERVICAL DISCECTOMY ANTERIOR WITH FUSION C4-6;  Surgeon: Chester Cowan MD;  Location:  [facility];  Service: Neurosurgery;  Laterality: N/A;   • BACK SURGERY      lumbar 1995   • CARDIAC CATHETERIZATION N/A 10/13/2017    Procedure: Left Heart Cath;  Surgeon: Chester Centeno MD;  [facility]:  [facility];  Service:    • CARDIAC CATHETERIZATION N/A 02/11/2021    Procedure: Left Heart Cath;  Surgeon: Brent Degroot MD;  Location:  [facility];  Service: Cardiology;  Laterality: N/A;   • CATARACT EXTRACTION, BILATERAL Bilateral    • CERVICAL DISCECTOMY POSTERIOR FUSION WITH BRAIN LAB N/A 8/4/2022    Procedure: CERVICAL POSTERIOR FUSION WITH INSTRUMENTATION C3-5;  Surgeon: Chester Cowan MD;  Location:  [facility];  Service: Neurosurgery;  Laterality: N/A;   • COLONOSCOPY      • CORONARY ANGIOPLASTY WITH STENT PLACEMENT      x2-3 stent in place   • ENDOSCOPY     • ENDOSCOPY N/A 02/09/2018    Procedure: ESOPHAGOGASTRODUODENOSCOPY WITH DILATATION CPT CODE: 18748;  Surgeon: Everett Villanueva III, MD;  [facility];  Service:      Family History   Problem Relation Age of Onset   • Heart attack Mother         x 3   • Atrial fibrillation Mother    • Heart disease Mother    • Heart attack Father    • Heart disease Father         CABG     GI Family History  No family history of colon polyps or cancers  Social History     Tobacco Use   Smoking Status Current Every Day Smoker   • Packs/day: 1.00   • Years: 35.00   • Pack years: 35.00   • Types: Cigarettes   Smokeless Tobacco Former User   • Types: Chew   • Quit date: 2018   Tobacco Comment    down 0.5 ppd now     Social History     Substance and Sexual Activity   Alcohol Use No      Social History     Substance and Sexual Activity   Drug Use No        ------  Review of Systems    Vital Signs   Temp:  [98.1 °F (36.7 °C)-98.6 °F (37 °C)] 98.1 °F (36.7 °C)  Heart Rate:  [64-89] 64  Resp:  [14-16] 16  BP: (101-122)/(64-76) 122/72    Physical Exam:   General Appearance: Alert, in no acute distress  Head: Normocephalic, without obvious abnormality, atraumatic  Eyes: Lids and lashes normal, conjunctivae and sclerae normal, no icterus, no pallor, corneas clear, PERRLA  Lungs: respirations regular, even and unlabored Heart: Regular rhythm and normal rate  Chest Wall: Symmetrical respiratory expansion  Abdomen: No masses, no organomegaly, soft nontender, nondistended, no guarding  Extremities:  Moves all extremities well, no edema, no cyanosis, no redness  Skin: No bleeding, bruising or rash  Neurologic: Cranial nerves 2 - 12 grossly intact, no focal deficits       LABS:   Lab Results (last 48 hours)     ** No results found for the last 48 hours. **        RADIOLOGY:  Imaging Results (Last 24 Hours)     ** No results found for the last 24 hours.  **          Assessment & Plan       Cervical spondylosis with myelopathy      Impressions and plan #1 dysphagia and meat impaction: Presently he may be handling his own secretions better.  I do not see him spitting into a container.  We are going to recommend evaluation.  We will try and remove the impaction.  I did discuss the case with the neurosurgical physician assistant.  She recommends a glide scope for minimal manipulation of the neck with intubation.    I discussed the patient's findings and my recommendations with patient and with neurosurgery.    Sg Black MD  08/05/22  21:15 EDT

## 2022-08-06 NOTE — OP NOTE
EGD with food extraction      Instrument: Olympus video gastroscope    Medications: As per anesthesia      Preop diagnosis: Food impaction    Postop diagnosis: Food impaction, esophageal stricture, gastritis, duodenitis      Indication: The patient is a 56-year-old intermittent solid food dysphagia.  Today he was eating roast beef and he felt the food get lodged in his neck.  He had difficulty with his secretions and is referred for evaluation and food extraction.  He did have cervical neck surgery yesterday.    Procedure: After the patient was informed of the risks, benefits, alternatives to the procedure, informed consent was obtained.  The endoscope was inserted into the oropharynx and into the esophagus.  There was a large piece of food in the cervical esophagus.  This was grasped in 1 piece via a Craft net and pulled out the esophagus.  We reinserted the scope.  There was a ring in the esophagus with some ulceration and inflammation.  There appeared to be some other rings as well in the esophagus suspicious for eosinophilic esophagitis.  The stomach is remarkable for gastritis primarily in the antrum.  The duodenum the ileum had some duodenitis as well.  Retroflexion was performed and photos were taken.  The patient tolerated the procedure well and there were no immediate complications evident.      Impressions and plan #1 Food bolus extracted    #2 upper esophageal ring as well as several other rings suspicious for eosinophilic esophagitis    Plan PPI therapy and upper endoscopy as an outpatient.      GLEN Cowan   mild

## 2022-08-06 NOTE — ANESTHESIA PREPROCEDURE EVALUATION
Anesthesia Evaluation     Patient summary reviewed and Nursing notes reviewed                Airway   Mallampati: II  TM distance: >3 FB  Neck ROM: limited  Possible difficult intubation  Dental - normal exam     Pulmonary - normal exam   (+) a smoker Current, COPD, sleep apnea,   Cardiovascular - normal exam    (+) hypertension, CAD, cardiac stents hyperlipidemia,     ROS comment:   Echo 1/21: normal EF, no significant valve disease    LHC 2/21: Mild to moderate Non obstructive 50% in stent restenosis in the proximal LAD stent, medical management    Neuro/Psych- negative ROS    ROS Comment: S/p C3-5 posterior fusion  GI/Hepatic/Renal/Endo    (+)  GERD,      Musculoskeletal (-) negative ROS    Abdominal  - normal exam    Bowel sounds: normal.   Substance History - negative use     OB/GYN negative ob/gyn ROS         Other                      Anesthesia Plan    ASA 3 - emergent     general   Rapid sequence  (glidescope/simmons)  intravenous induction     Anesthetic plan, risks, benefits, and alternatives have been provided, discussed and informed consent has been obtained with: patient.        CODE STATUS:    Code Status (Patient has no pulse and is not breathing): CPR (Attempt to Resuscitate)  Medical Interventions (Patient has pulse or is breathing): Full

## 2022-08-06 NOTE — CASE MANAGEMENT/SOCIAL WORK
Continued Stay Note  [facility]     Patient Name: Javier Santiago  MRN: [id number]  Today's Date: 8/6/2022    Admit Date: 8/4/2022     Discharge Plan     Row Name 08/06/22 1451       Plan    Plan Comments Walker was brought to pts room from [facility].  has sent HH referrals to [facility]. Due to the weekend they cannot confirm they can accept pt until they run his insurance. CM has updated pt and will follow up Monday. Pt reports understanding.    Final Discharge Disposition Code 06 - home with home health care               Discharge Codes    No documentation.               Expected Discharge Date and Time     Expected Discharge Date Expected Discharge Time    Aug 6, 2022             Brooklyn Will RN

## 2022-08-06 NOTE — NURSING NOTE
Pt arrived to unit from endo is stable condition, v/s stable, no sob, dizziness, weakness. Pt verbalized comfort, ambulated in the hodgson, and is now in bed resting.     Thank you.     LS

## 2022-08-06 NOTE — PROGRESS NOTES
"NEUROSURGERY PROGRESS NOTE     LOS: 2 days   Patient Care Team:  Moi Segura APRN as PCP - General (Family Medicine)  Brent Degroot MD as Consulting Physician (Interventional Cardiology)    Chief Complaint: neck pain        POD#: 2 Day Post-Op  Procedures:  C3-6 decompression with fusion and stabilization dorsally C3-5.                                  Interval History:   Patient is postoperative day 2 from C3 3 through 6 decompression with fusion.  He has been ambulating and voiding independently.  He has incisional pain and muscle spasms.  He still has some numbness in his hands  Last night, he was eating dinner and a food bolus became lodged.  He was able to breathe easily but unable to pass this or handle his secretions.  Gastroenterology was called and performed a food extraction via EGD.  Patient has tolerated this well and is feeling much better.  He has been n.p.o. since last night.  He endorses mild throat pain but is ready to eat and aware of food texture restrictions. Also noted were esophageal stricture, which patient has been treated for in the past, gastritis, and duodenitis.  They recommended PPI and outpatient evaluation with upper endoscopy.   His surgical pain has been controlled with oral medication.  He is eager to go home.  He will need a walker at discharge to help with ambulation      Vital Signs: Blood pressure 143/83, pulse 56, temperature 98.2 °F (36.8 °C), temperature source Oral, resp. rate 16, height 177.8 cm (70\"), weight 77.6 kg (171 lb), SpO2 91 %.  Intake/Output:     Intake/Output Summary (Last 24 hours) at 8/6/2022 1131  Last data filed at 8/6/2022 1045  Gross per 24 hour   Intake 400 ml   Output 2965 ml   Net -2565 ml       Physical Exam:  Awake alert oriented.  Upper and lower extremity strength intact.  No vocal hoarseness.  Patient denies sore throat.  ROHAN drain in place with minimal serosanguineous drainage.  Mild serosanguineous drainage on cover " Derm      Assessment/Plan:  1.  Cervical spondylosis and stenosis with progressive myelopathy status post dorsal decompression with fusion and stabilization.  2.  History of ACDF C4-6.  3. History of esophageal stricture with previous dilatation. S/p acute food impaction of esophagus yesterday and EGD for food extraction. Gastroenterology noted esophageal stricture, gastritis, and duodenitis and recommended outpatient follow up with them.   4.  History of coronary artery disease and congestive heart failure.  5.  History of hypertension.  6  Disposition: Discharge to home today.  Physical therapy had recommended inpatient rehab, however patient would prefer to go home with home health and home physical therapy.  We have ordered a walker for him to take at discharge.  He does not need elevated commode etc.  Case management to arrange home health home physical therapy on Monday  7.follow up in approximately 10-12 days at neurosurgery office for posterior cervical suture removal.     Daiana Ford PA-C  08/06/22  11:31 EDT

## 2022-08-06 NOTE — ANESTHESIA PROCEDURE NOTES
Airway  Urgency: elective    Date/Time: 8/5/2022 9:52 PM  Airway not difficult    General Information and Staff    Patient location during procedure: OR  Anesthesiologist: Rahul Banks MD    Indications and Patient Condition  Indications for airway management: airway protection    Preoxygenated: yes  MILS not maintained throughout  Mask difficulty assessment: 1 - vent by mask    Final Airway Details  Final airway type: endotracheal airway      Successful airway: ETT  Cuffed: yes   Successful intubation technique: direct laryngoscopy, video laryngoscopy and RSI  Facilitating devices/methods: cricoid pressure  Endotracheal tube insertion site: oral  Blade: Néstor  Blade size: 3  ETT size (mm): 7.5  Cormack-Lehane Classification: grade I - full view of glottis  Placement verified by: chest auscultation and capnometry   Measured from: lips  ETT/EBT  to lips (cm): 20  Number of attempts at approach: 1  Assessment: lips, teeth, and gum same as pre-op and atraumatic intubation    Additional Comments  C spine neutral position, no manipulation    Negative epigastric sounds, Breath sound equal bilaterally with symmetric chest rise and fall

## 2022-08-06 NOTE — PLAN OF CARE
Goal Outcome Evaluation:        PT status post c3-c6 posterior decompression and fusion day one, complaining of difficulty swallowing. Pt placed on strict Npo. Neurosurgery assessed patient. Pt then went to endoscopy for evacuation and pt now reports comfort, is able to swallow his own saliva and small sips of water. Pt has received morphine 2mg via iv for pain control. Is stable, no signs of distress, sob, nor salivating. Pt is now resting. ROHAN drain in place with moderate amount of output. Sx site dressing in place. Pt self regulates positioning. Will be monitored for remainder of shift and likely continued on a mechanical soft diet resuming for breakfast as recommended by egd report.     Thank you.     VIRY

## 2022-08-06 NOTE — DISCHARGE SUMMARY
[facility]    Date of Admission: 8/4/2022  Date of Discharge:  8/6/2022    Discharge Diagnosis: Cervical spondylosis and stenosis with myelopathy    Procedures Performed  Procedure(s):  C3-6 decompression with fusion and stabilization dorsally C3-5.  ESOPHAGOGASTRODUODENOSCOPY WITH FOREIGN BODY REMOVAL       Presenting Problem/History of Present Illness  Mr Santiago is a 56-year-old gentleman with a history of cervical myelopathy who previously underwent an anterior intervention last year.  Some of his symptoms  improved but recently he had a recurrence and imaging showed significant ongoing cervical stenosis.  He presented on 8/4/2022 for dorsal decompression with fusion and stabilization.  This was done at levels C3-5.    Hospital Course:   Surgery was done on 8/4/2022 and was without complication.  He was discharged to recovery and then to the surgical floor in good condition.  He participated with physical therapy and inpatient rehab was recommended, however patient wanted to go home.  He will be sent home with a walker and case management will work on arranging home health and home physical therapy.    On the evening of postoperative day 2, he was eating dinner when a food bolus became lodged in his esophagus below the level of the trachea.  He was able to breathe and talk without difficulty but was unable to pass this bolus or handle his secretions.  Ultimately, gastroenterology was called and performed an EGD under anesthesia.  The food bolus was removed.  They noted esophageal strictures, gastritis and duodenitis and recommended PPI therapy as well as outpatient follow-up for upper endoscopy  On the morning of postoperative day 3, he was doing better.  He was able to tolerate a soft diet again.  ROHAN output from his cervical surgery had diminished and his drain was able to be removed.  He was discharged to home on postoperative day 3 with plan for case  management to arrange home health and home physical therapy to begin sometime next week    Condition on Discharge:  Stable  Discharge to: home with walker. Patient will need home health and home PT.     PATIENT SPECIFIC EDUCATION/PLAN:  1. Follow-up with neurosurgery PA in 310-12 days with PA for suture removal, with AP/lateral xrays of the cervical spine prior to appointment  2. No driving until follow-up.  3. Patient discharged with Aquacel dressing. May remove this and get incision itself wet in the shower starting 8/9/22  4. NO tub bathing or swimming until follow-up  5. Ice pack to incision(s) as needed for associated pain or swelling   6. Ambulate frequently at home  7. No lifting greater than 5 lbs  8. Keep head of bed at 30 degrees or higher x5 days  9. Ambulate frequently at home; walker ordered at discharge  10 continue soft diet per gastroenterology  11. Case management to arrange home health and home PT       Discharge Medications     Discharge Medications      Changes to Medications      Instructions Start Date   clopidogrel 75 MG tablet  Commonly known as: Plavix  What changed: additional instructions   75 mg, Oral, Daily      HYDROcodone-acetaminophen  MG per tablet  Commonly known as: NORCO  What changed:   · how much to take  · how to take this  · when to take this  · reasons to take this   1 tablet, Oral, 3 Times Daily PRN         Continue These Medications      Instructions Start Date   albuterol sulfate  (90 Base) MCG/ACT inhaler  Commonly known as: PROVENTIL HFA;VENTOLIN HFA;PROAIR HFA   2 puffs, Inhalation, Every 4 Hours PRN      ALPRAZolam 0.5 MG tablet  Commonly known as: XANAX   No dose, route, or frequency recorded.      amLODIPine 5 MG tablet  Commonly known as: NORVASC   TAKE ONE TABLET BY MOUTH DAILY FOR FOR BLOOD  PRESSURE      aspirin 81 MG tablet   81 mg, Oral, Daily      atorvastatin 80 MG tablet  Commonly known as: LIPITOR   80 mg, Oral, Daily      baclofen 10 MG  tablet  Commonly known as: LIORESAL   10 mg, Oral, 2 Times Daily      betamethasone dipropionate 0.05 % ointment  Commonly known as: DIPROLENE   Topical, 2 Times Daily      DULoxetine 30 MG capsule  Commonly known as: CYMBALTA   30 mg, Oral, Daily      EPIPEN IJ   1 dose, Injection, As Needed      gabapentin 600 MG tablet  Commonly known as: NEURONTIN   600 mg, Oral, 3 Times Daily      hydrALAZINE 25 MG tablet  Commonly known as: APRESOLINE   25 mg, Oral, 3 Times Daily      hydrOXYzine 25 MG tablet  Commonly known as: ATARAX   25 mg, Oral, Every 8 Hours PRN      isosorbide mononitrate 30 MG 24 hr tablet  Commonly known as: IMDUR   TAKE ONE TABLET BY MOUTH DAILY FOR HEART      loratadine 10 MG tablet  Commonly known as: CLARITIN   10 mg, Oral, Daily      metoprolol succinate XL 25 MG 24 hr tablet  Commonly known as: TOPROL-XL   25 mg, Oral, Daily      mupirocin 2 % ointment  Commonly known as: BACTROBAN   Apply to the inside of each nostril with a cotton swab twice daily (morning and evening) starting 5 days before surgery.      nicotine 14 MG/24HR patch  Commonly known as: NICODERM CQ   1 patch, Transdermal, Every 24 Hours      nitroglycerin 0.4 MG SL tablet  Commonly known as: NITROSTAT   0.4 mg, Sublingual, Every 5 Minutes PRN, Take no more than 3 doses in 15 minutes.       pantoprazole 40 MG EC tablet  Commonly known as: Protonix   40 mg, Oral, 2 Times Daily Before Meals      STELARA IV   Intravenous      Symbicort 80-4.5 MCG/ACT inhaler  Generic drug: budesonide-formoterol   2 puffs, Inhalation, 2 Times Daily PRN      ustekinumab 45 MG/0.5ML injection  Commonly known as: STELARA   45 mg, Subcutaneous, Every 30 Days      Stelara 45 MG/0.5ML solution prefilled syringe Injection  Generic drug: Ustekinumab   No dose, route, or frequency recorded.             Follow-up Appointments  No future appointments.  Additional Instructions for the Follow-ups that You Need to Schedule     Ambulatory Referral to Home Health  ([facility])   As directed      Face to Face Visit Date: 8/6/2022    Follow-up provider for Plan of Care?: I will be treating the patient on an ongoing basis.  Please send me the Plan of Care for signature.    Follow-up provider: ABBIE MOHAN [[id number]]    Reason/Clinical Findings: cervical myelopathy    Describe mobility limitations that make leaving home difficult: cervical myelopathy, s/p cervical decompression    Nursing/Therapeutic Services Requested: Skilled Nursing Physical Therapy Occupational Therapy    Skilled nursing orders: Wound care dressing/changes    PT orders: Therapeutic exercise Gait Training Strengthening Transfer training Home safety assessment    Weight Bearing Status: As Tolerated    Frequency: 1 Week 1         Discharge Follow-up with Specified Provider: Chapo; 1 Week   As directed      To: Chapo    Follow Up: 1 Week    Follow Up Details: Follow-up with physician's assistant in approximately 10-12 days for suture removal             Ambulatory referral to Gastroenterology      Referring Provider  MD SELENA Houston Rodney Travis, APRN Talitha L Hunt, PA-C  08/06/22  12:08 EDT

## 2022-08-06 NOTE — ANESTHESIA POSTPROCEDURE EVALUATION
Patient: Javier Santiago    Procedure Summary     Date: 08/05/22 Room / Location:  [facility]    Anesthesia Start: 2143 Anesthesia Stop: 2209    Procedure: ESOPHAGOGASTRODUODENOSCOPY WITH FOREIGN BODY REMOVAL (N/A ) Diagnosis:     Surgeons: Sg Black MD Provider: Rahul Bnaks MD    Anesthesia Type: general ASA Status: 3 - Emergent          Anesthesia Type: general    Vitals  No vitals data found for the desired time range.          Post Anesthesia Care and Evaluation    Patient location during evaluation: PACU  Patient participation: complete - patient participated  Level of consciousness: awake and alert  Pain management: adequate    Airway patency: patent  Anesthetic complications: No anesthetic complications  PONV Status: none  Cardiovascular status: hemodynamically stable and acceptable  Respiratory status: nonlabored ventilation, acceptable and nasal cannula  Hydration status: acceptable

## 2022-08-08 ENCOUNTER — TELEPHONE (OUTPATIENT)
Dept: NEUROSURGERY | Facility: CLINIC | Age: 57
End: 2022-08-08

## 2022-08-08 NOTE — TELEPHONE ENCOUNTER
We are okay with the patient filling it.  This is for the additional pain that he has on top of his baseline.  Patient has had recent surgery.

## 2022-08-08 NOTE — TELEPHONE ENCOUNTER
Patients pharmacy called in regarding his Hydrocodone prescription that was sent in post op maintenance.   Concerns regarding multiple provider prescribing the same medication and mg.     They want to make sure it is okay to fill this medication although his PM provider prescribes the medication already that he takes daily.    06/28/2022 Hydrocodone Bitartrate/Ac 325MG/10MG  * Overlap *  1965 84 28 Margy Silva [facility] 30 1  07/08/2022 Alprazolam 0.5MG * Overlap * 1965 10 10 Annie Jay [facility] 1  07/26/2022 Gabapentin 600MG 1965 84 28 Margy Silva [facility] 1  07/26/2022 Hydrocodone Bitartrate/Ac  325MG/10MG * Overlap *  1965 84 28 Margy Silva [facility] 30 1  07/30/2022 Alprazolam 0.5MG * Overlap * 1965 10 10 Annie Jay [facility] 1

## 2022-08-08 NOTE — CASE MANAGEMENT/SOCIAL WORK
Case Management Discharge Note      Final Note: Per Clarence at [facility] [phone], they have received referral for home PT. They will plan to see him at home on 8/9         Selected Continued Care - Discharged on 8/6/2022 Admission date: 8/4/2022 - Discharge disposition: Home or Self Care    Destination    No services have been selected for the patient.              Durable Medical Equipment Coordination complete.    Service Provider Selected Services Address Phone Fax Patient Preferred    [facility] [place]  Durable Medical Equipment [address] [place] [id number] [phone] --          Dialysis/Infusion    No services have been selected for the patient.              Home Medical Care    No services have been selected for the patient.              Therapy    No services have been selected for the patient.              Community Resources    No services have been selected for the patient.              Community & DME    No services have been selected for the patient.                       Final Discharge Disposition Code: 06 - home with home health care

## 2022-08-15 ENCOUNTER — OFFICE VISIT (OUTPATIENT)
Dept: NEUROSURGERY | Facility: CLINIC | Age: 57
End: 2022-08-15

## 2022-08-15 VITALS — TEMPERATURE: 97.7 F | BODY MASS INDEX: 23.65 KG/M2 | WEIGHT: 165.2 LBS | HEIGHT: 70 IN

## 2022-08-15 DIAGNOSIS — Z98.1 S/P CERVICAL SPINAL FUSION: Primary | ICD-10-CM

## 2022-08-15 DIAGNOSIS — G95.9 CERVICAL MYELOPATHY: ICD-10-CM

## 2022-08-15 PROCEDURE — 99024 POSTOP FOLLOW-UP VISIT: CPT | Performed by: PHYSICIAN ASSISTANT

## 2022-08-15 NOTE — PROGRESS NOTES
Subjective     Chief Complaint: Cervical myelopathy    Patient ID: Javier Santiago is a 57 y.o. male is here today for follow-up.    History of Present Illness    This is a 56-year-old gentleman with a history of cervical myelopathy who previously underwent anterior intervention last year.  Denied his symptoms improved but he recently had a recurrence.  Imaging demonstrated significant ongoing cervical stenosis.  As such, he presented for dorsal decompression with fusion and stabilization at C3-5 on 8/4/2022 he notes improvement in his dysesthesias involving his chest and abdomen.  He continues to have symptoms in his arms and legs which are unchanged since surgery.  He feels his gait may be slightly improved.  Denies any problems with his incision.  He is continuing to take Norco and baclofen.    The following portions of the patient's history were reviewed and updated as appropriate: allergies, current medications, past family history, past medical history, past social history, past surgical history and problem list.    Family history:   Family History   Problem Relation Age of Onset   • Heart attack Mother         x 3   • Atrial fibrillation Mother    • Heart disease Mother    • Heart attack Father    • Heart disease Father         CABG       Social history:   Social History     Socioeconomic History   • Marital status:      Spouse name: Coretta Bhatt   Tobacco Use   • Smoking status: Current Every Day Smoker     Packs/day: 0.50     Years: 35.00     Pack years: 17.50     Types: Cigarettes   • Smokeless tobacco: Former User     Types: Chew     Quit date: 2018   • Tobacco comment: down 0.5 ppd now   Vaping Use   • Vaping Use: Never used   Substance and Sexual Activity   • Alcohol use: No   • Drug use: No   • Sexual activity: Defer       Review of Systems   Constitutional: Negative for activity change, appetite change, chills, diaphoresis, fatigue, fever and unexpected weight change.   HENT: Negative for  "congestion, dental problem, drooling, ear discharge, ear pain, facial swelling, hearing loss, mouth sores, nosebleeds, postnasal drip, rhinorrhea, sinus pressure, sinus pain, sneezing, sore throat, tinnitus, trouble swallowing and voice change.    Eyes: Negative for photophobia, pain, discharge, redness, itching and visual disturbance.   Respiratory: Negative for apnea, cough, choking, chest tightness, shortness of breath, wheezing and stridor.    Cardiovascular: Negative for chest pain, palpitations and leg swelling.   Gastrointestinal: Negative for abdominal distention, abdominal pain, anal bleeding, blood in stool, constipation, diarrhea, nausea, rectal pain and vomiting.   Endocrine: Negative for cold intolerance, heat intolerance, polydipsia, polyphagia and polyuria.   Genitourinary: Negative for decreased urine volume, difficulty urinating, dysuria, enuresis, flank pain, frequency, genital sores, hematuria, penile discharge, penile pain, penile swelling, scrotal swelling, testicular pain and urgency.   Musculoskeletal: Positive for arthralgias and back pain. Negative for gait problem, joint swelling, myalgias, neck pain and neck stiffness.   Skin: Negative for color change, pallor, rash and wound.   Allergic/Immunologic: Negative for environmental allergies, food allergies and immunocompromised state.   Neurological: Positive for weakness and numbness. Negative for dizziness, tremors, seizures, syncope, facial asymmetry, speech difficulty, light-headedness and headaches.   Hematological: Negative for adenopathy. Does not bruise/bleed easily.   Psychiatric/Behavioral: Negative for agitation, behavioral problems, confusion, decreased concentration, dysphoric mood, hallucinations, self-injury, sleep disturbance and suicidal ideas. The patient is not nervous/anxious and is not hyperactive.    All other systems reviewed and are negative.      Objective   Temperature 97.7 °F (36.5 °C), height 177.8 cm (70\"), weight " 74.9 kg (165 lb 3.2 oz).  Body mass index is 23.7 kg/m².  BMI is within normal parameters. No other follow-up for BMI required.      Physical Exam  Constitutional:       Appearance: Normal appearance.   HENT:      Head:     Pulmonary:      Effort: Pulmonary effort is normal.   Skin:     General: Skin is warm and dry.   Neurological:      Mental Status: He is alert.      Gait: Gait abnormal (Ambulates with walker).   Psychiatric:         Mood and Affect: Mood normal.         Behavior: Behavior normal.           Assessment & Plan       This is a 57-year-old gentleman with cervical myelopathy status post C4-6 ACDF in 2021 and status post C3-5 laminectomy and fusion on 8/4/2022.  He is recovering well and notes mild improvement in some of his symptoms.  We had a long discussion regarding the nature of cervical myelopathy and long-term recovery.  He is understanding of this.  He is working with home health physical therapy.  He was instructed to call with new or worsening symptoms.  We will see him back in 6 weeks with postoperative x-rays, or sooner if clinically indicated.    Diagnoses and all orders for this visit:    1. S/P cervical spinal fusion (Primary)  -     XR Spine Cervical 2 View    2. Cervical myelopathy (HCC)        Return in about 6 weeks (around 9/26/2022), or if symptoms worsen or fail to improve.             Reema Fierro PA-C

## 2022-08-25 ENCOUNTER — TELEPHONE (OUTPATIENT)
Dept: NEUROSURGERY | Facility: CLINIC | Age: 57
End: 2022-08-25

## 2022-08-25 DIAGNOSIS — Z98.1 S/P CERVICAL SPINAL FUSION: Primary | ICD-10-CM

## 2022-08-25 DIAGNOSIS — G95.9 CERVICAL MYELOPATHY: ICD-10-CM

## 2022-08-25 NOTE — TELEPHONE ENCOUNTER
Attempted to contact patient to let him know the PT order has been placed and signed. I LVM relaying the above information after voicemail confirmed patient's name.    I stated that if he wants to do PT through [facility], they will contact him but if he wants to do PT at an outside facility to call us and let us know.

## 2022-08-25 NOTE — TELEPHONE ENCOUNTER
"Provider:  Chapo  Surgery/Procedure: CERVICAL POSTERIOR FUSION WITH INSTRUMENTATION C3-5    Surgery/Procedure Date: 08/04/2022   Last visit: Office Visit with Reema Fierro PA-C (08/15/2022)    Next visit: 09/28/2022     Reason for call:     Patient LVM stating that he saw KYLE Benavidez and patient was doing home health PT. Patient states that Reema told him when he feels up to driving he can try outpatient PT. Patient feels that he is able to drive and would like to try outpatient PT. Order pending. Please sign if appropriate.    Per last OV note,    \"This is a 57-year-old gentleman with cervical myelopathy status post C4-6 ACDF in 2021 and status post C3-5 laminectomy and fusion on 8/4/2022.  He is recovering well and notes mild improvement in some of his symptoms.  We had a long discussion regarding the nature of cervical myelopathy and long-term recovery.  He is understanding of this.  He is working with home health physical therapy.  He was instructed to call with new or worsening symptoms.  We will see him back in 6 weeks with postoperative x-rays, or sooner if clinically indicated.\"        "

## 2022-09-07 ENCOUNTER — TREATMENT (OUTPATIENT)
Dept: PHYSICAL THERAPY | Facility: CLINIC | Age: 57
End: 2022-09-07

## 2022-09-07 DIAGNOSIS — G95.9 CERVICAL MYELOPATHY: ICD-10-CM

## 2022-09-07 DIAGNOSIS — Z98.1 S/P CERVICAL SPINAL FUSION: Primary | ICD-10-CM

## 2022-09-07 PROCEDURE — 97162 PT EVAL MOD COMPLEX 30 MIN: CPT | Performed by: PHYSICAL THERAPIST

## 2022-09-07 PROCEDURE — G0283 ELEC STIM OTHER THAN WOUND: HCPCS | Performed by: PHYSICAL THERAPIST

## 2022-09-07 PROCEDURE — 97110 THERAPEUTIC EXERCISES: CPT | Performed by: PHYSICAL THERAPIST

## 2022-09-07 NOTE — PROGRESS NOTES
"    Physical Therapy Initial Evaluation and Plan of Care    Patient: Javier Santiago   : 1965  Diagnosis/ICD-10 Code:  S/P cervical spinal fusion [Z98.1]  Referring practitioner: Candy Kwong PA-C  Date of Initial Visit: 2022  Today's Date: 2022  Patient seen for 1 session         Visit Diagnoses:    ICD-10-CM ICD-9-CM   1. S/P cervical spinal fusion  Z98.1 V45.4   2. Cervical myelopathy (HCC)  G95.9 721.1         Subjective Questionnaire: NDI:28/50=56% impaired      Subjective Evaluation    History of Present Illness  Date of surgery: 2022.  Mechanism of injury: Patient reports that he underwent a cervical fusion on 2022.  Patient reports that he has previously underwent a cervical fusion approximately 3 years ago.  Patient reports that he currently has a lifting restriction of less than 5 lbs.  Patient notes that he has been experiencing numbness/tingling in bilateral UEs and into the left side of the head/neck.  He also states that he has been \"keeping a mild headache\" since the surgery.        Patient Occupation: Disabled Pain  Current pain ratin  At best pain ratin  At worst pain rating: 10  Location: Neck  Quality: sharp and knife-like  Relieving factors: rest, ice and medications  Aggravating factors: overhead activity, outstretched reach, sleeping, repetitive movement, movement and lifting    Hand dominance: right    Patient Goals  Patient goals for therapy: decreased pain             Objective          Postural Observations  Seated posture: poor  Standing posture: poor        Palpation   Left   Muscle spasm in the cervical paraspinals, levator scapulae, sternocleidomastoid and upper trapezius.   Tenderness of the cervical paraspinals, levator scapulae, sternocleidomastoid, suboccipitals and upper trapezius.     Right   Muscle spasm in the cervical paraspinals, levator scapulae, sternocleidomastoid and upper trapezius. Tenderness of the cervical paraspinals, levator " scapulae, sternocleidomastoid, suboccipitals and upper trapezius.     Tenderness   Cervical Spine   Tenderness in the spinous process.     Active Range of Motion   Cervical/Thoracic Spine   Cervical    Flexion: 14 degrees   Extension: 13 degrees   Left rotation: 18 degrees   Right rotation: 11 degrees     Strength/Myotome Testing     Left Shoulder     Planes of Motion   Flexion: 3   Abduction: 3     Right Shoulder     Planes of Motion   Flexion: 3   Abduction: 3     Left Elbow   Flexion: 3  Extension: 3    Right Elbow   Flexion: 3  Extension: 3    Left Wrist/Hand      (2nd hand position)     Trial 1: 63 lbs    Trial 2: 67 lbs    Trial 3: 63 lbs    Average: 64.33 lbs    Right Wrist/Hand      (2nd hand position)     Trial 1: 72 lbs    Trial 2: 70 lbs    Trial 3: 68 lbs    Average: 70 lbs    Additional Strength Details  MMT grades based on full ROM against gravity.     General Comments     Cervical/Thoracic Comments  Good healing noted of incision, with no redness/drainage observed.  Possible stitch noted at incision.             Assessment & Plan     Assessment  Impairments: abnormal or restricted ROM, activity intolerance, impaired physical strength, lacks appropriate home exercise program and pain with function  Functional Limitations: carrying objects, lifting, sleeping, pulling, pushing, uncomfortable because of pain, reaching overhead and unable to perform repetitive tasks  Assessment details: Patient is a 57 year old male who comes to physical therapy for rehabilitation s/p cervical fusion. The patient presents with increased pain, decreased cervical ROM, decreased UE strength, and decreased  strength. Patient reports a 56% functional mobility impairment, based on the patient's response to the NDI.  Patient will benefit from skilled PT, so that patient can achieve maximum level of function.     Prognosis: good    Goals  Plan Goals: SHORT TERM GOALS:     4 weeks  1. Patient will be  independent/compliant with HEP.  2. Patient to demonstrate 5 degree improvement in cervical AROM to allow for improved ability to perform ADL's.  3. Patient to report no more than 3 headaches/week to allow for improved quality of life.  4. Patient will report pain no greater than 6/10 when performing self-care activities.  5. Patient will be educated in proper lifting mechanics for improved safety.    LONG TERM GOALS:   12 weeks  1. Bilateral UE strength will improve to at least 4/5 to allow for greater ease with lifting and carrying items.  2. Patient to demonstrate ability to lift 5# overhead with bilateral arm(s) without increase in cervical pain.   3. Patient will report less than 40% impairment on the NDI for improved functional mobility.  4. Patient will report pain no greater than 4/10 when performing household chores.  5.  strength will improve by 5# to allow for greater ease with daily tasks.    Plan  Therapy options: will be seen for skilled therapy services  Planned modality interventions: cryotherapy, electrical stimulation/Russian stimulation, TENS, thermotherapy (hydrocollator packs) and ultrasound  Planned therapy interventions: manual therapy, ADL retraining, neuromuscular re-education, body mechanics training, postural training, soft tissue mobilization, flexibility, spinal/joint mobilization, strengthening, functional ROM exercises, stretching, home exercise program, therapeutic activities, IADL retraining and joint mobilization  Frequency: 2x week  Duration in weeks: 12  Treatment plan discussed with: patient  Plan details: Moderate Evaluation  89985  Re-evaluation   96875    Therapeutic exercise  48122  Therapeutic activity    73584  Neuromuscular re-education   58629  Manual therapy   49302    Unattended e-stim (Medicaid/Medicare)     Moist heat/cryotherapy 18989   Ultrasound   88039            History # of Personal Factors and/or Comorbidities: MODERATE (1-2)  Examination of Body  System(s): # of elements: MODERATE (3)  Clinical Presentation: STABLE   Clinical Decision Making: MODERATE      Timed:         Manual Therapy:         mins  33856;     Therapeutic Exercise:    12     mins  55206;     Neuromuscular Skyler:        mins  61778;    Therapeutic Activity:          mins  17787;     Gait Training:           mins  60467;     Ultrasound:          mins  59347;    Ionto                                   mins   43470  Self Care                            mins   31360  Canalith Repos         mins 22609      Un-Timed:  Electrical Stimulation:    10     mins  09803 ( );  Dry Needling          mins self-pay  Traction          mins 97455  Low Eval          Mins  35907  Mod Eval     34     Mins  38101  High Eval                            Mins  06073        Timed Treatment:   12   mins   Total Treatment:     56   mins          PT: Josiane Borja, SOC[place]     License Number: [id number]  Electronically signed by Josiane Borja PT, 09/07/22, 8:11 AM EDT    Certification Period: 9/7/2022 thru 12/5/2022  I certify that the therapy services are furnished while this patient is under my care.  The services outlined above are required by this patient, and will be reviewed every 90 days.         Physician Signature:__________________________________________________    PHYSICIAN: Candy Kwong PA-C  NPI: [id number]                                      DATE:      Please sign and return via fax to [email or web address] . Thank you, [facility].

## 2022-09-13 ENCOUNTER — TREATMENT (OUTPATIENT)
Dept: PHYSICAL THERAPY | Facility: CLINIC | Age: 57
End: 2022-09-13

## 2022-09-13 DIAGNOSIS — G95.9 CERVICAL MYELOPATHY: ICD-10-CM

## 2022-09-13 DIAGNOSIS — Z98.1 S/P CERVICAL SPINAL FUSION: Primary | ICD-10-CM

## 2022-09-13 PROCEDURE — 97140 MANUAL THERAPY 1/> REGIONS: CPT | Performed by: PHYSICAL THERAPIST

## 2022-09-13 PROCEDURE — 97110 THERAPEUTIC EXERCISES: CPT | Performed by: PHYSICAL THERAPIST

## 2022-09-13 PROCEDURE — G0283 ELEC STIM OTHER THAN WOUND: HCPCS | Performed by: PHYSICAL THERAPIST

## 2022-09-13 NOTE — PROGRESS NOTES
Physical Therapy Daily Treatment Note      Patient: Javier Santiago   : 1965  Referring practitioner: Candy Kwong PA-C  Date of Initial Visit: Type: THERAPY  Noted: 2022  Today's Date: 2022  Patient seen for 2 sessions       Visit Diagnoses:    ICD-10-CM ICD-9-CM   1. S/P cervical spinal fusion  Z98.1 V45.4   2. Cervical myelopathy (HCC)  G95.9 721.1       Subjective Evaluation    History of Present Illness    Subjective comment: Patient reports 7/10 pain today.  He notes that he feels like his arthritis is flared up today.Pain  Current pain ratin           Objective   See Exercise, Manual, and Modality Logs for complete treatment.       Assessment & Plan     Assessment    Assessment details: Therapy session initiated with manual therapy to bilateral UTs to assist with pain and muscle guarding.  Patient noted tenderness at bilateral UTs during STM.  There ex focused on gentle ROM, postural awareness, and  strength.  Patient educated to perform exercises per his tolerance, with patient verbalizing understanding.  Patient reported good tolerance with exercises and noted no change in pain with exercises.  Session was concluded with ice/ESTIM, with no skin irritation following.  He noted a decrease in pain at conclusion of session, reporting 5/10 pain.  He will continue to be progressed per his tolerance and POC.          Timed:         Manual Therapy:    17     mins  53798;     Therapeutic Exercise:    25     mins  67704;     Neuromuscular Skyler:        mins  47570;    Therapeutic Activity:          mins  67478;     Gait Training:           mins  76702;     Ultrasound:          mins  80550;    Ionto                                   mins   34458  Self Care                            mins   83554  Canalith Repos         mins 31863      Un-Timed:  Electrical Stimulation:    10     mins  70848 ( );  Dry Needling          mins self-pay  Traction          mins 72816      Timed Treatment:    42   mins   Total Treatment:     52   mins    Josiane Borja, PT  [place] License: [id number]  Electronically signed by Josiane Borja PT, 09/13/22, 10:50 AM EDT.

## 2022-09-15 ENCOUNTER — TELEPHONE (OUTPATIENT)
Dept: PHYSICAL THERAPY | Facility: CLINIC | Age: 57
End: 2022-09-15

## 2022-09-20 ENCOUNTER — TELEPHONE (OUTPATIENT)
Dept: PHYSICAL THERAPY | Facility: CLINIC | Age: 57
End: 2022-09-20

## 2022-09-28 ENCOUNTER — HOSPITAL ENCOUNTER (OUTPATIENT)
Dept: GENERAL RADIOLOGY | Facility: HOSPITAL | Age: 57
Discharge: HOME | End: 2022-09-28
Admitting: PHYSICIAN ASSISTANT

## 2022-09-28 ENCOUNTER — OFFICE VISIT (OUTPATIENT)
Dept: NEUROSURGERY | Facility: CLINIC | Age: 57
End: 2022-09-28

## 2022-09-28 VITALS — WEIGHT: 169 LBS | HEIGHT: 70 IN | TEMPERATURE: 99.1 F | BODY MASS INDEX: 24.2 KG/M2

## 2022-09-28 DIAGNOSIS — M47.12 CERVICAL SPONDYLOSIS WITH MYELOPATHY: ICD-10-CM

## 2022-09-28 DIAGNOSIS — Z98.1 S/P CERVICAL SPINAL FUSION: Primary | ICD-10-CM

## 2022-09-28 PROCEDURE — 99024 POSTOP FOLLOW-UP VISIT: CPT | Performed by: NEUROLOGICAL SURGERY

## 2022-09-28 PROCEDURE — 72040 X-RAY EXAM NECK SPINE 2-3 VW: CPT | Performed by: RADIOLOGY

## 2022-09-28 PROCEDURE — 72040 X-RAY EXAM NECK SPINE 2-3 VW: CPT

## 2022-09-28 NOTE — PROGRESS NOTES
Patient: Javier Santiago  : 1965    Primary Care Provider: Moi Segura APRN    Requesting Provider: As above        History    Chief Complaint: Numbness in the hands with dysesthesias involving the chest and abdomen.    History of Present Illness: Mr. Santiago is a 57-year-old gentleman with a history of cervical myelopathy due to spondylosis who underwent ACDF C4-6 on 2021.  He improved but then developed recurrent progressive symptoms and on 2022 underwent dorsal decompression from C3-6 with fusion stabilization C3-5.  The dysesthesias in his chest and abdomen are better.  He still has some numbness in his hands.  More recently he has noted some numbness that occurs in his left face with head turning.  He has no new arm symptoms.  He is doing physical therapy.  He is taking baclofen for muscle spasms.    Review of Systems   Constitutional: Negative for activity change, appetite change, chills, diaphoresis, fatigue, fever and unexpected weight change.   HENT: Negative for congestion, dental problem, drooling, ear discharge, ear pain, facial swelling, hearing loss, mouth sores, nosebleeds, postnasal drip, rhinorrhea, sinus pressure, sinus pain, sneezing, sore throat, tinnitus, trouble swallowing and voice change.    Eyes: Negative for photophobia, pain, discharge, redness, itching and visual disturbance.   Respiratory: Negative for apnea, cough, choking, chest tightness, shortness of breath, wheezing and stridor.    Cardiovascular: Negative for chest pain, palpitations and leg swelling.   Gastrointestinal: Negative for abdominal distention, abdominal pain, anal bleeding, blood in stool, constipation, diarrhea, nausea, rectal pain and vomiting.   Endocrine: Negative for cold intolerance, heat intolerance, polydipsia, polyphagia and polyuria.   Genitourinary: Negative for decreased urine volume, difficulty urinating, dysuria, enuresis, flank pain, frequency, genital sores, hematuria, penile  "discharge, penile pain, penile swelling, scrotal swelling, testicular pain and urgency.   Musculoskeletal: Positive for neck pain. Negative for arthralgias, back pain, gait problem, joint swelling, myalgias and neck stiffness.   Skin: Negative for color change, pallor, rash and wound.   Allergic/Immunologic: Negative for environmental allergies, food allergies and immunocompromised state.   Neurological: Positive for numbness. Negative for dizziness, tremors, seizures, syncope, facial asymmetry, speech difficulty, weakness, light-headedness and headaches.   Hematological: Negative for adenopathy. Does not bruise/bleed easily.   Psychiatric/Behavioral: Negative for agitation, behavioral problems, confusion, decreased concentration, dysphoric mood, hallucinations, self-injury, sleep disturbance and suicidal ideas. The patient is not nervous/anxious and is not hyperactive.        The patient's past medical history, past surgical history, family history, and social history have been reviewed at length in the electronic medical record.      Physical Exam:   Temp 99.1 °F (37.3 °C)   Ht 177.8 cm (70\")   Wt 76.7 kg (169 lb)   BMI 24.25 kg/m²   Dorsal cervical incision is well-healed.  His  strength is largely intact.    Medical Decision Making    Diagnosis:   Cervical spondylosis with myelopathy status post prior ACDF C4-6 followed by more recent dorsal decompression with fusion and stabilization.    Treatment Options:   I am going to check some plain films of his cervical spine.  He will continue his therapy and medication.  He will follow-up in our clinic in approximately 6 weeks to check on his progress.       Diagnosis Plan   1. S/P cervical spinal fusion     2. Cervical spondylosis with myelopathy         Scribed for Chester Cowan MD by ANGELICA Bolanos 9/28/2022 08:31 EDT      I, Dr. Cowan, personally performed the services described in the documentation, as scribed in my presence, and it is both " accurate and complete.

## 2022-11-22 ENCOUNTER — OFFICE VISIT (OUTPATIENT)
Dept: NEUROSURGERY | Facility: CLINIC | Age: 57
End: 2022-11-22

## 2022-11-22 VITALS
SYSTOLIC BLOOD PRESSURE: 110 MMHG | WEIGHT: 174.4 LBS | TEMPERATURE: 97.8 F | HEIGHT: 70 IN | BODY MASS INDEX: 24.97 KG/M2 | DIASTOLIC BLOOD PRESSURE: 72 MMHG

## 2022-11-22 DIAGNOSIS — G95.9 CERVICAL MYELOPATHY: ICD-10-CM

## 2022-11-22 DIAGNOSIS — G56.03 BILATERAL CARPAL TUNNEL SYNDROME: ICD-10-CM

## 2022-11-22 DIAGNOSIS — M47.12 CERVICAL SPONDYLOSIS WITH MYELOPATHY: Primary | ICD-10-CM

## 2022-11-22 PROCEDURE — 99213 OFFICE O/P EST LOW 20 MIN: CPT | Performed by: PHYSICIAN ASSISTANT

## 2022-11-22 RX ORDER — PREDNISONE 10 MG/1
TABLET ORAL
COMMUNITY
Start: 2022-11-17

## 2022-11-22 NOTE — PROGRESS NOTES
Patient: Javier Santiago  : 1965  Chart #: [id number]    Date of Service: 2022    CHIEF COMPLAINT: Numbness in the hands with dysesthesias involving the chest and abdomen    History of Present Illness Mr. Santiago is seen in follow-up.  He has a 57-year-old gentleman with a history of cervical myelopathy due to spondylosis.  He underwent ACDF C4-6 on 2021 and did well.  Later he developed recurrent progressive symptoms involving his hands, chest, and abdomen.  On 2022 he underwent dorsal decompression from C3-6 with fusion and stabilization C3-5.  Postoperatively the dysesthesias in his chest and abdomen improved.  He continues complain of numbness and tingling in the bilateral hands.  This often wakes him up at night.  He also complains of numbness in the left face when turning his head.  Electrodiagnostic studies have demonstrated mild carpal tunnel syndrome on the right.      Past Medical History:   Diagnosis Date   • Anxiety    • Arthritis     psoriatic   • ASCVD (arteriosclerotic cardiovascular disease)    • CHF (congestive heart failure) ([facility])    • Chronic low back pain    • COPD (chronic obstructive pulmonary disease) ([facility])    • Coronary artery disease    • Fibromyalgia    • Full dentures    • GERD (gastroesophageal reflux disease)    • History of EKG 2016    NORMAL   • Hyperlipidemia    • Hypertension    • Hypotension    • Insomnia    • MI, old    • Psoriasis    • Psoriatic arthritis ([facility])    • RA (rheumatoid arthritis) ([facility])    • Sciatic nerve pain    • Skin pain     from nerve pain   • Sleep apnea     cpap non compliant   • Tinnitus    • Wears glasses     readers         Current Outpatient Medications:   •  albuterol (PROVENTIL HFA;VENTOLIN HFA) 108 (90 BASE) MCG/ACT inhaler, Inhale 2 puffs Every 4 (Four) Hours As Needed for Wheezing., Disp: , Rfl:   •  ALPRAZolam (XANAX) 0.5 MG tablet, , Disp: , Rfl:   •  amLODIPine (NORVASC) 5 MG tablet, TAKE ONE TABLET BY MOUTH DAILY FOR FOR  BLOOD  PRESSURE, Disp: 30 tablet, Rfl: 5  •  aspirin 81 MG tablet, Take 1 tablet by mouth Daily., Disp: 30 tablet, Rfl: 11  •  atorvastatin (LIPITOR) 80 MG tablet, Take 80 mg by mouth daily., Disp: , Rfl:   •  baclofen (LIORESAL) 10 MG tablet, Take 1 tablet by mouth 2 (Two) Times a Day., Disp: 60 tablet, Rfl: 0  •  betamethasone dipropionate (DIPROLENE) 0.05 % ointment, Apply  topically to the appropriate area as directed 2 (Two) Times a Day., Disp: , Rfl:   •  clopidogrel (Plavix) 75 MG tablet, Take 1 tablet by mouth Daily. (Patient taking differently: Take 75 mg by mouth Daily. Holding for 5 days- letter on chart), Disp: 30 tablet, Rfl:   •  Diclofenac Sodium (VOLTAREN) 1 % gel gel, , Disp: , Rfl:   •  DULoxetine (CYMBALTA) 30 MG capsule, Take 30 mg by mouth Daily., Disp: , Rfl:   •  EPINEPHrine (EPIPEN IJ), Inject 1 dose as directed As Needed (ALLERGIC REACTION)., Disp: , Rfl:   •  gabapentin (NEURONTIN) 600 MG tablet, Take 600 mg by mouth 3 (Three) Times a Day., Disp: , Rfl:   •  hydrALAZINE (APRESOLINE) 25 MG tablet, Take 25 mg by mouth 3 (Three) Times a Day., Disp: , Rfl:   •  HYDROcodone-acetaminophen (NORCO)  MG per tablet, Take 1 tablet by mouth 3 (Three) Times a Day As Needed for Moderate Pain ., Disp: 30 tablet, Rfl: 0  •  hydrOXYzine (ATARAX) 25 MG tablet, Take 25 mg by mouth Every 8 (Eight) Hours As Needed., Disp: , Rfl:   •  isosorbide mononitrate (IMDUR) 30 MG 24 hr tablet, TAKE ONE TABLET BY MOUTH DAILY FOR HEART, Disp: 90 tablet, Rfl: 3  •  loratadine (CLARITIN) 10 MG tablet, Take 10 mg by mouth Daily., Disp: , Rfl:   •  methotrexate 2.5 MG tablet, , Disp: , Rfl:   •  metoprolol succinate XL (TOPROL-XL) 25 MG 24 hr tablet, Take 1 tablet by mouth Daily., Disp: 90 tablet, Rfl: 1  •  mupirocin (BACTROBAN) 2 % ointment, Apply to the inside of each nostril with a cotton swab twice daily (morning and evening) starting 5 days before surgery., Disp: 22 g, Rfl: 0  •  nicotine (NICODERM CQ) 14 MG/24HR  patch, Place 1 patch on the skin as directed by provider Daily., Disp: 30 patch, Rfl: 1  •  nitroglycerin (NITROSTAT) 0.4 MG SL tablet, Place 0.4 mg under the tongue Every 5 (Five) Minutes As Needed for Chest Pain. Take no more than 3 doses in 15 minutes., Disp: , Rfl:   •  pantoprazole (PROTONIX) 40 MG EC tablet, Take 1 tablet by mouth 2 (Two) Times a Day Before Meals., Disp: 60 tablet, Rfl: 5  •  predniSONE (DELTASONE) 10 MG tablet, , Disp: , Rfl:   •  Stelara 45 MG/0.5ML solution prefilled syringe Injection, , Disp: , Rfl:   •  SYMBICORT 80-4.5 MCG/ACT inhaler, Inhale 2 puffs 2 (Two) Times a Day As Needed., Disp: , Rfl:   •  Ustekinumab (STELARA IV), Infuse  into a venous catheter., Disp: , Rfl:   •  ustekinumab (STELARA) 45 MG/0.5ML injection, Inject 45 mg under the skin Every 30 (Thirty) Days., Disp: , Rfl:     Past Surgical History:   Procedure Laterality Date   • ANTERIOR CERVICAL DISCECTOMY W/ FUSION N/A 04/26/2021    Procedure: CERVICAL DISCECTOMY ANTERIOR WITH FUSION C4-6;  Surgeon: Chester Cowan MD;  Location:  [facility];  Service: Neurosurgery;  Laterality: N/A;   • BACK SURGERY      lumbar 1995   • CARDIAC CATHETERIZATION N/A 10/13/2017    Procedure: Left Heart Cath;  Surgeon: Chester Centeno MD;  [facility]  [facility];  Service:    • CARDIAC CATHETERIZATION N/A 02/11/2021    Procedure: Left Heart Cath;  Surgeon: Brent Degroot MD;  [facility]:  [facility];  Service: Cardiology;  Laterality: N/A;   • CATARACT EXTRACTION, BILATERAL Bilateral    • CERVICAL DISCECTOMY POSTERIOR FUSION WITH BRAIN LAB N/A 8/4/2022    Procedure: CERVICAL POSTERIOR FUSION WITH INSTRUMENTATION C3-5;  Surgeon: Chester Cowan MD;  Location:  [facility];  Service: Neurosurgery;  Laterality: N/A;   • COLONOSCOPY     • CORONARY ANGIOPLASTY WITH STENT PLACEMENT      x2-3 stent in place   • ENDOSCOPY     • ENDOSCOPY N/A 02/09/2018    Procedure: ESOPHAGOGASTRODUODENOSCOPY WITH  DILATATION CPT CODE: 56354;  Surgeon: Everett Villanueva III, MD;  Location: [facility] OR;  Service:    • ENDOSCOPY WITH FOREIGN BODY REMOVAL N/A 8/5/2022    Procedure: ESOPHAGOGASTRODUODENOSCOPY WITH FOREIGN BODY REMOVAL;  Surgeon: Sg Black MD;  Location:  [facility];  Service: Gastroenterology;  Laterality: N/A;       Social History     Socioeconomic History   • Marital status:      Spouse name: Coretta Bhatt   Tobacco Use   • Smoking status: Every Day     Packs/day: 0.50     Years: 35.00     Pack years: 17.50     Types: Cigarettes   • Smokeless tobacco: Former     Types: Chew     Quit date: 2018   • Tobacco comments:     down 0.5 ppd now   Vaping Use   • Vaping Use: Never used   Substance and Sexual Activity   • Alcohol use: No   • Drug use: No   • Sexual activity: Defer         Review of Systems   Constitutional: Positive for fatigue. Negative for activity change, appetite change, chills, diaphoresis, fever and unexpected weight change.   HENT: Positive for ear pain, facial swelling, sinus pressure, tinnitus and trouble swallowing. Negative for congestion, dental problem, drooling, ear discharge, hearing loss, mouth sores, nosebleeds, postnasal drip, rhinorrhea, sinus pain, sneezing, sore throat and voice change.    Eyes: Positive for itching and visual disturbance. Negative for photophobia, pain, discharge and redness.   Respiratory: Negative for apnea, cough, choking, chest tightness, shortness of breath, wheezing and stridor.    Cardiovascular: Positive for leg swelling. Negative for chest pain and palpitations.   Gastrointestinal: Negative for abdominal distention, abdominal pain, anal bleeding, blood in stool, constipation, diarrhea, nausea, rectal pain and vomiting.   Endocrine: Negative for cold intolerance, heat intolerance, polydipsia, polyphagia and polyuria.   Genitourinary: Negative for decreased urine volume, difficulty urinating, dysuria, enuresis, flank pain, frequency,  "genital sores, hematuria, penile discharge, penile pain, penile swelling, scrotal swelling, testicular pain and urgency.   Musculoskeletal: Positive for arthralgias, back pain, joint swelling, myalgias, neck pain and neck stiffness. Negative for gait problem.   Skin: Positive for rash. Negative for color change, pallor and wound.   Allergic/Immunologic: Negative for environmental allergies, food allergies and immunocompromised state.   Neurological: Positive for facial asymmetry and numbness. Negative for dizziness, tremors, seizures, syncope, speech difficulty, weakness, light-headedness and headaches.   Hematological: Negative for adenopathy. Does not bruise/bleed easily.   Psychiatric/Behavioral: Positive for sleep disturbance. Negative for agitation, behavioral problems, confusion, decreased concentration, dysphoric mood, hallucinations, self-injury and suicidal ideas. The patient is nervous/anxious. The patient is not hyperactive.        Objective   Vital Signs: Blood pressure 110/72, temperature 97.8 °F (36.6 °C), temperature source Infrared, height 177.8 cm (70\"), weight 79.1 kg (174 lb 6.4 oz).  Physical Exam  Vitals and nursing note reviewed.   Constitutional:       General: He is not in acute distress.     Appearance: He is well-developed.   HENT:      Head: Normocephalic and atraumatic.   Psychiatric:         Behavior: Behavior normal.         Thought Content: Thought content normal.     Musculoskeletal:     Strength is intact in upper and lower extremities to direct testing.     Station and gait are normal.  Neurologic:     Muscle tone is normal throughout.     Coordination is intact.     Sensation is intact to light touch throughout.     Patient is oriented to person, place, and time.         Independent review of radiographic imaging: Plain films of the cervical spine from 8/15/2022 demonstrate postoperative changes without hardware disruption.  Anterior fusion C3-5.  Posterior fusion " C2-4.    Assessment & Plan   Diagnosis:   1. Cervical spondylosis with myelopathy status post prior ACDF C4-6 followed by more recent dorsal decompression with fusion and stabilization  2.  Mild carpal tunnel syndrome, right    Medical Decision Making: Patient continues with numbness in the hands. Some degree of this may be a medial nerve neuropathy. I have prescribed wrist splints to try at nighttime. Most of his symptoms are likely from his myelopathy and may never go away completely. He is prescribed neurontin.  He will follow up in 2 months to check his progress. He will also get plain films of the neck at that time.     Diagnoses and all orders for this visit:    1. Cervical spondylosis with myelopathy (Primary)  -     XR Spine Cervical 2 View; Future    2. Cervical myelopathy (HCC)  -     XR Spine Cervical 2 View; Future    3. Bilateral carpal tunnel syndrome  -     Miscellaneous DME  -     XR Spine Cervical 2 View; Future                        Alicia Chappell PA-C  Patient Care Team:  Moi Segura APRN as PCP - General (Family Medicine)  Brent Degroot MD as Consulting Physician (Interventional Cardiology)  Sg Black MD as Consulting Physician (Gastroenterology)

## 2023-01-24 ENCOUNTER — HOSPITAL ENCOUNTER (OUTPATIENT)
Dept: GENERAL RADIOLOGY | Facility: HOSPITAL | Age: 58
Discharge: HOME | End: 2023-01-24
Admitting: PHYSICIAN ASSISTANT
Payer: MEDICARE

## 2023-01-24 DIAGNOSIS — M47.12 CERVICAL SPONDYLOSIS WITH MYELOPATHY: ICD-10-CM

## 2023-01-24 DIAGNOSIS — G56.03 BILATERAL CARPAL TUNNEL SYNDROME: ICD-10-CM

## 2023-01-24 DIAGNOSIS — G95.9 CERVICAL MYELOPATHY: ICD-10-CM

## 2023-01-24 PROCEDURE — 72040 X-RAY EXAM NECK SPINE 2-3 VW: CPT | Performed by: RADIOLOGY

## 2023-01-24 PROCEDURE — 72040 X-RAY EXAM NECK SPINE 2-3 VW: CPT

## 2023-01-25 ENCOUNTER — OFFICE VISIT (OUTPATIENT)
Dept: NEUROSURGERY | Facility: CLINIC | Age: 58
End: 2023-01-25
Payer: MEDICARE

## 2023-01-25 VITALS
SYSTOLIC BLOOD PRESSURE: 124 MMHG | HEIGHT: 70 IN | TEMPERATURE: 97.1 F | BODY MASS INDEX: 24.88 KG/M2 | WEIGHT: 173.8 LBS | DIASTOLIC BLOOD PRESSURE: 72 MMHG

## 2023-01-25 DIAGNOSIS — M48.02 MYELOPATHY CONCURRENT WITH AND DUE TO SPINAL STENOSIS OF CERVICAL REGION: ICD-10-CM

## 2023-01-25 DIAGNOSIS — G56.03 BILATERAL CARPAL TUNNEL SYNDROME: ICD-10-CM

## 2023-01-25 DIAGNOSIS — Z98.1 S/P CERVICAL SPINAL FUSION: ICD-10-CM

## 2023-01-25 DIAGNOSIS — Z72.0 TOBACCO USE: ICD-10-CM

## 2023-01-25 DIAGNOSIS — G99.2 MYELOPATHY CONCURRENT WITH AND DUE TO SPINAL STENOSIS OF CERVICAL REGION: ICD-10-CM

## 2023-01-25 DIAGNOSIS — G95.9 CERVICAL MYELOPATHY: ICD-10-CM

## 2023-01-25 DIAGNOSIS — R20.8 DYSESTHESIA OF MULTIPLE SITES: ICD-10-CM

## 2023-01-25 DIAGNOSIS — M47.12 CERVICAL SPONDYLOSIS WITH MYELOPATHY: Primary | ICD-10-CM

## 2023-01-25 PROCEDURE — 99213 OFFICE O/P EST LOW 20 MIN: CPT | Performed by: PHYSICIAN ASSISTANT

## 2023-01-25 RX ORDER — ALPRAZOLAM 1 MG/1
TABLET ORAL
COMMUNITY
Start: 2023-01-24

## 2023-01-25 RX ORDER — ETANERCEPT 50 MG/ML
SOLUTION SUBCUTANEOUS
COMMUNITY
Start: 2023-01-10

## 2023-01-25 NOTE — PROGRESS NOTES
Patient: Javier Santiago  : 1965  Chart #: [id number]    Date of Service: 23    CHIEF COMPLAINT: Numbness in the hands with dysesthesias involving the chest and abdomen    HISTORY OF PRESENT ILLNESS: Mr. Santiago is seen in follow-up.  He is a 57-year-old gentleman with a history of cervical myelopathy due to spondylosis.  He underwent ACDF C4-6 on 2021 and did well.  Later he developed recurrent progressive symptoms involving his hands, chest, and abdomen.  On 2022 he underwent dorsal decompression from C3-6 with fusion and stabilization C3-5.  Postoperatively the dysesthesias in his chest and abdomen improved.  He continues complain of numbness and tingling in the bilateral hands.  Sometimes his arms ache throughout.  This often wakes him up at night.  He also complains of numbness in the left face when turning his head.  Electrodiagnostic studies have demonstrated mild carpal tunnel syndrome on the right.  He tried sleeping and wrist splints for 6 weeks with no improvement in symptoms.  He is prescribed Neurontin 600 mg 3 times a day.  He also takes Norco and baclofen.  His symptoms are not debilitating but he does have a fair amount of aches and pains daily      Past Medical History:   Diagnosis Date   • Anxiety    • Arthritis     psoriatic   • ASCVD (arteriosclerotic cardiovascular disease)    • CHF (congestive heart failure) ([facility])    • Chronic low back pain    • COPD (chronic obstructive pulmonary disease) ([facility])    • Coronary artery disease    • Fibromyalgia    • Full dentures    • GERD (gastroesophageal reflux disease)    • History of EKG 2016    NORMAL   • Hyperlipidemia    • Hypertension    • Hypotension    • Insomnia    • MI, old    • Psoriasis    • Psoriatic arthritis ([facility])    • RA (rheumatoid arthritis) ([facility])    • Sciatic nerve pain    • Skin pain     from nerve pain   • Sleep apnea     cpap non compliant   • Tinnitus    • Wears glasses     readers         Current Outpatient  Medications:   •  albuterol (PROVENTIL HFA;VENTOLIN HFA) 108 (90 BASE) MCG/ACT inhaler, Inhale 2 puffs Every 4 (Four) Hours As Needed for Wheezing., Disp: , Rfl:   •  ALPRAZolam (XANAX) 1 MG tablet, , Disp: , Rfl:   •  amLODIPine (NORVASC) 5 MG tablet, TAKE ONE TABLET BY MOUTH DAILY FOR FOR BLOOD  PRESSURE, Disp: 30 tablet, Rfl: 5  •  aspirin 81 MG tablet, Take 1 tablet by mouth Daily., Disp: 30 tablet, Rfl: 11  •  atorvastatin (LIPITOR) 80 MG tablet, Take 80 mg by mouth daily., Disp: , Rfl:   •  baclofen (LIORESAL) 10 MG tablet, Take 1 tablet by mouth 2 (Two) Times a Day., Disp: 60 tablet, Rfl: 0  •  betamethasone dipropionate (DIPROLENE) 0.05 % ointment, Apply  topically to the appropriate area as directed 2 (Two) Times a Day., Disp: , Rfl:   •  clopidogrel (Plavix) 75 MG tablet, Take 1 tablet by mouth Daily. (Patient taking differently: Take 75 mg by mouth Daily. Holding for 5 days- letter on chart), Disp: 30 tablet, Rfl:   •  Diclofenac Sodium (VOLTAREN) 1 % gel gel, , Disp: , Rfl:   •  DULoxetine (CYMBALTA) 30 MG capsule, Take 30 mg by mouth Daily., Disp: , Rfl:   •  Enbrel 50 MG/ML injection, , Disp: , Rfl:   •  EPINEPHrine (EPIPEN IJ), Inject 1 dose as directed As Needed (ALLERGIC REACTION)., Disp: , Rfl:   •  gabapentin (NEURONTIN) 600 MG tablet, Take 600 mg by mouth 3 (Three) Times a Day., Disp: , Rfl:   •  hydrALAZINE (APRESOLINE) 25 MG tablet, Take 25 mg by mouth 3 (Three) Times a Day., Disp: , Rfl:   •  HYDROcodone-acetaminophen (NORCO)  MG per tablet, Take 1 tablet by mouth 3 (Three) Times a Day As Needed for Moderate Pain ., Disp: 30 tablet, Rfl: 0  •  hydrOXYzine (ATARAX) 25 MG tablet, Take 25 mg by mouth Every 8 (Eight) Hours As Needed., Disp: , Rfl:   •  isosorbide mononitrate (IMDUR) 30 MG 24 hr tablet, TAKE ONE TABLET BY MOUTH DAILY FOR HEART, Disp: 90 tablet, Rfl: 3  •  loratadine (CLARITIN) 10 MG tablet, Take 10 mg by mouth Daily., Disp: , Rfl:   •  methotrexate 2.5 MG tablet, , Disp: ,  Rfl:   •  metoprolol succinate XL (TOPROL-XL) 25 MG 24 hr tablet, Take 1 tablet by mouth Daily., Disp: 90 tablet, Rfl: 1  •  mupirocin (BACTROBAN) 2 % ointment, Apply to the inside of each nostril with a cotton swab twice daily (morning and evening) starting 5 days before surgery., Disp: 22 g, Rfl: 0  •  nicotine (NICODERM CQ) 14 MG/24HR patch, Place 1 patch on the skin as directed by provider Daily., Disp: 30 patch, Rfl: 1  •  nitroglycerin (NITROSTAT) 0.4 MG SL tablet, Place 0.4 mg under the tongue Every 5 (Five) Minutes As Needed for Chest Pain. Take no more than 3 doses in 15 minutes., Disp: , Rfl:   •  pantoprazole (PROTONIX) 40 MG EC tablet, Take 1 tablet by mouth 2 (Two) Times a Day Before Meals., Disp: 60 tablet, Rfl: 5  •  predniSONE (DELTASONE) 10 MG tablet, , Disp: , Rfl:   •  SYMBICORT 80-4.5 MCG/ACT inhaler, Inhale 2 puffs 2 (Two) Times a Day As Needed., Disp: , Rfl:   •  Ustekinumab (STELARA IV), Infuse  into a venous catheter., Disp: , Rfl:     Past Surgical History:   Procedure Laterality Date   • ANTERIOR CERVICAL DISCECTOMY W/ FUSION N/A 04/26/2021    Procedure: CERVICAL DISCECTOMY ANTERIOR WITH FUSION C4-6;  Surgeon: Chester Cowan MD;  Location:  [facility];  Service: Neurosurgery;  Laterality: N/A;   • BACK SURGERY      lumbar 1995   • CARDIAC CATHETERIZATION N/A 10/13/2017    Procedure: Left Heart Cath;  Surgeon: Chester Centeno MD;  [facility]:  [facility];  Service:    • CARDIAC CATHETERIZATION N/A 02/11/2021    Procedure: Left Heart Cath;  Surgeon: Brent Degroot MD;  [facility]  [facility];  Service: Cardiology;  Laterality: N/A;   • CATARACT EXTRACTION, BILATERAL Bilateral    • CERVICAL DISCECTOMY POSTERIOR FUSION WITH BRAIN LAB N/A 8/4/2022    Procedure: CERVICAL POSTERIOR FUSION WITH INSTRUMENTATION C3-5;  Surgeon: Chester Cowan MD;  Location:  [facility];  Service: Neurosurgery;  Laterality: N/A;   • COLONOSCOPY     • CORONARY  ANGIOPLASTY WITH STENT PLACEMENT      x2-3 stent in place   • ENDOSCOPY     • ENDOSCOPY N/A 02/09/2018    Procedure: ESOPHAGOGASTRODUODENOSCOPY WITH DILATATION CPT CODE: 86328;  Surgeon: Everett Villanueva III, MD;  Location:  [facility];  Service:    • ENDOSCOPY WITH FOREIGN BODY REMOVAL N/A 8/5/2022    Procedure: ESOPHAGOGASTRODUODENOSCOPY WITH FOREIGN BODY REMOVAL;  Surgeon: Sg Black MD;  Location:  [facility];  Service: Gastroenterology;  Laterality: N/A;       Social History     Socioeconomic History   • Marital status:      Spouse name: Coretta Bhatt   Tobacco Use   • Smoking status: Every Day     Packs/day: 0.50     Years: 35.00     Pack years: 17.50     Types: Cigarettes   • Smokeless tobacco: Former     Types: Chew     Quit date: 2018   • Tobacco comments:     down 0.5 ppd now   Vaping Use   • Vaping Use: Never used   Substance and Sexual Activity   • Alcohol use: No   • Drug use: No   • Sexual activity: Defer         Review of Systems   Constitutional: Positive for fatigue. Negative for activity change, appetite change, chills, diaphoresis, fever and unexpected weight change.   HENT: Positive for ear pain, facial swelling, sinus pressure, tinnitus and trouble swallowing. Negative for congestion, dental problem, drooling, ear discharge, hearing loss, mouth sores, nosebleeds, postnasal drip, rhinorrhea, sinus pain, sneezing, sore throat and voice change.    Eyes: Positive for itching and visual disturbance. Negative for photophobia, pain, discharge and redness.   Respiratory: Positive for apnea and choking. Negative for cough, chest tightness, shortness of breath, wheezing and stridor.    Cardiovascular: Positive for leg swelling. Negative for chest pain and palpitations.   Gastrointestinal: Negative for abdominal distention, abdominal pain, anal bleeding, blood in stool, constipation, diarrhea, nausea, rectal pain and vomiting.   Endocrine: Negative for cold intolerance, heat  "intolerance, polydipsia, polyphagia and polyuria.   Genitourinary: Negative for decreased urine volume, difficulty urinating, dysuria, enuresis, flank pain, frequency, genital sores, hematuria, penile discharge, penile pain, penile swelling, scrotal swelling, testicular pain and urgency.   Musculoskeletal: Positive for arthralgias, back pain, joint swelling, myalgias, neck pain and neck stiffness. Negative for gait problem.   Skin: Positive for rash. Negative for color change, pallor and wound.   Allergic/Immunologic: Negative for environmental allergies, food allergies and immunocompromised state.   Neurological: Positive for facial asymmetry and numbness. Negative for dizziness, tremors, seizures, syncope, speech difficulty, weakness, light-headedness and headaches.   Hematological: Negative for adenopathy. Does not bruise/bleed easily.   Psychiatric/Behavioral: Positive for sleep disturbance. Negative for agitation, behavioral problems, confusion, decreased concentration, dysphoric mood, hallucinations, self-injury and suicidal ideas. The patient is nervous/anxious. The patient is not hyperactive.        Objective   Vital Signs: Blood pressure 124/72, temperature 97.1 °F (36.2 °C), temperature source Infrared, height 177.8 cm (70\"), weight 78.8 kg (173 lb 12.8 oz).  Physical Exam  Vitals and nursing note reviewed.   Constitutional:       General: He is not in acute distress.     Appearance: He is well-developed.   HENT:      Head: Normocephalic and atraumatic.   Psychiatric:         Behavior: Behavior normal.         Thought Content: Thought content normal.     Musculoskeletal:     Strength is intact in upper and lower extremities to direct testing.     Station and gait are normal.  Neurologic:     Muscle tone is normal throughout.     Coordination is intact.     Sensation is intact to light touch throughout.     Patient is oriented to person, place, and time.     Andres sign positive on the right.  Negative on " the left       Independent review of radiographic imaging: Plain films of the cervical spine from 1/24/2023 demonstrate postoperative changes without hardware disruption.  Anterior fusion C3-5.  Posterior fusion C2-4.    Assessment & Plan   Diagnosis:   1. Cervical spondylosis with myelopathy status post prior ACDF C4-6 followed by more recent dorsal decompression with fusion and stabilization  2.  Mild carpal tunnel syndrome, right    Medical Decision Making: Patient continues to be symptomatic from his myelopathy.  In the past Soma seem to help him better than any other muscle relaxers he has tried.  That may be an option to consider with his pain management.  From our standpoint, we are going to sit tight and continue to monitor.  Hopefully as warmer months come around, he will be able to get out more for exercise.  We discussed water therapy.  I would like to see him back in 5 months to check on things.  He can call the office if he develops new or progressive symptoms in the interim.        Diagnoses and all orders for this visit:    1. Cervical spondylosis with myelopathy (Primary)    2. Cervical myelopathy (HCC)    3. Bilateral carpal tunnel syndrome    4. S/P cervical spinal fusion    5. Tobacco use    6. Dysesthesia of multiple sites    7. Myelopathy concurrent with and due to spinal stenosis of cervical region (HCC)                        Alicia Chappell PA-C  Patient Care Team:  Moi Segura APRN as PCP - General (Family Medicine)  Brent Degroot MD as Consulting Physician (Interventional Cardiology)  Sg Black MD as Consulting Physician (Gastroenterology)

## 2023-01-25 NOTE — LETTER
2023     SHARAN Pulido  [address] [facility]  [address]  [facility] [address]    Patient: Javier Santiago   YOB: 1965   Date of Visit: 2023       Dear SHARAN Ashley:    Thank you for referring Javier Santiago to me for evaluation. Below are the relevant portions of my assessment and plan of care.    If you have questions, please do not hesitate to call me. I look forward to following Javier along with you.         Sincerely,        Alicia Chappell PA-C        CC: OBDULIO James, Alicia Wakefield PA-C  [phone]  Signed  Patient: Javier Santiago  : 1965  Chart #: [id number]    Date of Service: 23    CHIEF COMPLAINT: Numbness in the hands with dysesthesias involving the chest and abdomen    HISTORY OF PRESENT ILLNESS: Mr. Santiago is seen in follow-up.  He is a 57-year-old gentleman with a history of cervical myelopathy due to spondylosis.  He underwent ACDF C4-6 on 2021 and did well.  Later he developed recurrent progressive symptoms involving his hands, chest, and abdomen.  On 2022 he underwent dorsal decompression from C3-6 with fusion and stabilization C3-5.  Postoperatively the dysesthesias in his chest and abdomen improved.  He continues complain of numbness and tingling in the bilateral hands.  Sometimes his arms ache throughout.  This often wakes him up at night.  He also complains of numbness in the left face when turning his head.  Electrodiagnostic studies have demonstrated mild carpal tunnel syndrome on the right.  He tried sleeping and wrist splints for 6 weeks with no improvement in symptoms.  He is prescribed Neurontin 600 mg 3 times a day.  He also takes Norco and baclofen.  His symptoms are not debilitating but he does have a fair amount of aches and pains daily      Past Medical History:   Diagnosis Date   • Anxiety    • Arthritis     psoriatic   • ASCVD (arteriosclerotic cardiovascular disease)    • CHF (congestive heart  failure) (Formerly [facility])    • Chronic low back pain    • COPD (chronic obstructive pulmonary disease) (Formerly [facility])    • Coronary artery disease    • Fibromyalgia    • Full dentures    • GERD (gastroesophageal reflux disease)    • History of EKG 01/26/2016    NORMAL   • Hyperlipidemia    • Hypertension    • Hypotension    • Insomnia    • MI, old 2015   • Psoriasis    • Psoriatic arthritis (Formerly [facility])    • RA (rheumatoid arthritis) (Formerly [facility])    • Sciatic nerve pain    • Skin pain     from nerve pain   • Sleep apnea     cpap non compliant   • Tinnitus    • Wears glasses     readers         Current Outpatient Medications:   •  albuterol (PROVENTIL HFA;VENTOLIN HFA) 108 (90 BASE) MCG/ACT inhaler, Inhale 2 puffs Every 4 (Four) Hours As Needed for Wheezing., Disp: , Rfl:   •  ALPRAZolam (XANAX) 1 MG tablet, , Disp: , Rfl:   •  amLODIPine (NORVASC) 5 MG tablet, TAKE ONE TABLET BY MOUTH DAILY FOR FOR BLOOD  PRESSURE, Disp: 30 tablet, Rfl: 5  •  aspirin 81 MG tablet, Take 1 tablet by mouth Daily., Disp: 30 tablet, Rfl: 11  •  atorvastatin (LIPITOR) 80 MG tablet, Take 80 mg by mouth daily., Disp: , Rfl:   •  baclofen (LIORESAL) 10 MG tablet, Take 1 tablet by mouth 2 (Two) Times a Day., Disp: 60 tablet, Rfl: 0  •  betamethasone dipropionate (DIPROLENE) 0.05 % ointment, Apply  topically to the appropriate area as directed 2 (Two) Times a Day., Disp: , Rfl:   •  clopidogrel (Plavix) 75 MG tablet, Take 1 tablet by mouth Daily. (Patient taking differently: Take 75 mg by mouth Daily. Holding for 5 days- letter on chart), Disp: 30 tablet, Rfl:   •  Diclofenac Sodium (VOLTAREN) 1 % gel gel, , Disp: , Rfl:   •  DULoxetine (CYMBALTA) 30 MG capsule, Take 30 mg by mouth Daily., Disp: , Rfl:   •  Enbrel 50 MG/ML injection, , Disp: , Rfl:   •  EPINEPHrine (EPIPEN IJ), Inject 1 dose as directed As Needed (ALLERGIC REACTION)., Disp: , Rfl:   •  gabapentin (NEURONTIN) 600 MG tablet, Take 600 mg by mouth 3 (Three) Times a Day., Disp: , Rfl:   •  hydrALAZINE (APRESOLINE)  25 MG tablet, Take 25 mg by mouth 3 (Three) Times a Day., Disp: , Rfl:   •  HYDROcodone-acetaminophen (NORCO)  MG per tablet, Take 1 tablet by mouth 3 (Three) Times a Day As Needed for Moderate Pain ., Disp: 30 tablet, Rfl: 0  •  hydrOXYzine (ATARAX) 25 MG tablet, Take 25 mg by mouth Every 8 (Eight) Hours As Needed., Disp: , Rfl:   •  isosorbide mononitrate (IMDUR) 30 MG 24 hr tablet, TAKE ONE TABLET BY MOUTH DAILY FOR HEART, Disp: 90 tablet, Rfl: 3  •  loratadine (CLARITIN) 10 MG tablet, Take 10 mg by mouth Daily., Disp: , Rfl:   •  methotrexate 2.5 MG tablet, , Disp: , Rfl:   •  metoprolol succinate XL (TOPROL-XL) 25 MG 24 hr tablet, Take 1 tablet by mouth Daily., Disp: 90 tablet, Rfl: 1  •  mupirocin (BACTROBAN) 2 % ointment, Apply to the inside of each nostril with a cotton swab twice daily (morning and evening) starting 5 days before surgery., Disp: 22 g, Rfl: 0  •  nicotine (NICODERM CQ) 14 MG/24HR patch, Place 1 patch on the skin as directed by provider Daily., Disp: 30 patch, Rfl: 1  •  nitroglycerin (NITROSTAT) 0.4 MG SL tablet, Place 0.4 mg under the tongue Every 5 (Five) Minutes As Needed for Chest Pain. Take no more than 3 doses in 15 minutes., Disp: , Rfl:   •  pantoprazole (PROTONIX) 40 MG EC tablet, Take 1 tablet by mouth 2 (Two) Times a Day Before Meals., Disp: 60 tablet, Rfl: 5  •  predniSONE (DELTASONE) 10 MG tablet, , Disp: , Rfl:   •  SYMBICORT 80-4.5 MCG/ACT inhaler, Inhale 2 puffs 2 (Two) Times a Day As Needed., Disp: , Rfl:   •  Ustekinumab (STELARA IV), Infuse  into a venous catheter., Disp: , Rfl:     Past Surgical History:   Procedure Laterality Date   • ANTERIOR CERVICAL DISCECTOMY W/ FUSION N/A 04/26/2021    Procedure: CERVICAL DISCECTOMY ANTERIOR WITH FUSION C4-6;  Surgeon: Chseter Cowan MD;  [facility];  Service: Neurosurgery;  Laterality: N/A;   • BACK SURGERY      lumbar 1995   • CARDIAC CATHETERIZATION N/A 10/13/2017    Procedure: Left Heart Cath;  Surgeon: Chester  OSEAS Centeno MD;  [facility]  [facility];  Service:    • CARDIAC CATHETERIZATION N/A 02/11/2021    Procedure: Left Heart Cath;  Surgeon: Brent Degroot MD;  [facility]  [facility];  Service: Cardiology;  Laterality: N/A;   • CATARACT EXTRACTION, BILATERAL Bilateral    • CERVICAL DISCECTOMY POSTERIOR FUSION WITH BRAIN LAB N/A 8/4/2022    Procedure: CERVICAL POSTERIOR FUSION WITH INSTRUMENTATION C3-5;  Surgeon: Chester Cowan MD;  Location:  [facility];  Service: Neurosurgery;  Laterality: N/A;   • COLONOSCOPY     • CORONARY ANGIOPLASTY WITH STENT PLACEMENT      x2-3 stent in place   • ENDOSCOPY     • ENDOSCOPY N/A 02/09/2018    Procedure: ESOPHAGOGASTRODUODENOSCOPY WITH DILATATION CPT CODE: 89309;  Surgeon: Everett Villanueva III, MD;  Location:  [facility];  Service:    • ENDOSCOPY WITH FOREIGN BODY REMOVAL N/A 8/5/2022    Procedure: ESOPHAGOGASTRODUODENOSCOPY WITH FOREIGN BODY REMOVAL;  Surgeon: Sg Black MD;  Location:  [facility];  Service: Gastroenterology;  Laterality: N/A;       Social History     Socioeconomic History   • Marital status:      Spouse name: Coretta Bhatt   Tobacco Use   • Smoking status: Every Day     Packs/day: 0.50     Years: 35.00     Pack years: 17.50     Types: Cigarettes   • Smokeless tobacco: Former     Types: Chew     Quit date: 2018   • Tobacco comments:     down 0.5 ppd now   Vaping Use   • Vaping Use: Never used   Substance and Sexual Activity   • Alcohol use: No   • Drug use: No   • Sexual activity: Defer         Review of Systems   Constitutional: Positive for fatigue. Negative for activity change, appetite change, chills, diaphoresis, fever and unexpected weight change.   HENT: Positive for ear pain, facial swelling, sinus pressure, tinnitus and trouble swallowing. Negative for congestion, dental problem, drooling, ear discharge, hearing loss, mouth sores, nosebleeds, postnasal drip, rhinorrhea, sinus pain,  "sneezing, sore throat and voice change.    Eyes: Positive for itching and visual disturbance. Negative for photophobia, pain, discharge and redness.   Respiratory: Positive for apnea and choking. Negative for cough, chest tightness, shortness of breath, wheezing and stridor.    Cardiovascular: Positive for leg swelling. Negative for chest pain and palpitations.   Gastrointestinal: Negative for abdominal distention, abdominal pain, anal bleeding, blood in stool, constipation, diarrhea, nausea, rectal pain and vomiting.   Endocrine: Negative for cold intolerance, heat intolerance, polydipsia, polyphagia and polyuria.   Genitourinary: Negative for decreased urine volume, difficulty urinating, dysuria, enuresis, flank pain, frequency, genital sores, hematuria, penile discharge, penile pain, penile swelling, scrotal swelling, testicular pain and urgency.   Musculoskeletal: Positive for arthralgias, back pain, joint swelling, myalgias, neck pain and neck stiffness. Negative for gait problem.   Skin: Positive for rash. Negative for color change, pallor and wound.   Allergic/Immunologic: Negative for environmental allergies, food allergies and immunocompromised state.   Neurological: Positive for facial asymmetry and numbness. Negative for dizziness, tremors, seizures, syncope, speech difficulty, weakness, light-headedness and headaches.   Hematological: Negative for adenopathy. Does not bruise/bleed easily.   Psychiatric/Behavioral: Positive for sleep disturbance. Negative for agitation, behavioral problems, confusion, decreased concentration, dysphoric mood, hallucinations, self-injury and suicidal ideas. The patient is nervous/anxious. The patient is not hyperactive.        Objective    Vital Signs: Blood pressure 124/72, temperature 97.1 °F (36.2 °C), temperature source Infrared, height 177.8 cm (70\"), weight 78.8 kg (173 lb 12.8 oz).  Physical Exam  Vitals and nursing note reviewed.   Constitutional:       General: He " is not in acute distress.     Appearance: He is well-developed.   HENT:      Head: Normocephalic and atraumatic.   Psychiatric:         Behavior: Behavior normal.         Thought Content: Thought content normal.     Musculoskeletal:     Strength is intact in upper and lower extremities to direct testing.     Station and gait are normal.  Neurologic:     Muscle tone is normal throughout.     Coordination is intact.     Sensation is intact to light touch throughout.     Patient is oriented to person, place, and time.     Andres sign positive on the right.  Negative on the left       Independent review of radiographic imaging: Plain films of the cervical spine from 1/24/2023 demonstrate postoperative changes without hardware disruption.  Anterior fusion C3-5.  Posterior fusion C2-4.    Assessment & Plan   Diagnosis:   1. Cervical spondylosis with myelopathy status post prior ACDF C4-6 followed by more recent dorsal decompression with fusion and stabilization  2.  Mild carpal tunnel syndrome, right    Medical Decision Making: Patient continues to be symptomatic from his myelopathy.  In the past Soma seem to help him better than any other muscle relaxers he has tried.  That may be an option to consider with his pain management.  From our standpoint, we are going to sit tight and continue to monitor.  Hopefully as warmer months come around, he will be able to get out more for exercise.  We discussed water therapy.  I would like to see him back in 5 months to check on things.  He can call the office if he develops new or progressive symptoms in the interim.        Diagnoses and all orders for this visit:    1. Cervical spondylosis with myelopathy (Primary)    2. Cervical myelopathy (HCC)    3. Bilateral carpal tunnel syndrome    4. S/P cervical spinal fusion    5. Tobacco use    6. Dysesthesia of multiple sites    7. Myelopathy concurrent with and due to spinal stenosis of cervical region (HCC)                        Alicia Chappell PA-C  Patient Care Team:  Moi Segura APRN as PCP - General (Family Medicine)  Brent Degroot MD as Consulting Physician (Interventional Cardiology)  Sg Black MD as Consulting Physician (Gastroenterology)

## 2023-05-17 ENCOUNTER — PREP FOR SURGERY (OUTPATIENT)
Dept: OTHER | Facility: HOSPITAL | Age: 58
End: 2023-05-17

## 2023-05-17 ENCOUNTER — OFFICE VISIT (OUTPATIENT)
Dept: GASTROENTEROLOGY | Facility: CLINIC | Age: 58
End: 2023-05-17
Payer: MEDICARE

## 2023-05-17 VITALS
TEMPERATURE: 97.4 F | OXYGEN SATURATION: 98 % | HEART RATE: 60 BPM | SYSTOLIC BLOOD PRESSURE: 118 MMHG | BODY MASS INDEX: 24.14 KG/M2 | HEIGHT: 70 IN | DIASTOLIC BLOOD PRESSURE: 82 MMHG | WEIGHT: 168.6 LBS

## 2023-05-17 DIAGNOSIS — Z98.890 HISTORY OF COLONOSCOPY: ICD-10-CM

## 2023-05-17 DIAGNOSIS — K21.00 GASTROESOPHAGEAL REFLUX DISEASE WITH ESOPHAGITIS WITHOUT HEMORRHAGE: ICD-10-CM

## 2023-05-17 DIAGNOSIS — G47.33 OBSTRUCTIVE SLEEP APNEA SYNDROME: ICD-10-CM

## 2023-05-17 DIAGNOSIS — Z79.01 LONG TERM CURRENT USE OF ANTICOAGULANT: ICD-10-CM

## 2023-05-17 DIAGNOSIS — I25.119 CORONARY ARTERY DISEASE INVOLVING NATIVE CORONARY ARTERY OF NATIVE HEART WITH ANGINA PECTORIS: ICD-10-CM

## 2023-05-17 DIAGNOSIS — K44.9 HIATAL HERNIA: ICD-10-CM

## 2023-05-17 DIAGNOSIS — R13.10 DYSPHAGIA, UNSPECIFIED TYPE: Primary | ICD-10-CM

## 2023-05-17 DIAGNOSIS — R13.19 CERVICAL DYSPHAGIA: Primary | ICD-10-CM

## 2023-05-17 DIAGNOSIS — I20.9 ANGINA, CLASS II: ICD-10-CM

## 2023-05-17 DIAGNOSIS — R06.02 SHORTNESS OF BREATH: ICD-10-CM

## 2023-05-17 RX ORDER — CYANOCOBALAMIN 1000 UG/ML
INJECTION, SOLUTION INTRAMUSCULAR; SUBCUTANEOUS
COMMUNITY
Start: 2023-04-19

## 2023-05-17 RX ORDER — DEXLANSOPRAZOLE 60 MG/1
60 CAPSULE, DELAYED RELEASE ORAL DAILY
Qty: 90 CAPSULE | Refills: 3 | Status: SHIPPED | OUTPATIENT
Start: 2023-05-17

## 2023-05-17 NOTE — PROGRESS NOTES
GASTROENTEROLOGY OFFICE NOTE    Javier Santiago  [id number]  1965    CARE TEAM  Patient Care Team:  Moi Segura APRN as PCP - General (Family Medicine)  Brent Degroot MD as Consulting Physician (Interventional Cardiology)  Sg Black MD as Consulting Physician (Gastroenterology)    Referring Provider: No ref. provider found    Chief Complaint   Patient presents with   • GI Problem     Chronic gastritis        HISTORY OF PRESENT ILLNESS:   Javier Santiago is a 57 y.o. male who presents to the clinic today for evaluation regarding  dysphagia. He reports difficulty swallowing where it feels as though food and medications gets stuck in neck area. He is interested in EGD with repeat dilation. He is having difficulty swallowing pantoprazole.     8/5/2022 history and physical per Dr. Black with documentation of history of dysphagia with prior dilations for what sounds like esophageal stricture, significant reflux, ate roast beef sandwich prior to EGD that felt lodged in his throat with call to relieve the impaction, history of posterior cervical spine surgery August 4, 2022.  EGD with large piece of food in the cervical esophagus that was grasped with Craft net and pulled out the esophagus, ring and esophagus with some ulceration and inflammation suspicious for eosinophilic esophagitis, gastritis, duodenitis.  Proton pump inhibitor therapy and upper endoscopy as an outpatient recommended.    5/10/2022 esophagram revealed smooth medium segment narrowing of the upper esophagus where barium tablet becomes lodged, aspiration with thin barium, moderate sliding hiatal hernia with reflux to the level of thoracic inlet.    He reports prior colonoscopy by Dr. Zamudio at [facility] ([place] or [place]).     Past Medical History:   Diagnosis Date   • Anxiety    • Arthritis     psoriatic   • ASCVD (arteriosclerotic cardiovascular disease)    • CHF (congestive heart failure)    • Chronic low back pain    •  COPD (chronic obstructive pulmonary disease)    • Coronary artery disease    • Fibromyalgia    • Full dentures    • GERD (gastroesophageal reflux disease)    • History of EKG 01/26/2016    NORMAL   • Hyperlipidemia    • Hypertension    • Hypotension    • Insomnia    • MI, old 2015   • Psoriasis    • Psoriatic arthritis    • RA (rheumatoid arthritis)    • Sciatic nerve pain    • Skin pain     from nerve pain   • Sleep apnea     cpap non compliant   • Tinnitus    • Wears glasses     readers        Past Surgical History:   Procedure Laterality Date   • ANTERIOR CERVICAL DISCECTOMY W/ FUSION N/A 04/26/2021    Procedure: CERVICAL DISCECTOMY ANTERIOR WITH FUSION C4-6;  Surgeon: Chester Cowan MD;  Location:  [facility];  Service: Neurosurgery;  Laterality: N/A;   • BACK SURGERY      lumbar 1995   • CARDIAC CATHETERIZATION N/A 10/13/2017    Procedure: Left Heart Cath;  Surgeon: Chester Centeno MD;  [facility]:  [facility];  Service:    • CARDIAC CATHETERIZATION N/A 02/11/2021    Procedure: Left Heart Cath;  Surgeon: Brent Degroot MD;  [facility]  [facility];  Service: Cardiology;  Laterality: N/A;   • CATARACT EXTRACTION, BILATERAL Bilateral    • CERVICAL DISCECTOMY POSTERIOR FUSION WITH BRAIN LAB N/A 8/4/2022    Procedure: CERVICAL POSTERIOR FUSION WITH INSTRUMENTATION C3-5;  Surgeon: Chester Cowan MD;  Location:  [facility];  Service: Neurosurgery;  Laterality: N/A;   • COLONOSCOPY     • CORONARY ANGIOPLASTY WITH STENT PLACEMENT      x2-3 stent in place   • ENDOSCOPY     • ENDOSCOPY N/A 02/09/2018    Procedure: ESOPHAGOGASTRODUODENOSCOPY WITH DILATATION CPT CODE: 07638;  Surgeon: Everett Villanueva III, MD;  Location:  [facility];  Service:    • ENDOSCOPY WITH FOREIGN BODY REMOVAL N/A 8/5/2022    Procedure: ESOPHAGOGASTRODUODENOSCOPY WITH FOREIGN BODY REMOVAL;  Surgeon: Sg Black MD;  Location:  [facility];  Service: Gastroenterology;  Laterality: N/A;         Current Outpatient Medications on File Prior to Visit   Medication Sig   • albuterol (PROVENTIL HFA;VENTOLIN HFA) 108 (90 BASE) MCG/ACT inhaler Inhale 2 puffs Every 4 (Four) Hours As Needed for Wheezing.   • ALPRAZolam (XANAX) 1 MG tablet    • amLODIPine (NORVASC) 5 MG tablet TAKE ONE TABLET BY MOUTH DAILY FOR FOR BLOOD  PRESSURE   • aspirin 81 MG tablet Take 1 tablet by mouth Daily.   • atorvastatin (LIPITOR) 80 MG tablet Take 1 tablet by mouth Daily.   • baclofen (LIORESAL) 10 MG tablet Take 1 tablet by mouth 2 (Two) Times a Day.   • betamethasone dipropionate (DIPROLENE) 0.05 % ointment Apply  topically to the appropriate area as directed 2 (Two) Times a Day.   • clopidogrel (Plavix) 75 MG tablet Take 1 tablet by mouth Daily. (Patient taking differently: Take 1 tablet by mouth Daily. Holding for 5 days- letter on chart)   • cyanocobalamin 1000 MCG/ML injection inject 1ml AS DIRECTED EVERY MONTH   • Diclofenac Sodium (VOLTAREN) 1 % gel gel    • DULoxetine (CYMBALTA) 30 MG capsule Take 1 capsule by mouth Daily.   • Enbrel 50 MG/ML injection    • EPINEPHrine (EPIPEN IJ) Inject 1 dose as directed As Needed (ALLERGIC REACTION).   • gabapentin (NEURONTIN) 600 MG tablet Take 1 tablet by mouth 3 (Three) Times a Day.   • hydrALAZINE (APRESOLINE) 25 MG tablet Take 1 tablet by mouth 3 (Three) Times a Day.   • HYDROcodone-acetaminophen (NORCO)  MG per tablet Take 1 tablet by mouth 3 (Three) Times a Day As Needed for Moderate Pain .   • hydrOXYzine (ATARAX) 25 MG tablet Take 1 tablet by mouth Every 8 (Eight) Hours As Needed.   • isosorbide mononitrate (IMDUR) 30 MG 24 hr tablet TAKE ONE TABLET BY MOUTH DAILY FOR HEART   • loratadine (CLARITIN) 10 MG tablet Take 1 tablet by mouth Daily.   • methotrexate 2.5 MG tablet    • metoprolol succinate XL (TOPROL-XL) 25 MG 24 hr tablet Take 1 tablet by mouth Daily.   • mupirocin (BACTROBAN) 2 % ointment Apply to the inside of each nostril with a cotton swab twice daily  "(morning and evening) starting 5 days before surgery.   • nitroglycerin (NITROSTAT) 0.4 MG SL tablet Place 1 tablet under the tongue Every 5 (Five) Minutes As Needed for Chest Pain. Take no more than 3 doses in 15 minutes.   • pantoprazole (PROTONIX) 40 MG EC tablet Take 1 tablet by mouth 2 (Two) Times a Day Before Meals.   • SYMBICORT 80-4.5 MCG/ACT inhaler Inhale 2 puffs 2 (Two) Times a Day As Needed.     No current facility-administered medications on file prior to visit.       No Known Allergies    Family History   Problem Relation Age of Onset   • Heart attack Mother         x 3   • Atrial fibrillation Mother    • Heart disease Mother    • Heart attack Father    • Heart disease Father         CABG       Social History     Socioeconomic History   • Marital status:      Spouse name: Coretta Bhatt   Tobacco Use   • Smoking status: Every Day     Packs/day: 0.50     Years: 35.00     Pack years: 17.50     Types: Cigarettes   • Smokeless tobacco: Former     Types: Chew     Quit date: 2018   • Tobacco comments:     down 0.5 ppd now   Vaping Use   • Vaping Use: Never used   Substance and Sexual Activity   • Alcohol use: No   • Drug use: Never   • Sexual activity: Defer       PHYSICAL EXAM   /82 (BP Location: Left arm, Patient Position: Sitting, Cuff Size: Adult)   Pulse 60   Temp 97.4 °F (36.3 °C) (Infrared)   Ht 177.8 cm (70\")   Wt 76.5 kg (168 lb 9.6 oz)   SpO2 98%   BMI 24.19 kg/m²   Physical Exam  Constitutional:       General: He is not in acute distress.     Appearance: He is not toxic-appearing.   HENT:      Head: Normocephalic and atraumatic. No contusion.      Right Ear: External ear normal.      Left Ear: External ear normal.   Eyes:      General: Lids are normal. No scleral icterus.        Right eye: No discharge.         Left eye: No discharge.      Extraocular Movements: Extraocular movements intact.   Neck:      Trachea: Trachea normal.      Comments: No visible mass  No visible " adenopathy  Cardiovascular:      Rate and Rhythm: Normal rate.   Pulmonary:      Effort: No respiratory distress.      Comments: Symmetrical expansion    Musculoskeletal:      Right lower leg: No edema.      Left lower leg: No edema.      Comments: Symmetrical movement of upper extremities  Symmetrical movement of lower extremities  No visible deformities   Skin:     General: Skin is warm and dry.      Coloration: Skin is not jaundiced.   Neurological:      General: No focal deficit present.      Mental Status: He is alert and oriented to person, place, and time.   Psychiatric:         Mood and Affect: Mood normal.         Behavior: Behavior normal.         Thought Content: Thought content normal.     Results Review:  8/5/2022 history and physical per Dr. Black with documentation of history of dysphagia with prior dilations for what sounds like esophageal stricture, significant reflux, ate roast beef sandwich prior to EGD that felt lodged in his throat with call to relieve the impaction, history of posterior cervical spine surgery August 4, 2022.  EGD with large piece of food in the cervical esophagus that was grasped with Craft net and pulled out the esophagus, ring and esophagus with some ulceration and inflammation suspicious for eosinophilic esophagitis, gastritis, duodenitis.  Proton pump inhibitor therapy and upper endoscopy as an outpatient recommended.    5/10/2022 esophagram revealed smooth medium segment narrowing of the upper esophagus where barium tablet becomes lodged, aspiration with thin barium, moderate sliding hiatal hernia with reflux to the level of thoracic inlet.    ASSESSMENT / PLAN  1. Cervical dysphagia  - change pantoprazole to dexlansoprazole as below due to possible uncontrolled reflux as well as difficulty swallowing and needing a PPI that he can open and sprinkle on applesauce or dissolve in water  - plan for repeat EGD at the hospital for additional evaluation  - prior EGD with findings  concerning for EoE. He is on Symbicort inhaler. If he has findings of EoE on repeat EGD, consider Dupixent.  - chew food well, sips after one to two bites, consider speaking to pharmacist to determine if any medications can be given in alternative form  - dexlansoprazole (DEXILANT) 60 MG capsule; Take 1 capsule by mouth Daily. Open capsule, sprinkle on 1 tablespoon applesauce, swallow immediately, follow with sips of water, do not crush/cut/chew granules OR mix with 20 mL of water, draw into oral syringe and take immediately, mix residue with 10 mL of water, swirl, take by mouth immediately, repeat with additional 10 mL of water.  Dispense: 90 capsule; Refill: 3    2. Gastroesophageal reflux disease with esophagitis without hemorrhage  - change pantoprazole to Dexilant  - esophagram 1 year ago revealed smooth medium segment narrowing of the upper esophagus where barium tablet becomes lodged, aspiration with thin barium, moderate sliding hiatal hernia with reflux to the level of thoracic inlet.  - dexlansoprazole (DEXILANT) 60 MG capsule; Take 1 capsule by mouth Daily. Open capsule, sprinkle on 1 tablespoon applesauce, swallow immediately, follow with sips of water, do not crush/cut/chew granules OR mix with 20 mL of water, draw into oral syringe and take immediately, mix residue with 10 mL of water, swirl, take by mouth immediately, repeat with additional 10 mL of water.  Dispense: 90 capsule; Refill: 3    3. Long term current use of anticoagulant  - recommend he discuss stopping Plavix with provider that prescribes Plavix prior to EGD    4. History of colonoscopy  - GAVIN signed to request colonoscopy record    5. Hiatal hernia  - continue PPI, change to Dexilant      Return for Follow-up after EGD.    Yamilet Jett, APRN  05/17/2023

## 2023-05-17 NOTE — PATIENT INSTRUCTIONS
Stop pantoprazole and start dexlansoprazole daily. Open capsule, sprinkle on 1 tablespoon applesauce, swallow immediately, follow with sips of water, do not crush/cut/chew granules OR mix with 20 mL of water, draw into oral syringe and take immediately, mix residue with 10 mL of water, swirl, take by mouth immediately, repeat with additional 10 mL of water.

## 2023-05-23 RX ORDER — SODIUM CHLORIDE, SODIUM LACTATE, POTASSIUM CHLORIDE, CALCIUM CHLORIDE 600; 310; 30; 20 MG/100ML; MG/100ML; MG/100ML; MG/100ML
30 INJECTION, SOLUTION INTRAVENOUS CONTINUOUS PRN
OUTPATIENT
Start: 2023-05-23

## 2023-06-07 ENCOUNTER — HOSPITAL ENCOUNTER (OUTPATIENT)
Facility: HOSPITAL | Age: 58
Setting detail: HOSPITAL OUTPATIENT SURGERY
Discharge: HOME | End: 2023-06-07
Attending: INTERNAL MEDICINE | Admitting: INTERNAL MEDICINE
Payer: MEDICARE

## 2023-06-07 ENCOUNTER — ANESTHESIA (OUTPATIENT)
Dept: GASTROENTEROLOGY | Facility: HOSPITAL | Age: 58
End: 2023-06-07
Payer: MEDICARE

## 2023-06-07 ENCOUNTER — ANESTHESIA EVENT (OUTPATIENT)
Dept: GASTROENTEROLOGY | Facility: HOSPITAL | Age: 58
End: 2023-06-07
Payer: MEDICARE

## 2023-06-07 VITALS
OXYGEN SATURATION: 97 % | HEART RATE: 51 BPM | HEIGHT: 70 IN | WEIGHT: 170 LBS | DIASTOLIC BLOOD PRESSURE: 79 MMHG | TEMPERATURE: 97 F | RESPIRATION RATE: 16 BRPM | BODY MASS INDEX: 24.34 KG/M2 | SYSTOLIC BLOOD PRESSURE: 106 MMHG

## 2023-06-07 DIAGNOSIS — R13.10 DYSPHAGIA, UNSPECIFIED TYPE: ICD-10-CM

## 2023-06-07 DIAGNOSIS — G47.33 OBSTRUCTIVE SLEEP APNEA SYNDROME: ICD-10-CM

## 2023-06-07 DIAGNOSIS — I20.9 ANGINA, CLASS II: ICD-10-CM

## 2023-06-07 DIAGNOSIS — R06.02 SHORTNESS OF BREATH: ICD-10-CM

## 2023-06-07 DIAGNOSIS — I25.119 CORONARY ARTERY DISEASE INVOLVING NATIVE CORONARY ARTERY OF NATIVE HEART WITH ANGINA PECTORIS: ICD-10-CM

## 2023-06-07 DIAGNOSIS — K25.9 GASTRIC ULCER: ICD-10-CM

## 2023-06-07 LAB
QT INTERVAL: 400 MS
QTC INTERVAL: 400 MS

## 2023-06-07 PROCEDURE — 93005 ELECTROCARDIOGRAM TRACING: CPT | Performed by: ANESTHESIOLOGY

## 2023-06-07 PROCEDURE — 25010000002 MIDAZOLAM PER 1 MG: Performed by: ANESTHESIOLOGY

## 2023-06-07 PROCEDURE — C1726 CATH, BAL DIL, NON-VASCULAR: HCPCS | Performed by: INTERNAL MEDICINE

## 2023-06-07 PROCEDURE — 25010000002 PROPOFOL 10 MG/ML EMULSION: Performed by: NURSE ANESTHETIST, CERTIFIED REGISTERED

## 2023-06-07 PROCEDURE — 88305 TISSUE EXAM BY PATHOLOGIST: CPT | Performed by: INTERNAL MEDICINE

## 2023-06-07 PROCEDURE — 43249 ESOPH EGD DILATION <30 MM: CPT | Performed by: INTERNAL MEDICINE

## 2023-06-07 PROCEDURE — 43239 EGD BIOPSY SINGLE/MULTIPLE: CPT | Performed by: INTERNAL MEDICINE

## 2023-06-07 RX ORDER — LABETALOL HYDROCHLORIDE 5 MG/ML
5 INJECTION, SOLUTION INTRAVENOUS
Status: DISCONTINUED | OUTPATIENT
Start: 2023-06-07 | End: 2023-06-07 | Stop reason: HOSPADM

## 2023-06-07 RX ORDER — ONDANSETRON 2 MG/ML
4 INJECTION INTRAMUSCULAR; INTRAVENOUS ONCE AS NEEDED
Status: DISCONTINUED | OUTPATIENT
Start: 2023-06-07 | End: 2023-06-07 | Stop reason: HOSPADM

## 2023-06-07 RX ORDER — LIDOCAINE HYDROCHLORIDE 10 MG/ML
0.5 INJECTION, SOLUTION EPIDURAL; INFILTRATION; INTRACAUDAL; PERINEURAL ONCE AS NEEDED
Status: DISCONTINUED | OUTPATIENT
Start: 2023-06-07 | End: 2023-06-07 | Stop reason: HOSPADM

## 2023-06-07 RX ORDER — EPHEDRINE SULFATE 50 MG/ML
5 INJECTION, SOLUTION INTRAVENOUS ONCE AS NEEDED
Status: DISCONTINUED | OUTPATIENT
Start: 2023-06-07 | End: 2023-06-07 | Stop reason: HOSPADM

## 2023-06-07 RX ORDER — FENTANYL CITRATE 50 UG/ML
50 INJECTION, SOLUTION INTRAMUSCULAR; INTRAVENOUS
Status: DISCONTINUED | OUTPATIENT
Start: 2023-06-07 | End: 2023-06-07 | Stop reason: HOSPADM

## 2023-06-07 RX ORDER — FAMOTIDINE 20 MG/1
20 TABLET, FILM COATED ORAL ONCE
Status: DISCONTINUED | OUTPATIENT
Start: 2023-06-07 | End: 2023-06-07 | Stop reason: HOSPADM

## 2023-06-07 RX ORDER — SODIUM CHLORIDE 0.9 % (FLUSH) 0.9 %
10 SYRINGE (ML) INJECTION AS NEEDED
Status: DISCONTINUED | OUTPATIENT
Start: 2023-06-07 | End: 2023-06-07 | Stop reason: HOSPADM

## 2023-06-07 RX ORDER — SODIUM CHLORIDE 9 MG/ML
40 INJECTION, SOLUTION INTRAVENOUS AS NEEDED
Status: DISCONTINUED | OUTPATIENT
Start: 2023-06-07 | End: 2023-06-07 | Stop reason: HOSPADM

## 2023-06-07 RX ORDER — SODIUM CHLORIDE, SODIUM LACTATE, POTASSIUM CHLORIDE, CALCIUM CHLORIDE 600; 310; 30; 20 MG/100ML; MG/100ML; MG/100ML; MG/100ML
30 INJECTION, SOLUTION INTRAVENOUS CONTINUOUS PRN
Status: DISCONTINUED | OUTPATIENT
Start: 2023-06-07 | End: 2023-06-07 | Stop reason: HOSPADM

## 2023-06-07 RX ORDER — MIDAZOLAM HYDROCHLORIDE 1 MG/ML
1 INJECTION INTRAMUSCULAR; INTRAVENOUS
Status: DISCONTINUED | OUTPATIENT
Start: 2023-06-07 | End: 2023-06-07 | Stop reason: HOSPADM

## 2023-06-07 RX ORDER — SODIUM CHLORIDE 0.9 % (FLUSH) 0.9 %
10 SYRINGE (ML) INJECTION EVERY 12 HOURS SCHEDULED
Status: DISCONTINUED | OUTPATIENT
Start: 2023-06-07 | End: 2023-06-07 | Stop reason: HOSPADM

## 2023-06-07 RX ORDER — OXYCODONE HYDROCHLORIDE 5 MG/1
5 TABLET ORAL ONCE AS NEEDED
Status: DISCONTINUED | OUTPATIENT
Start: 2023-06-07 | End: 2023-06-07 | Stop reason: HOSPADM

## 2023-06-07 RX ORDER — SODIUM CHLORIDE, SODIUM LACTATE, POTASSIUM CHLORIDE, CALCIUM CHLORIDE 600; 310; 30; 20 MG/100ML; MG/100ML; MG/100ML; MG/100ML
9 INJECTION, SOLUTION INTRAVENOUS CONTINUOUS
Status: DISCONTINUED | OUTPATIENT
Start: 2023-06-07 | End: 2023-06-07 | Stop reason: HOSPADM

## 2023-06-07 RX ORDER — PROPOFOL 10 MG/ML
VIAL (ML) INTRAVENOUS AS NEEDED
Status: DISCONTINUED | OUTPATIENT
Start: 2023-06-07 | End: 2023-06-07 | Stop reason: SURG

## 2023-06-07 RX ORDER — ACETAMINOPHEN 325 MG/1
650 TABLET ORAL ONCE AS NEEDED
Status: DISCONTINUED | OUTPATIENT
Start: 2023-06-07 | End: 2023-06-07 | Stop reason: HOSPADM

## 2023-06-07 RX ORDER — FAMOTIDINE 10 MG/ML
20 INJECTION, SOLUTION INTRAVENOUS ONCE
Status: DISCONTINUED | OUTPATIENT
Start: 2023-06-07 | End: 2023-06-07 | Stop reason: HOSPADM

## 2023-06-07 RX ADMIN — SODIUM CHLORIDE, POTASSIUM CHLORIDE, SODIUM LACTATE AND CALCIUM CHLORIDE: 600; 310; 30; 20 INJECTION, SOLUTION INTRAVENOUS at 14:54

## 2023-06-07 RX ADMIN — PROPOFOL 100 MG: 10 INJECTION, EMULSION INTRAVENOUS at 14:54

## 2023-06-07 RX ADMIN — PROPOFOL 50 MG: 10 INJECTION, EMULSION INTRAVENOUS at 15:02

## 2023-06-07 RX ADMIN — MIDAZOLAM HYDROCHLORIDE 1 MG: 1 INJECTION, SOLUTION INTRAMUSCULAR; INTRAVENOUS at 14:01

## 2023-06-07 RX ADMIN — PROPOFOL 50 MG: 10 INJECTION, EMULSION INTRAVENOUS at 14:58

## 2023-06-07 RX ADMIN — SODIUM CHLORIDE, POTASSIUM CHLORIDE, SODIUM LACTATE AND CALCIUM CHLORIDE 30 ML/HR: 600; 310; 30; 20 INJECTION, SOLUTION INTRAVENOUS at 13:11

## 2023-06-07 NOTE — OP NOTE
EGD with biopsy and dilation    Instrument: Olympus video gastroscope      Medications: As per anesthesia      Pre-Op diagnosis: Dysphagia      Postop diagnosis: Ringed esophagus suspicious for eosinophilic esophagitis, gastritis      Indications: The patient is a 57-year-old here primarily due to long-term dysphagia to both solids and liquids.  He did have a meat impaction in the past 8/5/2022.  This was in the cervical esophagus.  He is on a PPI.      Procedure: After the patient was informed of the risks, benefits, alternatives to the procedure, informed consent was obtained.  Endoscope was inserted in the oropharynx, down the esophagus, into the stomach and duodenum.  Esophageal mucosa was ringed.  There were rings in the entire length of the esophagus.  The scope did pass through these rings easily.  There was some esophageal spasm.  Biopsies were taken at the GE junction.  Biopsies were taken in the esophagus to rule out EOE.  8 8/9/2010 balloon was used to dilate the esophagus.  We dilated in multiple areas.  We dilated in the cervical esophagus, mid esophagus, and at the GE junction.  The stomach is markable for gastritis.  There was some healing ulcers in the stomach and biopsies were taken.  The duodenal bulb and descending duodenum were unremarkable.  The patient tolerated the procedure well and there were no immediate complications.  There was no bleeding.      Impressions and plan #1 healing gastric ulcers: He should continue on his proton pump inhibitor.  The fact that he had multiple healing ulcers makes me think is related to nonsteroidals or aspirin.  We will treat H. pylori if present.    #2 ringed esophagus with some spasm consistent with eosinophilic esophagitis: He is on a proton pump inhibitor.  We will evaluate under the biopsies as well.  We did dilate empirically to 12 mm.      CC referring OBDULIO Miramontes

## 2023-06-07 NOTE — H&P
Gastroenterology Consult Note    Reason for Consultation: Dysphagia    Patient Care Team:  Moi Segura APRN as PCP - General (Family Medicine)  Brnet Degroot MD as Consulting Physician (Interventional Cardiology)  Sg Black MD as Consulting Physician (Gastroenterology)    Chief complaint: Swallowing troubles      History of present illness: The patient is a 57-year-old with a known narrowing in the upper esophagus.  He did have a meat impaction last year which was removed.  He had some rings at that time suspicious for EOE.  He is here for evaluation.  He continues to have troubles with solids and liquids.  He does not complain of much in the way of heartburn.  He denies any family history of colon polyps or cancers.    No Known Allergies  Prior to Admission medications    Medication Sig Start Date End Date Taking? Authorizing Provider   albuterol (PROVENTIL HFA;VENTOLIN HFA) 108 (90 BASE) MCG/ACT inhaler Inhale 2 puffs Every 4 (Four) Hours As Needed for Wheezing.   Yes Annalee Elena MD   ALPRAZolam (XANAX) 1 MG tablet Take 1 tablet by mouth 3 (Three) Times a Day As Needed for Anxiety. 1/24/23  Yes Annalee Elena MD   amLODIPine (NORVASC) 5 MG tablet TAKE ONE TABLET BY MOUTH DAILY FOR FOR BLOOD  PRESSURE  Patient taking differently: Take 1 tablet by mouth Daily. 1/11/22  Yes Brent Degroot MD   aspirin 81 MG tablet Take 1 tablet by mouth Daily. 5/14/18  Yes Iza Muhammad APRN   atorvastatin (LIPITOR) 80 MG tablet Take 1 tablet by mouth Daily. 4/28/16  Yes Annalee Elena MD   baclofen (LIORESAL) 10 MG tablet Take 1 tablet by mouth 2 (Two) Times a Day. 2/28/19  Yes Dagoberto Lema MD   betamethasone dipropionate (DIPROLENE) 0.05 % ointment Apply 1 application topically to the appropriate area as directed 2 (Two) Times a Day.   Yes Annalee Elena MD   clopidogrel (Plavix) 75 MG tablet Take 1 tablet by mouth Daily. 5/3/21  Yes Chapo  Chester LEE MD   cyanocobalamin 1000 MCG/ML injection Inject 1 mL into the appropriate muscle as directed by prescriber Every 28 (Twenty-Eight) Days. 4/19/23  Yes Annalee Elena MD   Diclofenac Sodium (VOLTAREN) 1 % gel gel Apply 4 g topically to the appropriate area as directed 3 (Three) Times a Day As Needed (JOINT PAIN). 11/17/22  Yes Annalee Elena MD   DULoxetine (CYMBALTA) 30 MG capsule Take 1 capsule by mouth Daily.   Yes Annalee Elena MD   Enbrel 50 MG/ML injection Inject 1 mL under the skin into the appropriate area as directed 2 (Two) Times a Week. TUESDAY AND SATURDAY 1/10/23  Yes Annalee Elena MD   gabapentin (NEURONTIN) 600 MG tablet Take 1 tablet by mouth 3 (Three) Times a Day. 3/25/21  Yes Annalee Elena MD   hydrALAZINE (APRESOLINE) 25 MG tablet Take 1 tablet by mouth 3 (Three) Times a Day.   Yes Annalee Elena MD   HYDROcodone-acetaminophen (NORCO)  MG per tablet Take 1 tablet by mouth 3 (Three) Times a Day As Needed for Moderate Pain .  Patient taking differently: Take 1 tablet by mouth Every 6 (Six) Hours As Needed for Moderate Pain. 8/6/22  Yes Chester Cowan MD   hydrOXYzine (ATARAX) 25 MG tablet Take 1 tablet by mouth Every 8 (Eight) Hours As Needed for Itching. 12/16/20  Yes Annalee Elena MD   isosorbide mononitrate (IMDUR) 30 MG 24 hr tablet TAKE ONE TABLET BY MOUTH DAILY FOR HEART  Patient taking differently: Take 1 tablet by mouth Daily. 10/19/21  Yes Brent Degroot MD   loratadine (CLARITIN) 10 MG tablet Take 1 tablet by mouth Daily.   Yes Annalee Elena MD   methotrexate 2.5 MG tablet Take 8 tablets by mouth 1 (One) Time Per Week. WEDNESDAY 10/31/22  Yes Annalee Elena MD   metoprolol succinate XL (TOPROL-XL) 25 MG 24 hr tablet Take 1 tablet by mouth Daily. 12/29/20  Yes Brent Degroot MD   pantoprazole (PROTONIX) 40 MG EC tablet Take 1 tablet by mouth 2 (Two) Times a Day Before Meals.  12/11/17  Yes Holley Summers PA-C   SYMBICORT 80-4.5 MCG/ACT inhaler Inhale 2 puffs 2 (Two) Times a Day As Needed. 4/9/18  Yes Annalee Elena MD   dexlansoprazole (DEXILANT) 60 MG capsule Take 1 capsule by mouth Daily. Open capsule, sprinkle on 1 tablespoon applesauce, swallow immediately, follow with sips of water, do not crush/cut/chew granules OR mix with 20 mL of water, draw into oral syringe and take immediately, mix residue with 10 mL of water, swirl, take by mouth immediately, repeat with additional 10 mL of water. 5/17/23   Yamilet Jett APRN   EPINEPHrine (EPIPEN IJ) Inject 1 dose as directed As Needed (ALLERGIC REACTION).    ProviderAnnalee MD   nitroglycerin (NITROSTAT) 0.4 MG SL tablet Place 1 tablet under the tongue Every 5 (Five) Minutes As Needed for Chest Pain. Take no more than 3 doses in 15 minutes.    ProviderAnnalee MD      Current Facility-Administered Medications   Medication Dose Route Frequency Provider Last Rate Last Admin    famotidine (PEPCID) injection 20 mg  20 mg Intravenous Once Frank Sow MD        famotidine (PEPCID) tablet 20 mg  20 mg Oral Once Frank Sow MD        lactated ringers infusion  9 mL/hr Intravenous Continuous Frank Sow MD        lactated ringers infusion  30 mL/hr Intravenous Continuous PRN Sg Black MD 30 mL/hr at 06/07/23 1311 30 mL/hr at 06/07/23 1311    lidocaine PF 1% (XYLOCAINE) injection 0.5 mL  0.5 mL Injection Once PRN Frank Sow MD        midazolam (VERSED) injection 1 mg  1 mg Intravenous Q10 Min PRN Frank Sow MD   1 mg at 06/07/23 1401    sodium chloride 0.9 % flush 10 mL  10 mL Intravenous Q12H Frank Sow MD        sodium chloride 0.9 % flush 10 mL  10 mL Intravenous PRN Frank Sow MD        sodium chloride 0.9 % infusion 40 mL  40 mL Intravenous PRN Frank Sow MD          Past Medical History:   Diagnosis Date    Anxiety     Arthritis     psoriatic    ASCVD (arteriosclerotic  cardiovascular disease)     CHF (congestive heart failure)     Chronic low back pain     COPD (chronic obstructive pulmonary disease)     Coronary artery disease     Fibromyalgia     Full dentures     GERD (gastroesophageal reflux disease)     History of EKG 01/26/2016    NORMAL    Hyperlipidemia     Hypertension     Hypotension     Insomnia     MI, old 2015    MRSA infection 08/2022    NECK SURGERY    Psoriasis     Psoriatic arthritis     RA (rheumatoid arthritis)     Sciatic nerve pain     Skin pain     from nerve pain    Sleep apnea     cpap non compliant    Tinnitus     Wears glasses     readers     Past Surgical History:   Procedure Laterality Date    ANTERIOR CERVICAL DISCECTOMY W/ FUSION N/A 04/26/2021    Procedure: CERVICAL DISCECTOMY ANTERIOR WITH FUSION C4-6;  Surgeon: Chester Cowan MD;  Location:  [facility];  Service: Neurosurgery;  Laterality: N/A;    BACK SURGERY      lumbar 1995    CARDIAC CATHETERIZATION N/A 10/13/2017    Procedure: Left Heart Cath;  Surgeon: Chester Centeno MD;  [facility]:  [facility];  Service:     CARDIAC CATHETERIZATION N/A 02/11/2021    Procedure: Left Heart Cath;  Surgeon: Brent Degroot MD;  [facility]  [facility];  Service: Cardiology;  Laterality: N/A;    CATARACT EXTRACTION, BILATERAL Bilateral     CERVICAL DISCECTOMY POSTERIOR FUSION WITH BRAIN LAB N/A 08/04/2022    Procedure: CERVICAL POSTERIOR FUSION WITH INSTRUMENTATION C3-5;  Surgeon: Chester Cowan MD;  Location:  [facility];  Service: Neurosurgery;  Laterality: N/A;    COLONOSCOPY      CORONARY ANGIOPLASTY WITH STENT PLACEMENT      x2 stent in place    ENDOSCOPY      ENDOSCOPY N/A 02/09/2018    Procedure: ESOPHAGOGASTRODUODENOSCOPY WITH DILATATION CPT CODE: 98271;  Surgeon: Everett Villanueva III, MD;  Location:  [facility];  Service:     ENDOSCOPY WITH FOREIGN BODY REMOVAL N/A 08/05/2022    Procedure: ESOPHAGOGASTRODUODENOSCOPY WITH FOREIGN BODY REMOVAL;  Surgeon:  Sg Black MD;  Location: [facility];  Service: Gastroenterology;  Laterality: N/A;     Family History   Problem Relation Age of Onset    Heart attack Mother         x 3    Atrial fibrillation Mother     Heart disease Mother     Heart attack Father     Heart disease Father         CABG     GI Family History  No family history of colon polyps or cancers  Social History     Tobacco Use   Smoking Status Every Day    Packs/day: 0.50    Years: 35.00    Pack years: 17.50    Types: Cigarettes   Smokeless Tobacco Former    Types: Chew    Quit date: 2018   Tobacco Comments    down 0.5 ppd now     Social History     Substance and Sexual Activity   Alcohol Use No      Social History     Substance and Sexual Activity   Drug Use Never        ------  Review of systems:   Please refer to review of systems and pertinent positives in the history of present illness, all other groups tested are negative.    Vital Signs   Temp:  [98 °F (36.7 °C)] 98 °F (36.7 °C)  Heart Rate:  [60] 60  Resp:  [16] 16  BP: (145)/(98) 145/98    Physical Exam:   General Appearance: Alert, in no acute distress  Head: Normocephalic, without obvious abnormality, atraumatic  Eyes: Lids and lashes normal, conjunctivae and sclerae normal, no icterus, no pallor  Ears: Ears appear intact with no abnormalities noted  Neck: No adenopathy, supple, trachea midline, no thyromegaly, no JVD  Lungs: respirations regular, even and unlabored Heart: Regular rhythm and normal rate  Chest Wall: Symmetrical respiratory expansion  Abdomen: No masses, no organomegaly, soft nontender, nondistended, no guarding  Extremities:  Moves all extremities well, no edema, no cyanosis, no redness  Skin: No bleeding, bruising or rash  Neurologic: Cranial nerves 2 - 12 grossly intact, no focal deficits       LABS:   Lab Results (last 48 hours)       ** No results found for the last 48 hours. **          RADIOLOGY:  Imaging Results (Last 24 Hours)       ** No results found for  the last 24 hours. **            Assessment & Plan       Obstructive sleep apnea syndrome    Coronary artery disease involving native coronary artery of native heart with angina pectoris    Dysphagia    Angina, class II    Shortness of breath      Impressions and plan #1 dysphagia: We will plan an upper endoscopy.  We will dilate if needed.  We will look for signs of eosinophilic esophagitis and biopsy as needed.  Risks, benefits, alternatives were explained in detail.    I discussed the patient's findings and my recommendations with patient    Sg Black MD  06/07/23  14:50 EDT

## 2023-06-07 NOTE — ANESTHESIA PREPROCEDURE EVALUATION
Anesthesia Evaluation     Patient summary reviewed and Nursing notes reviewed   NPO Solid Status: > 8 hours  NPO Liquid Status: > 2 hours           Airway   Mallampati: II  TM distance: >3 FB  Neck ROM: full  No difficulty expected  Dental    (+) edentulous    Pulmonary - normal exam    breath sounds clear to auscultation  (+) a smoker (1ppd) Current, COPD mild,sleep apnea (Non-compliant with CPAP)  Cardiovascular - normal exam    ECG reviewed  Rhythm: regular  Rate: normal    (+) hypertension, CAD, cardiac stents (stopped ASA/plavix x 1 wk - pt reports cardiologist aware and ok'd stopping)     ROS comment: 2021 Echo:  · Myocardial perfusion imaging indicates a small-sized infarct located in the apex with mild ischemia in the distal anteroapical territory.  · Left ventricular ejection fraction is normal. (Calculated EF = 60%).  · Findings consistent with a normal ECG stress test.  · Impressions are consistent with an intermediate risk study.      Neuro/Psych- negative ROS  GI/Hepatic/Renal/Endo    (+) GERD well controlled    Musculoskeletal     Abdominal    Substance History      OB/GYN          Other   arthritis,                   Anesthesia Plan    ASA 3     general     intravenous induction     Anesthetic plan, risks, benefits, and alternatives have been provided, discussed and informed consent has been obtained with: patient.    Plan discussed with CRNA.    CODE STATUS:

## 2023-06-07 NOTE — ANESTHESIA POSTPROCEDURE EVALUATION
Patient: Javier Santiago    Procedure Summary       Date: 06/07/23 Room / Location:  [facility] /  [facility]    Anesthesia Start: 1449 Anesthesia Stop: 1503    Procedure: ESOPHAGOGASTRODUODENOSCOPY WITH ESOPHAGEAL BALLOON DILATATION AND BIOPSY Diagnosis:       Dysphagia, unspecified type      Coronary artery disease involving native coronary artery of native heart with angina pectoris      Angina, class II      Obstructive sleep apnea syndrome      Shortness of breath      (Dysphagia, unspecified type [R13.10])      (Coronary artery disease involving native coronary artery of native heart with angina pectoris [I25.119])      (Angina, class II [I20.9])      (Obstructive sleep apnea syndrome [G47.33])      (Shortness of breath [R06.02])    Surgeons: Sg Black MD Provider: Suraj Staley MD    Anesthesia Type: general ASA Status: 3            Anesthesia Type: general    Vitals  Vitals Value Taken Time   /63 06/07/23 1504   Temp     Pulse     Resp     SpO2 99 % 06/07/23 1504           Post Anesthesia Care and Evaluation    Patient location during evaluation: PACU  Patient participation: complete - patient participated  Level of consciousness: awake and alert  Pain management: adequate    Airway patency: patent  Anesthetic complications: No anesthetic complications  PONV Status: none  Cardiovascular status: hemodynamically stable and acceptable  Respiratory status: nonlabored ventilation, acceptable and nasal cannula  Hydration status: acceptable

## 2023-06-09 LAB
CYTO UR: NORMAL
LAB AP CASE REPORT: NORMAL
LAB AP CLINICAL INFORMATION: NORMAL
LAB AP DIAGNOSIS COMMENT: NORMAL
PATH REPORT.FINAL DX SPEC: NORMAL
PATH REPORT.GROSS SPEC: NORMAL

## 2023-06-22 LAB
QT INTERVAL: 400 MS
QTC INTERVAL: 400 MS

## 2023-09-26 ENCOUNTER — TRANSCRIBE ORDERS (OUTPATIENT)
Dept: ADMINISTRATIVE | Facility: HOSPITAL | Age: 58
End: 2023-09-26
Payer: MEDICARE

## 2023-09-26 DIAGNOSIS — F17.210 CIGARETTE SMOKER: Primary | ICD-10-CM

## 2023-10-10 ENCOUNTER — HOSPITAL ENCOUNTER (OUTPATIENT)
Dept: CT IMAGING | Facility: HOSPITAL | Age: 58
Discharge: HOME | End: 2023-10-10
Admitting: NURSE PRACTITIONER
Payer: MEDICARE

## 2023-10-10 DIAGNOSIS — F17.210 CIGARETTE SMOKER: ICD-10-CM

## 2023-10-10 PROCEDURE — 71271 CT THORAX LUNG CANCER SCR C-: CPT

## 2024-03-12 ENCOUNTER — OFFICE VISIT (OUTPATIENT)
Dept: CARDIOLOGY | Facility: CLINIC | Age: 59
End: 2024-03-12
Payer: MEDICARE

## 2024-03-12 VITALS
BODY MASS INDEX: 25.48 KG/M2 | RESPIRATION RATE: 18 BRPM | WEIGHT: 178 LBS | OXYGEN SATURATION: 97 % | DIASTOLIC BLOOD PRESSURE: 81 MMHG | HEIGHT: 70 IN | HEART RATE: 67 BPM | SYSTOLIC BLOOD PRESSURE: 163 MMHG

## 2024-03-12 DIAGNOSIS — I20.0 UNSTABLE ANGINA: Primary | ICD-10-CM

## 2024-03-12 NOTE — H&P (VIEW-ONLY)
[facility] CARDIOLOGY  [address] [address]  Phone: [phone]  Fax: [phone]    03/12/2024    Chief Complaint   Patient presents with    Chest Pain        History:   Javier Santiago is a 58 y.o. male seen in consultation, patient has a history of prior cardiac catheterization done by Dr. Mukherjee in 2021 showing patent stent but 50% stenosis in the LAD.  He been having worsening chest pain becoming more frequent and more dyspnea on exertion with minimal activity.          Past Medical History:   Diagnosis Date    Anxiety     Arthritis     psoriatic    ASCVD (arteriosclerotic cardiovascular disease)     CHF (congestive heart failure)     Chronic low back pain     COPD (chronic obstructive pulmonary disease)     Coronary artery disease     Fibromyalgia     Full dentures     GERD (gastroesophageal reflux disease)     History of EKG 01/26/2016    NORMAL    Hyperlipidemia     Hypertension     Hypotension     Insomnia     MI, old 2015    MRSA infection 08/2022    NECK SURGERY    Psoriasis     Psoriatic arthritis     RA (rheumatoid arthritis)     Sciatic nerve pain     Skin pain     from nerve pain    Sleep apnea     cpap non compliant    Tinnitus     Wears glasses     readers       Past Surgical History:   Procedure Laterality Date    ANTERIOR CERVICAL DISCECTOMY W/ FUSION N/A 04/26/2021    Procedure: CERVICAL DISCECTOMY ANTERIOR WITH FUSION C4-6;  Surgeon: Chester Cowan MD;  Location: [facility];  Service: Neurosurgery;  Laterality: N/A;    BACK SURGERY      lumbar 1995    CARDIAC CATHETERIZATION N/A 10/13/2017    Procedure: Left Heart Cath;  Surgeon: Chester Centeno MD;  [facility]  [facility];  Service:     CARDIAC CATHETERIZATION N/A 02/11/2021    Procedure: Left Heart Cath;  Surgeon: Brent Degroot MD;  [facility]:  [facility];  Service: Cardiology;  Laterality: N/A;    CATARACT EXTRACTION, BILATERAL Bilateral      CERVICAL DISCECTOMY POSTERIOR FUSION WITH BRAIN LAB N/A 08/04/2022    Procedure: CERVICAL POSTERIOR FUSION WITH INSTRUMENTATION C3-5;  Surgeon: Chester Cowan MD;  Location:  [facility];  Service: Neurosurgery;  Laterality: N/A;    COLONOSCOPY      CORONARY ANGIOPLASTY WITH STENT PLACEMENT      x2 stent in place    ENDOSCOPY      ENDOSCOPY N/A 02/09/2018    Procedure: ESOPHAGOGASTRODUODENOSCOPY WITH DILATATION CPT CODE: 57775;  Surgeon: Everett Villanueva III, MD;  Location:  [facility];  Service:     ENDOSCOPY N/A 6/7/2023    Procedure: ESOPHAGOGASTRODUODENOSCOPY WITH ESOPHAGEAL BALLOON DILATATION AND BIOPSY;  Surgeon: Sg Black MD;  Location:  [facility];  Service: Gastroenterology;  Laterality: N/A;  BIOPSY OF GASTRIC ULCERS, GEJ, AND ESOPHAGUS BIOPSIES, DILATION UP TO 12 MM WITH BALLOON    ENDOSCOPY WITH FOREIGN BODY REMOVAL N/A 08/05/2022    Procedure: ESOPHAGOGASTRODUODENOSCOPY WITH FOREIGN BODY REMOVAL;  Surgeon: Sg Black MD;  Location:  [facility];  Service: Gastroenterology;  Laterality: N/A;        ROS  As above otherwise negative    Past Social History:  Social History     Socioeconomic History    Marital status:      Spouse name: Coretta Bhatt   Tobacco Use    Smoking status: Every Day     Current packs/day: 0.50     Average packs/day: 0.5 packs/day for 35.0 years (17.5 ttl pk-yrs)     Types: Cigarettes    Smokeless tobacco: Former     Types: Chew     Quit date: 2018    Tobacco comments:     down 0.5 ppd now   Vaping Use    Vaping status: Never Used   Substance and Sexual Activity    Alcohol use: No    Drug use: Never    Sexual activity: Defer       Past Family History:  Family History   Problem Relation Age of Onset    Heart attack Mother         x 3    Atrial fibrillation Mother     Heart disease Mother     Heart attack Father     Heart disease Father         CABG       Current Outpatient Medications   Medication Sig Dispense Refill    albuterol (PROVENTIL  HFA;VENTOLIN HFA) 108 (90 BASE) MCG/ACT inhaler Inhale 2 puffs Every 4 (Four) Hours As Needed for Wheezing.      ALPRAZolam (XANAX) 1 MG tablet Take 1 tablet by mouth 3 (Three) Times a Day As Needed for Anxiety.      aspirin 81 MG tablet Take 1 tablet by mouth Daily. 30 tablet 11    atorvastatin (LIPITOR) 80 MG tablet Take 1 tablet by mouth Daily.      baclofen (LIORESAL) 10 MG tablet Take 1 tablet by mouth 2 (Two) Times a Day. 60 tablet 0    cyanocobalamin 1000 MCG/ML injection Inject 1 mL into the appropriate muscle as directed by prescriber Every 28 (Twenty-Eight) Days.      dexlansoprazole (DEXILANT) 60 MG capsule Take 1 capsule by mouth Daily. Open capsule, sprinkle on 1 tablespoon applesauce, swallow immediately, follow with sips of water, do not crush/cut/chew granules OR mix with 20 mL of water, draw into oral syringe and take immediately, mix residue with 10 mL of water, swirl, take by mouth immediately, repeat with additional 10 mL of water. 90 capsule 3    Diclofenac Sodium (VOLTAREN) 1 % gel gel Apply 4 g topically to the appropriate area as directed 3 (Three) Times a Day As Needed (JOINT PAIN).      DULoxetine (CYMBALTA) 30 MG capsule Take 1 capsule by mouth Daily.      Enbrel 50 MG/ML injection Inject 1 mL under the skin into the appropriate area as directed 2 (Two) Times a Week. TUESDAY AND SATURDAY      EPINEPHrine (EPIPEN IJ) Inject 1 dose as directed As Needed (ALLERGIC REACTION).      gabapentin (NEURONTIN) 600 MG tablet Take 1 tablet by mouth 3 (Three) Times a Day.      hydrALAZINE (APRESOLINE) 25 MG tablet Take 1 tablet by mouth 3 (Three) Times a Day.      hydrOXYzine (ATARAX) 25 MG tablet Take 1 tablet by mouth Every 8 (Eight) Hours As Needed for Itching.      methotrexate 2.5 MG tablet Take 8 tablets by mouth 1 (One) Time Per Week. WEDNESDAY      nitroglycerin (NITROSTAT) 0.4 MG SL tablet Place 1 tablet under the tongue Every 5 (Five) Minutes As Needed for Chest Pain. Take no more than 3 doses  in 15 minutes.      pantoprazole (PROTONIX) 40 MG EC tablet Take 1 tablet by mouth 2 (Two) Times a Day Before Meals. 60 tablet 5    SYMBICORT 80-4.5 MCG/ACT inhaler Inhale 2 puffs 2 (Two) Times a Day As Needed.      amLODIPine (NORVASC) 5 MG tablet TAKE ONE TABLET BY MOUTH DAILY FOR FOR BLOOD  PRESSURE (Patient not taking: Reported on 3/12/2024) 30 tablet 5    betamethasone dipropionate (DIPROLENE) 0.05 % ointment Apply 1 application topically to the appropriate area as directed 2 (Two) Times a Day. (Patient not taking: Reported on 3/12/2024)      HYDROcodone-acetaminophen (NORCO)  MG per tablet Take 1 tablet by mouth 3 (Three) Times a Day As Needed for Moderate Pain . (Patient taking differently: Take 1 tablet by mouth Every 6 (Six) Hours As Needed for Moderate Pain.) 30 tablet 0    isosorbide mononitrate (IMDUR) 30 MG 24 hr tablet TAKE ONE TABLET BY MOUTH DAILY FOR HEART (Patient not taking: Reported on 3/12/2024) 90 tablet 3    loratadine (CLARITIN) 10 MG tablet Take 1 tablet by mouth Daily. (Patient not taking: Reported on 3/12/2024)      metoprolol succinate XL (TOPROL-XL) 25 MG 24 hr tablet Take 1 tablet by mouth Daily. (Patient not taking: Reported on 3/12/2024) 90 tablet 1     No current facility-administered medications for this visit.        No Known Allergies      Objective:  Vitals:    03/12/24 0956   BP: 163/81   Pulse: 67   Resp: 18   SpO2: 97%       Physical Exam  Pleasant cooperative no acute distress he is alert and oriented no rhonchi or wheezing S1-S2 are normal regular rhythm no murmurs no edema clubbing or cyanosis    DATA:  Results for orders placed during the hospital encounter of 02/11/21    SCANNED - TELEMETRY     Results for orders placed during the hospital encounter of 01/20/21    Adult Transthoracic Echo Complete W/ Cont if Necessary Per Protocol    Interpretation Summary  · Left ventricular ejection fraction appears to be 56 - 60%. Left ventricular systolic function is normal.  ·  Left ventricular diastolic function was normal.  · No significant functional valvular abnormalities noted.  · There is no evidence of pericardial effusion   Results for orders placed during the hospital encounter of 01/20/21    Stress Test With Myocardial Perfusion One Day    Interpretation Summary  · Myocardial perfusion imaging indicates a small-sized infarct located in the apex with mild ischemia in the distal anteroapical territory.  · Left ventricular ejection fraction is normal. (Calculated EF = 60%).  · Findings consistent with a normal ECG stress test.  · Impressions are consistent with an intermediate risk study.   Results for orders placed during the hospital encounter of 01/20/21    Stress Test With Myocardial Perfusion One Day    Interpretation Summary  · Myocardial perfusion imaging indicates a small-sized infarct located in the apex with mild ischemia in the distal anteroapical territory.  · Left ventricular ejection fraction is normal. (Calculated EF = 60%).  · Findings consistent with a normal ECG stress test.  · Impressions are consistent with an intermediate risk study.   Results for orders placed during the hospital encounter of 02/11/21    Cardiac Catheterization/Vascular Study    Narrative  Images from the original result were not included.  CARDIAC CATHETERIZATION / INTERVENTION REPORT            DATE OF PROCEDURE: 2/11/2021      INDICATION FOR PROCEDURE: Abnormal stress test      PRE PROCEDURE DIAGNOSIS:  CCS 2 angina with abnormal stress test showing apical infarct with mild ischemia      POST PROCEDURE DIAGNOSIS:  Mild to moderate in stent restenosis in the very proximal portion of LAD Stent 40-50% in stent restenosis  Normal LV systolic and diastolic function    Face to face mdoerate conscious  sedation time : 30 min      COMPLICATIONS : None    Specimens collected : None    PROCEDURE PERFORMED:    1. Selective right and left Coronary Angiogram  2. Left heart catheretization  3. Left  Ventriculography    Description of the procedure:  Prior to the procedure risk, benefits and possible alternative were discussed with the patient and informed consent was obtained. Patient was brought to cardiac cath lab table in post absorbtive state. Patient was prepped and drape in usual sterile fashion. IV Versed and Fentanyl was used for moderate sedation. 2% Lidocaine was used for topical anesthesia. R radial arterial site was prepped and a micropuncture needle was used to access the artery and a 5 F slender sheath was placed. 2.5 mg of Verapamil and 200 mcg of NTG was given through the sheath intra arterial and 5000 units of Heparin was given once the catheter crossed the aortic arch.    5 F TIG 4 catheters was used for right and left coronary angiogram and 5 F pigtail catheter was used for Left heart hemodynamics and left ventriculography. All the catheters were exchanged over 0.035 wire. The R radial arterial sheath was removed and TR band was applied and immediate and complete hemostasis was achieved. The patient tolerate the entire procedure well without any immediate known complications.    Coronary anatomy findings:    LM: Is a large calibre vessel , normal take off from left cusp, divides into LAD and Lcx. Very short length vessel with no stenosis    LAD: Stent from proximal to mid, proximal portion had 40-50% tubular in stent restenosis , remaining mid and distal portion of the stent were patent, remaining mid and distal was small calibre and mild diffuse disease noted    LCX: Moderate calibre vessel, mild luminal irregularities.    RCA: Large calibre, dominant artery, normal take off from right cusp.  No significant stenosis noted.    Left Ventriculography:    LV systolic function was normal with visual estimated EF of 65%. No wall motion abnormalities.  No significant mitral regurgitation noted.    LVEDP: 14 mmHg  No gradient across the aortic valve on pull back.            Final Impression:  Mild  "to moderate Non obstructive 50% in stent restenosis in the proximal LAD stent        Recommendations:  Will continue with medical rx for non obstructive CAD            Brent MD Mikayla, [facility]  Interventional Cardiology    02/11/21  09:22 EST       Procedures     Lab Results   Component Value Date    CHOL 137 11/01/2017    CHLPL 170 01/11/2016    CHLPL 230 (H) 01/14/2014     Lab Results   Component Value Date    TRIG 95 11/01/2017    TRIG 234 (H) 01/11/2016    TRIG 323 (H) 01/14/2014     Lab Results   Component Value Date    HDL 40 (L) 11/01/2017    HDL 25 (L) 01/11/2016     Lab Results   Component Value Date    LDL 78 11/01/2017    LDL 98 01/11/2016       No results found for: \"TSH\"          Invalid input(s): \"LABALBU\", \"PROT\"            Assessment and Plan:    Javier Santiago  reports that he has been smoking cigarettes. He has a 17.5 pack-year smoking history. He quit smokeless tobacco use about 6 years ago.  His smokeless tobacco use included chew.   He is having episodes of worsening angina similar to his prior chest pain with worsening dyspnea on exertion.  Given that he had 50% stenosis 3 years ago and with ongoing smoking I strongly suggest a urgent coronary angiogram.  We will plan a right radial approach.  His recent CT scan showed severe calcification of the coronary arteries as well    Thank you for allowing me to participate in the care of Javier Santiago. Feel free to contact me directly with any further questions or concerns.            Charles Montaño MD, [facility], [facility]  Interventional Cardiology     "

## 2024-03-15 ENCOUNTER — HOSPITAL ENCOUNTER (OUTPATIENT)
Facility: HOSPITAL | Age: 59
Discharge: HOME | End: 2024-03-15
Attending: INTERNAL MEDICINE | Admitting: INTERNAL MEDICINE
Payer: MEDICARE

## 2024-03-15 VITALS
TEMPERATURE: 97.8 F | RESPIRATION RATE: 20 BRPM | BODY MASS INDEX: 25.05 KG/M2 | HEIGHT: 70 IN | HEART RATE: 55 BPM | OXYGEN SATURATION: 98 % | DIASTOLIC BLOOD PRESSURE: 100 MMHG | WEIGHT: 175 LBS | SYSTOLIC BLOOD PRESSURE: 150 MMHG

## 2024-03-15 DIAGNOSIS — I20.0 UNSTABLE ANGINA: ICD-10-CM

## 2024-03-15 PROBLEM — I24.9 ACUTE CORONARY SYNDROME: Status: ACTIVE | Noted: 2024-03-15

## 2024-03-15 LAB
ANION GAP SERPL CALCULATED.3IONS-SCNC: 10.7 MMOL/L (ref 5–15)
BUN SERPL-MCNC: 10 MG/DL (ref 6–20)
BUN/CREAT SERPL: 9.3 (ref 7–25)
CALCIUM SPEC-SCNC: 9.3 MG/DL (ref 8.6–10.5)
CHLORIDE SERPL-SCNC: 106 MMOL/L (ref 98–107)
CO2 SERPL-SCNC: 25.3 MMOL/L (ref 22–29)
CREAT SERPL-MCNC: 1.08 MG/DL (ref 0.76–1.27)
DEPRECATED RDW RBC AUTO: 49.1 FL (ref 37–54)
EGFRCR SERPLBLD CKD-EPI 2021: 79.5 ML/MIN/1.73
ERYTHROCYTE [DISTWIDTH] IN BLOOD BY AUTOMATED COUNT: 13.8 % (ref 12.3–15.4)
GLUCOSE SERPL-MCNC: 163 MG/DL (ref 65–99)
HCT VFR BLD AUTO: 46.4 % (ref 37.5–51)
HGB BLD-MCNC: 15.4 G/DL (ref 13–17.7)
MCH RBC QN AUTO: 31.8 PG (ref 26.6–33)
MCHC RBC AUTO-ENTMCNC: 33.2 G/DL (ref 31.5–35.7)
MCV RBC AUTO: 95.9 FL (ref 79–97)
PLATELET # BLD AUTO: 274 10*3/MM3 (ref 140–450)
PMV BLD AUTO: 10.7 FL (ref 6–12)
POTASSIUM SERPL-SCNC: 4.1 MMOL/L (ref 3.5–5.2)
QT INTERVAL: 434 MS
QTC INTERVAL: 395 MS
RBC # BLD AUTO: 4.84 10*6/MM3 (ref 4.14–5.8)
SODIUM SERPL-SCNC: 142 MMOL/L (ref 136–145)
WBC NRBC COR # BLD AUTO: 7.65 10*3/MM3 (ref 3.4–10.8)

## 2024-03-15 PROCEDURE — 93010 ELECTROCARDIOGRAM REPORT: CPT | Performed by: INTERNAL MEDICINE

## 2024-03-15 PROCEDURE — 93005 ELECTROCARDIOGRAM TRACING: CPT | Performed by: INTERNAL MEDICINE

## 2024-03-15 PROCEDURE — 92928 PRQ TCAT PLMT NTRAC ST 1 LES: CPT | Performed by: INTERNAL MEDICINE

## 2024-03-15 PROCEDURE — 92972 PERQ TRLUML CORONRY LITHOTRP: CPT | Performed by: INTERNAL MEDICINE

## 2024-03-15 PROCEDURE — C1769 GUIDE WIRE: HCPCS | Performed by: INTERNAL MEDICINE

## 2024-03-15 PROCEDURE — 92979 ENDOLUMINL IVUS OCT C EA: CPT | Performed by: INTERNAL MEDICINE

## 2024-03-15 PROCEDURE — 93458 L HRT ARTERY/VENTRICLE ANGIO: CPT | Performed by: INTERNAL MEDICINE

## 2024-03-15 PROCEDURE — 25010000002 FENTANYL CITRATE (PF) 50 MCG/ML SOLUTION: Performed by: INTERNAL MEDICINE

## 2024-03-15 PROCEDURE — 99153 MOD SED SAME PHYS/QHP EA: CPT | Performed by: INTERNAL MEDICINE

## 2024-03-15 PROCEDURE — 92978 ENDOLUMINL IVUS OCT C 1ST: CPT | Performed by: INTERNAL MEDICINE

## 2024-03-15 PROCEDURE — 85027 COMPLETE CBC AUTOMATED: CPT | Performed by: INTERNAL MEDICINE

## 2024-03-15 PROCEDURE — C1887 CATHETER, GUIDING: HCPCS | Performed by: INTERNAL MEDICINE

## 2024-03-15 PROCEDURE — 25010000002 MIDAZOLAM PER 1 MG: Performed by: INTERNAL MEDICINE

## 2024-03-15 PROCEDURE — 25510000001 IOPAMIDOL PER 1 ML: Performed by: INTERNAL MEDICINE

## 2024-03-15 PROCEDURE — C1725 CATH, TRANSLUMIN NON-LASER: HCPCS | Performed by: INTERNAL MEDICINE

## 2024-03-15 PROCEDURE — C1874 STENT, COATED/COV W/DEL SYS: HCPCS | Performed by: INTERNAL MEDICINE

## 2024-03-15 PROCEDURE — C1753 CATH, INTRAVAS ULTRASOUND: HCPCS | Performed by: INTERNAL MEDICINE

## 2024-03-15 PROCEDURE — C1894 INTRO/SHEATH, NON-LASER: HCPCS | Performed by: INTERNAL MEDICINE

## 2024-03-15 PROCEDURE — C9600 PERC DRUG-EL COR STENT SING: HCPCS | Performed by: INTERNAL MEDICINE

## 2024-03-15 PROCEDURE — C1761: HCPCS | Performed by: INTERNAL MEDICINE

## 2024-03-15 PROCEDURE — 80048 BASIC METABOLIC PNL TOTAL CA: CPT | Performed by: INTERNAL MEDICINE

## 2024-03-15 PROCEDURE — 99152 MOD SED SAME PHYS/QHP 5/>YRS: CPT | Performed by: INTERNAL MEDICINE

## 2024-03-15 PROCEDURE — 25010000002 MORPHINE SULFATE (PF) 2 MG/ML SOLUTION: Performed by: INTERNAL MEDICINE

## 2024-03-15 PROCEDURE — 25810000003 SODIUM CHLORIDE 0.9 % SOLUTION: Performed by: INTERNAL MEDICINE

## 2024-03-15 PROCEDURE — 25010000002 HEPARIN (PORCINE) PER 1000 UNITS: Performed by: INTERNAL MEDICINE

## 2024-03-15 DEVICE — XIENCE SKYPOINT™ EVEROLIMUS ELUTING CORONARY STENT SYSTEM 2.50 MM X 15 MM / RAPID-EXCHANGE
Type: IMPLANTABLE DEVICE | Status: FUNCTIONAL
Brand: XIENCE SKYPOINT™

## 2024-03-15 DEVICE — XIENCE SKYPOINT™ EVEROLIMUS ELUTING CORONARY STENT SYSTEM 3.00 MM X 12 MM / RAPID-EXCHANGE
Type: IMPLANTABLE DEVICE | Status: FUNCTIONAL
Brand: XIENCE SKYPOINT™

## 2024-03-15 RX ORDER — HEPARIN SODIUM 1000 [USP'U]/ML
INJECTION, SOLUTION INTRAVENOUS; SUBCUTANEOUS
Status: DISCONTINUED | OUTPATIENT
Start: 2024-03-15 | End: 2024-03-15 | Stop reason: HOSPADM

## 2024-03-15 RX ORDER — ACETAMINOPHEN 325 MG/1
650 TABLET ORAL EVERY 4 HOURS PRN
Status: DISCONTINUED | OUTPATIENT
Start: 2024-03-15 | End: 2024-03-15 | Stop reason: HOSPADM

## 2024-03-15 RX ORDER — MORPHINE SULFATE 2 MG/ML
2 INJECTION, SOLUTION INTRAMUSCULAR; INTRAVENOUS
Status: DISCONTINUED | OUTPATIENT
Start: 2024-03-15 | End: 2024-03-15 | Stop reason: HOSPADM

## 2024-03-15 RX ORDER — CLOPIDOGREL BISULFATE 75 MG/1
75 TABLET ORAL DAILY
Qty: 90 TABLET | Refills: 5 | Status: SHIPPED | OUTPATIENT
Start: 2024-03-15

## 2024-03-15 RX ORDER — HYDROCODONE BITARTRATE AND ACETAMINOPHEN 10; 325 MG/1; MG/1
1 TABLET ORAL EVERY 4 HOURS PRN
Status: DISCONTINUED | OUTPATIENT
Start: 2024-03-15 | End: 2024-03-15 | Stop reason: HOSPADM

## 2024-03-15 RX ORDER — CLOPIDOGREL BISULFATE 75 MG/1
TABLET ORAL
Status: DISCONTINUED | OUTPATIENT
Start: 2024-03-15 | End: 2024-03-15 | Stop reason: HOSPADM

## 2024-03-15 RX ORDER — MIDAZOLAM HYDROCHLORIDE 1 MG/ML
INJECTION INTRAMUSCULAR; INTRAVENOUS
Status: DISCONTINUED | OUTPATIENT
Start: 2024-03-15 | End: 2024-03-15 | Stop reason: HOSPADM

## 2024-03-15 RX ORDER — FENTANYL CITRATE 50 UG/ML
INJECTION, SOLUTION INTRAMUSCULAR; INTRAVENOUS
Status: DISCONTINUED | OUTPATIENT
Start: 2024-03-15 | End: 2024-03-15 | Stop reason: HOSPADM

## 2024-03-15 RX ORDER — VERAPAMIL HYDROCHLORIDE 2.5 MG/ML
INJECTION, SOLUTION INTRAVENOUS
Status: DISCONTINUED | OUTPATIENT
Start: 2024-03-15 | End: 2024-03-15 | Stop reason: HOSPADM

## 2024-03-15 RX ORDER — SODIUM CHLORIDE 9 MG/ML
INJECTION, SOLUTION INTRAVENOUS
Status: COMPLETED | OUTPATIENT
Start: 2024-03-15 | End: 2024-03-15

## 2024-03-15 RX ORDER — LIDOCAINE HYDROCHLORIDE 20 MG/ML
INJECTION, SOLUTION INFILTRATION; PERINEURAL
Status: DISCONTINUED | OUTPATIENT
Start: 2024-03-15 | End: 2024-03-15 | Stop reason: HOSPADM

## 2024-03-15 RX ORDER — CLOPIDOGREL BISULFATE 75 MG/1
75 TABLET ORAL DAILY
Qty: 90 TABLET | Refills: 5 | Status: SHIPPED | OUTPATIENT
Start: 2024-03-15 | End: 2024-03-15

## 2024-03-15 RX ORDER — ASPIRIN 81 MG/1
TABLET, CHEWABLE ORAL
Status: DISCONTINUED | OUTPATIENT
Start: 2024-03-15 | End: 2024-03-15 | Stop reason: HOSPADM

## 2024-03-15 RX ORDER — NITROGLYCERIN 0.4 MG/1
0.4 TABLET SUBLINGUAL
Status: DISCONTINUED | OUTPATIENT
Start: 2024-03-15 | End: 2024-03-15 | Stop reason: HOSPADM

## 2024-03-15 RX ADMIN — ACETAMINOPHEN 650 MG: 325 TABLET ORAL at 12:15

## 2024-03-15 RX ADMIN — MORPHINE SULFATE 2 MG: 2 INJECTION, SOLUTION INTRAMUSCULAR; INTRAVENOUS at 11:20

## 2024-03-15 NOTE — Clinical Note
Catheter inserted with wire simultaneously. Mobile number was to care center-- pt was discharged per [facility]    [phone]-- number to hotel and was able to connect with pt    Pt states received new glasses from [employer] and not able to read with new glasses    States did not receive bifocal, but did purchase glasses for reading    Scheduled tech only to check glasses Rx and update Rx if indicated at visit    Pt aware of date/time/location at [facility]    Devante Moncada RN 1:17 PM 09/28/21             [facility] Center    Phone Message    May a detailed message be left on voicemail: yes     Reason for Call: Other: Pt calling to see if she can get an adjustment on her eyeglass prescription as she states they are not tight enough. Please follow up with Pt to discuss if a tech appointment would be appropriate to address her concerns.      Action Taken: Message routed to:  [facility] Eye    Travel Screening: Not Applicable

## 2024-03-15 NOTE — Clinical Note
Hemostasis started on the right radial artery. R-Band was used in achieving hemostasis. Radial compression device applied to vessel. Hemostasis achieved successfully. Closure device additional comment: 11ml of air

## 2024-03-15 NOTE — NURSING NOTE
Morphine 2mg IV ordered per verbal order Dr. Mcintosh.  Given at 1120.  Pain level of 9 stated per patient.

## 2024-03-15 NOTE — INTERVAL H&P NOTE
H&P reviewed. The patient was examined and there are no changes to the H&P.    Pertinent exam only as this is cardiac procedure and rectal//Breast exam is not indicated

## 2024-03-15 NOTE — Clinical Note
1. Have you been to the ER, urgent care clinic since your last visit? Hospitalized since your last visit? No    2. Have you seen or consulted any other health care providers outside of the [facility] since your last visit? Include any pap smears or colon screening.  No inserted over wire.

## 2024-03-15 NOTE — DISCHARGE INSTR - ACTIVITY
No driving for 24 hours.  No washing dishes or submerging wrist in water for 3-5 days or until puncture wound is healed.  No lifting over 5 pounds with affected hand.    You may shower in 24 hours, pat wrist dry, apply bandaid to site.   Do not use lotion, ointment, or powders at site.     If site starts to bleed, you notice swelling or bruising at site, hold pressure and call 911 or have someone drive you to the nearest Emergency Department.

## 2024-03-18 ENCOUNTER — CALL CENTER PROGRAMS (OUTPATIENT)
Dept: CALL CENTER | Facility: HOSPITAL | Age: 59
End: 2024-03-18
Payer: MEDICARE

## 2024-03-18 NOTE — OUTREACH NOTE
"PCI/Device Survey      Flowsheet Row Responses   Facility patient discharged from? [facility]   Procedure date 03/15/24   Procedure (if device, specify in description) PCI   PCI site Right, Arm   Performing MD Other (annotate)  [Dr. Mcintosh]   Attempt successful? Yes   Call start time 0933   Call end time 0945   Symptom comments Pt c/o chest pain, center of chest, crushing/pressure began while sitting, all of sudden on Saturday. Pt reports that he t took 1SL NTG, which resolved pain, rated 7/10 associated with no other symptoms. Pt denies further chest pains since Saturday.   Is the patient taking prescribed medications: ASA, Plavix   Nursing intervention Reminded to continue to take prescribed medications, Nurse provided patient education   Does the patient have any of the following symptoms related to the cath/surgical site? --  [R. radial site, WNL, no issues per pt reports.]   Nursing intervention Patient education provided   Does the patient have an appointment scheduled with the cardiologist? Yes   If the patient is a current smoker, are they able to teach back resources for cessation? --  [pt smokes 1pk/day,  Nurse provided smoking cessation education, pt v/u.]   Did the patient feel prepared to go home on the same day as the procedure? No  [Pt reports that he continued having \"the worst chest pains like i was having a heart attack and felt they should have kept me overnight.\".]   Is the patient satisfied with the same day discharge process? No   Discharge process comments pt did not feel that he was physically ready to go home, per his report.   PCI/Device call completed Yes            Mily GALICIA - [facility] Nurse  "

## 2024-03-20 ENCOUNTER — TELEPHONE (OUTPATIENT)
Dept: CARDIAC REHAB | Facility: HOSPITAL | Age: 59
End: 2024-03-20
Payer: MEDICARE

## 2024-03-20 DIAGNOSIS — Z95.5 STATUS POST CORONARY ARTERY STENT PLACEMENT: Primary | ICD-10-CM

## 2024-03-20 NOTE — TELEPHONE ENCOUNTER
Pt wife called to have patient set up for Cardiac Rehab, reviewed heart cath. Patient interested in 2 days/ week. Sending order to Dr. Montaño to sign. Pt wife educated on CR program, sending intake packet by mail.

## 2024-04-03 ENCOUNTER — TELEPHONE (OUTPATIENT)
Dept: CARDIAC REHAB | Facility: HOSPITAL | Age: 59
End: 2024-04-03
Payer: MEDICARE

## 2024-04-03 NOTE — TELEPHONE ENCOUNTER
Called to confirm patients CR intake appointment, patient states he cannot afford copay per visit and has elected not to come at this time. Patient is aware his referral is good for one year.

## 2024-04-16 ENCOUNTER — OFFICE VISIT (OUTPATIENT)
Dept: CARDIOLOGY | Facility: CLINIC | Age: 59
End: 2024-04-16
Payer: MEDICARE

## 2024-04-16 VITALS
BODY MASS INDEX: 24.82 KG/M2 | SYSTOLIC BLOOD PRESSURE: 143 MMHG | WEIGHT: 173.4 LBS | DIASTOLIC BLOOD PRESSURE: 88 MMHG | HEIGHT: 70 IN | HEART RATE: 62 BPM | OXYGEN SATURATION: 98 %

## 2024-04-16 DIAGNOSIS — I25.119 CORONARY ARTERY DISEASE INVOLVING NATIVE CORONARY ARTERY OF NATIVE HEART WITH ANGINA PECTORIS: Primary | ICD-10-CM

## 2024-04-16 DIAGNOSIS — R94.31 ABNORMAL ELECTROCARDIOGRAM (ECG) (EKG): ICD-10-CM

## 2024-04-16 DIAGNOSIS — F17.200 SMOKER: ICD-10-CM

## 2024-04-16 DIAGNOSIS — Z72.0 TOBACCO ABUSE: ICD-10-CM

## 2024-04-16 DIAGNOSIS — E78.01 FAMILIAL HYPERCHOLESTEROLEMIA: Chronic | ICD-10-CM

## 2024-04-16 DIAGNOSIS — I20.9 ANGINA, CLASS II: ICD-10-CM

## 2024-04-16 PROBLEM — E78.5 DYSLIPIDEMIA: Status: RESOLVED | Noted: 2021-02-02 | Resolved: 2024-04-16

## 2024-04-16 PROBLEM — Z01.810 PREOPERATIVE CARDIOVASCULAR EXAMINATION: Status: RESOLVED | Noted: 2021-02-02 | Resolved: 2024-04-16

## 2024-04-16 PROBLEM — I24.9 ACUTE CORONARY SYNDROME: Status: RESOLVED | Noted: 2024-03-15 | Resolved: 2024-04-16

## 2024-04-16 PROBLEM — I25.10 CORONARY ARTERY DISEASE INVOLVING NATIVE CORONARY ARTERY OF NATIVE HEART WITHOUT ANGINA PECTORIS: Status: RESOLVED | Noted: 2021-02-02 | Resolved: 2024-04-16

## 2024-04-16 PROBLEM — I20.0 UNSTABLE ANGINA: Status: RESOLVED | Noted: 2017-10-10 | Resolved: 2024-04-16

## 2024-04-16 PROBLEM — R07.9 CHEST PAIN: Status: RESOLVED | Noted: 2017-12-12 | Resolved: 2024-04-16

## 2024-04-16 PROCEDURE — 1160F RVW MEDS BY RX/DR IN RCRD: CPT | Performed by: NURSE PRACTITIONER

## 2024-04-16 PROCEDURE — 99214 OFFICE O/P EST MOD 30 MIN: CPT | Performed by: NURSE PRACTITIONER

## 2024-04-16 PROCEDURE — 3079F DIAST BP 80-89 MM HG: CPT | Performed by: NURSE PRACTITIONER

## 2024-04-16 PROCEDURE — 93000 ELECTROCARDIOGRAM COMPLETE: CPT | Performed by: NURSE PRACTITIONER

## 2024-04-16 PROCEDURE — 99406 BEHAV CHNG SMOKING 3-10 MIN: CPT | Performed by: NURSE PRACTITIONER

## 2024-04-16 PROCEDURE — 1159F MED LIST DOCD IN RCRD: CPT | Performed by: NURSE PRACTITIONER

## 2024-04-16 PROCEDURE — 3077F SYST BP >= 140 MM HG: CPT | Performed by: NURSE PRACTITIONER

## 2024-04-16 RX ORDER — VARENICLINE TARTRATE 1 MG/1
1 TABLET, FILM COATED ORAL 2 TIMES DAILY
Qty: 56 TABLET | Refills: 1 | Status: SHIPPED | OUTPATIENT
Start: 2024-05-14 | End: 2024-07-09

## 2024-04-16 RX ORDER — NICOTINE 21 MG/24HR
1 PATCH, TRANSDERMAL 24 HOURS TRANSDERMAL EVERY 24 HOURS
Qty: 30 PATCH | Refills: 1 | Status: SHIPPED | OUTPATIENT
Start: 2024-04-16

## 2024-04-16 RX ORDER — VARENICLINE TARTRATE 0.5 (11)-1
1 KIT ORAL ONCE
Qty: 1 EACH | Refills: 0 | Status: SHIPPED | OUTPATIENT
Start: 2024-04-16 | End: 2024-04-16

## 2024-04-16 RX ORDER — ISOSORBIDE MONONITRATE 30 MG/1
30 TABLET, EXTENDED RELEASE ORAL DAILY
Qty: 90 TABLET | Refills: 3 | Status: SHIPPED | OUTPATIENT
Start: 2024-04-16

## 2024-04-16 RX ORDER — ERGOCALCIFEROL 1.25 MG/1
1 CAPSULE ORAL WEEKLY
COMMUNITY
Start: 2024-04-01

## 2024-04-16 RX ORDER — NITROGLYCERIN 0.4 MG/1
0.4 TABLET SUBLINGUAL
Qty: 30 TABLET | Refills: 0 | Status: SHIPPED | OUTPATIENT
Start: 2024-04-16

## 2024-04-16 RX ORDER — PREDNISONE 10 MG/1
TABLET ORAL
COMMUNITY
Start: 2024-01-02

## 2024-04-16 NOTE — PROGRESS NOTES
"Chief Complaint  Follow-up (Cath F/U, Pt denies CP,some chest tightness,SOA on exertion,chronic fatigue.) and Med Review (Tolerating all meds, some bruising.)    Subjective          Javier Santiago presents to [facility] for follow up.    History of Present Illness  Mr. Santiago presents after having undergone invasive coronary angiogram 03/15/2024.  Dr. Montaño stented his OM and performed shockwave lithotripsy and IVUS guided PCI of the LAD.    He requests a refill on his nitroglycerin.  He reports that he takes 1-2 per week and gets relief from his chest tightness when he does.      More than 3 minutes but less than 10 was spent in counseling regarding smoking cessation.  Discussed motivation for quitting, previous attempts, and the options for pharmacological support.  He would like to try Chantix.  Quit approaches discussed and he will do the reduce by half beginning 05/01/2024.  He is currently smoking 2 packs/day and so will decrease to an average of 5 cigarettes daily.  Plan to follow-up in 1 month.    Tobacco Use: High Risk (4/16/2024)    Patient History     Smoking Tobacco Use: Every Day     Smokeless Tobacco Use: Former     Passive Exposure: Not on file       Objective     Vital Signs:   /88 (BP Location: Left arm, Patient Position: Sitting, Cuff Size: Adult)   Pulse 62   Ht 177.8 cm (70\")   Wt 78.7 kg (173 lb 6.4 oz)   SpO2 98%   BMI 24.88 kg/m²       Physical Exam  Vitals and nursing note reviewed.   Constitutional:       General: He is not in acute distress.  HENT:      Head: Normocephalic and atraumatic.   Eyes:      Conjunctiva/sclera: Conjunctivae normal.   Neck:      Vascular: No carotid bruit.   Cardiovascular:      Rate and Rhythm: Normal rate and regular rhythm.      Pulses: Normal pulses.   Pulmonary:      Effort: Pulmonary effort is normal.      Breath sounds: Normal breath sounds.   Musculoskeletal:      Cervical back: Neck supple.      Right lower leg: No " edema.      Left lower leg: No edema.   Skin:     General: Skin is warm and dry.   Neurological:      General: No focal deficit present.   Psychiatric:         Mood and Affect: Mood normal.         Behavior: Behavior normal.          Result Review :   The following data was reviewed by: OBDULIO Juan on 04/16/2024:  Common labs          3/15/2024    08:21   Common Labs   Glucose 163    BUN 10    Creatinine 1.08    Sodium 142    Potassium 4.1    Chloride 106    Calcium 9.3    WBC 7.65    Hemoglobin 15.4    Hematocrit 46.4    Platelets 274        Data reviewed : Cardiology studies as detailed below      Last Cardiac Cath  Results for orders placed during the hospital encounter of 03/15/24    Intravascular Ultrasound    Conclusion  Images from the original result were not included.  CARDIAC CATHETERIZATION / INTERVENTION REPORT    DATE OF PROCEDURE: 3/15/2024    INDICATION FOR PROCEDURE: chest pain      PRE PROCEDURE DIAGNOSIS:  Unstable angina with history of coronary disease    POST PROCEDURE DIAGNOSIS:  Severe coronary disease with critical stenosis in the proximal LAD and severe lesion in the obtuse marginal    Face to face moderate conscious sedation time:      COMPLICATIONS : None    Access Site :  6 Danish right radial    PROCEDURE PERFORMED:    1. Coronary Angiogram  2. Left heart catheterization  3.IVUS to the LAD and Lcx  4.PCI with Shockwave to the LAD  5.PCI to OM1      PROCEDURE COMMENTS:    Prior to the procedure risks benefits alternatives and possible adverse events were discussed with the patient and informed consent was obtained.  Moderate conscious sedation was utilized for 60 minutes refrigeration procedure supervised administration fentanyl and Versed that was given independently by Tania cabral registered nurse.  Javier Santiago was brought to the cath lab and placed on the cardiac catherization table in the postabsortive state. The patient was prepped and draped according to the cath lab  protocol under strict aseptic and sterile condition.  We obtained access with a slender sheath 6 Polish in the right radial artery after giving lidocaine in the right radial artery.  We went with a Negrito catheter over J-wire to engage the left main and the RCA and also cross the LV cavity to obtain a pressure pullback pressure as well.  With subsequent moved onto intervention.  We gave additional heparin and used an Akari 4 guide to engage the left main.  We took a Prowater wire down the LAD and subsequently performed IVUS.  Lesion appears to be partially in-stent and partially Denovo we used a 3 mm noncompliant balloon however there was a waist and then we used a 3 x 12 mm shockwave lithotripsy balloon.  However due to prep errors we were not able to deliver complete shocks and went to deliver 35 shocks.  This is despite changing out for the balloon.  We subsequently then inserted a 3 x 12 mm Xience stent inflating it to 23 hector.  We then redirected our wire into the obtuse marginal performing IVUS.  The luminal area of this lesion is 2.5 mm².  We subsequently directly stented with a 2.5 x 15 mm Xience stent inflating it to 23 hector.  We then used a 2.5 noncompliant balloon inflating it to 23 hector.  Angiographically there is no residual stenosis and DALILA-3 flow in all vessels.  We did give 325 aspirin and 600 mg of Plavix        Coronary anatomy findings:  Left main appears normal.  The obtuse marginal in the midportion has a 75 to 80% stenosis.  The proximal LAD has a 90% stenosis.  Mid LAD has a 30% stenosis.  The stents in the proximal LAD appear to be underexpanded on IVUS        LVEDP: 17 mmHg  No gradient across the aortic valve on pull back.      PERCUTANEOUS CORONARY INTERVENTION PROCEDURE NOTE:        Lesion length: 5 mm  Pre PCI Stenosis: 90  %  Post PCI stenosis: 0 %  Pre PCI DALILA flow: 3 in the LAD and circumflex  Post PCI DALILA flow: 3 in the LAD and circumflex    EBL: Less than 20cc        Final  Impression:  In-stent restenosis in the proximal LAD status post shockwave lithotripsy and IVUS guided PCI.  Successful PCI of the mid OM1 with IVUS guidance and no residual stenosis  Mild to moderate disease in the mid LAD continue risk factor modification          Recommendations:  Aggressive guideline directed medical management for CAD  Dual Antiplatelet therapy    Charles Montaño MD  03/15/24  10:39 EDT      Last Stress test  Results for orders placed during the hospital encounter of 01/20/21    Stress Test With Myocardial Perfusion One Day    Interpretation Summary  · Myocardial perfusion imaging indicates a small-sized infarct located in the apex with mild ischemia in the distal anteroapical territory.  · Left ventricular ejection fraction is normal. (Calculated EF = 60%).  · Findings consistent with a normal ECG stress test.  · Impressions are consistent with an intermediate risk study.       Last Echo  Results for orders placed during the hospital encounter of 01/20/21    Adult Transthoracic Echo Complete W/ Cont if Necessary Per Protocol    Interpretation Summary  · Left ventricular ejection fraction appears to be 56 - 60%. Left ventricular systolic function is normal.  · Left ventricular diastolic function was normal.  · No significant functional valvular abnormalities noted.  · There is no evidence of pericardial effusion         ECG 12 Lead    Date/Time: 4/16/2024 4:27 PM  Performed by: Macey Jett APRN    Authorized by: Macey Jett APRN  Comparison: compared with previous ECG from 3/15/2024  Similar to previous ECG  Comparison to previous ECG: Sinus bradycardia 50 bpm  Rhythm: sinus rhythm  Rate: normal  BPM: 67  QRS axis: normal  Other findings: early repolarization    Clinical impression: non-specific ECG           Current Outpatient Medications   Medication Sig Dispense Refill    albuterol (PROVENTIL HFA;VENTOLIN HFA) 108 (90 BASE) MCG/ACT inhaler Inhale 2 puffs Every 4 (Four) Hours As  Needed for Wheezing.      ALPRAZolam (XANAX) 1 MG tablet Take 1 tablet by mouth 3 (Three) Times a Day As Needed for Anxiety. Only takes 10 per month.      aspirin 81 MG tablet Take 1 tablet by mouth Daily. 30 tablet 11    atorvastatin (LIPITOR) 80 MG tablet Take 1 tablet by mouth Daily.      baclofen (LIORESAL) 10 MG tablet Take 1 tablet by mouth 2 (Two) Times a Day. 60 tablet 0    betamethasone dipropionate (DIPROLENE) 0.05 % ointment Apply 1 Application topically to the appropriate area as directed 2 (Two) Times a Day.      clopidogrel (PLAVIX) 75 MG tablet Take 1 tablet by mouth Daily. 90 tablet 5    cyanocobalamin 1000 MCG/ML injection Inject 1 mL into the appropriate muscle as directed by prescriber Every 28 (Twenty-Eight) Days.      dexlansoprazole (DEXILANT) 60 MG capsule Take 1 capsule by mouth Daily. Open capsule, sprinkle on 1 tablespoon applesauce, swallow immediately, follow with sips of water, do not crush/cut/chew granules OR mix with 20 mL of water, draw into oral syringe and take immediately, mix residue with 10 mL of water, swirl, take by mouth immediately, repeat with additional 10 mL of water. 90 capsule 3    Diclofenac Sodium (VOLTAREN) 1 % gel gel Apply 4 g topically to the appropriate area as directed 3 (Three) Times a Day As Needed (JOINT PAIN).      Enbrel 50 MG/ML injection Inject 1 mL under the skin into the appropriate area as directed 2 (Two) Times a Week. TUESDAY AND SATURDAY      EPINEPHrine (EPIPEN IJ) Inject 1 dose as directed As Needed (ALLERGIC REACTION).      gabapentin (NEURONTIN) 600 MG tablet Take 1 tablet by mouth 3 (Three) Times a Day.      hydrALAZINE (APRESOLINE) 25 MG tablet Take 1 tablet by mouth 3 (Three) Times a Day.      HYDROcodone-acetaminophen (NORCO)  MG per tablet Take 1 tablet by mouth 3 (Three) Times a Day As Needed for Moderate Pain . 30 tablet 0    hydrOXYzine (ATARAX) 25 MG tablet Take 1 tablet by mouth Every 8 (Eight) Hours As Needed for Itching.       isosorbide mononitrate (IMDUR) 30 MG 24 hr tablet Take 1 tablet by mouth Daily. 90 tablet 3    loratadine (CLARITIN) 10 MG tablet Take 1 tablet by mouth Daily.      methotrexate 2.5 MG tablet Take 8 tablets by mouth 1 (One) Time Per Week. WEDNESDAY      metoprolol succinate XL (TOPROL-XL) 25 MG 24 hr tablet Take 1 tablet by mouth Daily. 90 tablet 1    nitroglycerin (NITROSTAT) 0.4 MG SL tablet Place 1 tablet under the tongue Every 5 (Five) Minutes As Needed for Chest Pain. Take no more than 3 doses in 15 minutes. 30 tablet 0    pantoprazole (PROTONIX) 40 MG EC tablet Take 1 tablet by mouth 2 (Two) Times a Day Before Meals. 60 tablet 5    predniSONE (DELTASONE) 10 MG tablet TAKE 1 TABLET BY MOUTH AS DIRECTED FOR 2-5 DAYS FOR A flare UP, MAY REPEAT ONCE A WEEK      SYMBICORT 80-4.5 MCG/ACT inhaler Inhale 2 puffs 2 (Two) Times a Day As Needed.      vitamin D (ERGOCALCIFEROL) 1.25 MG (75296 UT) capsule capsule Take 1 capsule by mouth 1 (One) Time Per Week.      nicotine (NICODERM CQ) 14 MG/24HR patch Place 1 patch on the skin as directed by provider Daily. 30 patch 1    [START ON 5/14/2024] varenicline (Chantix Continuing Month Pak) 1 MG tablet Take 1 tablet by mouth 2 (Two) Times a Day for 56 days. 56 tablet 1    Varenicline Tartrate, Starter, 0.5 MG X 11 & 1 MG X 42 tablet therapy pack Take 1 package by mouth 1 (One) Time for 1 dose. Take 0.5 mg po daily x 3 days, then 0.5 mg po bid x 4 days, then 1 mg po bid 1 each 0     No current facility-administered medications for this visit.            Assessment and Plan    Problem List Items Addressed This Visit       Familial hypercholesterolemia (Chronic)    Coronary artery disease involving native coronary artery of native heart with angina pectoris - Primary (Chronic)    Relevant Medications    nitroglycerin (NITROSTAT) 0.4 MG SL tablet    isosorbide mononitrate (IMDUR) 30 MG 24 hr tablet    Other Relevant Orders    Adult Transthoracic Echo Complete w/ Color, Spectral and  Contrast if necessary per protocol    ECG 12 Lead    Tobacco abuse    Angina, class II    Overview     Added automatically from request for surgery [id number]         Relevant Medications    nitroglycerin (NITROSTAT) 0.4 MG SL tablet    isosorbide mononitrate (IMDUR) 30 MG 24 hr tablet     Other Visit Diagnoses       Smoker        Relevant Medications    Varenicline Tartrate, Starter, 0.5 MG X 11 & 1 MG X 42 tablet therapy pack    varenicline (Chantix Continuing Month Rocky) 1 MG tablet (Start on 5/14/2024)    nicotine (NICODERM CQ) 14 MG/24HR patch    Abnormal electrocardiogram (ECG) (EKG)        Relevant Orders    Adult Transthoracic Echo Complete w/ Color, Spectral and Contrast if necessary per protocol          Diagnoses and all orders for this visit:    1. Coronary artery disease involving native coronary artery of native heart with angina pectoris (Primary)  -     nitroglycerin (NITROSTAT) 0.4 MG SL tablet; Place 1 tablet under the tongue Every 5 (Five) Minutes As Needed for Chest Pain. Take no more than 3 doses in 15 minutes.  Dispense: 30 tablet; Refill: 0  -     isosorbide mononitrate (IMDUR) 30 MG 24 hr tablet; Take 1 tablet by mouth Daily.  Dispense: 90 tablet; Refill: 3  -     Adult Transthoracic Echo Complete w/ Color, Spectral and Contrast if necessary per protocol; Future  -     ECG 12 Lead    2. Angina, class II  -     isosorbide mononitrate (IMDUR) 30 MG 24 hr tablet; Take 1 tablet by mouth Daily.  Dispense: 90 tablet; Refill: 3    3. Smoker  -     Varenicline Tartrate, Starter, 0.5 MG X 11 & 1 MG X 42 tablet therapy pack; Take 1 package by mouth 1 (One) Time for 1 dose. Take 0.5 mg po daily x 3 days, then 0.5 mg po bid x 4 days, then 1 mg po bid  Dispense: 1 each; Refill: 0  -     varenicline (Chantix Continuing Month Rocky) 1 MG tablet; Take 1 tablet by mouth 2 (Two) Times a Day for 56 days.  Dispense: 56 tablet; Refill: 1  -     nicotine (NICODERM CQ) 14 MG/24HR patch; Place 1 patch on the skin as  directed by provider Daily.  Dispense: 30 patch; Refill: 1    4. Abnormal electrocardiogram (ECG) (EKG)  -     Adult Transthoracic Echo Complete w/ Color, Spectral and Contrast if necessary per protocol; Future    5. Familial hypercholesterolemia    6. Tobacco abuse            Follow Up     Return in about 4 weeks (around 5/14/2024).    Patient was given instructions and counseling regarding his condition or for health maintenance advice. Please see specific information pulled into the AVS if appropriate.       Electronically signed by OBDULIO Juan, 04/16/24, 4:30 PM EDT.      Dictated Utilizing Dragon Dictation: Part of this note may be an electronic transcription/translation of spoken language to printed text using the Dragon Dictation System

## 2024-04-26 ENCOUNTER — HOSPITAL ENCOUNTER (OUTPATIENT)
Dept: CARDIOLOGY | Facility: HOSPITAL | Age: 59
Discharge: HOME | End: 2024-04-26
Payer: MEDICARE

## 2024-04-26 DIAGNOSIS — I25.119 CORONARY ARTERY DISEASE INVOLVING NATIVE CORONARY ARTERY OF NATIVE HEART WITH ANGINA PECTORIS: ICD-10-CM

## 2024-04-26 DIAGNOSIS — R94.31 ABNORMAL ELECTROCARDIOGRAM (ECG) (EKG): ICD-10-CM

## 2024-04-26 LAB
BH CV ECHO MEAS - AO MAX PG: 4.5 MMHG
BH CV ECHO MEAS - AO MEAN PG: 3 MMHG
BH CV ECHO MEAS - AO ROOT DIAM: 3 CM
BH CV ECHO MEAS - AO V2 MAX: 106 CM/SEC
BH CV ECHO MEAS - AO V2 VTI: 25.5 CM
BH CV ECHO MEAS - EDV(CUBED): 64 ML
BH CV ECHO MEAS - EDV(MOD-SP4): 48.8 ML
BH CV ECHO MEAS - EF(MOD-BP): 32 %
BH CV ECHO MEAS - EF(MOD-SP4): 32 %
BH CV ECHO MEAS - ESV(CUBED): 32.8 ML
BH CV ECHO MEAS - ESV(MOD-SP4): 33.2 ML
BH CV ECHO MEAS - FS: 20 %
BH CV ECHO MEAS - IVS/LVPW: 1.08 CM
BH CV ECHO MEAS - IVSD: 1.4 CM
BH CV ECHO MEAS - LA DIMENSION: 3 CM
BH CV ECHO MEAS - LAT PEAK E' VEL: 12.3 CM/SEC
BH CV ECHO MEAS - LV MASS(C)D: 197.6 GRAMS
BH CV ECHO MEAS - LVIDD: 4 CM
BH CV ECHO MEAS - LVIDS: 3.2 CM
BH CV ECHO MEAS - LVOT AREA: 2.8 CM2
BH CV ECHO MEAS - LVOT DIAM: 1.9 CM
BH CV ECHO MEAS - LVPWD: 1.3 CM
BH CV ECHO MEAS - MED PEAK E' VEL: 11 CM/SEC
BH CV ECHO MEAS - MV A MAX VEL: 55.8 CM/SEC
BH CV ECHO MEAS - MV E MAX VEL: 94.6 CM/SEC
BH CV ECHO MEAS - MV E/A: 1.7
BH CV ECHO MEAS - PA ACC TIME: 0.09 SEC
BH CV ECHO MEAS - SV(MOD-SP4): 15.6 ML
BH CV ECHO MEAS - TAPSE (>1.6): 2.6 CM
BH CV ECHO MEASUREMENTS AVERAGE E/E' RATIO: 8.12

## 2024-04-26 PROCEDURE — 93306 TTE W/DOPPLER COMPLETE: CPT

## 2024-04-29 NOTE — PROGRESS NOTES
His echocardiogram revealed some weakening of his heart.  Please see if he can move his appointment to an earlier date so that we can discuss a new plan of care.

## 2024-04-30 ENCOUNTER — TELEPHONE (OUTPATIENT)
Dept: CARDIOLOGY | Facility: CLINIC | Age: 59
End: 2024-04-30
Payer: MEDICARE

## 2024-04-30 NOTE — TELEPHONE ENCOUNTER
Spoke to patient regarding test results per Randee's request. Patient voiced understanding and was given appointment for 05/03/24 to discuss further treatment.

## 2024-04-30 NOTE — TELEPHONE ENCOUNTER
----- Message from Yady LOPEZ sent at 4/29/2024  4:36 PM EDT -----  His echocardiogram revealed some weakening of his heart.  Please see if he can move his appointment to an earlier date so that we can discuss a new plan of care.

## 2024-05-03 ENCOUNTER — OFFICE VISIT (OUTPATIENT)
Dept: CARDIOLOGY | Facility: CLINIC | Age: 59
End: 2024-05-03
Payer: MEDICARE

## 2024-05-03 VITALS
HEIGHT: 70 IN | BODY MASS INDEX: 24.77 KG/M2 | HEART RATE: 60 BPM | DIASTOLIC BLOOD PRESSURE: 76 MMHG | OXYGEN SATURATION: 96 % | SYSTOLIC BLOOD PRESSURE: 119 MMHG | WEIGHT: 173 LBS

## 2024-05-03 DIAGNOSIS — E78.01 FAMILIAL HYPERCHOLESTEROLEMIA: Chronic | ICD-10-CM

## 2024-05-03 DIAGNOSIS — I50.22 CHRONIC HFREF (HEART FAILURE WITH REDUCED EJECTION FRACTION): Primary | Chronic | ICD-10-CM

## 2024-05-03 DIAGNOSIS — I25.5 ISCHEMIC CARDIOMYOPATHY: Chronic | ICD-10-CM

## 2024-05-03 DIAGNOSIS — I25.119 CORONARY ARTERY DISEASE INVOLVING NATIVE CORONARY ARTERY OF NATIVE HEART WITH ANGINA PECTORIS: Chronic | ICD-10-CM

## 2024-05-03 DIAGNOSIS — I42.9 CARDIOMYOPATHY, UNSPECIFIED TYPE: ICD-10-CM

## 2024-05-03 PROCEDURE — 3078F DIAST BP <80 MM HG: CPT | Performed by: NURSE PRACTITIONER

## 2024-05-03 PROCEDURE — 1159F MED LIST DOCD IN RCRD: CPT | Performed by: NURSE PRACTITIONER

## 2024-05-03 PROCEDURE — 3074F SYST BP LT 130 MM HG: CPT | Performed by: NURSE PRACTITIONER

## 2024-05-03 PROCEDURE — 1160F RVW MEDS BY RX/DR IN RCRD: CPT | Performed by: NURSE PRACTITIONER

## 2024-05-03 RX ORDER — SPIRONOLACTONE 25 MG/1
25 TABLET ORAL DAILY
Qty: 30 TABLET | Refills: 11 | Status: SHIPPED | OUTPATIENT
Start: 2024-05-03

## 2024-05-03 RX ORDER — DAPAGLIFLOZIN 10 MG/1
10 TABLET, FILM COATED ORAL DAILY
Qty: 30 TABLET | Refills: 5 | Status: SHIPPED | OUTPATIENT
Start: 2024-05-03

## 2024-05-03 NOTE — PROGRESS NOTES
"Chief Complaint  Results (Echo results )    Subjective          Javier Santiago presents to [facility] for follow up.    History of Present Illness  Mr. Santiago presents with his wife for continued follow-up of coronary artery disease and hyperlipidemia.  His last visit to clinic was with me 04/16/2024 at which time we discussed smoking cessation.  He opted to try Chantix and planned to reduce to 5 cigarettes/day as of 05/01/2024.  He reports that insurance wouldn't cover the medication.  He bought some patches and will continue to try to discontinue.      I requested labs from his primary care provider and received some that were dated February 2024.  His cholesterol is not at goal for someone with known coronary artery disease.    Since his visit with me he has had an echocardiogram that has also revealed a decreased ejection fraction.  In 2021 his EF was noted to be 56 to 60%.  It is now down to 31 to 35%.  Discussed GDMT for heart failure and the indication for wearable cardiac defibrillator  Tobacco Use: High Risk (5/3/2024)    Patient History     Smoking Tobacco Use: Every Day     Smokeless Tobacco Use: Former     Passive Exposure: Not on file       Objective     Vital Signs:   /76 (BP Location: Left arm, Patient Position: Sitting, Cuff Size: Adult)   Pulse 60   Ht 177.8 cm (70\")   Wt 78.5 kg (173 lb)   SpO2 96%   BMI 24.82 kg/m²       Physical Exam  Vitals and nursing note reviewed. Exam conducted with a chaperone present (Wife accompanies).   Constitutional:       General: He is not in acute distress.     Comments: Smells of cigarette smoke   HENT:      Head: Normocephalic and atraumatic.   Eyes:      Conjunctiva/sclera: Conjunctivae normal.   Neck:      Vascular: No carotid bruit.   Cardiovascular:      Rate and Rhythm: Normal rate and regular rhythm.      Pulses: Normal pulses.   Pulmonary:      Effort: Pulmonary effort is normal.      Breath sounds: Normal breath sounds. "   Musculoskeletal:      Cervical back: Neck supple.      Right lower leg: No edema.      Left lower leg: No edema.   Skin:     General: Skin is warm and dry.   Neurological:      General: No focal deficit present.   Psychiatric:         Mood and Affect: Mood normal.         Behavior: Behavior normal.          Result Review :   The following data was reviewed by: OBDULIO Juan on 05/03/2024:  Common labs          3/15/2024    08:21   Common Labs   Glucose 163    BUN 10    Creatinine 1.08    Sodium 142    Potassium 4.1    Chloride 106    Calcium 9.3    WBC 7.65    Hemoglobin 15.4    Hematocrit 46.4    Platelets 274    February 3, 2024-total cholesterol 205, triglycerides 142, HDL 33,     Data reviewed : Cardiology studies as detailed below      Last Cardiac Cath  Results for orders placed during the hospital encounter of 03/15/24    Intravascular Ultrasound    Conclusion  Images from the original result were not included.  CARDIAC CATHETERIZATION / INTERVENTION REPORT    DATE OF PROCEDURE: 3/15/2024    INDICATION FOR PROCEDURE: chest pain      PRE PROCEDURE DIAGNOSIS:  Unstable angina with history of coronary disease    POST PROCEDURE DIAGNOSIS:  Severe coronary disease with critical stenosis in the proximal LAD and severe lesion in the obtuse marginal    Face to face moderate conscious sedation time:      COMPLICATIONS : None    Access Site :  6 Lebanese right radial    PROCEDURE PERFORMED:    1. Coronary Angiogram  2. Left heart catheterization  3.IVUS to the LAD and Lcx  4.PCI with Shockwave to the LAD  5.PCI to OM1      PROCEDURE COMMENTS:    Prior to the procedure risks benefits alternatives and possible adverse events were discussed with the patient and informed consent was obtained.  Moderate conscious sedation was utilized for 60 minutes refrigeration procedure supervised administration fentanyl and Versed that was given independently by Tania cabral registered nurse.  Javier Santiago was brought to the  cath lab and placed on the cardiac catherization table in the postabsortive state. The patient was prepped and draped according to the cath lab protocol under strict aseptic and sterile condition.  We obtained access with a slender sheath 6 Mongolian in the right radial artery after giving lidocaine in the right radial artery.  We went with a Negrito catheter over J-wire to engage the left main and the RCA and also cross the LV cavity to obtain a pressure pullback pressure as well.  With subsequent moved onto intervention.  We gave additional heparin and used an Akari 4 guide to engage the left main.  We took a Prowater wire down the LAD and subsequently performed IVUS.  Lesion appears to be partially in-stent and partially Denovo we used a 3 mm noncompliant balloon however there was a waist and then we used a 3 x 12 mm shockwave lithotripsy balloon.  However due to prep errors we were not able to deliver complete shocks and went to deliver 35 shocks.  This is despite changing out for the balloon.  We subsequently then inserted a 3 x 12 mm Xience stent inflating it to 23 hector.  We then redirected our wire into the obtuse marginal performing IVUS.  The luminal area of this lesion is 2.5 mm².  We subsequently directly stented with a 2.5 x 15 mm Xience stent inflating it to 23 hector.  We then used a 2.5 noncompliant balloon inflating it to 23 hector.  Angiographically there is no residual stenosis and DALILA-3 flow in all vessels.  We did give 325 aspirin and 600 mg of Plavix        Coronary anatomy findings:  Left main appears normal.  The obtuse marginal in the midportion has a 75 to 80% stenosis.  The proximal LAD has a 90% stenosis.  Mid LAD has a 30% stenosis.  The stents in the proximal LAD appear to be underexpanded on IVUS        LVEDP: 17 mmHg  No gradient across the aortic valve on pull back.      PERCUTANEOUS CORONARY INTERVENTION PROCEDURE NOTE:        Lesion length: 5 mm  Pre PCI Stenosis: 90  %  Post PCI stenosis: 0  %  Pre PCI DALILA flow: 3 in the LAD and circumflex  Post PCI DALILA flow: 3 in the LAD and circumflex    EBL: Less than 20cc        Final Impression:  In-stent restenosis in the proximal LAD status post shockwave lithotripsy and IVUS guided PCI.  Successful PCI of the mid OM1 with IVUS guidance and no residual stenosis  Mild to moderate disease in the mid LAD continue risk factor modification          Recommendations:  Aggressive guideline directed medical management for CAD  Dual Antiplatelet therapy    Charles Montaño MD  03/15/24  10:39 EDT      Last Stress test  Results for orders placed during the hospital encounter of 01/20/21    Stress Test With Myocardial Perfusion One Day    Interpretation Summary  · Myocardial perfusion imaging indicates a small-sized infarct located in the apex with mild ischemia in the distal anteroapical territory.  · Left ventricular ejection fraction is normal. (Calculated EF = 60%).  · Findings consistent with a normal ECG stress test.  · Impressions are consistent with an intermediate risk study.       Last Echo  Results for orders placed during the hospital encounter of 04/26/24    Adult Transthoracic Echo Complete w/ Color, Spectral and Contrast if necessary per protocol    Interpretation Summary    Left ventricular systolic function is moderately decreased. Calculated left ventricular EF = 32% Left ventricular ejection fraction appears to be 31 - 35%.    Left ventricular wall thickness is consistent with mild concentric hypertrophy.    Left ventricular diastolic function was normal.             Current Outpatient Medications   Medication Sig Dispense Refill    albuterol (PROVENTIL HFA;VENTOLIN HFA) 108 (90 BASE) MCG/ACT inhaler Inhale 2 puffs Every 4 (Four) Hours As Needed for Wheezing.      ALPRAZolam (XANAX) 1 MG tablet Take 1 tablet by mouth 3 (Three) Times a Day As Needed for Anxiety. Only takes 10 per month.      aspirin 81 MG tablet Take 1 tablet by mouth Daily. 30 tablet 11     atorvastatin (LIPITOR) 80 MG tablet Take 1 tablet by mouth Daily.      baclofen (LIORESAL) 10 MG tablet Take 1 tablet by mouth 2 (Two) Times a Day. 60 tablet 0    betamethasone dipropionate (DIPROLENE) 0.05 % ointment Apply 1 Application topically to the appropriate area as directed 2 (Two) Times a Day.      clopidogrel (PLAVIX) 75 MG tablet Take 1 tablet by mouth Daily. 90 tablet 5    cyanocobalamin 1000 MCG/ML injection Inject 1 mL into the appropriate muscle as directed by prescriber Every 28 (Twenty-Eight) Days.      Diclofenac Sodium (VOLTAREN) 1 % gel gel Apply 4 g topically to the appropriate area as directed 3 (Three) Times a Day As Needed (JOINT PAIN).      Enbrel 50 MG/ML injection Inject 1 mL under the skin into the appropriate area as directed 2 (Two) Times a Week. TUESDAY AND SATURDAY      EPINEPHrine (EPIPEN IJ) Inject 1 dose as directed As Needed (ALLERGIC REACTION).      gabapentin (NEURONTIN) 600 MG tablet Take 1 tablet by mouth 3 (Three) Times a Day.      hydrALAZINE (APRESOLINE) 25 MG tablet Take 1 tablet by mouth 3 (Three) Times a Day.      HYDROcodone-acetaminophen (NORCO)  MG per tablet Take 1 tablet by mouth 3 (Three) Times a Day As Needed for Moderate Pain . 30 tablet 0    hydrOXYzine (ATARAX) 25 MG tablet Take 1 tablet by mouth Every 8 (Eight) Hours As Needed for Itching.      isosorbide mononitrate (IMDUR) 30 MG 24 hr tablet Take 1 tablet by mouth Daily. 90 tablet 3    metoprolol succinate XL (TOPROL-XL) 25 MG 24 hr tablet Take 1 tablet by mouth Daily. 90 tablet 1    nicotine (NICODERM CQ) 14 MG/24HR patch Place 1 patch on the skin as directed by provider Daily. 30 patch 1    nitroglycerin (NITROSTAT) 0.4 MG SL tablet Place 1 tablet under the tongue Every 5 (Five) Minutes As Needed for Chest Pain. Take no more than 3 doses in 15 minutes. 30 tablet 0    pantoprazole (PROTONIX) 40 MG EC tablet Take 1 tablet by mouth 2 (Two) Times a Day Before Meals. 60 tablet 5    SYMBICORT 80-4.5  MCG/ACT inhaler Inhale 2 puffs 2 (Two) Times a Day As Needed.      vitamin D (ERGOCALCIFEROL) 1.25 MG (61924 UT) capsule capsule Take 1 capsule by mouth 1 (One) Time Per Week.      dapagliflozin Propanediol 10 MG tablet Take 10 mg by mouth Daily. 30 tablet 5    dexlansoprazole (DEXILANT) 60 MG capsule Take 1 capsule by mouth Daily. Open capsule, sprinkle on 1 tablespoon applesauce, swallow immediately, follow with sips of water, do not crush/cut/chew granules OR mix with 20 mL of water, draw into oral syringe and take immediately, mix residue with 10 mL of water, swirl, take by mouth immediately, repeat with additional 10 mL of water. (Patient not taking: Reported on 5/3/2024) 90 capsule 3    spironolactone (ALDACTONE) 25 MG tablet Take 1 tablet by mouth Daily. 30 tablet 11     No current facility-administered medications for this visit.            Assessment and Plan    Problem List Items Addressed This Visit       Familial hypercholesterolemia (Chronic)    Overview     02/02/2024-total cholesterol 205, triglycerides 142, HDL 33,          Relevant Orders    Ambulatory Referral to Disease State Management    Coronary artery disease involving native coronary artery of native heart with angina pectoris (Chronic)    Relevant Orders    Ambulatory Referral to Disease State Management    Chronic HFrEF (heart failure with reduced ejection fraction) - Primary (Chronic)    Overview     04/26/2024-LVEF 31 to 35%, mild LVH, normal diastolic function         Relevant Medications    dapagliflozin Propanediol 10 MG tablet    spironolactone (ALDACTONE) 25 MG tablet    Other Relevant Orders    Basic Metabolic Panel    [facility] Wearable Cardioverter-Defibrillator    Ischemic cardiomyopathy (Chronic)    Relevant Orders    [facility] Wearable Cardioverter-Defibrillator     Diagnoses and all orders for this visit:    1. Chronic HFrEF (heart failure with reduced ejection fraction) (Primary)  -     dapagliflozin Propanediol 10 MG  tablet; Take 10 mg by mouth Daily.  Dispense: 30 tablet; Refill: 5  -     spironolactone (ALDACTONE) 25 MG tablet; Take 1 tablet by mouth Daily.  Dispense: 30 tablet; Refill: 11  -     Basic Metabolic Panel; Future  -      [facility] Wearable Cardioverter-Defibrillator    2. Coronary artery disease involving native coronary artery of native heart with angina pectoris  -     Ambulatory Referral to Disease State Management    3. Familial hypercholesterolemia  -     Ambulatory Referral to Disease State Management    4. Ischemic cardiomyopathy  -     [facility] Wearable Cardioverter-Defibrillator        F/U in three weeks to begin Entresto therapy.  Labs prior to visit to ensure renal function is stable.    Follow Up     Return in about 3 weeks (around 5/24/2024).    Patient was given instructions and counseling regarding his condition or for health maintenance advice. Please see specific information pulled into the AVS if appropriate.       Electronically signed by OBDULIO Juan, 05/03/24, 6:12 PM EDT.      Dictated Utilizing Dragon Dictation: Part of this note may be an electronic transcription/translation of spoken language to printed text using the Dragon Dictation System

## 2024-05-13 ENCOUNTER — DISEASE STATE MANAGEMENT VISIT (OUTPATIENT)
Dept: PHARMACY | Facility: HOSPITAL | Age: 59
End: 2024-05-13
Payer: MEDICARE

## 2024-05-13 ENCOUNTER — SPECIALTY PHARMACY (OUTPATIENT)
Dept: PHARMACY | Facility: HOSPITAL | Age: 59
End: 2024-05-13
Payer: MEDICARE

## 2024-05-13 DIAGNOSIS — E78.01 FAMILIAL HYPERCHOLESTEROLEMIA: Primary | Chronic | ICD-10-CM

## 2024-05-13 DIAGNOSIS — I25.119 CORONARY ARTERY DISEASE INVOLVING NATIVE CORONARY ARTERY OF NATIVE HEART WITH ANGINA PECTORIS: Chronic | ICD-10-CM

## 2024-05-13 NOTE — PROGRESS NOTES
[facility]  Lipid Management Program - [facility]       Javier Santiago is a 58 y.o. male referred to the [facility] by Randee Jett for clinical pharmacy and specialty pharmacy management of [facility].  Javier Santiago is  treated for clinical ASCVD, hyperlipidemia, and familial hypercholesterolemia) and currently takes Atorvastatin 80mg.  In the past, Pt has tried Atorvastatin is not considered statin intolerant. The patient denies any allergies to latex.      Relevant Past Medical History and Comorbidities  Past Medical History:   Diagnosis Date    Acute coronary syndrome 03/15/2024    Anxiety     Arthritis     psoriatic    ASCVD (arteriosclerotic cardiovascular disease)     CHF (congestive heart failure)     Chronic low back pain     COPD (chronic obstructive pulmonary disease)     Coronary artery disease     Fibromyalgia     Full dentures     GERD (gastroesophageal reflux disease)     History of EKG 01/26/2016    NORMAL    Hyperlipidemia     Hypertension     Hypotension     Insomnia     MI, old 2015    MRSA infection 08/2022    NECK SURGERY    Psoriasis     Psoriatic arthritis     RA (rheumatoid arthritis)     Sciatic nerve pain     Skin pain     from nerve pain    Sleep apnea     cpap non compliant    Tinnitus     Wears glasses     readers     Social History     Socioeconomic History    Marital status:      Spouse name: Coretta Bhatt   Tobacco Use    Smoking status: Every Day     Current packs/day: 0.50     Average packs/day: 0.5 packs/day for 35.0 years (17.5 ttl pk-yrs)     Types: Cigarettes    Smokeless tobacco: Former     Types: Chew   Vaping Use    Vaping status: Never Used   Substance and Sexual Activity    Alcohol use: No    Drug use: Never    Sexual activity: Defer       Allergies  Patient has no known allergies.    Current Medication List    Current Outpatient Medications:     albuterol (PROVENTIL HFA;VENTOLIN HFA) 108 (90 BASE) MCG/ACT inhaler, Inhale 2 puffs Every 4  (Four) Hours As Needed for Wheezing., Disp: , Rfl:     ALPRAZolam (XANAX) 1 MG tablet, Take 1 tablet by mouth Every Other Day. Only takes 10 per month., Disp: , Rfl:     aspirin 81 MG tablet, Take 1 tablet by mouth Daily., Disp: 30 tablet, Rfl: 11    atorvastatin (LIPITOR) 80 MG tablet, Take 1 tablet by mouth Daily., Disp: , Rfl:     baclofen (LIORESAL) 10 MG tablet, Take 1 tablet by mouth 2 (Two) Times a Day., Disp: 60 tablet, Rfl: 0    betamethasone dipropionate (DIPROLENE) 0.05 % ointment, Apply 1 Application topically to the appropriate area as directed 2 (Two) Times a Day. (Patient not taking: Reported on 5/13/2024), Disp: , Rfl:     clopidogrel (PLAVIX) 75 MG tablet, Take 1 tablet by mouth Daily., Disp: 90 tablet, Rfl: 5    cyanocobalamin 1000 MCG/ML injection, Inject 1 mL into the appropriate muscle as directed by prescriber Every 28 (Twenty-Eight) Days., Disp: , Rfl:     dapagliflozin Propanediol 10 MG tablet, Take 10 mg by mouth Daily., Disp: 30 tablet, Rfl: 5    dexlansoprazole (DEXILANT) 60 MG capsule, Take 1 capsule by mouth Daily. Open capsule, sprinkle on 1 tablespoon applesauce, swallow immediately, follow with sips of water, do not crush/cut/chew granules OR mix with 20 mL of water, draw into oral syringe and take immediately, mix residue with 10 mL of water, swirl, take by mouth immediately, repeat with additional 10 mL of water., Disp: 90 capsule, Rfl: 3    Diclofenac Sodium (VOLTAREN) 1 % gel gel, Apply 4 g topically to the appropriate area as directed 3 (Three) Times a Day As Needed (JOINT PAIN)., Disp: , Rfl:     Enbrel 50 MG/ML injection, Inject 1 mL under the skin into the appropriate area as directed 2 (Two) Times a Week. TUESDAY AND SATURDAY, Disp: , Rfl:     EPINEPHrine (EPIPEN IJ), Inject 1 dose as directed As Needed (ALLERGIC REACTION). (Patient not taking: Reported on 5/13/2024), Disp: , Rfl:     Evolocumab (REPATHA) solution auto-injector SureClick injection, Inject 1 mL under the skin  into the appropriate area as directed Every 14 (Fourteen) Days., Disp: 2 mL, Rfl: 5    gabapentin (NEURONTIN) 600 MG tablet, Take 1 tablet by mouth 3 (Three) Times a Day., Disp: , Rfl:     hydrALAZINE (APRESOLINE) 25 MG tablet, Take 1 tablet by mouth 3 (Three) Times a Day., Disp: , Rfl:     HYDROcodone-acetaminophen (NORCO)  MG per tablet, Take 1 tablet by mouth 3 (Three) Times a Day As Needed for Moderate Pain ., Disp: 30 tablet, Rfl: 0    hydrOXYzine (ATARAX) 25 MG tablet, Take 1 tablet by mouth Every 8 (Eight) Hours As Needed for Itching., Disp: , Rfl:     isosorbide mononitrate (IMDUR) 30 MG 24 hr tablet, Take 1 tablet by mouth Daily., Disp: 90 tablet, Rfl: 3    metoprolol succinate XL (TOPROL-XL) 25 MG 24 hr tablet, Take 1 tablet by mouth Daily., Disp: 90 tablet, Rfl: 1    nicotine (NICODERM CQ) 14 MG/24HR patch, Place 1 patch on the skin as directed by provider Daily., Disp: 30 patch, Rfl: 1    nitroglycerin (NITROSTAT) 0.4 MG SL tablet, Place 1 tablet under the tongue Every 5 (Five) Minutes As Needed for Chest Pain. Take no more than 3 doses in 15 minutes. (Patient not taking: Reported on 5/13/2024), Disp: 30 tablet, Rfl: 0    pantoprazole (PROTONIX) 40 MG EC tablet, Take 1 tablet by mouth 2 (Two) Times a Day Before Meals., Disp: 60 tablet, Rfl: 5    spironolactone (ALDACTONE) 25 MG tablet, Take 1 tablet by mouth Daily., Disp: 30 tablet, Rfl: 11    SYMBICORT 80-4.5 MCG/ACT inhaler, Inhale 2 puffs 2 (Two) Times a Day As Needed., Disp: , Rfl:     vitamin D (ERGOCALCIFEROL) 1.25 MG (67046 UT) capsule capsule, Take 1 capsule by mouth 1 (One) Time Per Week., Disp: , Rfl:     Drug Interactions  none     Relevant Laboratory Values  Lab Results   Component Value Date    CHOL 137 11/01/2017    CHLPL 170 01/11/2016    TRIG 95 11/01/2017    HDL 40 (L) 11/01/2017    LDL 78 11/01/2017     Familial hypercholesterolemia (Chronic)      Overview       02/02/2024-total cholesterol 205, triglycerides 142, HDL 33,          Per Randee Jett's note on 5/3/24,  from labs 2/2/24    Medication Assessment & Plan    Patient started today on Repatha [id number] once every 14 days. Injection training and medication education provided.     Pt administered dose into the right side of his stomach    Patient will need lipid panel in 6 weeks, order placed.     Patient will continue regular follow-up with cardiology. Next appointment 5/23/24    Will follow up with specialty pharmacy for next injection. Patient would like to receive mail order services. Please ship to [address] [place] [address]    Will follow-up in 6 months, or sooner if needed.     Sylvia Crews, PharmD  5/13/2024  14:16 EDT

## 2024-05-13 NOTE — PROGRESS NOTES
[facility]  Lipid Management Program - [facility]       Javier Santiago is a 58 y.o. male referred to the [facility] by Randee Jett for clinical pharmacy and specialty pharmacy management of [facility].  Javier Santiago is  treated for clinical ASCVD, hyperlipidemia, and familial hypercholesterolemia) and currently takes Atorvastatin 80mg.  In the past, Pt has tried Atorvastatin is not considered statin intolerant. The patient denies any allergies to latex.      Relevant Past Medical History and Comorbidities  Past Medical History:   Diagnosis Date    Acute coronary syndrome 03/15/2024    Anxiety     Arthritis     psoriatic    ASCVD (arteriosclerotic cardiovascular disease)     CHF (congestive heart failure)     Chronic low back pain     COPD (chronic obstructive pulmonary disease)     Coronary artery disease     Fibromyalgia     Full dentures     GERD (gastroesophageal reflux disease)     History of EKG 01/26/2016    NORMAL    Hyperlipidemia     Hypertension     Hypotension     Insomnia     MI, old 2015    MRSA infection 08/2022    NECK SURGERY    Psoriasis     Psoriatic arthritis     RA (rheumatoid arthritis)     Sciatic nerve pain     Skin pain     from nerve pain    Sleep apnea     cpap non compliant    Tinnitus     Wears glasses     readers     Social History     Socioeconomic History    Marital status:      Spouse name: Coretta Bhatt   Tobacco Use    Smoking status: Every Day     Current packs/day: 0.50     Average packs/day: 0.5 packs/day for 35.0 years (17.5 ttl pk-yrs)     Types: Cigarettes    Smokeless tobacco: Former     Types: Chew   Vaping Use    Vaping status: Never Used   Substance and Sexual Activity    Alcohol use: No    Drug use: Never    Sexual activity: Defer       Allergies  Patient has no known allergies.    Current Medication List    Current Outpatient Medications:     albuterol (PROVENTIL HFA;VENTOLIN HFA) 108 (90 BASE) MCG/ACT inhaler, Inhale 2 puffs Every 4  (Four) Hours As Needed for Wheezing., Disp: , Rfl:     ALPRAZolam (XANAX) 1 MG tablet, Take 1 tablet by mouth 3 (Three) Times a Day As Needed for Anxiety. Only takes 10 per month., Disp: , Rfl:     aspirin 81 MG tablet, Take 1 tablet by mouth Daily., Disp: 30 tablet, Rfl: 11    atorvastatin (LIPITOR) 80 MG tablet, Take 1 tablet by mouth Daily., Disp: , Rfl:     baclofen (LIORESAL) 10 MG tablet, Take 1 tablet by mouth 2 (Two) Times a Day., Disp: 60 tablet, Rfl: 0    betamethasone dipropionate (DIPROLENE) 0.05 % ointment, Apply 1 Application topically to the appropriate area as directed 2 (Two) Times a Day., Disp: , Rfl:     clopidogrel (PLAVIX) 75 MG tablet, Take 1 tablet by mouth Daily., Disp: 90 tablet, Rfl: 5    cyanocobalamin 1000 MCG/ML injection, Inject 1 mL into the appropriate muscle as directed by prescriber Every 28 (Twenty-Eight) Days., Disp: , Rfl:     dapagliflozin Propanediol 10 MG tablet, Take 10 mg by mouth Daily., Disp: 30 tablet, Rfl: 5    dexlansoprazole (DEXILANT) 60 MG capsule, Take 1 capsule by mouth Daily. Open capsule, sprinkle on 1 tablespoon applesauce, swallow immediately, follow with sips of water, do not crush/cut/chew granules OR mix with 20 mL of water, draw into oral syringe and take immediately, mix residue with 10 mL of water, swirl, take by mouth immediately, repeat with additional 10 mL of water. (Patient not taking: Reported on 5/3/2024), Disp: 90 capsule, Rfl: 3    Diclofenac Sodium (VOLTAREN) 1 % gel gel, Apply 4 g topically to the appropriate area as directed 3 (Three) Times a Day As Needed (JOINT PAIN)., Disp: , Rfl:     Enbrel 50 MG/ML injection, Inject 1 mL under the skin into the appropriate area as directed 2 (Two) Times a Week. TUESDAY AND SATURDAY, Disp: , Rfl:     EPINEPHrine (EPIPEN IJ), Inject 1 dose as directed As Needed (ALLERGIC REACTION)., Disp: , Rfl:     Evolocumab (REPATHA) solution auto-injector SureClick injection, Inject 1 mL under the skin into the  appropriate area as directed Every 14 (Fourteen) Days., Disp: 2 mL, Rfl: 0    gabapentin (NEURONTIN) 600 MG tablet, Take 1 tablet by mouth 3 (Three) Times a Day., Disp: , Rfl:     hydrALAZINE (APRESOLINE) 25 MG tablet, Take 1 tablet by mouth 3 (Three) Times a Day., Disp: , Rfl:     HYDROcodone-acetaminophen (NORCO)  MG per tablet, Take 1 tablet by mouth 3 (Three) Times a Day As Needed for Moderate Pain ., Disp: 30 tablet, Rfl: 0    hydrOXYzine (ATARAX) 25 MG tablet, Take 1 tablet by mouth Every 8 (Eight) Hours As Needed for Itching., Disp: , Rfl:     isosorbide mononitrate (IMDUR) 30 MG 24 hr tablet, Take 1 tablet by mouth Daily., Disp: 90 tablet, Rfl: 3    metoprolol succinate XL (TOPROL-XL) 25 MG 24 hr tablet, Take 1 tablet by mouth Daily., Disp: 90 tablet, Rfl: 1    nicotine (NICODERM CQ) 14 MG/24HR patch, Place 1 patch on the skin as directed by provider Daily., Disp: 30 patch, Rfl: 1    nitroglycerin (NITROSTAT) 0.4 MG SL tablet, Place 1 tablet under the tongue Every 5 (Five) Minutes As Needed for Chest Pain. Take no more than 3 doses in 15 minutes., Disp: 30 tablet, Rfl: 0    pantoprazole (PROTONIX) 40 MG EC tablet, Take 1 tablet by mouth 2 (Two) Times a Day Before Meals., Disp: 60 tablet, Rfl: 5    spironolactone (ALDACTONE) 25 MG tablet, Take 1 tablet by mouth Daily., Disp: 30 tablet, Rfl: 11    SYMBICORT 80-4.5 MCG/ACT inhaler, Inhale 2 puffs 2 (Two) Times a Day As Needed., Disp: , Rfl:     vitamin D (ERGOCALCIFEROL) 1.25 MG (17342 UT) capsule capsule, Take 1 capsule by mouth 1 (One) Time Per Week., Disp: , Rfl:     Drug Interactions  none     Relevant Laboratory Values  Lab Results   Component Value Date    CHOL 137 11/01/2017    CHLPL 170 01/11/2016    TRIG 95 11/01/2017    HDL 40 (L) 11/01/2017    LDL 78 11/01/2017       Medication Assessment & Plan    Patient started today on Repatha [id number] once every 14 days. Injection training and medication education provided.     Pt administered dose  into the right side of his stomach    Patient will need lipid panel in 6 weeks, order placed.     Patient will continue regular follow-up with cardiology.     Will follow up with specialty pharmacy for next injection. Patient would like to receive mail order services. Please ship to [address] [place] [address]    Will follow-up in 6 months, or sooner if needed.     Sylvia Crews, PharmD  5/13/2024  13:44 EDT

## 2024-05-13 NOTE — PROGRESS NOTES
[facility]  Lipid Management Program - [facility]       Javier Santiago is a 58 y.o. male referred to the [facility] by Randee Jett for clinical pharmacy and specialty pharmacy management of [facility].  Javier Santiago is  treated for clinical ASCVD, hyperlipidemia, and familial hypercholesterolemia) and currently takes Atorvastatin 80mg.  In the past, Pt has tried Atorvastatin is not considered statin intolerant. The patient denies any allergies to latex.      Relevant Past Medical History and Comorbidities  Past Medical History:   Diagnosis Date    Acute coronary syndrome 03/15/2024    Anxiety     Arthritis     psoriatic    ASCVD (arteriosclerotic cardiovascular disease)     CHF (congestive heart failure)     Chronic low back pain     COPD (chronic obstructive pulmonary disease)     Coronary artery disease     Fibromyalgia     Full dentures     GERD (gastroesophageal reflux disease)     History of EKG 01/26/2016    NORMAL    Hyperlipidemia     Hypertension     Hypotension     Insomnia     MI, old 2015    MRSA infection 08/2022    NECK SURGERY    Psoriasis     Psoriatic arthritis     RA (rheumatoid arthritis)     Sciatic nerve pain     Skin pain     from nerve pain    Sleep apnea     cpap non compliant    Tinnitus     Wears glasses     readers     Social History     Socioeconomic History    Marital status:      Spouse name: Coretta Bhatt   Tobacco Use    Smoking status: Every Day     Current packs/day: 0.50     Average packs/day: 0.5 packs/day for 35.0 years (17.5 ttl pk-yrs)     Types: Cigarettes    Smokeless tobacco: Former     Types: Chew   Vaping Use    Vaping status: Never Used   Substance and Sexual Activity    Alcohol use: No    Drug use: Never    Sexual activity: Defer       Allergies  Patient has no known allergies.    Current Medication List    Current Outpatient Medications:     albuterol (PROVENTIL HFA;VENTOLIN HFA) 108 (90 BASE) MCG/ACT inhaler, Inhale 2 puffs Every 4  (Four) Hours As Needed for Wheezing., Disp: , Rfl:     ALPRAZolam (XANAX) 1 MG tablet, Take 1 tablet by mouth Every Other Day. Only takes 10 per month., Disp: , Rfl:     aspirin 81 MG tablet, Take 1 tablet by mouth Daily., Disp: 30 tablet, Rfl: 11    atorvastatin (LIPITOR) 80 MG tablet, Take 1 tablet by mouth Daily., Disp: , Rfl:     baclofen (LIORESAL) 10 MG tablet, Take 1 tablet by mouth 2 (Two) Times a Day., Disp: 60 tablet, Rfl: 0    betamethasone dipropionate (DIPROLENE) 0.05 % ointment, Apply 1 Application topically to the appropriate area as directed 2 (Two) Times a Day. (Patient not taking: Reported on 5/13/2024), Disp: , Rfl:     clopidogrel (PLAVIX) 75 MG tablet, Take 1 tablet by mouth Daily., Disp: 90 tablet, Rfl: 5    cyanocobalamin 1000 MCG/ML injection, Inject 1 mL into the appropriate muscle as directed by prescriber Every 28 (Twenty-Eight) Days., Disp: , Rfl:     dapagliflozin Propanediol 10 MG tablet, Take 10 mg by mouth Daily., Disp: 30 tablet, Rfl: 5    dexlansoprazole (DEXILANT) 60 MG capsule, Take 1 capsule by mouth Daily. Open capsule, sprinkle on 1 tablespoon applesauce, swallow immediately, follow with sips of water, do not crush/cut/chew granules OR mix with 20 mL of water, draw into oral syringe and take immediately, mix residue with 10 mL of water, swirl, take by mouth immediately, repeat with additional 10 mL of water., Disp: 90 capsule, Rfl: 3    Diclofenac Sodium (VOLTAREN) 1 % gel gel, Apply 4 g topically to the appropriate area as directed 3 (Three) Times a Day As Needed (JOINT PAIN)., Disp: , Rfl:     Enbrel 50 MG/ML injection, Inject 1 mL under the skin into the appropriate area as directed 2 (Two) Times a Week. TUESDAY AND SATURDAY, Disp: , Rfl:     EPINEPHrine (EPIPEN IJ), Inject 1 dose as directed As Needed (ALLERGIC REACTION). (Patient not taking: Reported on 5/13/2024), Disp: , Rfl:     Evolocumab (REPATHA) solution auto-injector SureClick injection, Inject 1 mL under the skin  into the appropriate area as directed Every 14 (Fourteen) Days., Disp: 2 mL, Rfl: 5    gabapentin (NEURONTIN) 600 MG tablet, Take 1 tablet by mouth 3 (Three) Times a Day., Disp: , Rfl:     hydrALAZINE (APRESOLINE) 25 MG tablet, Take 1 tablet by mouth 3 (Three) Times a Day., Disp: , Rfl:     HYDROcodone-acetaminophen (NORCO)  MG per tablet, Take 1 tablet by mouth 3 (Three) Times a Day As Needed for Moderate Pain ., Disp: 30 tablet, Rfl: 0    hydrOXYzine (ATARAX) 25 MG tablet, Take 1 tablet by mouth Every 8 (Eight) Hours As Needed for Itching., Disp: , Rfl:     isosorbide mononitrate (IMDUR) 30 MG 24 hr tablet, Take 1 tablet by mouth Daily., Disp: 90 tablet, Rfl: 3    metoprolol succinate XL (TOPROL-XL) 25 MG 24 hr tablet, Take 1 tablet by mouth Daily., Disp: 90 tablet, Rfl: 1    nicotine (NICODERM CQ) 14 MG/24HR patch, Place 1 patch on the skin as directed by provider Daily., Disp: 30 patch, Rfl: 1    nitroglycerin (NITROSTAT) 0.4 MG SL tablet, Place 1 tablet under the tongue Every 5 (Five) Minutes As Needed for Chest Pain. Take no more than 3 doses in 15 minutes. (Patient not taking: Reported on 5/13/2024), Disp: 30 tablet, Rfl: 0    pantoprazole (PROTONIX) 40 MG EC tablet, Take 1 tablet by mouth 2 (Two) Times a Day Before Meals., Disp: 60 tablet, Rfl: 5    spironolactone (ALDACTONE) 25 MG tablet, Take 1 tablet by mouth Daily., Disp: 30 tablet, Rfl: 11    SYMBICORT 80-4.5 MCG/ACT inhaler, Inhale 2 puffs 2 (Two) Times a Day As Needed., Disp: , Rfl:     vitamin D (ERGOCALCIFEROL) 1.25 MG (97221 UT) capsule capsule, Take 1 capsule by mouth 1 (One) Time Per Week., Disp: , Rfl:     Drug Interactions  none     Relevant Laboratory Values  Lab Results   Component Value Date    CHOL 137 11/01/2017    CHLPL 170 01/11/2016    TRIG 95 11/01/2017    HDL 40 (L) 11/01/2017    LDL 78 11/01/2017       Medication Assessment & Plan    Patient started today on Repatha [id number] once every 14 days. Injection training and  medication education provided.     Pt administered dose into the right side of his stomach    Patient will need lipid panel in 6 weeks, order placed.     Patient will continue regular follow-up with cardiology.     Will follow up with specialty pharmacy for next injection. Patient would like to receive mail order services. Please ship to [address] [place] [address]    Will follow-up in 6 months, or sooner if needed.     Sylvia Crews, PharmD  5/13/2024  14:22 EDT

## 2024-05-16 ENCOUNTER — TELEPHONE (OUTPATIENT)
Dept: CARDIOLOGY | Facility: CLINIC | Age: 59
End: 2024-05-16

## 2024-05-16 NOTE — TELEPHONE ENCOUNTER
Caller: Javier Santiago    Relationship to patient: Self    Best call back number: [phone]    Chief complaint: HAD TO CANCEL HIS APPT ON 05.23.24, BUT IS NEEDING TO RESCHEDULE. HE HAS A LIFE VEST ON RIGHT NOW.     Type of visit: 3 WEEK FU    Requested date: ASAP     If rescheduling, when is the original appointment: 05.23.24

## 2024-06-04 ENCOUNTER — SPECIALTY PHARMACY (OUTPATIENT)
Dept: PHARMACY | Facility: HOSPITAL | Age: 59
End: 2024-06-04
Payer: MEDICARE

## 2024-06-04 NOTE — PROGRESS NOTES
Specialty Pharmacy Refill Coordination Note     Javier is a 58 y.o. male contacted today regarding refills of  Repatha SureClick specialty medication(s).    Reviewed and verified with patient:       Specialty medication(s) and dose(s) confirmed: yes    Refill Questions      Flowsheet Row Most Recent Value   Changes to allergies? No   Changes to medications? No   New conditions or infections since last clinic visit No   Unplanned office visit, urgent care, ED, or hospital admission in the last 4 weeks  No   How does patient/caregiver feel medication is working? Very good   Financial problems or insurance changes  No   Since the previous refill, were any specialty medication doses or scheduled injections missed or delayed?  No   Does this patient require a clinical escalation to a pharmacist? No            Delivery Questions      Flowsheet Row Most Recent Value   Copay verified? Yes   Copay amount 0   Copay form of payment No copayment ($0)                   Follow-up: 28 day(s)     Neha Mccabe, Pharmacy Technician  Specialty Pharmacy Technician

## 2024-06-18 ENCOUNTER — LAB (OUTPATIENT)
Dept: LAB | Facility: HOSPITAL | Age: 59
End: 2024-06-18
Payer: MEDICARE

## 2024-06-18 DIAGNOSIS — I25.119 CORONARY ARTERY DISEASE INVOLVING NATIVE CORONARY ARTERY OF NATIVE HEART WITH ANGINA PECTORIS: Chronic | ICD-10-CM

## 2024-06-18 DIAGNOSIS — I50.22 CHRONIC HFREF (HEART FAILURE WITH REDUCED EJECTION FRACTION): Chronic | ICD-10-CM

## 2024-06-18 DIAGNOSIS — E78.01 FAMILIAL HYPERCHOLESTEROLEMIA: Chronic | ICD-10-CM

## 2024-06-18 LAB
ANION GAP SERPL CALCULATED.3IONS-SCNC: 12.5 MMOL/L (ref 5–15)
BUN SERPL-MCNC: 16 MG/DL (ref 6–20)
BUN/CREAT SERPL: 13.8 (ref 7–25)
CALCIUM SPEC-SCNC: 9.6 MG/DL (ref 8.6–10.5)
CHLORIDE SERPL-SCNC: 107 MMOL/L (ref 98–107)
CHOLEST SERPL-MCNC: 76 MG/DL (ref 0–200)
CO2 SERPL-SCNC: 18.5 MMOL/L (ref 22–29)
CREAT SERPL-MCNC: 1.16 MG/DL (ref 0.76–1.27)
EGFRCR SERPLBLD CKD-EPI 2021: 73 ML/MIN/1.73
GLUCOSE SERPL-MCNC: 102 MG/DL (ref 65–99)
HDLC SERPL-MCNC: 29 MG/DL (ref 40–60)
LDLC SERPL CALC-MCNC: 31 MG/DL (ref 0–100)
LDLC/HDLC SERPL: 1.13 {RATIO}
POTASSIUM SERPL-SCNC: 4.3 MMOL/L (ref 3.5–5.2)
SODIUM SERPL-SCNC: 138 MMOL/L (ref 136–145)
TRIGL SERPL-MCNC: 71 MG/DL (ref 0–150)
VLDLC SERPL-MCNC: 16 MG/DL (ref 5–40)

## 2024-06-18 PROCEDURE — 80061 LIPID PANEL: CPT

## 2024-06-18 PROCEDURE — 80048 BASIC METABOLIC PNL TOTAL CA: CPT

## 2024-06-18 PROCEDURE — 36415 COLL VENOUS BLD VENIPUNCTURE: CPT

## 2024-06-24 ENCOUNTER — OFFICE VISIT (OUTPATIENT)
Dept: CARDIOLOGY | Facility: CLINIC | Age: 59
End: 2024-06-24
Payer: MEDICARE

## 2024-06-24 VITALS
WEIGHT: 166 LBS | SYSTOLIC BLOOD PRESSURE: 121 MMHG | HEIGHT: 70 IN | DIASTOLIC BLOOD PRESSURE: 75 MMHG | OXYGEN SATURATION: 96 % | BODY MASS INDEX: 23.77 KG/M2 | HEART RATE: 62 BPM

## 2024-06-24 DIAGNOSIS — I50.22 CHRONIC HFREF (HEART FAILURE WITH REDUCED EJECTION FRACTION): Chronic | ICD-10-CM

## 2024-06-24 DIAGNOSIS — I25.5 ISCHEMIC CARDIOMYOPATHY: Chronic | ICD-10-CM

## 2024-06-24 DIAGNOSIS — F17.200 SMOKER: ICD-10-CM

## 2024-06-24 DIAGNOSIS — I25.119 CORONARY ARTERY DISEASE INVOLVING NATIVE CORONARY ARTERY OF NATIVE HEART WITH ANGINA PECTORIS: Primary | Chronic | ICD-10-CM

## 2024-06-24 DIAGNOSIS — E78.01 FAMILIAL HYPERCHOLESTEROLEMIA: Chronic | ICD-10-CM

## 2024-06-24 DIAGNOSIS — Z72.0 TOBACCO ABUSE: Chronic | ICD-10-CM

## 2024-06-24 DIAGNOSIS — I10 ESSENTIAL HYPERTENSION: Chronic | ICD-10-CM

## 2024-06-24 PROCEDURE — 3078F DIAST BP <80 MM HG: CPT | Performed by: NURSE PRACTITIONER

## 2024-06-24 PROCEDURE — 1159F MED LIST DOCD IN RCRD: CPT | Performed by: NURSE PRACTITIONER

## 2024-06-24 PROCEDURE — 1160F RVW MEDS BY RX/DR IN RCRD: CPT | Performed by: NURSE PRACTITIONER

## 2024-06-24 PROCEDURE — 3074F SYST BP LT 130 MM HG: CPT | Performed by: NURSE PRACTITIONER

## 2024-06-24 PROCEDURE — 99214 OFFICE O/P EST MOD 30 MIN: CPT | Performed by: NURSE PRACTITIONER

## 2024-06-24 RX ORDER — NICOTINE 21 MG/24HR
1 PATCH, TRANSDERMAL 24 HOURS TRANSDERMAL EVERY 24 HOURS
Qty: 30 PATCH | Refills: 2 | Status: SHIPPED | OUTPATIENT
Start: 2024-06-24

## 2024-06-24 RX ORDER — SACUBITRIL AND VALSARTAN 24; 26 MG/1; MG/1
0.5 TABLET, FILM COATED ORAL 2 TIMES DAILY
Qty: 30 TABLET | Refills: 1 | Status: SHIPPED | OUTPATIENT
Start: 2024-06-24

## 2024-06-24 RX ORDER — METOPROLOL SUCCINATE 25 MG/1
25 TABLET, EXTENDED RELEASE ORAL DAILY
Qty: 90 TABLET | Refills: 1 | Status: SHIPPED | OUTPATIENT
Start: 2024-06-24

## 2024-06-24 NOTE — PROGRESS NOTES
"Chief Complaint  Follow-up (Life vest has \" beeped\" a couple of times during the night )    Subjective          Javier Santiago presents to [facility] CARDIOLOGY for follow up.    History of Present Illness  Mr. Santiago presents for follow-up of coronary artery disease, hyperlipidemia, and chronic HFrEF.  His last visit to clinic was 05/03/2024.  At that visit, he was referred to the lipid clinic.  As his ejection fraction was found to be low in April 2024 he was put on SGLT2 inhibitor and MRA and a LifeVest was ordered.  He has been compliant with LifeVest therapy.  He had not been taking his metoprolol because he felt like his blood pressure was at goal.  I explained again that this is a part of GDMT for heart failure.  He agrees to resume.    His cholesterol was reevaluated June 18, 2024 and is now at goal thanks to PCSK9 inhibitor therapy.  His renal function is stable and will tolerate the addition of Entresto.    Blood pressure is at goal on his current medications.    He was planning to quit smoking and had asked for prescription for patches.  Unfortunately he increased the amount of cigarettes he was smoking to at least 1 pack/day.  He is willing to again try.  Prescription sent for higher milligram dose of patch  Tobacco Use: High Risk (6/24/2024)    Patient History     Smoking Tobacco Use: Every Day     Smokeless Tobacco Use: Former     Passive Exposure: Not on file       Objective     Vital Signs:   /75 (BP Location: Left arm, Patient Position: Sitting, Cuff Size: Adult)   Pulse 62   Ht 177.8 cm (70\")   Wt 75.3 kg (166 lb)   SpO2 96%   BMI 23.82 kg/m²       Physical Exam  Vitals and nursing note reviewed. Exam conducted with a chaperone present (Wife accompanies).   Constitutional:       General: He is not in acute distress.     Comments: Smells of cigarette smoke   HENT:      Head: Normocephalic and atraumatic.   Eyes:      Conjunctiva/sclera: Conjunctivae normal.   Neck:      " Vascular: No carotid bruit.   Cardiovascular:      Rate and Rhythm: Normal rate and regular rhythm.      Pulses: Normal pulses.   Pulmonary:      Effort: Pulmonary effort is normal.      Breath sounds: Wheezing present.   Musculoskeletal:      Cervical back: Neck supple.      Right lower leg: No edema.      Left lower leg: No edema.   Skin:     General: Skin is warm and dry.   Neurological:      General: No focal deficit present.   Psychiatric:         Mood and Affect: Mood normal.         Behavior: Behavior normal.          Result Review :   The following data was reviewed by: OBDULIO Juan on 06/24/2024:  Common labs          3/15/2024    08:21 6/18/2024    08:36   Common Labs   Glucose 163  102    BUN 10  16    Creatinine 1.08  1.16    Sodium 142  138    Potassium 4.1  4.3    Chloride 106  107    Calcium 9.3  9.6    WBC 7.65     Hemoglobin 15.4     Hematocrit 46.4     Platelets 274     Total Cholesterol  76    Triglycerides  71    HDL Cholesterol  29    LDL Cholesterol   31      Lipid Panel          6/18/2024    08:36   Lipid Panel   Total Cholesterol 76    Triglycerides 71    HDL Cholesterol 29    VLDL Cholesterol 16    LDL Cholesterol  31    LDL/HDL Ratio 1.13      Data reviewed : Cardiology studies as detailed below      Last Cardiac Cath  Results for orders placed during the hospital encounter of 03/15/24    Intravascular Ultrasound    Conclusion  Images from the original result were not included.  CARDIAC CATHETERIZATION / INTERVENTION REPORT    DATE OF PROCEDURE: 3/15/2024    INDICATION FOR PROCEDURE: chest pain      PRE PROCEDURE DIAGNOSIS:  Unstable angina with history of coronary disease    POST PROCEDURE DIAGNOSIS:  Severe coronary disease with critical stenosis in the proximal LAD and severe lesion in the obtuse marginal    Face to face moderate conscious sedation time:      COMPLICATIONS : None    Access Site :  6 Mohawk right radial    PROCEDURE PERFORMED:    1. Coronary Angiogram  2. Left  heart catheterization  3.IVUS to the LAD and Lcx  4.PCI with Shockwave to the LAD  5.PCI to OM1      PROCEDURE COMMENTS:    Prior to the procedure risks benefits alternatives and possible adverse events were discussed with the patient and informed consent was obtained.  Moderate conscious sedation was utilized for 60 minutes refrigeration procedure supervised administration fentanyl and Versed that was given independently by Tania cabral registered nurse.  Javier Santiago was brought to the cath lab and placed on the cardiac catherization table in the postabsortive state. The patient was prepped and draped according to the cath lab protocol under strict aseptic and sterile condition.  We obtained access with a slender sheath 6 Mohawk in the right radial artery after giving lidocaine in the right radial artery.  We went with a Negrito catheter over J-wire to engage the left main and the RCA and also cross the LV cavity to obtain a pressure pullback pressure as well.  With subsequent moved onto intervention.  We gave additional heparin and used an Akari 4 guide to engage the left main.  We took a Prowater wire down the LAD and subsequently performed IVUS.  Lesion appears to be partially in-stent and partially Denovo we used a 3 mm noncompliant balloon however there was a waist and then we used a 3 x 12 mm shockwave lithotripsy balloon.  However due to prep errors we were not able to deliver complete shocks and went to deliver 35 shocks.  This is despite changing out for the balloon.  We subsequently then inserted a 3 x 12 mm Xience stent inflating it to 23 hector.  We then redirected our wire into the obtuse marginal performing IVUS.  The luminal area of this lesion is 2.5 mm².  We subsequently directly stented with a 2.5 x 15 mm Xience stent inflating it to 23 hector.  We then used a 2.5 noncompliant balloon inflating it to 23 hector.  Angiographically there is no residual stenosis and DALILA-3 flow in all vessels.  We did give 325  aspirin and 600 mg of Plavix        Coronary anatomy findings:  Left main appears normal.  The obtuse marginal in the midportion has a 75 to 80% stenosis.  The proximal LAD has a 90% stenosis.  Mid LAD has a 30% stenosis.  The stents in the proximal LAD appear to be underexpanded on IVUS        LVEDP: 17 mmHg  No gradient across the aortic valve on pull back.      PERCUTANEOUS CORONARY INTERVENTION PROCEDURE NOTE:        Lesion length: 5 mm  Pre PCI Stenosis: 90  %  Post PCI stenosis: 0 %  Pre PCI DALILA flow: 3 in the LAD and circumflex  Post PCI DALILA flow: 3 in the LAD and circumflex    EBL: Less than 20cc        Final Impression:  In-stent restenosis in the proximal LAD status post shockwave lithotripsy and IVUS guided PCI.  Successful PCI of the mid OM1 with IVUS guidance and no residual stenosis  Mild to moderate disease in the mid LAD continue risk factor modification          Recommendations:  Aggressive guideline directed medical management for CAD  Dual Antiplatelet therapy    Charles Montaño MD  03/15/24  10:39 EDT      Last Stress test  Results for orders placed during the hospital encounter of 01/20/21    Stress Test With Myocardial Perfusion One Day    Interpretation Summary  · Myocardial perfusion imaging indicates a small-sized infarct located in the apex with mild ischemia in the distal anteroapical territory.  · Left ventricular ejection fraction is normal. (Calculated EF = 60%).  · Findings consistent with a normal ECG stress test.  · Impressions are consistent with an intermediate risk study.       Last Echo  Results for orders placed during the hospital encounter of 04/26/24    Adult Transthoracic Echo Complete w/ Color, Spectral and Contrast if necessary per protocol    Interpretation Summary    Left ventricular systolic function is moderately decreased. Calculated left ventricular EF = 32% Left ventricular ejection fraction appears to be 31 - 35%.    Left ventricular wall thickness is consistent  with mild concentric hypertrophy.    Left ventricular diastolic function was normal.             Current Outpatient Medications   Medication Sig Dispense Refill    albuterol (PROVENTIL HFA;VENTOLIN HFA) 108 (90 BASE) MCG/ACT inhaler Inhale 2 puffs Every 4 (Four) Hours As Needed for Wheezing.      ALPRAZolam (XANAX) 1 MG tablet Take 1 tablet by mouth Every Other Day. Only takes 10 per month.      aspirin 81 MG tablet Take 1 tablet by mouth Daily. 30 tablet 11    atorvastatin (LIPITOR) 80 MG tablet Take 1 tablet by mouth Daily.      baclofen (LIORESAL) 10 MG tablet Take 1 tablet by mouth 2 (Two) Times a Day. 60 tablet 0    clopidogrel (PLAVIX) 75 MG tablet Take 1 tablet by mouth Daily. 90 tablet 5    cyanocobalamin 1000 MCG/ML injection Inject 1 mL into the appropriate muscle as directed by prescriber Every 28 (Twenty-Eight) Days.      dapagliflozin Propanediol 10 MG tablet Take 10 mg by mouth Daily. 30 tablet 5    dexlansoprazole (DEXILANT) 60 MG capsule Take 1 capsule by mouth Daily. Open capsule, sprinkle on 1 tablespoon applesauce, swallow immediately, follow with sips of water, do not crush/cut/chew granules OR mix with 20 mL of water, draw into oral syringe and take immediately, mix residue with 10 mL of water, swirl, take by mouth immediately, repeat with additional 10 mL of water. 90 capsule 3    Enbrel 50 MG/ML injection Inject 1 mL under the skin into the appropriate area as directed 2 (Two) Times a Week. TUESDAY AND SATURDAY      EPINEPHrine (EPIPEN IJ) Inject 1 dose as directed As Needed (ALLERGIC REACTION).      Evolocumab (REPATHA) solution auto-injector SureClick injection Inject 1 mL under the skin into the appropriate area as directed Every 14 (Fourteen) Days. 2 mL 5    gabapentin (NEURONTIN) 600 MG tablet Take 1 tablet by mouth 3 (Three) Times a Day.      hydrALAZINE (APRESOLINE) 25 MG tablet Take 1 tablet by mouth 3 (Three) Times a Day.      HYDROcodone-acetaminophen (NORCO)  MG per tablet  Take 1 tablet by mouth 3 (Three) Times a Day As Needed for Moderate Pain . 30 tablet 0    hydrOXYzine (ATARAX) 25 MG tablet Take 1 tablet by mouth Every 8 (Eight) Hours As Needed for Itching.      isosorbide mononitrate (IMDUR) 30 MG 24 hr tablet Take 1 tablet by mouth Daily. 90 tablet 3    metoprolol succinate XL (TOPROL-XL) 25 MG 24 hr tablet Take 1 tablet by mouth Daily. 90 tablet 1    nitroglycerin (NITROSTAT) 0.4 MG SL tablet Place 1 tablet under the tongue Every 5 (Five) Minutes As Needed for Chest Pain. Take no more than 3 doses in 15 minutes. 30 tablet 0    spironolactone (ALDACTONE) 25 MG tablet Take 1 tablet by mouth Daily. 30 tablet 11    SYMBICORT 80-4.5 MCG/ACT inhaler Inhale 2 puffs 2 (Two) Times a Day As Needed.      vitamin D (ERGOCALCIFEROL) 1.25 MG (37680 UT) capsule capsule Take 1 capsule by mouth 1 (One) Time Per Week.      nicotine (NICODERM CQ) 21 MG/24HR patch Place 1 patch on the skin as directed by provider Daily. 30 patch 2    sacubitril-valsartan (Entresto) 24-26 MG tablet Take 0.5 tablets by mouth 2 (Two) Times a Day. 30 tablet 1     No current facility-administered medications for this visit.            Assessment and Plan    Problem List Items Addressed This Visit       Essential hypertension (Chronic)    Relevant Medications    metoprolol succinate XL (TOPROL-XL) 25 MG 24 hr tablet    Familial hypercholesterolemia (Chronic)    Overview     02/02/2024-total cholesterol 205, triglycerides 142, HDL 33,   6/18/2024-total cholesterol 76, triglycerides 71, HDL 29, LDL 31         Coronary artery disease involving native coronary artery of native heart with angina pectoris - Primary (Chronic)    Overview     03/15/2310-MRR-GXJ with shockwave to the LAD after in-stent restenosis, PCI to OM1-mild to moderate disease in the mid LAD         Relevant Medications    metoprolol succinate XL (TOPROL-XL) 25 MG 24 hr tablet    sacubitril-valsartan (Entresto) 24-26 MG tablet    Tobacco abuse  (Chronic)    Chronic HFrEF (heart failure with reduced ejection fraction) (Chronic)    Overview     04/26/2024-LVEF 31 to 35%, mild LVH, normal diastolic function         Relevant Medications    metoprolol succinate XL (TOPROL-XL) 25 MG 24 hr tablet    sacubitril-valsartan (Entresto) 24-26 MG tablet    Other Relevant Orders    Basic Metabolic Panel    Adult Transthoracic Echo Complete w/ Color, Spectral and Contrast if necessary per protocol    Ischemic cardiomyopathy (Chronic)    Relevant Medications    metoprolol succinate XL (TOPROL-XL) 25 MG 24 hr tablet    sacubitril-valsartan (Entresto) 24-26 MG tablet     Other Visit Diagnoses       Smoker        Relevant Medications    nicotine (NICODERM CQ) 21 MG/24HR patch          Diagnoses and all orders for this visit:    1. Coronary artery disease involving native coronary artery of native heart with angina pectoris (Primary)  -     metoprolol succinate XL (TOPROL-XL) 25 MG 24 hr tablet; Take 1 tablet by mouth Daily.  Dispense: 90 tablet; Refill: 1    2. Smoker  -     nicotine (NICODERM CQ) 21 MG/24HR patch; Place 1 patch on the skin as directed by provider Daily.  Dispense: 30 patch; Refill: 2    3. Chronic HFrEF (heart failure with reduced ejection fraction)  -     metoprolol succinate XL (TOPROL-XL) 25 MG 24 hr tablet; Take 1 tablet by mouth Daily.  Dispense: 90 tablet; Refill: 1  -     sacubitril-valsartan (Entresto) 24-26 MG tablet; Take 0.5 tablets by mouth 2 (Two) Times a Day.  Dispense: 30 tablet; Refill: 1  -     Basic Metabolic Panel; Future  -     Adult Transthoracic Echo Complete w/ Color, Spectral and Contrast if necessary per protocol; Future    4. Ischemic cardiomyopathy    5. Essential hypertension    6. Familial hypercholesterolemia    7. Tobacco abuse        Plan to repeat echocardiogram in August with follow-up appointment in early September.  If LDL remains well below goal of less than 55-will consider decreasing atorvastatin to 40 mg and continuing  with Repatha therapy    Follow Up     No follow-ups on file.    Patient was given instructions and counseling regarding his condition or for health maintenance advice. Please see specific information pulled into the AVS if appropriate.       Electronically signed by OBDULIO Juan, 06/24/24, 4:13 PM EDT.      Dictated Utilizing Dragon Dictation: Part of this note may be an electronic transcription/translation of spoken language to printed text using the Dragon Dictation System

## 2024-06-26 ENCOUNTER — SPECIALTY PHARMACY (OUTPATIENT)
Dept: PHARMACY | Facility: HOSPITAL | Age: 59
End: 2024-06-26
Payer: MEDICARE

## 2024-06-26 NOTE — PROGRESS NOTES
Specialty Pharmacy Refill Coordination Note     Javier is a 58 y.o. male contacted today regarding refills of  Repatha [employer]ick specialty medication(s).    Reviewed and verified with patient:       Specialty medication(s) and dose(s) confirmed: yes    Refill Questions      Flowsheet Row Most Recent Value   Changes to allergies? No   Changes to medications? No   New conditions or infections since last clinic visit No   Unplanned office visit, urgent care, ED, or hospital admission in the last 4 weeks  No   How does patient/caregiver feel medication is working? Very good   Financial problems or insurance changes  No   Since the previous refill, were any specialty medication doses or scheduled injections missed or delayed?  No   Does this patient require a clinical escalation to a pharmacist? No            Delivery Questions      Flowsheet Row Most Recent Value   Copay verified? Yes   Copay amount 0   Copay form of payment No copayment ($0)                   Follow-up: 84 day(s)     Neha Mccabe, Pharmacy Technician  Specialty Pharmacy Technician

## 2024-07-01 ENCOUNTER — LAB (OUTPATIENT)
Dept: LAB | Facility: HOSPITAL | Age: 59
End: 2024-07-01
Payer: MEDICARE

## 2024-07-01 DIAGNOSIS — I50.22 CHRONIC HFREF (HEART FAILURE WITH REDUCED EJECTION FRACTION): Chronic | ICD-10-CM

## 2024-07-01 LAB
ANION GAP SERPL CALCULATED.3IONS-SCNC: 10 MMOL/L (ref 5–15)
BUN SERPL-MCNC: 12 MG/DL (ref 6–20)
BUN/CREAT SERPL: 11.4 (ref 7–25)
CALCIUM SPEC-SCNC: 9.8 MG/DL (ref 8.6–10.5)
CHLORIDE SERPL-SCNC: 108 MMOL/L (ref 98–107)
CO2 SERPL-SCNC: 21 MMOL/L (ref 22–29)
CREAT SERPL-MCNC: 1.05 MG/DL (ref 0.76–1.27)
EGFRCR SERPLBLD CKD-EPI 2021: 82.3 ML/MIN/1.73
GLUCOSE SERPL-MCNC: 92 MG/DL (ref 65–99)
POTASSIUM SERPL-SCNC: 4.3 MMOL/L (ref 3.5–5.2)
SODIUM SERPL-SCNC: 139 MMOL/L (ref 136–145)

## 2024-07-01 PROCEDURE — 80048 BASIC METABOLIC PNL TOTAL CA: CPT

## 2024-07-01 PROCEDURE — 36415 COLL VENOUS BLD VENIPUNCTURE: CPT

## 2024-07-12 ENCOUNTER — OFFICE VISIT (OUTPATIENT)
Dept: CARDIOLOGY | Facility: CLINIC | Age: 59
End: 2024-07-12
Payer: MEDICARE

## 2024-07-12 VITALS
HEART RATE: 58 BPM | HEIGHT: 70 IN | SYSTOLIC BLOOD PRESSURE: 106 MMHG | WEIGHT: 169 LBS | DIASTOLIC BLOOD PRESSURE: 71 MMHG | OXYGEN SATURATION: 96 % | BODY MASS INDEX: 24.2 KG/M2

## 2024-07-12 DIAGNOSIS — K21.9 GASTROESOPHAGEAL REFLUX DISEASE WITHOUT ESOPHAGITIS: Primary | ICD-10-CM

## 2024-07-12 DIAGNOSIS — I50.22 CHRONIC HFREF (HEART FAILURE WITH REDUCED EJECTION FRACTION): Chronic | ICD-10-CM

## 2024-07-12 DIAGNOSIS — I25.119 CORONARY ARTERY DISEASE INVOLVING NATIVE CORONARY ARTERY OF NATIVE HEART WITH ANGINA PECTORIS: Chronic | ICD-10-CM

## 2024-07-12 PROCEDURE — 99214 OFFICE O/P EST MOD 30 MIN: CPT | Performed by: NURSE PRACTITIONER

## 2024-07-12 PROCEDURE — 1159F MED LIST DOCD IN RCRD: CPT | Performed by: NURSE PRACTITIONER

## 2024-07-12 PROCEDURE — 93000 ELECTROCARDIOGRAM COMPLETE: CPT | Performed by: NURSE PRACTITIONER

## 2024-07-12 PROCEDURE — 3078F DIAST BP <80 MM HG: CPT | Performed by: NURSE PRACTITIONER

## 2024-07-12 PROCEDURE — 1160F RVW MEDS BY RX/DR IN RCRD: CPT | Performed by: NURSE PRACTITIONER

## 2024-07-12 PROCEDURE — 3074F SYST BP LT 130 MM HG: CPT | Performed by: NURSE PRACTITIONER

## 2024-07-12 RX ORDER — FAMOTIDINE 40 MG/1
40 TABLET, FILM COATED ORAL 2 TIMES DAILY
Qty: 90 TABLET | Refills: 1 | Status: SHIPPED | OUTPATIENT
Start: 2024-07-12

## 2024-07-12 RX ORDER — RANOLAZINE 500 MG/1
500 TABLET, EXTENDED RELEASE ORAL 2 TIMES DAILY
Qty: 60 TABLET | Refills: 1 | Status: SHIPPED | OUTPATIENT
Start: 2024-07-12

## 2024-07-12 NOTE — PROGRESS NOTES
"Chief Complaint  Follow-up (Soa, chest pain )    Subjective          Javier Santiago presents to [facility] for follow up.    History of Present Illness  Mr. Santiago presents for continued follow-up of coronary artery disease, hyperlipidemia, and chronic HFrEF.  His last visit to clinic was 06/24/2024 with me.  At that time an echocardiogram was ordered to reevaluate the necessity of LifeVest.  He reports that that test has not been scheduled.  He discontinued wearing his LifeVest because the 3 months were up.  I encouraged him to continue wearing it until we can confirm whether his EF has recovered.    He reports that he continues to have chest discomfort.  He is unable to discern whether this is cardiac related pain or GERD.    Fortunately, he has not made much headway in smoking cessation.  I continue to encourage him to completely abstain from cigarettes.  Tobacco Use: Medium Risk (7/27/2024)    Patient History     Smoking Tobacco Use: Former     Smokeless Tobacco Use: Former     Passive Exposure: Not on file       Objective     Vital Signs:   /71 (BP Location: Left arm, Patient Position: Sitting, Cuff Size: Adult)   Pulse 58   Ht 177.8 cm (70\")   Wt 76.7 kg (169 lb)   SpO2 96%   BMI 24.25 kg/m²       Physical Exam  Vitals and nursing note reviewed.   Constitutional:       General: He is not in acute distress.     Comments: Smells of cigarette smoke   HENT:      Head: Normocephalic and atraumatic.   Eyes:      Conjunctiva/sclera: Conjunctivae normal.   Neck:      Vascular: No carotid bruit.   Cardiovascular:      Rate and Rhythm: Normal rate and regular rhythm.      Pulses: Normal pulses.   Pulmonary:      Effort: Pulmonary effort is normal.      Breath sounds: Wheezing present.   Musculoskeletal:      Cervical back: Neck supple.      Right lower leg: No edema.      Left lower leg: No edema.   Skin:     General: Skin is warm and dry.   Neurological:      General: No focal deficit " present.   Psychiatric:         Mood and Affect: Mood normal.         Behavior: Behavior normal.          Result Review :   The following data was reviewed by: OBDULIO Juan on 07/12/2024:  Common labs          3/15/2024    08:21 6/18/2024    08:36 7/1/2024    10:00   Common Labs   Glucose 163  102  92    BUN 10  16  12    Creatinine 1.08  1.16  1.05    Sodium 142  138  139    Potassium 4.1  4.3  4.3    Chloride 106  107  108    Calcium 9.3  9.6  9.8    WBC 7.65      Hemoglobin 15.4      Hematocrit 46.4      Platelets 274      Total Cholesterol  76     Triglycerides  71     HDL Cholesterol  29     LDL Cholesterol   31       Lipid Panel          6/18/2024    08:36   Lipid Panel   Total Cholesterol 76    Triglycerides 71    HDL Cholesterol 29    VLDL Cholesterol 16    LDL Cholesterol  31    LDL/HDL Ratio 1.13      Data reviewed : Cardiology studies as detailed below      Last Cardiac Cath  Results for orders placed during the hospital encounter of 03/15/24    Intravascular Ultrasound    Conclusion  Images from the original result were not included.  CARDIAC CATHETERIZATION / INTERVENTION REPORT    DATE OF PROCEDURE: 3/15/2024    INDICATION FOR PROCEDURE: chest pain      PRE PROCEDURE DIAGNOSIS:  Unstable angina with history of coronary disease    POST PROCEDURE DIAGNOSIS:  Severe coronary disease with critical stenosis in the proximal LAD and severe lesion in the obtuse marginal    Face to face moderate conscious sedation time:      COMPLICATIONS : None    Access Site :  6 Syriac right radial    PROCEDURE PERFORMED:    1. Coronary Angiogram  2. Left heart catheterization  3.IVUS to the LAD and Lcx  4.PCI with Shockwave to the LAD  5.PCI to OM1      PROCEDURE COMMENTS:    Prior to the procedure risks benefits alternatives and possible adverse events were discussed with the patient and informed consent was obtained.  Moderate conscious sedation was utilized for 60 minutes refrigeration procedure supervised  administration fentanyl and Versed that was given independently by Tania cabral registered nurse.  Javier Santiago was brought to the cath lab and placed on the cardiac catherization table in the postabsortive state. The patient was prepped and draped according to the cath lab protocol under strict aseptic and sterile condition.  We obtained access with a slender sheath 6 Indonesian in the right radial artery after giving lidocaine in the right radial artery.  We went with a Negrito catheter over J-wire to engage the left main and the RCA and also cross the LV cavity to obtain a pressure pullback pressure as well.  With subsequent moved onto intervention.  We gave additional heparin and used an Akari 4 guide to engage the left main.  We took a Prowater wire down the LAD and subsequently performed IVUS.  Lesion appears to be partially in-stent and partially Denovo we used a 3 mm noncompliant balloon however there was a waist and then we used a 3 x 12 mm shockwave lithotripsy balloon.  However due to prep errors we were not able to deliver complete shocks and went to deliver 35 shocks.  This is despite changing out for the balloon.  We subsequently then inserted a 3 x 12 mm Xience stent inflating it to 23 hector.  We then redirected our wire into the obtuse marginal performing IVUS.  The luminal area of this lesion is 2.5 mm².  We subsequently directly stented with a 2.5 x 15 mm Xience stent inflating it to 23 hector.  We then used a 2.5 noncompliant balloon inflating it to 23 hector.  Angiographically there is no residual stenosis and DALILA-3 flow in all vessels.  We did give 325 aspirin and 600 mg of Plavix        Coronary anatomy findings:  Left main appears normal.  The obtuse marginal in the midportion has a 75 to 80% stenosis.  The proximal LAD has a 90% stenosis.  Mid LAD has a 30% stenosis.  The stents in the proximal LAD appear to be underexpanded on IVUS        LVEDP: 17 mmHg  No gradient across the aortic valve on pull  back.      PERCUTANEOUS CORONARY INTERVENTION PROCEDURE NOTE:        Lesion length: 5 mm  Pre PCI Stenosis: 90  %  Post PCI stenosis: 0 %  Pre PCI DALILA flow: 3 in the LAD and circumflex  Post PCI DALILA flow: 3 in the LAD and circumflex    EBL: Less than 20cc        Final Impression:  In-stent restenosis in the proximal LAD status post shockwave lithotripsy and IVUS guided PCI.  Successful PCI of the mid OM1 with IVUS guidance and no residual stenosis  Mild to moderate disease in the mid LAD continue risk factor modification          Recommendations:  Aggressive guideline directed medical management for CAD  Dual Antiplatelet therapy    Charles Montaño MD  03/15/24  10:39 EDT      Last Stress test  Results for orders placed during the hospital encounter of 01/20/21    Stress Test With Myocardial Perfusion One Day    Interpretation Summary  · Myocardial perfusion imaging indicates a small-sized infarct located in the apex with mild ischemia in the distal anteroapical territory.  · Left ventricular ejection fraction is normal. (Calculated EF = 60%).  · Findings consistent with a normal ECG stress test.  · Impressions are consistent with an intermediate risk study.       Last Echo  Results for orders placed during the hospital encounter of 04/26/24    Adult Transthoracic Echo Complete w/ Color, Spectral and Contrast if necessary per protocol    Interpretation Summary    Left ventricular systolic function is moderately decreased. Calculated left ventricular EF = 32% Left ventricular ejection fraction appears to be 31 - 35%.    Left ventricular wall thickness is consistent with mild concentric hypertrophy.    Left ventricular diastolic function was normal.         ECG 12 Lead    Date/Time: 7/12/2024 2:29 PM  Performed by: Macey Jett APRN    Authorized by: Macey Jett APRN  Comparison: compared with previous ECG from 4/16/2024  Similar to previous ECG  Comparison to previous ECG: Sinus rhythm  Rhythm: sinus  rhythm  Rate: normal  BPM: 60  ST Segments: ST segments normal  T Waves: T waves normal  QRS axis: normal    Clinical impression: normal ECG           Current Outpatient Medications   Medication Sig Dispense Refill    albuterol (PROVENTIL HFA;VENTOLIN HFA) 108 (90 BASE) MCG/ACT inhaler Inhale 2 puffs Every 4 (Four) Hours As Needed for Wheezing.      ALPRAZolam (XANAX) 1 MG tablet Take 1 tablet by mouth Every Other Day. Only takes 10 per month.      aspirin 81 MG tablet Take 1 tablet by mouth Daily. 30 tablet 11    atorvastatin (LIPITOR) 80 MG tablet Take 1 tablet by mouth Daily.      baclofen (LIORESAL) 10 MG tablet Take 1 tablet by mouth 2 (Two) Times a Day. (Patient taking differently: Take 1 tablet by mouth 3 (Three) Times a Day.) 60 tablet 0    clopidogrel (PLAVIX) 75 MG tablet Take 1 tablet by mouth Daily. 90 tablet 5    cyanocobalamin 1000 MCG/ML injection Inject 1 mL into the appropriate muscle as directed by prescriber Every 28 (Twenty-Eight) Days.      dapagliflozin Propanediol 10 MG tablet Take 10 mg by mouth Daily. 30 tablet 5    Enbrel 50 MG/ML injection Inject 1 mL under the skin into the appropriate area as directed 2 (Two) Times a Week. TUESDAY AND SATURDAY      EPINEPHrine (EPIPEN IJ) Inject 1 dose as directed As Needed (ALLERGIC REACTION).      Evolocumab (REPATHA) solution auto-injector SureClick injection Inject 1 mL under the skin into the appropriate area as directed Every 14 (Fourteen) Days. 2 mL 5    gabapentin (NEURONTIN) 600 MG tablet Take 1 tablet by mouth 3 (Three) Times a Day.      HYDROcodone-acetaminophen (NORCO)  MG per tablet Take 1 tablet by mouth 3 (Three) Times a Day As Needed for Moderate Pain . 30 tablet 0    hydrOXYzine (ATARAX) 25 MG tablet Take 1 tablet by mouth Every 8 (Eight) Hours As Needed for Itching. Rarely      isosorbide mononitrate (IMDUR) 30 MG 24 hr tablet Take 1 tablet by mouth Daily. 90 tablet 3    metoprolol succinate XL (TOPROL-XL) 25 MG 24 hr tablet Take  1 tablet by mouth Daily. 90 tablet 1    nicotine (NICODERM CQ) 21 MG/24HR patch Place 1 patch on the skin as directed by provider Daily. 30 patch 2    sacubitril-valsartan (Entresto) 24-26 MG tablet Take 0.5 tablets by mouth 2 (Two) Times a Day. 30 tablet 1    spironolactone (ALDACTONE) 25 MG tablet Take 1 tablet by mouth Daily. 30 tablet 11    SYMBICORT 80-4.5 MCG/ACT inhaler Inhale 2 puffs 2 (Two) Times a Day As Needed.      vitamin D (ERGOCALCIFEROL) 1.25 MG (07674 UT) capsule capsule Take 1 capsule by mouth 1 (One) Time Per Week.      famotidine (Pepcid) 40 MG tablet Take 1 tablet by mouth 2 (Two) Times a Day. 90 tablet 1    nitroglycerin (NITROSTAT) 0.4 MG SL tablet Place 1 tablet under the tongue Every 5 (Five) Minutes As Needed for Chest Pain. Take no more than 3 doses in 15 minutes. 30 tablet 0    ranolazine (Ranexa) 500 MG 12 hr tablet Take 1 tablet by mouth 2 (Two) Times a Day. 60 tablet 1     No current facility-administered medications for this visit.            Assessment and Plan    Problem List Items Addressed This Visit       Coronary artery disease involving native coronary artery of native heart with angina pectoris (Chronic)    Overview     03/15/1661-ARI-[id number] with shockwave to the LAD after in-stent restenosis, PCI to OM1-mild to moderate disease in the mid LAD         Relevant Medications    ranolazine (Ranexa) 500 MG 12 hr tablet    Other Relevant Orders    ECG 12 Lead    Gastroesophageal reflux disease - Primary    Overview     Added automatically from request for surgery [id number]         Relevant Medications    famotidine (Pepcid) 40 MG tablet    Chronic HFrEF (heart failure with reduced ejection fraction) (Chronic)    Overview     04/26/2024-LVEF 31 to 35%, mild LVH, normal diastolic function         Relevant Medications    ranolazine (Ranexa) 500 MG 12 hr tablet     Diagnoses and all orders for this visit:    1. Gastroesophageal reflux disease without esophagitis (Primary)  -     famotidine  (Pepcid) 40 MG tablet; Take 1 tablet by mouth 2 (Two) Times a Day.  Dispense: 90 tablet; Refill: 1    2. Coronary artery disease involving native coronary artery of native heart with angina pectoris  -     ranolazine (Ranexa) 500 MG 12 hr tablet; Take 1 tablet by mouth 2 (Two) Times a Day.  Dispense: 60 tablet; Refill: 1  -     ECG 12 Lead    3. Chronic HFrEF (heart failure with reduced ejection fraction)      Echo cardiogram has been scheduled for 08/27/2024.  Plan to follow-up following that.      Follow Up     No follow-ups on file.    Patient was given instructions and counseling regarding his condition or for health maintenance advice. Please see specific information pulled into the AVS if appropriate.       Electronically signed by OBDULIO Juan, 07/27/24, 2:31 PM EDT.      Dictated Utilizing Dragon Dictation: Part of this note may be an electronic transcription/translation of spoken language to printed text using the Dragon Dictation System

## 2024-07-16 ENCOUNTER — TRANSCRIBE ORDERS (OUTPATIENT)
Dept: ADMINISTRATIVE | Facility: HOSPITAL | Age: 59
End: 2024-07-16
Payer: MEDICARE

## 2024-07-16 DIAGNOSIS — M79.605 PAIN IN BOTH LOWER EXTREMITIES: Primary | ICD-10-CM

## 2024-07-16 DIAGNOSIS — I73.9 PERIPHERAL VASCULAR DISEASE, UNSPECIFIED: ICD-10-CM

## 2024-07-16 DIAGNOSIS — I25.10 CVD (CARDIOVASCULAR DISEASE): ICD-10-CM

## 2024-07-16 DIAGNOSIS — M79.604 PAIN IN BOTH LOWER EXTREMITIES: Primary | ICD-10-CM

## 2024-07-23 DIAGNOSIS — I25.119 CORONARY ARTERY DISEASE INVOLVING NATIVE CORONARY ARTERY OF NATIVE HEART WITH ANGINA PECTORIS: ICD-10-CM

## 2024-07-23 RX ORDER — NITROGLYCERIN 0.4 MG/1
0.4 TABLET SUBLINGUAL
Qty: 30 TABLET | Refills: 0 | Status: SHIPPED | OUTPATIENT
Start: 2024-07-23

## 2024-07-23 NOTE — TELEPHONE ENCOUNTER
Caller: Javier Santiago    Relationship: Self    Best call back number: [phone]    What is the best time to reach you: ANYTIME    What was the call regarding: PATIENT HAS NOT BEEN ABLE TO GET RANEXA BECAUSE THE PHARMACY TOLD HIM HE NEEDS A PA FOR IT. PATIENT ALSO STATES HE IS COMPLETELY OUT OF HIS NITRO AND WOULD LIKE THAT SENT TO PHARMACY AS WELL. PATIENT USES [facility].

## 2024-07-24 ENCOUNTER — HOSPITAL ENCOUNTER (OUTPATIENT)
Dept: ULTRASOUND IMAGING | Facility: HOSPITAL | Age: 59
Discharge: HOME | End: 2024-07-24
Admitting: NURSE PRACTITIONER
Payer: MEDICARE

## 2024-07-24 DIAGNOSIS — M79.605 PAIN IN BOTH LOWER EXTREMITIES: ICD-10-CM

## 2024-07-24 DIAGNOSIS — M79.604 PAIN IN BOTH LOWER EXTREMITIES: ICD-10-CM

## 2024-07-24 DIAGNOSIS — I25.10 CVD (CARDIOVASCULAR DISEASE): ICD-10-CM

## 2024-07-24 DIAGNOSIS — I73.9 PERIPHERAL VASCULAR DISEASE, UNSPECIFIED: ICD-10-CM

## 2024-07-24 PROCEDURE — 93925 LOWER EXTREMITY STUDY: CPT

## 2024-07-24 PROCEDURE — 93925 LOWER EXTREMITY STUDY: CPT | Performed by: RADIOLOGY

## 2024-08-27 ENCOUNTER — HOSPITAL ENCOUNTER (OUTPATIENT)
Facility: HOSPITAL | Age: 59
Discharge: HOME | End: 2024-08-27
Admitting: NURSE PRACTITIONER
Payer: MEDICARE

## 2024-08-27 DIAGNOSIS — I50.22 CHRONIC HFREF (HEART FAILURE WITH REDUCED EJECTION FRACTION): Chronic | ICD-10-CM

## 2024-08-27 PROCEDURE — 93306 TTE W/DOPPLER COMPLETE: CPT

## 2024-08-28 NOTE — Clinical Note
IVUS Procedure End Staff called patient regarding his refill request. Patient hasn't been seen in clinic since 2023. Staff informed patient that he needs to get scheduled to see a provider before we can send in any refill. Patient has been successfully scheduled on 9/12

## 2024-08-29 LAB
BH CV ECHO MEAS - ACS: 2.11 CM
BH CV ECHO MEAS - AO MAX PG: 5.3 MMHG
BH CV ECHO MEAS - AO MEAN PG: 3.2 MMHG
BH CV ECHO MEAS - AO ROOT DIAM: 3.1 CM
BH CV ECHO MEAS - AO V2 MAX: 115 CM/SEC
BH CV ECHO MEAS - AO V2 VTI: 23.7 CM
BH CV ECHO MEAS - EDV(MOD-SP4): 68.7 ML
BH CV ECHO MEAS - EF(MOD-BP): 61 %
BH CV ECHO MEAS - EF(MOD-SP4): 61.1 %
BH CV ECHO MEAS - EPSS: 0.38 CM
BH CV ECHO MEAS - ESV(MOD-SP4): 26.7 ML
BH CV ECHO MEAS - IVS/LVPW: 0.78 CM
BH CV ECHO MEAS - IVSD: 0.66 CM
BH CV ECHO MEAS - LA DIMENSION: 3.2 CM
BH CV ECHO MEAS - LAT PEAK E' VEL: 16 CM/SEC
BH CV ECHO MEAS - LV DIASTOLIC VOL/BSA (35-75): 35.3 CM2
BH CV ECHO MEAS - LV MAX PG: 3.2 MMHG
BH CV ECHO MEAS - LV MEAN PG: 1.6 MMHG
BH CV ECHO MEAS - LV SYSTOLIC VOL/BSA (12-30): 13.7 CM2
BH CV ECHO MEAS - LV V1 MAX: 89 CM/SEC
BH CV ECHO MEAS - LV V1 VTI: 18.1 CM
BH CV ECHO MEAS - LVIDD: 5.3 CM
BH CV ECHO MEAS - LVIDS: 2.8 CM
BH CV ECHO MEAS - LVOT DIAM: 2.11 CM
BH CV ECHO MEAS - LVPWD: 0.84 CM
BH CV ECHO MEAS - MED PEAK E' VEL: 17.1 CM/SEC
BH CV ECHO MEAS - MR MAX PG: 4.7 MMHG
BH CV ECHO MEAS - MR MAX VEL: 107.5 CM/SEC
BH CV ECHO MEAS - PA ACC TIME: 0.1 SEC
BH CV ECHO MEAS - SV(MOD-SP4): 42 ML
BH CV ECHO MEAS - SVI(MOD-SP4): 21.6 ML/M2
BH CV ECHO MEAS - TAPSE (>1.6): 2.21 CM
BH CV ECHO MEAS - TR MAX PG: 9.6 MMHG
BH CV ECHO MEAS - TR MAX VEL: 141.3 CM/SEC

## 2024-09-05 DIAGNOSIS — M25.511 BILATERAL SHOULDER PAIN, UNSPECIFIED CHRONICITY: Primary | ICD-10-CM

## 2024-09-05 DIAGNOSIS — M25.512 BILATERAL SHOULDER PAIN, UNSPECIFIED CHRONICITY: Primary | ICD-10-CM

## 2024-09-11 ENCOUNTER — OFFICE VISIT (OUTPATIENT)
Dept: CARDIOLOGY | Facility: CLINIC | Age: 59
End: 2024-09-11
Payer: MEDICARE

## 2024-09-11 VITALS
SYSTOLIC BLOOD PRESSURE: 92 MMHG | HEIGHT: 70 IN | OXYGEN SATURATION: 96 % | WEIGHT: 159.8 LBS | DIASTOLIC BLOOD PRESSURE: 53 MMHG | HEART RATE: 52 BPM | BODY MASS INDEX: 22.88 KG/M2

## 2024-09-11 DIAGNOSIS — Z72.0 TOBACCO ABUSE: Chronic | ICD-10-CM

## 2024-09-11 DIAGNOSIS — E78.01 FAMILIAL HYPERCHOLESTEROLEMIA: Chronic | ICD-10-CM

## 2024-09-11 DIAGNOSIS — I10 ESSENTIAL HYPERTENSION: Chronic | ICD-10-CM

## 2024-09-11 DIAGNOSIS — I25.119 CORONARY ARTERY DISEASE INVOLVING NATIVE CORONARY ARTERY OF NATIVE HEART WITH ANGINA PECTORIS: Primary | Chronic | ICD-10-CM

## 2024-09-11 DIAGNOSIS — I50.32 HEART FAILURE WITH RECOVERED EJECTION FRACTION (HFRECEF): Chronic | ICD-10-CM

## 2024-09-11 PROCEDURE — G2211 COMPLEX E/M VISIT ADD ON: HCPCS | Performed by: NURSE PRACTITIONER

## 2024-09-11 PROCEDURE — 99214 OFFICE O/P EST MOD 30 MIN: CPT | Performed by: NURSE PRACTITIONER

## 2024-09-11 PROCEDURE — 1159F MED LIST DOCD IN RCRD: CPT | Performed by: NURSE PRACTITIONER

## 2024-09-11 PROCEDURE — 1160F RVW MEDS BY RX/DR IN RCRD: CPT | Performed by: NURSE PRACTITIONER

## 2024-09-11 PROCEDURE — 3078F DIAST BP <80 MM HG: CPT | Performed by: NURSE PRACTITIONER

## 2024-09-11 PROCEDURE — 3074F SYST BP LT 130 MM HG: CPT | Performed by: NURSE PRACTITIONER

## 2024-09-11 RX ORDER — PANTOPRAZOLE SODIUM 40 MG/1
40 TABLET, DELAYED RELEASE ORAL DAILY
COMMUNITY
End: 2024-09-11

## 2024-09-11 RX ORDER — DEXLANSOPRAZOLE 60 MG/1
60 CAPSULE, DELAYED RELEASE ORAL DAILY
COMMUNITY
Start: 2024-07-02 | End: 2024-09-11

## 2024-09-11 NOTE — ASSESSMENT & PLAN NOTE
Lipid abnormalities are improving with treatment    Plan:  Continue same medication/s without change.      Counseled patient on lifestyle modifications to help control hyperlipidemia.   Stop tobacco use encouraged    Patient Treatment Goals:   LDL goal is less than 55    Followup in 6 months.  
Coronary Artery Disease (OPTIONAL): Coronary artery disease is improving with treatment.  Continue current treatment regimen. Regular aerobic exercise. Stop smoking.  Cardiac status will be reassessed in 6 months.  
EF now recovered.  Plan to continue GDMT.  Can discontinue LifeVest  
Hypertension is stable and controlled  Continue current treatment regimen.  Stop smoking.  Blood pressure will be reassessed in 6 months.  
Strongly recommend cessation  
0 = independent

## 2024-09-11 NOTE — PROGRESS NOTES
"Chief Complaint  Follow-up (Intermittent CP, SOA on exertion, mild leg/ankle swelling,chronic fatigue.) and Med Management (Tolerating all current medications with no side effects.)    Subjective          Javier Santiago presents to [facility] for follow up.    History of Present Illness  Mr. Santiago presents to discuss the results of his recent echocardiogram.  His ejection fraction has recovered back to a normal range.  He has discontinued wearing his LifeVest and will return it.  As he is tolerating GDMT for heart failure well, will continue for now.    In terms of his coronary artery disease, he continues on dual antiplatelet therapy.  He reports that he has intermittent chest pain that does not cause him to change his activity.  He is on antianginal therapy.    His last lipid report reveals excellent control.  Will need to repeat labs prior to next appointment.  Tobacco Use: High Risk (9/11/2024)    Patient History     Smoking Tobacco Use: Every Day     Smokeless Tobacco Use: Former     Passive Exposure: Not on file       Objective     Vital Signs:   BP 92/53 (BP Location: Left arm, Patient Position: Sitting, Cuff Size: Adult)   Pulse 52   Ht 177.8 cm (70\")   Wt 72.5 kg (159 lb 12.8 oz)   SpO2 96%   BMI 22.93 kg/m²       Physical Exam  Vitals and nursing note reviewed.   Constitutional:       General: He is not in acute distress.  HENT:      Head: Normocephalic and atraumatic.   Eyes:      Conjunctiva/sclera: Conjunctivae normal.   Neck:      Vascular: No carotid bruit.   Cardiovascular:      Rate and Rhythm: Normal rate and regular rhythm.      Pulses: Normal pulses.   Pulmonary:      Effort: Pulmonary effort is normal.      Breath sounds: Normal breath sounds.   Musculoskeletal:      Cervical back: Neck supple.      Right lower leg: No edema.      Left lower leg: No edema.   Skin:     General: Skin is warm and dry.   Neurological:      General: No focal deficit present. "   Psychiatric:         Mood and Affect: Mood normal.         Behavior: Behavior normal.          Result Review :   The following data was reviewed by: OBDULIO Juan on 09/11/2024:  Common labs          3/15/2024    08:21 6/18/2024    08:36 7/1/2024    10:00   Common Labs   Glucose 163  102  92    BUN 10  16  12    Creatinine 1.08  1.16  1.05    Sodium 142  138  139    Potassium 4.1  4.3  4.3    Chloride 106  107  108    Calcium 9.3  9.6  9.8    WBC 7.65      Hemoglobin 15.4      Hematocrit 46.4      Platelets 274      Total Cholesterol  76     Triglycerides  71     HDL Cholesterol  29     LDL Cholesterol   31       Lipid Panel          6/18/2024    08:36   Lipid Panel   Total Cholesterol 76    Triglycerides 71    HDL Cholesterol 29    VLDL Cholesterol 16    LDL Cholesterol  31    LDL/HDL Ratio 1.13      Data reviewed : Cardiology studies as detailed below      Last Cardiac Cath  Results for orders placed during the hospital encounter of 03/15/24    Intravascular Ultrasound    Conclusion  Images from the original result were not included.  CARDIAC CATHETERIZATION / INTERVENTION REPORT    DATE OF PROCEDURE: 3/15/2024    INDICATION FOR PROCEDURE: chest pain      PRE PROCEDURE DIAGNOSIS:  Unstable angina with history of coronary disease    POST PROCEDURE DIAGNOSIS:  Severe coronary disease with critical stenosis in the proximal LAD and severe lesion in the obtuse marginal    Face to face moderate conscious sedation time:      COMPLICATIONS : None    Access Site :  6 Divehi right radial    PROCEDURE PERFORMED:    1. Coronary Angiogram  2. Left heart catheterization  3.IVUS to the LAD and Lcx  4.PCI with Shockwave to the LAD  5.PCI to OM1      PROCEDURE COMMENTS:    Prior to the procedure risks benefits alternatives and possible adverse events were discussed with the patient and informed consent was obtained.  Moderate conscious sedation was utilized for 60 minutes refrigeration procedure supervised administration  fentanyl and Versed that was given independently by Tania cabral registered nurse.  Javier Santiago was brought to the cath lab and placed on the cardiac catherization table in the postabsortive state. The patient was prepped and draped according to the cath lab protocol under strict aseptic and sterile condition.  We obtained access with a slender sheath 6 Sudanese in the right radial artery after giving lidocaine in the right radial artery.  We went with a Negrito catheter over J-wire to engage the left main and the RCA and also cross the LV cavity to obtain a pressure pullback pressure as well.  With subsequent moved onto intervention.  We gave additional heparin and used an Akari 4 guide to engage the left main.  We took a Prowater wire down the LAD and subsequently performed IVUS.  Lesion appears to be partially in-stent and partially Denovo we used a 3 mm noncompliant balloon however there was a waist and then we used a 3 x 12 mm shockwave lithotripsy balloon.  However due to prep errors we were not able to deliver complete shocks and went to deliver 35 shocks.  This is despite changing out for the balloon.  We subsequently then inserted a 3 x 12 mm Xience stent inflating it to 23 hector.  We then redirected our wire into the obtuse marginal performing IVUS.  The luminal area of this lesion is 2.5 mm².  We subsequently directly stented with a 2.5 x 15 mm Xience stent inflating it to 23 hector.  We then used a 2.5 noncompliant balloon inflating it to 23 hector.  Angiographically there is no residual stenosis and DALILA-3 flow in all vessels.  We did give 325 aspirin and 600 mg of Plavix        Coronary anatomy findings:  Left main appears normal.  The obtuse marginal in the midportion has a 75 to 80% stenosis.  The proximal LAD has a 90% stenosis.  Mid LAD has a 30% stenosis.  The stents in the proximal LAD appear to be underexpanded on IVUS        LVEDP: 17 mmHg  No gradient across the aortic valve on pull back.      PERCUTANEOUS  CORONARY INTERVENTION PROCEDURE NOTE:        Lesion length: 5 mm  Pre PCI Stenosis: 90  %  Post PCI stenosis: 0 %  Pre PCI DALILA flow: 3 in the LAD and circumflex  Post PCI DALILA flow: 3 in the LAD and circumflex    EBL: Less than 20cc        Final Impression:  In-stent restenosis in the proximal LAD status post shockwave lithotripsy and IVUS guided PCI.  Successful PCI of the mid OM1 with IVUS guidance and no residual stenosis  Mild to moderate disease in the mid LAD continue risk factor modification          Recommendations:  Aggressive guideline directed medical management for CAD  Dual Antiplatelet therapy    Charles Montaño MD  03/15/24  10:39 EDT      Last Stress test  Results for orders placed during the hospital encounter of 01/20/21    Stress Test With Myocardial Perfusion One Day    Interpretation Summary  · Myocardial perfusion imaging indicates a small-sized infarct located in the apex with mild ischemia in the distal anteroapical territory.  · Left ventricular ejection fraction is normal. (Calculated EF = 60%).  · Findings consistent with a normal ECG stress test.  · Impressions are consistent with an intermediate risk study.       Last Echo  Results for orders placed during the hospital encounter of 08/27/24    Adult Transthoracic Echo Complete w/ Color, Spectral and Contrast if necessary per protocol    Interpretation Summary    Left ventricular systolic function is normal. Calculated left ventricular EF = 61% Left ventricular ejection fraction appears to be 56 - 60%.    Left ventricular diastolic function was normal.             Current Outpatient Medications   Medication Sig Dispense Refill    albuterol (PROVENTIL HFA;VENTOLIN HFA) 108 (90 BASE) MCG/ACT inhaler Inhale 2 puffs Every 4 (Four) Hours As Needed for Wheezing.      ALPRAZolam (XANAX) 1 MG tablet Take 1 tablet by mouth Every Other Day. Only takes 10 per month.      aspirin 81 MG tablet Take 1 tablet by mouth Daily. 30 tablet 11     atorvastatin (LIPITOR) 80 MG tablet Take 1 tablet by mouth Daily.      clopidogrel (PLAVIX) 75 MG tablet Take 1 tablet by mouth Daily. 90 tablet 5    cyanocobalamin 1000 MCG/ML injection Inject 1 mL into the appropriate muscle as directed by prescriber Every 28 (Twenty-Eight) Days.      dapagliflozin Propanediol 10 MG tablet Take 10 mg by mouth Daily. 30 tablet 5    Enbrel 50 MG/ML injection Inject 1 mL under the skin into the appropriate area as directed 2 (Two) Times a Week. TUESDAY AND SATURDAY      EPINEPHrine (EPIPEN IJ) Inject 1 dose as directed As Needed (ALLERGIC REACTION).      Evolocumab (REPATHA) solution auto-injector SureClick injection Inject 1 mL under the skin into the appropriate area as directed Every 14 (Fourteen) Days. 2 mL 5    famotidine (Pepcid) 40 MG tablet Take 1 tablet by mouth 2 (Two) Times a Day. 90 tablet 1    gabapentin (NEURONTIN) 600 MG tablet Take 1 tablet by mouth 3 (Three) Times a Day.      HYDROcodone-acetaminophen (NORCO)  MG per tablet Take 1 tablet by mouth 3 (Three) Times a Day As Needed for Moderate Pain . 30 tablet 0    hydrOXYzine (ATARAX) 25 MG tablet Take 1 tablet by mouth Every 8 (Eight) Hours As Needed for Itching. Rarely      isosorbide mononitrate (IMDUR) 30 MG 24 hr tablet Take 1 tablet by mouth Daily. 90 tablet 3    metoprolol succinate XL (TOPROL-XL) 25 MG 24 hr tablet Take 1 tablet by mouth Daily. 90 tablet 1    nicotine (NICODERM CQ) 21 MG/24HR patch Place 1 patch on the skin as directed by provider Daily. 30 patch 2    nitroglycerin (NITROSTAT) 0.4 MG SL tablet Place 1 tablet under the tongue Every 5 (Five) Minutes As Needed for Chest Pain. Take no more than 3 doses in 15 minutes. 30 tablet 0    ranolazine (Ranexa) 500 MG 12 hr tablet Take 1 tablet by mouth 2 (Two) Times a Day. 60 tablet 1    sacubitril-valsartan (Entresto) 24-26 MG tablet Take 0.5 tablets by mouth 2 (Two) Times a Day. 30 tablet 1    spironolactone (ALDACTONE) 25 MG tablet Take 1 tablet by  mouth Daily. 30 tablet 11    SYMBICORT 80-4.5 MCG/ACT inhaler Inhale 2 puffs 2 (Two) Times a Day As Needed.      vitamin D (ERGOCALCIFEROL) 1.25 MG (24505 UT) capsule capsule Take 1 capsule by mouth 1 (One) Time Per Week.       No current facility-administered medications for this visit.            Assessment and Plan    Problem List Items Addressed This Visit       Essential hypertension (Chronic)    Overview     Goals of care-maintain systolic blood pressure less than 140         Current Assessment & Plan     Hypertension is stable and controlled  Continue current treatment regimen.  Stop smoking.  Blood pressure will be reassessed in 6 months.         Familial hypercholesterolemia (Chronic)    Overview     Goals of care-maintain LDL less than 55  02/02/2024-total cholesterol 205, triglycerides 142, HDL 33,   6/18/2024-total cholesterol 76, triglycerides 71, HDL 29, LDL 31         Current Assessment & Plan      Lipid abnormalities are improving with treatment    Plan:  Continue same medication/s without change.      Counseled patient on lifestyle modifications to help control hyperlipidemia.   Stop tobacco use encouraged    Patient Treatment Goals:   LDL goal is less than 55    Followup in 6 months.         Coronary artery disease involving native coronary artery of native heart with angina pectoris - Primary (Chronic)    Overview     Goals of care-dual antiplatelet therapy until at least 03/15/2025, maximize GDMT, aggressive risk management strategies  03/15/0832-CLQ-RWI with shockwave to the LAD after in-stent restenosis, PCI to OM1-mild to moderate disease in the mid LAD         Current Assessment & Plan     Coronary Artery Disease (OPTIONAL): Coronary artery disease is improving with treatment.  Continue current treatment regimen. Regular aerobic exercise. Stop smoking.  Cardiac status will be reassessed in 6 months.         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  Plan to continue GDMT.  Can discontinue LifeVest          Diagnoses and all orders for this visit:    1. Coronary artery disease involving native coronary artery of native heart with angina pectoris (Primary)  Assessment & Plan:  Coronary Artery Disease (OPTIONAL): Coronary artery disease is improving with treatment.  Continue current treatment regimen. Regular aerobic exercise. Stop smoking.  Cardiac status will be reassessed in 6 months.      2. Familial hypercholesterolemia  Assessment & Plan:   Lipid abnormalities are improving with treatment    Plan:  Continue same medication/s without change.      Counseled patient on lifestyle modifications to help control hyperlipidemia.   Stop tobacco use encouraged    Patient Treatment Goals:   LDL goal is less than 55    Followup in 6 months.      3. Essential hypertension  Assessment & Plan:  Hypertension is stable and controlled  Continue current treatment regimen.  Stop smoking.  Blood pressure will be reassessed in 6 months.      4. 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)  Assessment & Plan:  EF now recovered.  Plan to continue GDMT.  Can discontinue LifeVest      5. Tobacco abuse  Assessment & Plan:  Strongly recommend cessation        I have assumed longitudinal care for complex medical condition(s) that require long-term management involving monitoring and adjustments to medications.  Goals of care, history of important events, and historical results can be found with each problem.  Episodic plan of care can be found in the order section.        Follow Up     Return in about 6 months (around 3/11/2025).    Patient was given instructions and counseling regarding  his condition or for health maintenance advice. Please see specific information pulled into the AVS if appropriate.       Electronically signed by OBDULIO Juan, 09/11/24, 4:27 PM EDT.      Dictated Utilizing Dragon Dictation: Part of this note may be an electronic transcription/translation of spoken language to printed text using the Dragon Dictation System

## 2024-09-18 ENCOUNTER — HOSPITAL ENCOUNTER (OUTPATIENT)
Dept: GENERAL RADIOLOGY | Facility: HOSPITAL | Age: 59
Discharge: HOME | End: 2024-09-18
Admitting: ORTHOPAEDIC SURGERY
Payer: MEDICARE

## 2024-09-18 ENCOUNTER — OFFICE VISIT (OUTPATIENT)
Dept: ORTHOPEDIC SURGERY | Facility: CLINIC | Age: 59
End: 2024-09-18
Payer: MEDICARE

## 2024-09-18 VITALS
DIASTOLIC BLOOD PRESSURE: 67 MMHG | SYSTOLIC BLOOD PRESSURE: 106 MMHG | WEIGHT: 165 LBS | HEIGHT: 70 IN | HEART RATE: 58 BPM | BODY MASS INDEX: 23.62 KG/M2

## 2024-09-18 DIAGNOSIS — R20.0 BILATERAL HAND NUMBNESS: ICD-10-CM

## 2024-09-18 DIAGNOSIS — G89.29 CHRONIC PAIN OF BOTH SHOULDERS: Primary | ICD-10-CM

## 2024-09-18 DIAGNOSIS — M25.511 CHRONIC PAIN OF BOTH SHOULDERS: Primary | ICD-10-CM

## 2024-09-18 DIAGNOSIS — M25.562 CHRONIC PAIN OF LEFT KNEE: ICD-10-CM

## 2024-09-18 DIAGNOSIS — M25.512 CHRONIC PAIN OF BOTH SHOULDERS: Primary | ICD-10-CM

## 2024-09-18 DIAGNOSIS — G89.29 CHRONIC PAIN OF LEFT KNEE: ICD-10-CM

## 2024-09-18 DIAGNOSIS — M25.512 BILATERAL SHOULDER PAIN, UNSPECIFIED CHRONICITY: ICD-10-CM

## 2024-09-18 DIAGNOSIS — M25.511 BILATERAL SHOULDER PAIN, UNSPECIFIED CHRONICITY: ICD-10-CM

## 2024-09-18 DIAGNOSIS — M54.2 NECK PAIN: ICD-10-CM

## 2024-09-18 PROCEDURE — 73030 X-RAY EXAM OF SHOULDER: CPT

## 2024-09-18 PROCEDURE — 73562 X-RAY EXAM OF KNEE 3: CPT

## 2024-09-18 RX ORDER — BACLOFEN 10 MG/1
10 TABLET ORAL 3 TIMES DAILY
COMMUNITY

## 2024-09-18 RX ADMIN — METHYLPREDNISOLONE ACETATE 40 MG: 40 INJECTION, SUSPENSION INTRA-ARTICULAR; INTRALESIONAL; INTRAMUSCULAR; SOFT TISSUE at 15:21

## 2024-09-18 RX ADMIN — LIDOCAINE HYDROCHLORIDE 5 ML: 10 INJECTION, SOLUTION EPIDURAL; INFILTRATION; INTRACAUDAL; PERINEURAL at 15:21

## 2024-09-20 ENCOUNTER — SPECIALTY PHARMACY (OUTPATIENT)
Dept: PHARMACY | Facility: HOSPITAL | Age: 59
End: 2024-09-20
Payer: MEDICARE

## 2024-09-20 RX ORDER — LIDOCAINE HYDROCHLORIDE 10 MG/ML
5 INJECTION, SOLUTION EPIDURAL; INFILTRATION; INTRACAUDAL; PERINEURAL
Status: COMPLETED | OUTPATIENT
Start: 2024-09-18 | End: 2024-09-18

## 2024-09-20 RX ORDER — METHYLPREDNISOLONE ACETATE 40 MG/ML
40 INJECTION, SUSPENSION INTRA-ARTICULAR; INTRALESIONAL; INTRAMUSCULAR; SOFT TISSUE
Status: COMPLETED | OUTPATIENT
Start: 2024-09-18 | End: 2024-09-18

## 2024-10-02 ENCOUNTER — TELEPHONE (OUTPATIENT)
Dept: ORTHOPEDIC SURGERY | Facility: CLINIC | Age: 59
End: 2024-10-02
Payer: MEDICARE

## 2024-10-02 NOTE — TELEPHONE ENCOUNTER
Caller: EDGARD    Relationship: [place] [facility]    Best call back number: [phone]    What form or medical record are you requesting: OFFICE NOTES FROM 09/18/24    Who is requesting this form or medical record from you: [place] [facility]    How would you like to receive the form or medical records (pick-up, mail, fax): FAX  If fax, what is the fax number: [phone]    Timeframe paperwork needed: ASAP

## 2024-10-18 ENCOUNTER — SPECIALTY PHARMACY (OUTPATIENT)
Dept: PHARMACY | Facility: HOSPITAL | Age: 59
End: 2024-10-18
Payer: MEDICARE

## 2024-10-18 DIAGNOSIS — I25.119 CORONARY ARTERY DISEASE INVOLVING NATIVE CORONARY ARTERY OF NATIVE HEART WITH ANGINA PECTORIS: ICD-10-CM

## 2024-10-18 NOTE — PROGRESS NOTES
Specialty Pharmacy Refill Coordination Note     Javier is a 59 y.o. male contacted today regarding refills of  Repatha Sureclick specialty medication(s).    Reviewed and verified with patient:       Specialty medication(s) and dose(s) confirmed: yes    Refill Questions      Flowsheet Row Most Recent Value   Changes to allergies? No   Changes to medications? No   New conditions or infections since last clinic visit No   Unplanned office visit, urgent care, ED, or hospital admission in the last 4 weeks  No   How does patient/caregiver feel medication is working? Very good   Financial problems or insurance changes  No   Since the previous refill, were any specialty medication doses or scheduled injections missed or delayed?  No   Does this patient require a clinical escalation to a pharmacist? No            Delivery Questions      Flowsheet Row Most Recent Value   Delivery method [employer]   Delivery address verified with patient/caregiver? No   Delivery address Prescription   Medication(s) being filled and delivered Evolocumab (REPATHA)   Copay verified? Yes   Copay amount 0   Copay form of payment Pay at pickup   Signature Required No                   Follow-up: 84 day(s)     eNha Mccabe, Pharmacy Technician  Specialty Pharmacy Technician

## 2024-10-21 RX ORDER — NITROGLYCERIN 0.4 MG/1
0.4 TABLET SUBLINGUAL
Qty: 30 TABLET | Refills: 0 | Status: SHIPPED | OUTPATIENT
Start: 2024-10-21

## 2024-10-29 ENCOUNTER — TELEPHONE (OUTPATIENT)
Dept: CARDIOLOGY | Facility: CLINIC | Age: 59
End: 2024-10-29
Payer: MEDICARE

## 2024-10-29 DIAGNOSIS — I25.119 CORONARY ARTERY DISEASE INVOLVING NATIVE CORONARY ARTERY OF NATIVE HEART WITH ANGINA PECTORIS: Chronic | ICD-10-CM

## 2024-10-29 DIAGNOSIS — I50.22 CHRONIC HFREF (HEART FAILURE WITH REDUCED EJECTION FRACTION): Chronic | ICD-10-CM

## 2024-10-29 RX ORDER — SPIRONOLACTONE 25 MG/1
12.5 TABLET ORAL DAILY
Qty: 15 TABLET | Refills: 11 | Status: SHIPPED | OUTPATIENT
Start: 2024-10-29

## 2024-10-29 RX ORDER — METOPROLOL SUCCINATE 25 MG/1
12.5 TABLET, EXTENDED RELEASE ORAL DAILY
Qty: 45 TABLET | Refills: 1 | Status: SHIPPED | OUTPATIENT
Start: 2024-10-29

## 2024-10-29 NOTE — TELEPHONE ENCOUNTER
Please tell him to stop taking Imdur and cut both his metoprolol and spironolactone in half.  I've made the med adjustments on the chart.

## 2024-10-29 NOTE — TELEPHONE ENCOUNTER
Patient called in and states that blood pressure has been low systolic being in the 80's and 90's. Patient states that he feels dizzy and weak when that happens. Patient states his last visit was in September and some of his medications had changed. He asks for Randee MAK to call or give him further instructions.

## 2024-10-30 NOTE — TELEPHONE ENCOUNTER
Contacted his significant other yesterday and left message for him to call. This morning I was able to reach patient and give him instructions. Patient voiced understanding and will call and let us know how he is doing.

## 2024-12-09 DIAGNOSIS — I50.22 CHRONIC HFREF (HEART FAILURE WITH REDUCED EJECTION FRACTION): Chronic | ICD-10-CM

## 2024-12-09 NOTE — TELEPHONE ENCOUNTER
Caller: PamelaJavier    Relationship: Self    Best call back number: [phone]    Requested Prescriptions:   Requested Prescriptions     Pending Prescriptions Disp Refills    sacubitril-valsartan (Entresto) 24-26 MG tablet 30 tablet 1     Sig: Take 0.5 tablets by mouth 2 (Two) Times a Day.        Pharmacy where request should be sent: [address] - [phone]  - [phone]     Last office visit with prescribing clinician: 9/11/2024   Last telemedicine visit with prescribing clinician: Visit date not found   Next office visit with prescribing clinician: 3/12/2025     Additional details provided by patient: 1 WEEK    Does the patient have less than a 3 day supply:  [] Yes  [x] No    Would you like a call back once the refill request has been completed: [] Yes [x] No    If the office needs to give you a call back, can they leave a voicemail: [] Yes [x] No    Dandy Fajardo Rep   12/09/24 16:16 EST

## 2024-12-10 RX ORDER — SACUBITRIL AND VALSARTAN 24; 26 MG/1; MG/1
0.5 TABLET, FILM COATED ORAL 2 TIMES DAILY
Qty: 30 TABLET | Refills: 1 | Status: SHIPPED | OUTPATIENT
Start: 2024-12-10

## 2024-12-18 ENCOUNTER — OFFICE VISIT (OUTPATIENT)
Dept: ORTHOPEDIC SURGERY | Facility: CLINIC | Age: 59
End: 2024-12-18
Payer: MEDICARE

## 2024-12-18 VITALS — BODY MASS INDEX: 23.61 KG/M2 | WEIGHT: 164.9 LBS | HEIGHT: 70 IN

## 2024-12-18 DIAGNOSIS — G89.29 CHRONIC PAIN OF BOTH SHOULDERS: Primary | ICD-10-CM

## 2024-12-18 DIAGNOSIS — G56.02 CARPAL TUNNEL SYNDROME ON LEFT: ICD-10-CM

## 2024-12-18 DIAGNOSIS — R20.0 BILATERAL HAND NUMBNESS: ICD-10-CM

## 2024-12-18 DIAGNOSIS — M25.512 CHRONIC PAIN OF BOTH SHOULDERS: Primary | ICD-10-CM

## 2024-12-18 DIAGNOSIS — M25.511 CHRONIC PAIN OF BOTH SHOULDERS: Primary | ICD-10-CM

## 2024-12-18 RX ADMIN — LIDOCAINE HYDROCHLORIDE 5 ML: 20 INJECTION, SOLUTION INFILTRATION; PERINEURAL at 16:27

## 2024-12-18 RX ADMIN — METHYLPREDNISOLONE ACETATE 40 MG: 80 INJECTION, SUSPENSION INTRA-ARTICULAR; INTRALESIONAL; INTRAMUSCULAR; SOFT TISSUE at 16:27

## 2024-12-18 NOTE — PROGRESS NOTES
Follow-up Visit         Patient: Javier Santiago  YOB: 1965  Date of Encounter: 12/18/2024      Chief  Complaint:   Chief Complaint   Patient presents with   • Left Shoulder - Pain, Follow-up   • Right Shoulder - Pain, Follow-up         HPI:  Javier Santiago, 59 y.o. male presents in follow-up bilateral shoulder pain neck pain numbness of his both hands but greatest involving both thumbs.  He has undergone 2 previous cervical spine fusions 2021 and again in 2022.  He does acknowledge some numbness of his hands since his surgery his symptoms seem to have worsened over the past 6 months.  When last seen he was referred for EMG nerve conduction studies and presents today for follow-up.  Medical history is remarkable for 4 previous cardiac catheterizations.        Medical History:  Patient Active Problem List   Diagnosis   • Hiatal hernia   • Abdominal discomfort, epigastric   • Essential hypertension   • Familial hypercholesterolemia   • Atypical chest pain   • Obstructive sleep apnea syndrome   • Coronary artery disease involving native coronary artery of native heart with angina pectoris   • Tobacco abuse   • On clopidogrel therapy   • Gastroesophageal reflux disease   • Dysphagia   • Angina, class II   • Shortness of breath   • Myelopathy concurrent with and due to spinal stenosis of cervical region   • Cervical spondylosis with myelopathy   • S/P cervical spinal fusion   • Dysesthesia affecting both sides of body   • Esophageal stricture   • Gastritis   • 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)   • Ischemic cardiomyopathy     Past Medical History:   Diagnosis Date   • Acute coronary syndrome 03/15/2024   • Anxiety    • Arthritis     psoriatic   • Arthritis of back    • Arthritis of neck    • ASCVD (arteriosclerotic cardiovascular disease)    • Cervical disc disorder    • CHF (congestive heart failure)    • Chronic low back pain    • COPD (chronic obstructive pulmonary disease)    •  Coronary artery disease    • CTS (carpal tunnel syndrome)    • Fibromyalgia    • Fracture, finger    • Frozen shoulder    • Full dentures    • GERD (gastroesophageal reflux disease)    • History of EKG 01/26/2016    NORMAL   • Hyperlipidemia    • Hypertension    • Hypotension    • Insomnia    • Knee swelling    • Low back strain    • Lumbosacral disc disease    • MI, old 2015   • MRSA infection 08/2022    NECK SURGERY   • Neck strain    • Psoriasis    • Psoriatic arthritis    • RA (rheumatoid arthritis)    • Rotator cuff syndrome    • Sciatic nerve pain    • Skin pain     from nerve pain   • Sleep apnea     cpap non compliant   • Tennis elbow    • Thoracic disc disorder    • Tinnitus    • Wears glasses     readers           Social History:  Social History     Socioeconomic History   • Marital status:      Spouse name: Coretta Bhatt   Tobacco Use   • Smoking status: Every Day     Current packs/day: 1.00     Average packs/day: 0.8 packs/day for 85.0 years (67.5 ttl pk-yrs)     Types: Cigarettes     Start date: 1975   • Smokeless tobacco: Former     Types: Chew   Vaping Use   • Vaping status: Never Used   Substance and Sexual Activity   • Alcohol use: No   • Drug use: Never   • Sexual activity: Defer           Current Medications:    Current Outpatient Medications:   •  albuterol (PROVENTIL HFA;VENTOLIN HFA) 108 (90 BASE) MCG/ACT inhaler, Inhale 2 puffs Every 4 (Four) Hours As Needed for Wheezing., Disp: , Rfl:   •  ALPRAZolam (XANAX) 1 MG tablet, Take 1 tablet by mouth Every Other Day. Only takes 10 per month., Disp: , Rfl:   •  aspirin 81 MG tablet, Take 1 tablet by mouth Daily., Disp: 30 tablet, Rfl: 11  •  atorvastatin (LIPITOR) 80 MG tablet, Take 1 tablet by mouth Daily., Disp: , Rfl:   •  baclofen (LIORESAL) 10 MG tablet, Take 1 tablet by mouth 3 (Three) Times a Day., Disp: , Rfl:   •  clopidogrel (PLAVIX) 75 MG tablet, Take 1 tablet by mouth Daily., Disp: 90 tablet, Rfl: 5  •  cyanocobalamin 1000 MCG/ML  injection, Inject 1 mL into the appropriate muscle as directed by prescriber Every 28 (Twenty-Eight) Days., Disp: , Rfl:   •  dapagliflozin Propanediol 10 MG tablet, Take 10 mg by mouth Daily., Disp: 30 tablet, Rfl: 5  •  Enbrel 50 MG/ML injection, Inject 1 mL under the skin into the appropriate area as directed 1 (One) Time Per Week. Wednesday, Disp: , Rfl:   •  EPINEPHrine (EPIPEN IJ), Inject 1 dose as directed As Needed (ALLERGIC REACTION)., Disp: , Rfl:   •  Evolocumab (REPATHA) solution auto-injector SureClick injection, Inject 1 mL under the skin into the appropriate area as directed Every 14 (Fourteen) Days., Disp: 2 mL, Rfl: 5  •  famotidine (Pepcid) 40 MG tablet, Take 1 tablet by mouth 2 (Two) Times a Day., Disp: 90 tablet, Rfl: 1  •  gabapentin (NEURONTIN) 600 MG tablet, Take 1 tablet by mouth 3 (Three) Times a Day., Disp: , Rfl:   •  HYDROcodone-acetaminophen (NORCO)  MG per tablet, Take 1 tablet by mouth 3 (Three) Times a Day As Needed for Moderate Pain ., Disp: 30 tablet, Rfl: 0  •  hydrOXYzine (ATARAX) 25 MG tablet, Take 1 tablet by mouth Every 8 (Eight) Hours As Needed for Itching. Rarely, Disp: , Rfl:   •  metoprolol succinate XL (TOPROL-XL) 25 MG 24 hr tablet, Take 0.5 tablet by mouth Daily., Disp: 45 tablet, Rfl: 1  •  nicotine (NICODERM CQ) 21 MG/24HR patch, Place 1 patch on the skin as directed by provider Daily., Disp: 30 patch, Rfl: 2  •  nitroglycerin (NITROSTAT) 0.4 MG SL tablet, Place 1 tablet under the tongue Every 5 (Five) Minutes As Needed for Chest Pain. Take no more than 3 doses in 15 minutes., Disp: 30 tablet, Rfl: 0  •  ranolazine (Ranexa) 500 MG 12 hr tablet, Take 1 tablet by mouth 2 (Two) Times a Day., Disp: 60 tablet, Rfl: 1  •  sacubitril-valsartan (Entresto) 24-26 MG tablet, Take 0.5 tablets by mouth 2 (Two) Times a Day., Disp: 30 tablet, Rfl: 1  •  spironolactone (ALDACTONE) 25 MG tablet, Take 0.5 tablet by mouth Daily., Disp: 15 tablet, Rfl: 11  •  SYMBICORT 80-4.5  MCG/ACT inhaler, Inhale 2 puffs 2 (Two) Times a Day As Needed., Disp: , Rfl:   •  vitamin D (ERGOCALCIFEROL) 1.25 MG (86411 UT) capsule capsule, Take 1 capsule by mouth 1 (One) Time Per Week., Disp: , Rfl:         Allergies:  No Known Allergies        Family History:  Family History   Problem Relation Age of Onset   • Heart attack Mother         x 3   • Atrial fibrillation Mother    • Heart disease Mother    • Diabetes Mother    • Heart attack Father    • Heart disease Father         CABG           Surgical History:  Past Surgical History:   Procedure Laterality Date   • ANTERIOR CERVICAL DISCECTOMY W/ FUSION N/A 04/26/2021    Procedure: CERVICAL DISCECTOMY ANTERIOR WITH FUSION C4-6;  Surgeon: Chester Cowan MD;  Location:  [facility];  Service: Neurosurgery;  Laterality: N/A;   • BACK SURGERY      lumbar 1995   • CARDIAC CATHETERIZATION N/A 10/13/2017    Procedure: Left Heart Cath;  Surgeon: Chester Centeno MD;  [facility]  [facility];  Service:    • CARDIAC CATHETERIZATION N/A 02/11/2021    Procedure: Left Heart Cath;  Surgeon: Brent Degroot MD;  Location:  [facility];  Service: Cardiology;  Laterality: N/A;   • CARDIAC CATHETERIZATION N/A 03/15/2024    Procedure: Coronary angiography;  Surgeon: Charles Montaño MD;  Location:  [facility];  Service: Cardiology;  Laterality: N/A;   • CARDIAC CATHETERIZATION N/A 03/15/2024    Procedure: Percutaneous Coronary Intervention;  Surgeon: Charles Montaño MD;  Location:  [facility];  Service: Cardiology;  Laterality: N/A;   • CATARACT EXTRACTION, BILATERAL Bilateral    • CERVICAL DISCECTOMY POSTERIOR FUSION WITH BRAIN LAB N/A 08/04/2022    Procedure: CERVICAL POSTERIOR FUSION WITH INSTRUMENTATION C3-5;  Surgeon: Chester Cowan MD;  Location:  [facility];  Service: Neurosurgery;  Laterality: N/A;   • COLONOSCOPY     • CORONARY ANGIOPLASTY WITH STENT PLACEMENT      x2 stent in place   •  "ENDOSCOPY     • ENDOSCOPY N/A 02/09/2018    Procedure: ESOPHAGOGASTRODUODENOSCOPY WITH DILATATION CPT CODE: 01534;  Surgeon: Everett Villanueva III, MD;  Location: [facility] OR;  Service:    • ENDOSCOPY N/A 06/07/2023    Procedure: ESOPHAGOGASTRODUODENOSCOPY WITH ESOPHAGEAL BALLOON DILATATION AND BIOPSY;  Surgeon: Sg Black MD;  Location:  [facility];  Service: Gastroenterology;  Laterality: N/A;  BIOPSY OF GASTRIC ULCERS, GEJ, AND ESOPHAGUS BIOPSIES, DILATION UP TO 12 MM WITH BALLOON   • ENDOSCOPY WITH FOREIGN BODY REMOVAL N/A 08/05/2022    Procedure: ESOPHAGOGASTRODUODENOSCOPY WITH FOREIGN BODY REMOVAL;  Surgeon: Sg Black MD;  Location:  [facility];  Service: Gastroenterology;  Laterality: N/A;   • HAND SURGERY     • INTERVENTIONAL RADIOLOGY PROCEDURE N/A 03/15/2024    Procedure: Intravascular Ultrasound;  Surgeon: Charles Montaño MD;  Location: [facility] CATH INVASIVE LOCATION;  Service: Cardiology;  Laterality: N/A;   • NECK SURGERY     • TRIGGER POINT INJECTION           Vitals:  Vitals:    12/18/24 0906   Weight: 74.8 kg (164 lb 14.5 oz)   Height: 177.8 cm (70\")     Body mass index is 23.66 kg/m².      EMG/NCS:            Orthopedic Examination: Cervical spine demonstrates limited mobility in all directions with mild to moderate discomfort at the extremes.  He has full mobility of both shoulders with no sign of rotator cuff pathology and no sign of AC joint arthritis neurovascular exam again demonstrates diminished sensation thumb index and third fingers greatest involvement of his thumb with mild decreased strength.  Phalen sign produces mild to moderate discomfort bilateral hands.          Assessment & Plan:   59 y.o. male presents with EMG nerve conduction studies describing mild carpal tunnel syndrome bilaterally.  Noted his description that physiologic evidence of cubital tunnel syndrome unable to rule out presence of cervical radiculopathy because of previous surgeries.  We " discussed options he has mild to moderate findings of carpal tunnel syndrome.  We discussed the option of MRI and referral to his neurosurgeon but before proceeding today he is provided steroid injection Depo-Medrol 40 mg lidocaine block within the left carpal tunnel.  We will reevaluate in 3 weeks and consider MRI if not improved consider tunnel release if has experienced some response.  It is interesting that his family history is positive for carpal tunnel surgery in 2 of his siblings.           Diagnosis Plan   1. Chronic pain of both shoulders        2. Bilateral hand numbness            - Injection Tendon or Ligament left hand    Date/Time: 12/18/2024 4:27 PM    Performed by: Luis Leija MD  Authorized by: Luis Leija MD  Medications administered: 5 mL lidocaine 2%; 40 mg methylPREDNISolone acetate 80 MG/ML          Cc:  Annie Jay FNP              This document has been electronically signed by Luis Leija MD   December 23, 2024 16:27 EST

## 2025-01-01 RX ORDER — LIDOCAINE HYDROCHLORIDE 20 MG/ML
5 INJECTION, SOLUTION INFILTRATION; PERINEURAL
Status: COMPLETED | OUTPATIENT
Start: 2024-12-18 | End: 2024-12-18

## 2025-01-01 RX ORDER — METHYLPREDNISOLONE ACETATE 80 MG/ML
40 INJECTION, SUSPENSION INTRA-ARTICULAR; INTRALESIONAL; INTRAMUSCULAR; SOFT TISSUE
Status: COMPLETED | OUTPATIENT
Start: 2024-12-18 | End: 2024-12-18

## 2025-01-13 ENCOUNTER — OFFICE VISIT (OUTPATIENT)
Dept: ORTHOPEDIC SURGERY | Facility: CLINIC | Age: 60
End: 2025-01-13
Payer: MEDICARE

## 2025-01-13 VITALS — BODY MASS INDEX: 25.68 KG/M2 | WEIGHT: 179.4 LBS | HEIGHT: 70 IN

## 2025-01-13 DIAGNOSIS — M54.2 NECK PAIN: ICD-10-CM

## 2025-01-13 DIAGNOSIS — R20.0 BILATERAL HAND NUMBNESS: ICD-10-CM

## 2025-01-13 DIAGNOSIS — G56.00 MILD CARPAL TUNNEL SYNDROME, UNSPECIFIED LATERALITY: ICD-10-CM

## 2025-01-13 DIAGNOSIS — M54.12 CERVICAL RADICULOPATHY: Primary | ICD-10-CM

## 2025-01-13 RX ORDER — ISOSORBIDE MONONITRATE 30 MG/1
TABLET, EXTENDED RELEASE ORAL
COMMUNITY
Start: 2025-01-10

## 2025-01-13 RX ORDER — PANTOPRAZOLE SODIUM 40 MG/1
TABLET, DELAYED RELEASE ORAL
COMMUNITY
Start: 2025-01-10

## 2025-01-13 NOTE — PROGRESS NOTES
Follow-up Visit         Patient: Javier Santiago  YOB: 1965  Date of Encounter: 01/13/2025      Chief  Complaint:   Chief Complaint   Patient presents with    Right Wrist - Follow-up         HPI:  Javier Santiago, 59 y.o. male presents in follow-up bilateral hand pain and numbness primarily involving his thumbs.  He initially presented with findings concerning for carpal tunnel syndrome but also with his history of degenerative disc disease of cervical spine status post cervical spine fusion in 2021 and 2022 there is also concern for cervical radicular source of his bilateral arm and hand pain and numbness.  Was provided steroid injection left hand for carpal tunnel syndrome last visit 12/18/2024 he reports no improvement.  He describes his current pain level 8.  Past medical history is remarkable for psoriasis        Medical History:  Patient Active Problem List   Diagnosis    Hiatal hernia    Abdominal discomfort, epigastric    Essential hypertension    Familial hypercholesterolemia    Atypical chest pain    Obstructive sleep apnea syndrome    Coronary artery disease involving native coronary artery of native heart with angina pectoris    Tobacco abuse    On clopidogrel therapy    Gastroesophageal reflux disease    Dysphagia    Angina, class II    Shortness of breath    Myelopathy concurrent with and due to spinal stenosis of cervical region    Cervical spondylosis with myelopathy    S/P cervical spinal fusion    Dysesthesia affecting both sides of body    Esophageal stricture    Gastritis    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)    Ischemic cardiomyopathy     Past Medical History:   Diagnosis Date    Acute coronary syndrome 03/15/2024    Anxiety     Arthritis     psoriatic    Arthritis of back     Arthritis of neck     ASCVD (arteriosclerotic cardiovascular disease)     Cervical disc disorder     CHF (congestive heart failure)     Chronic low back pain     COPD (chronic obstructive  pulmonary disease)     Coronary artery disease     CTS (carpal tunnel syndrome)     Fibromyalgia     Fracture, finger     Frozen shoulder     Full dentures     GERD (gastroesophageal reflux disease)     History of EKG 01/26/2016    NORMAL    Hyperlipidemia     Hypertension     Hypotension     Insomnia     Knee swelling     Low back strain     Lumbosacral disc disease     MI, old 2015    MRSA infection 08/2022    NECK SURGERY    Neck strain     Psoriasis     Psoriatic arthritis     RA (rheumatoid arthritis)     Rotator cuff syndrome     Sciatic nerve pain     Skin pain     from nerve pain    Sleep apnea     cpap non compliant    Tennis elbow     Thoracic disc disorder     Tinnitus     Wears glasses     readers           Social History:  Social History     Socioeconomic History    Marital status:      Spouse name: Coretta Bhatt   Tobacco Use    Smoking status: Every Day     Current packs/day: 1.00     Average packs/day: 0.8 packs/day for 85.0 years (67.5 ttl pk-yrs)     Types: Cigarettes     Start date: 1975    Smokeless tobacco: Former     Types: Chew   Vaping Use    Vaping status: Never Used   Substance and Sexual Activity    Alcohol use: No    Drug use: Never    Sexual activity: Defer           Current Medications:    Current Outpatient Medications:     albuterol (PROVENTIL HFA;VENTOLIN HFA) 108 (90 BASE) MCG/ACT inhaler, Inhale 2 puffs Every 4 (Four) Hours As Needed for Wheezing., Disp: , Rfl:     ALPRAZolam (XANAX) 1 MG tablet, Take 1 tablet by mouth Every Other Day. Only takes 10 per month., Disp: , Rfl:     aspirin 81 MG tablet, Take 1 tablet by mouth Daily., Disp: 30 tablet, Rfl: 11    atorvastatin (LIPITOR) 80 MG tablet, Take 1 tablet by mouth Daily., Disp: , Rfl:     baclofen (LIORESAL) 10 MG tablet, Take 1 tablet by mouth 3 (Three) Times a Day., Disp: , Rfl:     clopidogrel (PLAVIX) 75 MG tablet, Take 1 tablet by mouth Daily., Disp: 90 tablet, Rfl: 5    cyanocobalamin 1000 MCG/ML injection, Inject  1 mL into the appropriate muscle as directed by prescriber Every 28 (Twenty-Eight) Days., Disp: , Rfl:     dapagliflozin Propanediol 10 MG tablet, Take 10 mg by mouth Daily., Disp: 30 tablet, Rfl: 5    Enbrel 50 MG/ML injection, Inject 1 mL under the skin into the appropriate area as directed 1 (One) Time Per Week. Wednesday, Disp: , Rfl:     EPINEPHrine (EPIPEN IJ), Inject 1 dose as directed As Needed (ALLERGIC REACTION)., Disp: , Rfl:     Evolocumab (REPATHA) solution auto-injector SureClick injection, Inject 1 mL under the skin into the appropriate area as directed Every 14 (Fourteen) Days., Disp: 2 mL, Rfl: 5    famotidine (Pepcid) 40 MG tablet, Take 1 tablet by mouth 2 (Two) Times a Day., Disp: 90 tablet, Rfl: 1    gabapentin (NEURONTIN) 600 MG tablet, Take 1 tablet by mouth 3 (Three) Times a Day., Disp: , Rfl:     HYDROcodone-acetaminophen (NORCO)  MG per tablet, Take 1 tablet by mouth 3 (Three) Times a Day As Needed for Moderate Pain ., Disp: 30 tablet, Rfl: 0    hydrOXYzine (ATARAX) 25 MG tablet, Take 1 tablet by mouth Every 8 (Eight) Hours As Needed for Itching. Rarely, Disp: , Rfl:     isosorbide mononitrate (IMDUR) 30 MG 24 hr tablet, , Disp: , Rfl:     nitroglycerin (NITROSTAT) 0.4 MG SL tablet, Place 1 tablet under the tongue Every 5 (Five) Minutes As Needed for Chest Pain. Take no more than 3 doses in 15 minutes., Disp: 30 tablet, Rfl: 0    pantoprazole (PROTONIX) 40 MG EC tablet, , Disp: , Rfl:     ranolazine (Ranexa) 500 MG 12 hr tablet, Take 1 tablet by mouth 2 (Two) Times a Day., Disp: 60 tablet, Rfl: 1    sacubitril-valsartan (Entresto) 24-26 MG tablet, Take 0.5 tablets by mouth 2 (Two) Times a Day., Disp: 30 tablet, Rfl: 1    spironolactone (ALDACTONE) 25 MG tablet, Take 0.5 tablet by mouth Daily., Disp: 15 tablet, Rfl: 11    SYMBICORT 80-4.5 MCG/ACT inhaler, Inhale 2 puffs 2 (Two) Times a Day As Needed., Disp: , Rfl:     vitamin D (ERGOCALCIFEROL) 1.25 MG (84391 UT) capsule capsule, Take 1  capsule by mouth 1 (One) Time Per Week., Disp: , Rfl:     metoprolol succinate XL (TOPROL-XL) 25 MG 24 hr tablet, Take 0.5 tablet by mouth Daily. (Patient not taking: Reported on 1/13/2025), Disp: 45 tablet, Rfl: 1    nicotine (NICODERM CQ) 21 MG/24HR patch, Place 1 patch on the skin as directed by provider Daily. (Patient not taking: Reported on 1/13/2025), Disp: 30 patch, Rfl: 2        Allergies:  No Known Allergies        Family History:  Family History   Problem Relation Age of Onset    Heart attack Mother         x 3    Atrial fibrillation Mother     Heart disease Mother     Diabetes Mother     Heart attack Father     Heart disease Father         CABG           Surgical History:  Past Surgical History:   Procedure Laterality Date    ANTERIOR CERVICAL DISCECTOMY W/ FUSION N/A 04/26/2021    Procedure: CERVICAL DISCECTOMY ANTERIOR WITH FUSION C4-6;  Surgeon: Chester Cowan MD;  Location: [facility];  Service: Neurosurgery;  Laterality: N/A;    BACK SURGERY      lumbar 1995    CARDIAC CATHETERIZATION N/A 10/13/2017    Procedure: Left Heart Cath;  Surgeon: Chester Centeno MD;  [facility]:  [facility];  Service:     CARDIAC CATHETERIZATION N/A 02/11/2021    Procedure: Left Heart Cath;  Surgeon: Brent Degroot MD;  Location:  [facility];  Service: Cardiology;  Laterality: N/A;    CARDIAC CATHETERIZATION N/A 03/15/2024    Procedure: Coronary angiography;  Surgeon: Charles Montaño MD;  [facility]:  [facility];  Service: Cardiology;  Laterality: N/A;    CARDIAC CATHETERIZATION N/A 03/15/2024    Procedure: Percutaneous Coronary Intervention;  Surgeon: Charles Montaño MD;  Location:  [facility];  Service: Cardiology;  Laterality: N/A;    CATARACT EXTRACTION, BILATERAL Bilateral     CERVICAL DISCECTOMY POSTERIOR FUSION WITH BRAIN LAB N/A 08/04/2022    Procedure: CERVICAL POSTERIOR FUSION WITH INSTRUMENTATION C3-5;  Surgeon: Chester Cowan  "MD;  Location:  [facility];  Service: Neurosurgery;  Laterality: N/A;    COLONOSCOPY      CORONARY ANGIOPLASTY WITH STENT PLACEMENT      x2 stent in place    ENDOSCOPY      ENDOSCOPY N/A 02/09/2018    Procedure: ESOPHAGOGASTRODUODENOSCOPY WITH DILATATION CPT CODE: 17287;  Surgeon: Everett Villanueva III, MD;  Location: [facility] OR;  Service:     ENDOSCOPY N/A 06/07/2023    Procedure: ESOPHAGOGASTRODUODENOSCOPY WITH ESOPHAGEAL BALLOON DILATATION AND BIOPSY;  Surgeon: Sg Black MD;  Location:  [facility];  Service: Gastroenterology;  Laterality: N/A;  BIOPSY OF GASTRIC ULCERS, GEJ, AND ESOPHAGUS BIOPSIES, DILATION UP TO 12 MM WITH BALLOON    ENDOSCOPY WITH FOREIGN BODY REMOVAL N/A 08/05/2022    Procedure: ESOPHAGOGASTRODUODENOSCOPY WITH FOREIGN BODY REMOVAL;  Surgeon: Sg Black MD;  Location:  [facility];  Service: Gastroenterology;  Laterality: N/A;    HAND SURGERY      INTERVENTIONAL RADIOLOGY PROCEDURE N/A 03/15/2024    Procedure: Intravascular Ultrasound;  Surgeon: Charles Montaño MD;  Location: [facility] CATH INVASIVE LOCATION;  Service: Cardiology;  Laterality: N/A;    NECK SURGERY      TRIGGER POINT INJECTION           Vitals:  Vitals:    01/13/25 1009   Weight: 81.4 kg (179 lb 6.4 oz)   Height: 177.8 cm (70\")     Body mass index is 25.74 kg/m².            EMG/NCS:      Orthopedic Examination:   Cervical spine demonstrates limited motion and all directions with moderate discomfort at the extremes of lateral bending and rotation.      Examination upper extremities demonstrates decreased sensation involving thumb bilaterally with mild involvement of index and third fingers.  No involvement of fourth or fifth.          Assessment & Plan:   59 y.o. male presents in follow-up having nerve conduction studies completed which describe mild carpal tunnel syndrome.  Nerve conduction studies were unable to identify cervical source but cervical radiculopathy was suspected based on his previous " history.  We will request MRI of cervical spine and see him back once completed.  Likely referral to neurosurgeon once MRI results cervical spine are available.           Diagnosis Plan   1. Cervical radiculopathy  MRI Cervical Spine Without Contrast      2. Bilateral hand numbness  MRI Cervical Spine Without Contrast      3. Neck pain  MRI Cervical Spine Without Contrast      4. Mild carpal tunnel syndrome, unspecified laterality                    Cc:  Annie Jay, SHARAN              This document has been electronically signed by Luis Leija MD   January 15, 2025 16:25 EST

## 2025-01-22 ENCOUNTER — LAB (OUTPATIENT)
Dept: LAB | Facility: HOSPITAL | Age: 60
End: 2025-01-22
Payer: MEDICARE

## 2025-01-22 ENCOUNTER — HOSPITAL ENCOUNTER (OUTPATIENT)
Dept: MRI IMAGING | Facility: HOSPITAL | Age: 60
Discharge: HOME | End: 2025-01-22
Payer: MEDICARE

## 2025-01-22 ENCOUNTER — TRANSCRIBE ORDERS (OUTPATIENT)
Dept: ADMINISTRATIVE | Facility: HOSPITAL | Age: 60
End: 2025-01-22
Payer: MEDICARE

## 2025-01-22 DIAGNOSIS — E55.9 AVITAMINOSIS D: ICD-10-CM

## 2025-01-22 DIAGNOSIS — R73.9 BLOOD GLUCOSE ELEVATED: ICD-10-CM

## 2025-01-22 DIAGNOSIS — E03.9 MYXEDEMA HEART DISEASE: ICD-10-CM

## 2025-01-22 DIAGNOSIS — I51.9 MYXEDEMA HEART DISEASE: ICD-10-CM

## 2025-01-22 DIAGNOSIS — M50.30 DEGENERATION OF CERVICAL INTERVERTEBRAL DISC: ICD-10-CM

## 2025-01-22 DIAGNOSIS — R20.0 BILATERAL HAND NUMBNESS: ICD-10-CM

## 2025-01-22 DIAGNOSIS — I25.10 DISEASE OF CARDIOVASCULAR SYSTEM: Primary | ICD-10-CM

## 2025-01-22 DIAGNOSIS — M54.2 NECK PAIN: ICD-10-CM

## 2025-01-22 DIAGNOSIS — I25.10 DISEASE OF CARDIOVASCULAR SYSTEM: ICD-10-CM

## 2025-01-22 DIAGNOSIS — M54.12 CERVICAL RADICULOPATHY: ICD-10-CM

## 2025-01-22 LAB
25(OH)D3 SERPL-MCNC: 57.3 NG/ML (ref 30–100)
ALBUMIN SERPL-MCNC: 4.1 G/DL (ref 3.5–5.2)
ALBUMIN/GLOB SERPL: 1.6 G/DL
ALP SERPL-CCNC: 114 U/L (ref 39–117)
ALT SERPL W P-5'-P-CCNC: 24 U/L (ref 1–41)
ANION GAP SERPL CALCULATED.3IONS-SCNC: 12 MMOL/L (ref 5–15)
AST SERPL-CCNC: 22 U/L (ref 1–40)
BACTERIA UR QL AUTO: NORMAL /HPF
BASOPHILS # BLD AUTO: 0.08 10*3/MM3 (ref 0–0.2)
BASOPHILS NFR BLD AUTO: 1 % (ref 0–1.5)
BILIRUB SERPL-MCNC: 0.3 MG/DL (ref 0–1.2)
BILIRUB UR QL STRIP: NEGATIVE
BUN SERPL-MCNC: 14 MG/DL (ref 6–20)
BUN/CREAT SERPL: 15.2 (ref 7–25)
CALCIUM SPEC-SCNC: 8.8 MG/DL (ref 8.6–10.5)
CHLORIDE SERPL-SCNC: 107 MMOL/L (ref 98–107)
CHOLEST SERPL-MCNC: 55 MG/DL (ref 0–200)
CLARITY UR: CLEAR
CO2 SERPL-SCNC: 21 MMOL/L (ref 22–29)
COLOR UR: YELLOW
CREAT SERPL-MCNC: 0.92 MG/DL (ref 0.76–1.27)
DEPRECATED RDW RBC AUTO: 45.4 FL (ref 37–54)
EGFRCR SERPLBLD CKD-EPI 2021: 95.8 ML/MIN/1.73
EOSINOPHIL # BLD AUTO: 0.36 10*3/MM3 (ref 0–0.4)
EOSINOPHIL NFR BLD AUTO: 4.7 % (ref 0.3–6.2)
ERYTHROCYTE [DISTWIDTH] IN BLOOD BY AUTOMATED COUNT: 12.9 % (ref 12.3–15.4)
GLOBULIN UR ELPH-MCNC: 2.6 GM/DL
GLUCOSE SERPL-MCNC: 108 MG/DL (ref 65–99)
GLUCOSE UR STRIP-MCNC: ABNORMAL MG/DL
HBA1C MFR BLD: 5.7 % (ref 4.8–5.6)
HCT VFR BLD AUTO: 44.7 % (ref 37.5–51)
HDLC SERPL-MCNC: 29 MG/DL (ref 40–60)
HGB BLD-MCNC: 15.5 G/DL (ref 13–17.7)
HGB UR QL STRIP.AUTO: NEGATIVE
HOLD SPECIMEN: NORMAL
HYALINE CASTS UR QL AUTO: NORMAL /LPF
IMM GRANULOCYTES # BLD AUTO: 0.03 10*3/MM3 (ref 0–0.05)
IMM GRANULOCYTES NFR BLD AUTO: 0.4 % (ref 0–0.5)
KETONES UR QL STRIP: NEGATIVE
LDLC SERPL CALC-MCNC: 5 MG/DL (ref 0–100)
LDLC/HDLC SERPL: 0.12 {RATIO}
LEUKOCYTE ESTERASE UR QL STRIP.AUTO: ABNORMAL
LYMPHOCYTES # BLD AUTO: 2.81 10*3/MM3 (ref 0.7–3.1)
LYMPHOCYTES NFR BLD AUTO: 36.4 % (ref 19.6–45.3)
MCH RBC QN AUTO: 33.3 PG (ref 26.6–33)
MCHC RBC AUTO-ENTMCNC: 34.7 G/DL (ref 31.5–35.7)
MCV RBC AUTO: 95.9 FL (ref 79–97)
MONOCYTES # BLD AUTO: 0.62 10*3/MM3 (ref 0.1–0.9)
MONOCYTES NFR BLD AUTO: 8 % (ref 5–12)
NEUTROPHILS NFR BLD AUTO: 3.82 10*3/MM3 (ref 1.7–7)
NEUTROPHILS NFR BLD AUTO: 49.5 % (ref 42.7–76)
NITRITE UR QL STRIP: NEGATIVE
NRBC BLD AUTO-RTO: 0 /100 WBC (ref 0–0.2)
PH UR STRIP.AUTO: 6 [PH] (ref 5–8)
PLATELET # BLD AUTO: 339 10*3/MM3 (ref 140–450)
PMV BLD AUTO: 10.4 FL (ref 6–12)
POTASSIUM SERPL-SCNC: 4.2 MMOL/L (ref 3.5–5.2)
PROT SERPL-MCNC: 6.7 G/DL (ref 6–8.5)
PROT UR QL STRIP: NEGATIVE
RBC # BLD AUTO: 4.66 10*6/MM3 (ref 4.14–5.8)
RBC # UR STRIP: NORMAL /HPF
REF LAB TEST METHOD: NORMAL
SODIUM SERPL-SCNC: 140 MMOL/L (ref 136–145)
SP GR UR STRIP: 1.03 (ref 1–1.03)
SQUAMOUS #/AREA URNS HPF: NORMAL /HPF
T4 FREE SERPL-MCNC: 1.23 NG/DL (ref 0.92–1.68)
TRIGL SERPL-MCNC: 112 MG/DL (ref 0–150)
TSH SERPL DL<=0.05 MIU/L-ACNC: 1.65 UIU/ML (ref 0.27–4.2)
UROBILINOGEN UR QL STRIP: ABNORMAL
VIT B12 BLD-MCNC: 420 PG/ML (ref 211–946)
VLDLC SERPL-MCNC: 21 MG/DL (ref 5–40)
WBC # UR STRIP: NORMAL /HPF
WBC NRBC COR # BLD AUTO: 7.72 10*3/MM3 (ref 3.4–10.8)

## 2025-01-22 PROCEDURE — 72141 MRI NECK SPINE W/O DYE: CPT | Performed by: RADIOLOGY

## 2025-01-22 PROCEDURE — 82306 VITAMIN D 25 HYDROXY: CPT

## 2025-01-22 PROCEDURE — 83036 HEMOGLOBIN GLYCOSYLATED A1C: CPT

## 2025-01-22 PROCEDURE — 72141 MRI NECK SPINE W/O DYE: CPT

## 2025-01-22 PROCEDURE — 81001 URINALYSIS AUTO W/SCOPE: CPT

## 2025-01-22 PROCEDURE — 82607 VITAMIN B-12: CPT

## 2025-01-22 PROCEDURE — 80061 LIPID PANEL: CPT

## 2025-01-22 PROCEDURE — 80053 COMPREHEN METABOLIC PANEL: CPT

## 2025-01-22 PROCEDURE — 83525 ASSAY OF INSULIN: CPT

## 2025-01-22 PROCEDURE — 36415 COLL VENOUS BLD VENIPUNCTURE: CPT

## 2025-01-22 PROCEDURE — 85025 COMPLETE CBC W/AUTO DIFF WBC: CPT

## 2025-01-22 PROCEDURE — 84443 ASSAY THYROID STIM HORMONE: CPT

## 2025-01-22 PROCEDURE — 84439 ASSAY OF FREE THYROXINE: CPT

## 2025-01-23 ENCOUNTER — SPECIALTY PHARMACY (OUTPATIENT)
Dept: PHARMACY | Facility: HOSPITAL | Age: 60
End: 2025-01-23
Payer: MEDICARE

## 2025-01-23 LAB — INSULIN SERPL-ACNC: 25.7 UIU/ML (ref 2.6–24.9)

## 2025-01-23 NOTE — PROGRESS NOTES
Specialty Pharmacy Refill Coordination Note     Javier is a 59 y.o. male contacted today regarding refills of  Repatha SureClick specialty medication(s).    Reviewed and verified with patient:       Specialty medication(s) and dose(s) confirmed: yes    Refill Questions      Flowsheet Row Most Recent Value   Changes to allergies? No   Changes to medications? No   New conditions or infections since last clinic visit No   Unplanned office visit, urgent care, ED, or hospital admission in the last 4 weeks  No   How does patient/caregiver feel medication is working? Good   Financial problems or insurance changes  No   Since the previous refill, were any specialty medication doses or scheduled injections missed or delayed?  No   Does this patient require a clinical escalation to a pharmacist? No            Delivery Questions      Flowsheet Row Most Recent Value   Delivery method  at Pharmacy   Delivery address Prescription   Medication(s) being filled and delivered Evolocumab (REPATHA)   Copay verified? Yes   Copay amount $0   Copay form of payment No copayment ($0)   Signature Required No                   Follow-up: 28 day(s)     Neha Mccabe, Pharmacy Technician  Specialty Pharmacy Technician

## 2025-02-18 ENCOUNTER — TELEPHONE (OUTPATIENT)
Dept: CARDIOLOGY | Facility: CLINIC | Age: 60
End: 2025-02-18

## 2025-02-18 ENCOUNTER — SPECIALTY PHARMACY (OUTPATIENT)
Dept: PHARMACY | Facility: HOSPITAL | Age: 60
End: 2025-02-18
Payer: MEDICARE

## 2025-02-18 NOTE — TELEPHONE ENCOUNTER
Caller: FUAD HUMPHREY    Relationship: Self    Best call back number: [phone] (home)     What is the best time to reach you: ANYTIME    Who are you requesting to speak with (clinical staff, provider,  specific staff member): ANYONE    What was the call regarding: PT MISSED CALL. NO NOTES IN CHART. PLEASE CALL PT WHEN AVAILABLE.

## 2025-02-18 NOTE — PROGRESS NOTES
Specialty Pharmacy Patient Management Program  Medication Management Clinic Refill Outreach      Javier was contacted today regarding refills of his medication(s).    Specialty medication(s) and dose(s) confirmed: repatha sureclick    Refill Questions      Flowsheet Row Most Recent Value   Changes to allergies? No   Changes to medications? No   New conditions or infections since last clinic visit No   Unplanned office visit, urgent care, ED, or hospital admission in the last 4 weeks  No   How does patient/caregiver feel medication is working? Very good   Financial problems or insurance changes  No   Since the previous refill, were any specialty medication doses or scheduled injections missed or delayed?  No   Does this patient require a clinical escalation to a pharmacist? No          Delivery Questions      Flowsheet Row Most Recent Value   Delivery method  at Pharmacy   Number of medications in delivery 1   Medication(s) being filled and delivered Evolocumab (REPATHA)   Doses left of specialty medications 0   Copay verified? Yes   Copay amount $0.00   Copay form of payment No copayment ($0)   Signature Required No            Follow-Up: 28 days    Sylvia Crews, PharmD  2/18/2025  14:06 EST

## 2025-02-18 NOTE — PROGRESS NOTES
[facility] Pharmacy Patient Management Program  Lipid Management Program - PCSK9i Initial Assessment     Javier Santiago is a 59 y.o. male referred by their provider, Randee Jett, to the Hyperlipidemia Patient Management program offered by [facility] for Lipid Management.  Javier Santiago is  treated for clinical ASCVD/familial hypercholesterolemia and currently takes Atorvastatin 80 mg and Repatha 140 mg every 2 weeks.  In the past, Pt has tried Lipitor but is not statin intolerant. The patient denies any allergies to latex.         A follow-up outreach was conducted, including assessment of continued therapy appropriateness, medication adherence, and side effect incidence and management for Repatha. The patient denies any trouble giving themself the injection.  He denies missing doses or adverse effects.     Changes to Insurance Coverage or Financial Support  [employer]- no changes    Relevant Past Medical History and Comorbidities  Relevant medical history and concomitant health conditions were discussed with the patient. The patient's chart has been reviewed for relevant past medical history and comorbid health conditions and updated as necessary.   Past Medical History:   Diagnosis Date    Acute coronary syndrome 03/15/2024    Anxiety     Arthritis     psoriatic    Arthritis of back     Arthritis of neck     ASCVD (arteriosclerotic cardiovascular disease)     Cervical disc disorder     CHF (congestive heart failure)     Chronic low back pain     COPD (chronic obstructive pulmonary disease)     Coronary artery disease     CTS (carpal tunnel syndrome)     Fibromyalgia     Fracture, finger     Frozen shoulder     Full dentures     GERD (gastroesophageal reflux disease)     History of EKG 01/26/2016    NORMAL    Hyperlipidemia     Hypertension     Hypotension     Insomnia     Knee swelling     Low back strain     Lumbosacral disc disease     MI,  old 2015    MRSA infection 08/2022    NECK SURGERY    Neck strain     Psoriasis     Psoriatic arthritis     RA (rheumatoid arthritis)     Rotator cuff syndrome     Sciatic nerve pain     Skin pain     from nerve pain    Sleep apnea     cpap non compliant    Tennis elbow     Thoracic disc disorder     Tinnitus     Wears glasses     readers     Social History     Socioeconomic History    Marital status:      Spouse name: Coretta Bhatt   Tobacco Use    Smoking status: Every Day     Current packs/day: 1.00     Average packs/day: 0.8 packs/day for 85.1 years (67.6 ttl pk-yrs)     Types: Cigarettes     Start date: 1975    Smokeless tobacco: Former     Types: Chew   Vaping Use    Vaping status: Never Used   Substance and Sexual Activity    Alcohol use: No    Drug use: Never    Sexual activity: Defer     Problem list reviewed by Sylvia Crews, Mary on 2/18/2025 at  2:03 PM    Hospitalizations and Urgent Care Since Last Assessment  ED Visits, Admissions, or Hospitalizations: none  Urgent Office Visits: none    Allergies  Known allergies and reactions were discussed with the patient. The patient's chart has been reviewed for allergy information and updated as necessary.   No Known Allergies  Allergies reviewed by Sylvia Crews, Mary on 2/18/2025 at  2:03 PM    Relevant Laboratory Values  Relevant laboratory values were discussed with the patient. The following specialty medication dose adjustment(s) are recommended: none    Lab Results   Component Value Date    GLUCOSE 108 (H) 01/22/2025    CALCIUM 8.8 01/22/2025     01/22/2025    K 4.2 01/22/2025    CO2 21.0 (L) 01/22/2025     01/22/2025    BUN 14 01/22/2025    CREATININE 0.92 01/22/2025    EGFRIFNONA 73 02/11/2021    BCR 15.2 01/22/2025    ANIONGAP 12.0 01/22/2025     Lab Results   Component Value Date    CHOL 55 01/22/2025    CHLPL 170 01/11/2016    TRIG 112 01/22/2025    HDL 29 (L) 01/22/2025    LDL 5 01/22/2025     Current Medication  List  This medication list has been reviewed with the patient and evaluated for any interactions or necessary modifications/recommendations, and updated to include all prescription medications, OTC medications, and supplements the patient is currently taking.  This list reflects what is contained in the patient's profile, which has also been marked as reviewed to communicate to other providers it is the most up to date version of the patient's current medication therapy.     Current Outpatient Medications:     albuterol (PROVENTIL HFA;VENTOLIN HFA) 108 (90 BASE) MCG/ACT inhaler, Inhale 2 puffs Every 4 (Four) Hours As Needed for Wheezing., Disp: , Rfl:     ALPRAZolam (XANAX) 1 MG tablet, Take 1 tablet by mouth Every Other Day. Only takes 10 per month., Disp: , Rfl:     aspirin 81 MG tablet, Take 1 tablet by mouth Daily., Disp: 30 tablet, Rfl: 11    atorvastatin (LIPITOR) 80 MG tablet, Take 1 tablet by mouth Daily., Disp: , Rfl:     baclofen (LIORESAL) 10 MG tablet, Take 1 tablet by mouth 3 (Three) Times a Day., Disp: , Rfl:     clopidogrel (PLAVIX) 75 MG tablet, Take 1 tablet by mouth Daily., Disp: 90 tablet, Rfl: 5    cyanocobalamin 1000 MCG/ML injection, Inject 1 mL into the appropriate muscle as directed by prescriber Every 28 (Twenty-Eight) Days., Disp: , Rfl:     dapagliflozin Propanediol 10 MG tablet, Take 10 mg by mouth Daily., Disp: 30 tablet, Rfl: 5    Enbrel 50 MG/ML injection, Inject 1 mL under the skin into the appropriate area as directed 1 (One) Time Per Week. Wednesday, Disp: , Rfl:     EPINEPHrine (EPIPEN IJ), Inject 1 dose as directed As Needed (ALLERGIC REACTION)., Disp: , Rfl:     Evolocumab (REPATHA) solution auto-injector SureClick injection, Inject 1 mL under the skin into the appropriate area as directed Every 14 (Fourteen) Days., Disp: 2 mL, Rfl: 5    famotidine (Pepcid) 40 MG tablet, Take 1 tablet by mouth 2 (Two) Times a Day., Disp: 90 tablet, Rfl: 1    HYDROcodone-acetaminophen (NORCO)   MG per tablet, Take 1 tablet by mouth 3 (Three) Times a Day As Needed for Moderate Pain . (Patient taking differently: Take 1 tablet by mouth Every 6 (Six) Hours As Needed for Moderate Pain.), Disp: 30 tablet, Rfl: 0    hydrOXYzine (ATARAX) 25 MG tablet, Take 1 tablet by mouth Every 8 (Eight) Hours As Needed for Itching. Rarely, Disp: , Rfl:     isosorbide mononitrate (IMDUR) 30 MG 24 hr tablet, Take 1 tablet by mouth Daily., Disp: , Rfl:     metoprolol succinate XL (TOPROL-XL) 25 MG 24 hr tablet, Take 0.5 tablet by mouth Daily., Disp: 45 tablet, Rfl: 1    nicotine (NICODERM CQ) 21 MG/24HR patch, Place 1 patch on the skin as directed by provider Daily., Disp: 30 patch, Rfl: 2    nitroglycerin (NITROSTAT) 0.4 MG SL tablet, Place 1 tablet under the tongue Every 5 (Five) Minutes As Needed for Chest Pain. Take no more than 3 doses in 15 minutes., Disp: 30 tablet, Rfl: 0    pantoprazole (PROTONIX) 40 MG EC tablet, , Disp: , Rfl:     ranolazine (Ranexa) 500 MG 12 hr tablet, Take 1 tablet by mouth 2 (Two) Times a Day., Disp: 60 tablet, Rfl: 1    sacubitril-valsartan (Entresto) 24-26 MG tablet, Take 0.5 tablets by mouth 2 (Two) Times a Day., Disp: 30 tablet, Rfl: 1    spironolactone (ALDACTONE) 25 MG tablet, Take 0.5 tablet by mouth Daily., Disp: 15 tablet, Rfl: 11    SYMBICORT 80-4.5 MCG/ACT inhaler, Inhale 2 puffs 2 (Two) Times a Day As Needed., Disp: , Rfl:     vitamin D (ERGOCALCIFEROL) 1.25 MG (67609 UT) capsule capsule, Take 1 capsule by mouth 1 (One) Time Per Week., Disp: , Rfl:     gabapentin (NEURONTIN) 600 MG tablet, Take 1 tablet by mouth 4 (Four) Times a Day., Disp: , Rfl:     Medicines reviewed by Sylvia Crews, PharmD on 2/18/2025 at  2:01 PM    Drug Interactions  None with repatha    Adverse Drug Reactions  Medication tolerability: Tolerating with no to minimal ADRs  Medication plan: Continue therapy with normal follow-up  Plan for ADR Management: n/a    Adherence, Self-Administration, and Current  Therapy Problems  Adherence related to the patient's specialty therapy was discussed with the patient. The Adherence segment of this outreach has been reviewed and updated.     Adherence Questions  Linked Medication(s) Assessed: Evolocumab (REPATHA)  On average, how many doses/injections does the patient miss per month?: 0  What are the identified reasons for non-adherence or missed doses? : no problems identified  What is the estimated medication adherence level?: %  Based on the patient/caregiver response and refill history, does this patient require an MTP to track adherence improvements?: no    Additional Barriers to Patient Self-Administration: none  Methods for Supporting Patient Self-Administration: calendar use    Open Medication Therapy Problems  No medication therapy recommendations to display    Goals of Therapy  Goals related to the patient's specialty therapy were discussed with the patient. The Patient Goals segment of this outreach has been reviewed and updated.   Goals Addressed Today        Specialty Pharmacy General Goal      LDL < 70 mg/dl    2/18/325 [place]: LDL 5 mg/dl (at goal)              Quality of Life Assessment   Quality of Life related to the patient's enrollment in the patient management program and services provided was discussed with the patient. The QOL segment of this outreach has been reviewed and updated.  Quality of Life Improvement Scale: 10-Significantly better    Reassessment Plan & Follow-Up  1. Medication Therapy Changes: no changes to report  2. Related Plans, Therapy Recommendations, or Issues to Be Addressed:    -LDL at goal  3. Pharmacist to perform regular assessments no more than (6) months from the previous assessment.  Patient will continue regular follow-up with referring provider.   4. Care Coordinator to set up future refill outreaches, coordinate prescription delivery, and escalate clinical questions to pharmacist.    Attestation  Therapeutic appropriateness:  Appropriate   I attest the patient was actively involved in and has agreed to the above plan of care.  If the prescribed therapy is at any point deemed not appropriate based on the current or future assessments, a consultation will be initiated with the patient's specialty care provider to determine the best course of action. The revised plan of therapy will be documented along with any required assessments and/or additional patient education provided.     Sylvia Crews, PharmD  2/18/2025  14:07 EST

## 2025-02-19 ENCOUNTER — TELEPHONE (OUTPATIENT)
Dept: CARDIOLOGY | Facility: CLINIC | Age: 60
End: 2025-02-19
Payer: MEDICARE

## 2025-02-19 NOTE — TELEPHONE ENCOUNTER
Patient states he had a call from a [employer] number but was unsure if it was cardiology office that was trying to reach him. Patient does have appointment scheduled in March but I could not find were anyone in this office had attempted to reach out to him. He states he did speak to someone yesterday and thinks it was that particular office. Patient will keep follow up for march 2025

## 2025-02-24 ENCOUNTER — OFFICE VISIT (OUTPATIENT)
Dept: ORTHOPEDIC SURGERY | Facility: CLINIC | Age: 60
End: 2025-02-24
Payer: MEDICARE

## 2025-02-24 VITALS — HEIGHT: 70 IN | WEIGHT: 179 LBS | BODY MASS INDEX: 25.62 KG/M2

## 2025-02-24 DIAGNOSIS — R20.0 BILATERAL HAND NUMBNESS: Primary | ICD-10-CM

## 2025-02-24 PROCEDURE — 99214 OFFICE O/P EST MOD 30 MIN: CPT | Performed by: ORTHOPAEDIC SURGERY

## 2025-02-24 NOTE — PROGRESS NOTES
Follow-up Visit         Patient: Javier Santiago  YOB: 1965  Date of Encounter: 02/24/2025      Chief  Complaint:   Chief Complaint   Patient presents with    Cervical Spine - Follow-up         HPI:  Javier Santiago, 59 y.o. male presents in follow-up bilateral hand pain involving thumb greater than index and third fingers bilaterally.  He presents today for MRI review.  He has history of degenerative disc disease of cervical spine and has undergone cervical spine fusion in 2021 and again in 2022.  His fusion was from C3-C6.  He does not present with significant neck pain.  He was initially suspected of having bilateral carpal tunnel syndrome and was provided steroid injection and December 18, 2024 he reports minimal improvement.  Past medical history includes psoriasis and ischemic cardiomyopathy.        Medical History:  Patient Active Problem List   Diagnosis    Hiatal hernia    Abdominal discomfort, epigastric    Essential hypertension    Familial hypercholesterolemia    Atypical chest pain    Obstructive sleep apnea syndrome    Coronary artery disease involving native coronary artery of native heart with angina pectoris    Tobacco abuse    On clopidogrel therapy    Gastroesophageal reflux disease    Dysphagia    Angina, class II    Shortness of breath    Myelopathy concurrent with and due to spinal stenosis of cervical region    Cervical spondylosis with myelopathy    S/P cervical spinal fusion    Dysesthesia affecting both sides of body    Esophageal stricture    Gastritis    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)    Ischemic cardiomyopathy     Past Medical History:   Diagnosis Date    Acute coronary syndrome 03/15/2024    Anxiety     Arthritis     psoriatic    Arthritis of back     Arthritis of neck     ASCVD (arteriosclerotic cardiovascular disease)     Cervical disc disorder     CHF (congestive heart failure)     Chronic low back pain     COPD (chronic obstructive pulmonary  disease)     Coronary artery disease     CTS (carpal tunnel syndrome)     Fibromyalgia     Fracture, finger     Frozen shoulder     Full dentures     GERD (gastroesophageal reflux disease)     History of EKG 01/26/2016    NORMAL    Hyperlipidemia     Hypertension     Hypotension     Insomnia     Knee swelling     Low back strain     Lumbosacral disc disease     MI, old 2015    MRSA infection 08/2022    NECK SURGERY    Neck strain     Psoriasis     Psoriatic arthritis     RA (rheumatoid arthritis)     Rotator cuff syndrome     Sciatic nerve pain     Skin pain     from nerve pain    Sleep apnea     cpap non compliant    Tennis elbow     Thoracic disc disorder     Tinnitus     Wears glasses     readers           Social History:  Social History     Socioeconomic History    Marital status:      Spouse name: Coretta Bhatt   Tobacco Use    Smoking status: Every Day     Current packs/day: 1.00     Average packs/day: 0.8 packs/day for 85.1 years (67.6 ttl pk-yrs)     Types: Cigarettes     Start date: 1975    Smokeless tobacco: Former     Types: Chew   Vaping Use    Vaping status: Never Used   Substance and Sexual Activity    Alcohol use: No    Drug use: Never    Sexual activity: Defer           Current Medications:    Current Outpatient Medications:     albuterol (PROVENTIL HFA;VENTOLIN HFA) 108 (90 BASE) MCG/ACT inhaler, Inhale 2 puffs Every 4 (Four) Hours As Needed for Wheezing., Disp: , Rfl:     ALPRAZolam (XANAX) 1 MG tablet, Take 1 tablet by mouth Every Other Day. Only takes 10 per month., Disp: , Rfl:     aspirin 81 MG tablet, Take 1 tablet by mouth Daily., Disp: 30 tablet, Rfl: 11    atorvastatin (LIPITOR) 80 MG tablet, Take 1 tablet by mouth Daily., Disp: , Rfl:     baclofen (LIORESAL) 10 MG tablet, Take 1 tablet by mouth 3 (Three) Times a Day., Disp: , Rfl:     clopidogrel (PLAVIX) 75 MG tablet, Take 1 tablet by mouth Daily., Disp: 90 tablet, Rfl: 5    cyanocobalamin 1000 MCG/ML injection, Inject 1 mL into  the appropriate muscle as directed by prescriber Every 28 (Twenty-Eight) Days., Disp: , Rfl:     dapagliflozin Propanediol 10 MG tablet, Take 10 mg by mouth Daily., Disp: 30 tablet, Rfl: 5    Enbrel 50 MG/ML injection, Inject 1 mL under the skin into the appropriate area as directed 1 (One) Time Per Week. Wednesday, Disp: , Rfl:     EPINEPHrine (EPIPEN IJ), Inject 1 dose as directed As Needed (ALLERGIC REACTION)., Disp: , Rfl:     Evolocumab (REPATHA) solution auto-injector SureClick injection, Inject 1 mL under the skin into the appropriate area as directed Every 14 (Fourteen) Days., Disp: 2 mL, Rfl: 5    Evolocumab (REPATHA) solution auto-injector SureClick injection, Inject 1 mL under the skin into the appropriate area as directed Every 14 (Fourteen) Days., Disp: 2 mL, Rfl: 5    famotidine (Pepcid) 40 MG tablet, Take 1 tablet by mouth 2 (Two) Times a Day., Disp: 90 tablet, Rfl: 1    gabapentin (NEURONTIN) 600 MG tablet, Take 1 tablet by mouth 4 (Four) Times a Day., Disp: , Rfl:     HYDROcodone-acetaminophen (NORCO)  MG per tablet, Take 1 tablet by mouth 3 (Three) Times a Day As Needed for Moderate Pain . (Patient taking differently: Take 1 tablet by mouth Every 6 (Six) Hours As Needed for Moderate Pain.), Disp: 30 tablet, Rfl: 0    hydrOXYzine (ATARAX) 25 MG tablet, Take 1 tablet by mouth Every 8 (Eight) Hours As Needed for Itching. Rarely, Disp: , Rfl:     isosorbide mononitrate (IMDUR) 30 MG 24 hr tablet, Take 1 tablet by mouth Daily., Disp: , Rfl:     metoprolol succinate XL (TOPROL-XL) 25 MG 24 hr tablet, Take 0.5 tablet by mouth Daily., Disp: 45 tablet, Rfl: 1    nicotine (NICODERM CQ) 21 MG/24HR patch, Place 1 patch on the skin as directed by provider Daily., Disp: 30 patch, Rfl: 2    nitroglycerin (NITROSTAT) 0.4 MG SL tablet, Place 1 tablet under the tongue Every 5 (Five) Minutes As Needed for Chest Pain. Take no more than 3 doses in 15 minutes., Disp: 30 tablet, Rfl: 0    pantoprazole (PROTONIX) 40  MG EC tablet, , Disp: , Rfl:     ranolazine (Ranexa) 500 MG 12 hr tablet, Take 1 tablet by mouth 2 (Two) Times a Day., Disp: 60 tablet, Rfl: 1    sacubitril-valsartan (Entresto) 24-26 MG tablet, Take 0.5 tablets by mouth 2 (Two) Times a Day., Disp: 30 tablet, Rfl: 1    spironolactone (ALDACTONE) 25 MG tablet, Take 0.5 tablet by mouth Daily., Disp: 15 tablet, Rfl: 11    SYMBICORT 80-4.5 MCG/ACT inhaler, Inhale 2 puffs 2 (Two) Times a Day As Needed., Disp: , Rfl:     vitamin D (ERGOCALCIFEROL) 1.25 MG (89875 UT) capsule capsule, Take 1 capsule by mouth 1 (One) Time Per Week., Disp: , Rfl:         Allergies:  No Known Allergies        Family History:  Family History   Problem Relation Age of Onset    Heart attack Mother         x 3    Atrial fibrillation Mother     Heart disease Mother     Diabetes Mother     Heart attack Father     Heart disease Father         CABG           Surgical History:  Past Surgical History:   Procedure Laterality Date    ANTERIOR CERVICAL DISCECTOMY W/ FUSION N/A 04/26/2021    Procedure: CERVICAL DISCECTOMY ANTERIOR WITH FUSION C4-6;  Surgeon: Chester Cowan MD;  Location: [facility];  Service: Neurosurgery;  Laterality: N/A;    BACK SURGERY      lumbar 1995    CARDIAC CATHETERIZATION N/A 10/13/2017    Procedure: Left Heart Cath;  Surgeon: Chester Centeno MD;  Location: [facility] CATH INVASIVE LOCATION;  Service:     CARDIAC CATHETERIZATION N/A 02/11/2021    Procedure: Left Heart Cath;  Surgeon: Brent Degroot MD;  [facility];  Service: Cardiology;  Laterality: N/A;    CARDIAC CATHETERIZATION N/A 03/15/2024    Procedure: Coronary angiography;  Surgeon: Charles Montaño MD;  Location:  [facility]ATH [facility]VASIVE [facility];  Service: Cardiology;  Laterality: N/A;    CARDIAC CATHETERIZATION N/A 03/15/2024    Procedure: Percutaneous Coronary Intervention;  Surgeon: Charles Montaño MD;  Location:  [facility]ATH [facility]VAS[facility];  Service: Cardiology;  Laterality:  "N/A;    CATARACT EXTRACTION, BILATERAL Bilateral     CERVICAL DISCECTOMY POSTERIOR FUSION WITH BRAIN LAB N/A 08/04/2022    Procedure: CERVICAL POSTERIOR FUSION WITH INSTRUMENTATION C3-5;  Surgeon: Chester Cowan MD;  Location: [facility] OR;  Service: Neurosurgery;  Laterality: N/A;    COLONOSCOPY      CORONARY ANGIOPLASTY WITH STENT PLACEMENT      x2 stent in place    ENDOSCOPY      ENDOSCOPY N/A 02/09/2018    Procedure: ESOPHAGOGASTRODUODENOSCOPY WITH DILATATION CPT CODE: 65070;  Surgeon: Everett Villanueva III, MD;  Location: [facility] OR;  Service:     ENDOSCOPY N/A 06/07/2023    Procedure: ESOPHAGOGASTRODUODENOSCOPY WITH ESOPHAGEAL BALLOON DILATATION AND BIOPSY;  Surgeon: Sg Black MD;  Location:  [facility];  Service: Gastroenterology;  Laterality: N/A;  BIOPSY OF GASTRIC ULCERS, GEJ, AND ESOPHAGUS BIOPSIES, DILATION UP TO 12 MM WITH BALLOON    ENDOSCOPY WITH FOREIGN BODY REMOVAL N/A 08/05/2022    Procedure: ESOPHAGOGASTRODUODENOSCOPY WITH FOREIGN BODY REMOVAL;  Surgeon: Sg Black MD;  Location:  [facility];  Service: Gastroenterology;  Laterality: N/A;    HAND SURGERY      INTERVENTIONAL RADIOLOGY PROCEDURE N/A 03/15/2024    Procedure: Intravascular Ultrasound;  Surgeon: Charles Montaño MD;  Location: [facility] CATH INVASIVE LOCATION;  Service: Cardiology;  Laterality: N/A;    NECK SURGERY      TRIGGER POINT INJECTION           Vitals:  Vitals:    02/24/25 1038   Weight: 81.2 kg (179 lb)   Height: 177.8 cm (70\")     Body mass index is 25.68 kg/m².        Radiology:   EXAM:    MR Cervical Spine Without Intravenous Contrast     EXAM DATE:    1/22/2025 8:15 AM     CLINICAL HISTORY:    numbness hands bilateral, cervical radiculopathy, history of cervical  spine surgery; R20.0-Anesthesia of skin; M54.2-Cervicalgia;  M54.12-Radiculopathy, cervical region     TECHNIQUE:    Magnetic resonance images of the cervical spine without intravenous  contrast in multiple planes.     COMPARISON:    " No relevant prior studies available.     FINDINGS:    VERTEBRAE:  Unremarkable as visualized.  No acute fracture.    SPINAL CORD:  Unremarkable as visualized.  Normal signal.    SOFT TISSUES:  Unremarkable as visualized.      DISCS/SPINAL CANAL/NEURAL FORAMINA:    C2-C3:  Unremarkable as visualized.  No significant disc disease.  No  stenosis.    C3-C4:  Unremarkable as visualized.  No significant disc disease.  No  stenosis.    C4-C5:  Unremarkable as visualized.  No significant disc disease.  No  stenosis.    C5-C6:  Unremarkable as visualized.  No significant disc disease.  No  stenosis.    C6-C7:  Anterior fusion with plate screw fixation of C4, C5 and C6  vertebral bodies. At C6/C7 there is minimal posterior disc bulging  contributing to bilateral neural foraminal narrowing and mild central  canal stenosis.    C7-T1:  Unremarkable as visualized.  No significant disc disease.  No  stenosis.     IMPRESSION:    Anterior fusion with plate screw fixation of C4, C5 and C6 vertebral  bodies. At C6/C7 there is minimal posterior disc bulging contributing to  bilateral neural foraminal narrowing and mild central canal stenosis.     This report was finalized on 1/22/2025 9:05 AM by Dr. Royal Matt MD.       EMG/NCS:        Orthopedic Examination: Bilateral hands reveals diminished sensation involving both thumbs greater than the index and third fingers bilaterally.  He has moderately positive Phalen sign on the left.          Assessment & Plan:   59 y.o. male presents follow-up carpal tunnel syndrome bilaterally versus radiculopathy.  Nerve conduction studies by report describe mild carpal tunnel syndrome bilaterally obvious cervical radiculopathy was equivocal.  His MRI cervical spine  describes normal posterior disc bulge at the C6-C7 level.  To bilateral neuroforaminal narrowing and mild central stenosis.  He is suspected of having double crush syndrome.  I think carpal tunnel release on the left is a reasonable  option but before considering would prefer to have the opinion of his treating neurosurgeon Dr. Cowan.  Referral is made today he is invited to return in the future as needed for possible carpal tunnel release depending on Dr. Cowan's opinion.         Diagnosis Plan   1. Bilateral hand numbness  Ambulatory Referral to Neurosurgery                  Cc:  Annie Jay FNP              This document has been electronically signed by Luis Leija MD   February 24, 2025 16:33 EST

## 2025-03-12 ENCOUNTER — LAB (OUTPATIENT)
Dept: LAB | Facility: HOSPITAL | Age: 60
End: 2025-03-12
Payer: MEDICARE

## 2025-03-12 ENCOUNTER — OFFICE VISIT (OUTPATIENT)
Dept: CARDIOLOGY | Facility: CLINIC | Age: 60
End: 2025-03-12
Payer: MEDICARE

## 2025-03-12 VITALS
HEART RATE: 74 BPM | WEIGHT: 169 LBS | OXYGEN SATURATION: 94 % | HEIGHT: 70 IN | DIASTOLIC BLOOD PRESSURE: 69 MMHG | SYSTOLIC BLOOD PRESSURE: 112 MMHG | BODY MASS INDEX: 24.2 KG/M2

## 2025-03-12 DIAGNOSIS — I25.119 CORONARY ARTERY DISEASE INVOLVING NATIVE CORONARY ARTERY OF NATIVE HEART WITH ANGINA PECTORIS: Chronic | ICD-10-CM

## 2025-03-12 DIAGNOSIS — E78.01 FAMILIAL HYPERCHOLESTEROLEMIA: Chronic | ICD-10-CM

## 2025-03-12 DIAGNOSIS — I50.32 HEART FAILURE WITH RECOVERED EJECTION FRACTION (HFRECEF): ICD-10-CM

## 2025-03-12 DIAGNOSIS — R06.02 SHORTNESS OF BREATH: ICD-10-CM

## 2025-03-12 DIAGNOSIS — E78.01 FAMILIAL HYPERCHOLESTEROLEMIA: Primary | Chronic | ICD-10-CM

## 2025-03-12 LAB
HCT VFR BLD AUTO: 44 % (ref 37.5–51)
PA ADP PRP-ACNC: 7 PRU
PLATELET # BLD AUTO: 312 10*3/MM3 (ref 140–450)

## 2025-03-12 PROCEDURE — 80053 COMPREHEN METABOLIC PANEL: CPT

## 2025-03-12 PROCEDURE — 85027 COMPLETE CBC AUTOMATED: CPT

## 2025-03-12 PROCEDURE — 36415 COLL VENOUS BLD VENIPUNCTURE: CPT | Performed by: NURSE PRACTITIONER

## 2025-03-12 PROCEDURE — 80061 LIPID PANEL: CPT

## 2025-03-12 PROCEDURE — 85576 BLOOD PLATELET AGGREGATION: CPT | Performed by: NURSE PRACTITIONER

## 2025-03-12 RX ORDER — ATORVASTATIN CALCIUM 40 MG/1
40 TABLET, FILM COATED ORAL DAILY
Qty: 90 TABLET | Refills: 3 | Status: SHIPPED | OUTPATIENT
Start: 2025-03-12

## 2025-03-12 NOTE — PROGRESS NOTES
Chief Complaint  Follow-up (Patient states has chest pain , mostly in AM, also shortness of breath on exertion )    Subjective          Javier Santiago presents to [facility] for follow up.    History of Present Illness  Mr. Santiago presents for follow-up of coronary artery disease, familial hyperlipidemia, essential hypertension, and heart failure with recovered ejection fraction.    History of Present Illness  The patient presents for evaluation of chest pain and shortness of breath.    He reports experiencing intermittent chest pain, predominantly in the mornings but also occasionally at night. He has been self-administering nitroglycerin daily to manage these symptoms. Initially, he suspected acid reflux as the cause of his discomfort. He does not report any exacerbation of swelling but does experience occasional heart palpitations. Accompanying symptoms include leg pain, arm tingling, and perspiration during episodes of chest pain. He also reports nausea, a symptom he typically experiences with severe pain. He is uncertain about his current medication regimen as his wife manages his medications. He has requested a list of his prescribed medications for reference. He continues to smoke, although he has reduced his intake by half a pack.  He has expressed a desire to quit smoking but has found it challenging. He has requested a refill of his nitroglycerin prescription.    He also reports dyspnea on exertion, which has been progressively worsening. He does not have a pulmonologist and has not undergone any recent lung screenings.    He occasionally monitors his blood pressure at home, which typically ranges from 112 to 130 systolic. He recalls a period when his blood pressure was in the 80s following the discontinuation of a certain medication.    SOCIAL HISTORY  The patient admits to smoking and has cut back by about half a pack.           Tobacco Use: High Risk (3/15/2025)    Patient  "History     Smoking Tobacco Use: Every Day     Smokeless Tobacco Use: Former     Passive Exposure: Not on file       Objective     Vital Signs:   /69 (BP Location: Left arm, Patient Position: Sitting, Cuff Size: Adult)   Pulse 74   Ht 177.8 cm (70\")   Wt 76.7 kg (169 lb)   SpO2 94%   BMI 24.25 kg/m²       Physical Exam  Vitals and nursing note reviewed.   Constitutional:       General: He is not in acute distress.  HENT:      Head: Normocephalic and atraumatic.   Eyes:      Conjunctiva/sclera: Conjunctivae normal.   Neck:      Vascular: No carotid bruit.   Cardiovascular:      Rate and Rhythm: Normal rate and regular rhythm.      Pulses: Normal pulses.   Pulmonary:      Effort: Pulmonary effort is normal.      Breath sounds: Normal breath sounds.   Musculoskeletal:      Cervical back: Neck supple.      Right lower leg: No edema.      Left lower leg: No edema.   Skin:     General: Skin is warm and dry.   Neurological:      General: No focal deficit present.   Psychiatric:         Mood and Affect: Mood normal.         Behavior: Behavior normal.             Result Review :   The following data was reviewed by: OBDULIO Juan on 03/12/2025:  Common labs          7/1/2024    10:00 1/22/2025    08:59 3/12/2025    14:32   Common Labs   Glucose 92  108  94    BUN 12  14  11    Creatinine 1.05  0.92  0.94    Sodium 139  140  139    Potassium 4.3  4.2  4.4    Chloride 108  107  106    Calcium 9.8  8.8  9.3    Albumin  4.1  4.4    Total Bilirubin  0.3  0.7    Alkaline Phosphatase  114  97    AST (SGOT)  22  22    ALT (SGPT)  24  18    WBC  7.72  6.85    Hemoglobin  15.5  15.4    Hematocrit  44.7  44.0     46.8    Platelets  339  312     333    Total Cholesterol  55  60    Triglycerides  112  67    HDL Cholesterol  29  33    LDL Cholesterol   5  11    Hemoglobin A1C  5.70       Lipid Panel          6/18/2024    08:36 1/22/2025    08:59 3/12/2025    14:32   Lipid Panel   Total Cholesterol 76  55  60  "   Triglycerides 71  112  67    HDL Cholesterol 29  29  33    VLDL Cholesterol 16  21  16    LDL Cholesterol  31  5  11    LDL/HDL Ratio 1.13  0.12  0.41        Data reviewed : Cardiology studies as detailed below      Last Cardiac Cath  Results for orders placed during the hospital encounter of 03/15/24    Intravascular Ultrasound    Conclusion  Images from the original result were not included.  CARDIAC CATHETERIZATION / INTERVENTION REPORT    DATE OF PROCEDURE: 3/15/2024    INDICATION FOR PROCEDURE: chest pain      PRE PROCEDURE DIAGNOSIS:  Unstable angina with history of coronary disease    POST PROCEDURE DIAGNOSIS:  Severe coronary disease with critical stenosis in the proximal LAD and severe lesion in the obtuse marginal    Face to face moderate conscious sedation time:      COMPLICATIONS : None    Access Site :  6 Uzbek right radial    PROCEDURE PERFORMED:    1. Coronary Angiogram  2. Left heart catheterization  3.IVUS to the LAD and Lcx  4.PCI with Shockwave to the LAD  5.PCI to OM1      PROCEDURE COMMENTS:    Prior to the procedure risks benefits alternatives and possible adverse events were discussed with the patient and informed consent was obtained.  Moderate conscious sedation was utilized for 60 minutes refrigeration procedure supervised administration fentanyl and Versed that was given independently by Tania cabral registered nurse.  Javier Santiago was brought to the cath lab and placed on the cardiac catherization table in the postabsortive state. The patient was prepped and draped according to the cath lab protocol under strict aseptic and sterile condition.  We obtained access with a slender sheath 6 Uzbek in the right radial artery after giving lidocaine in the right radial artery.  We went with a Negrito catheter over J-wire to engage the left main and the RCA and also cross the LV cavity to obtain a pressure pullback pressure as well.  With subsequent moved onto intervention.  We gave additional  heparin and used an Akari 4 guide to engage the left main.  We took a Prowater wire down the LAD and subsequently performed IVUS.  Lesion appears to be partially in-stent and partially Denovo we used a 3 mm noncompliant balloon however there was a waist and then we used a 3 x 12 mm shockwave lithotripsy balloon.  However due to prep errors we were not able to deliver complete shocks and went to deliver 35 shocks.  This is despite changing out for the balloon.  We subsequently then inserted a 3 x 12 mm Xience stent inflating it to 23 hector.  We then redirected our wire into the obtuse marginal performing IVUS.  The luminal area of this lesion is 2.5 mm².  We subsequently directly stented with a 2.5 x 15 mm Xience stent inflating it to 23 hector.  We then used a 2.5 noncompliant balloon inflating it to 23 hector.  Angiographically there is no residual stenosis and DALILA-3 flow in all vessels.  We did give 325 aspirin and 600 mg of Plavix        Coronary anatomy findings:  Left main appears normal.  The obtuse marginal in the midportion has a 75 to 80% stenosis.  The proximal LAD has a 90% stenosis.  Mid LAD has a 30% stenosis.  The stents in the proximal LAD appear to be underexpanded on IVUS        LVEDP: 17 mmHg  No gradient across the aortic valve on pull back.      PERCUTANEOUS CORONARY INTERVENTION PROCEDURE NOTE:        Lesion length: 5 mm  Pre PCI Stenosis: 90  %  Post PCI stenosis: 0 %  Pre PCI DALILA flow: 3 in the LAD and circumflex  Post PCI DALILA flow: 3 in the LAD and circumflex    EBL: Less than 20cc        Final Impression:  In-stent restenosis in the proximal LAD status post shockwave lithotripsy and IVUS guided PCI.  Successful PCI of the mid OM1 with IVUS guidance and no residual stenosis  Mild to moderate disease in the mid LAD continue risk factor modification          Recommendations:  Aggressive guideline directed medical management for CAD  Dual Antiplatelet therapy    Charles Montaño MD  03/15/24  10:39  EDT      Last Stress test  Results for orders placed during the hospital encounter of 01/20/21    Stress Test With Myocardial Perfusion One Day    Interpretation Summary  · Myocardial perfusion imaging indicates a small-sized infarct located in the apex with mild ischemia in the distal anteroapical territory.  · Left ventricular ejection fraction is normal. (Calculated EF = 60%).  · Findings consistent with a normal ECG stress test.  · Impressions are consistent with an intermediate risk study.       Last Echo  Results for orders placed during the hospital encounter of 08/27/24    Adult Transthoracic Echo Complete w/ Color, Spectral and Contrast if necessary per protocol    Interpretation Summary    Left ventricular systolic function is normal. Calculated left ventricular EF = 61% Left ventricular ejection fraction appears to be 56 - 60%.    Left ventricular diastolic function was normal.         ECG 12 Lead    Date/Time: 3/12/2025 12:32 PM  Performed by: Macey Jett APRN    Authorized by: Macey Jett APRN  Comparison: compared with previous ECG from 7/7/2024  Similar to previous ECG  Comparison to previous ECG: Sinus rhythm  Rhythm: sinus rhythm  Rate: normal  BPM: 79  Conduction: conduction normal  ST Segments: ST segments normal  QRS axis: normal    Clinical impression: normal ECG           Current Outpatient Medications   Medication Sig Dispense Refill    albuterol (PROVENTIL HFA;VENTOLIN HFA) 108 (90 BASE) MCG/ACT inhaler Inhale 2 puffs Every 4 (Four) Hours As Needed for Wheezing.      ALPRAZolam (XANAX) 1 MG tablet Take 1 tablet by mouth Every Other Day. Only takes 10 per month. (Patient taking differently: Take 1 tablet by mouth Every Other Day. Only takes 15 per month)      aspirin 81 MG tablet Take 1 tablet by mouth Daily. 30 tablet 11    atorvastatin (LIPITOR) 40 MG tablet Take 1 tablet by mouth Daily. 90 tablet 3    baclofen (LIORESAL) 10 MG tablet Take 1 tablet by mouth 3 (Three) Times a Day.       clopidogrel (PLAVIX) 75 MG tablet Take 1 tablet by mouth Daily. 90 tablet 5    cyanocobalamin 1000 MCG/ML injection Inject 1 mL into the appropriate muscle as directed by prescriber Every 28 (Twenty-Eight) Days.      dapagliflozin Propanediol 10 MG tablet Take 10 mg by mouth Daily. 30 tablet 5    Enbrel 50 MG/ML injection Inject 1 mL under the skin into the appropriate area as directed 1 (One) Time Per Week. Wednesday      EPINEPHrine (EPIPEN IJ) Inject 1 dose as directed As Needed (ALLERGIC REACTION).      Evolocumab (REPATHA) solution auto-injector SureClick injection Inject 1 mL under the skin into the appropriate area as directed Every 14 (Fourteen) Days. 2 mL 5    famotidine (Pepcid) 40 MG tablet Take 1 tablet by mouth 2 (Two) Times a Day. 90 tablet 1    gabapentin (NEURONTIN) 600 MG tablet Take 1 tablet by mouth 4 (Four) Times a Day.      HYDROcodone-acetaminophen (NORCO)  MG per tablet Take 1 tablet by mouth 3 (Three) Times a Day As Needed for Moderate Pain . (Patient taking differently: Take 1 tablet by mouth Every 6 (Six) Hours As Needed for Moderate Pain.) 30 tablet 0    hydrOXYzine (ATARAX) 25 MG tablet Take 1 tablet by mouth Every 8 (Eight) Hours As Needed for Itching. Rarely      isosorbide mononitrate (IMDUR) 30 MG 24 hr tablet Take 1 tablet by mouth Daily.      metoprolol succinate XL (TOPROL-XL) 25 MG 24 hr tablet Take 0.5 tablet by mouth Daily. 45 tablet 1    nitroglycerin (NITROSTAT) 0.4 MG SL tablet Place 1 tablet under the tongue Every 5 (Five) Minutes As Needed for Chest Pain. Take no more than 3 doses in 15 minutes. 30 tablet 0    pantoprazole (PROTONIX) 40 MG EC tablet       ranolazine (Ranexa) 500 MG 12 hr tablet Take 1 tablet by mouth 2 (Two) Times a Day. 60 tablet 1    sacubitril-valsartan (Entresto) 24-26 MG tablet Take 0.5 tablets by mouth 2 (Two) Times a Day. 30 tablet 1    spironolactone (ALDACTONE) 25 MG tablet Take 0.5 tablet by mouth Daily. 15 tablet 11    SYMBICORT 80-4.5  MCG/ACT inhaler Inhale 2 puffs 2 (Two) Times a Day As Needed.      vitamin D (ERGOCALCIFEROL) 1.25 MG (49691 UT) capsule capsule Take 1 capsule by mouth 1 (One) Time Per Week.      Evolocumab (REPATHA) solution auto-injector SureClick injection Inject 1 mL under the skin into the appropriate area as directed Every 14 (Fourteen) Days. 2 mL 5     No current facility-administered medications for this visit.            Assessment and Plan    Problem List Items Addressed This Visit       Familial hypercholesterolemia - Primary (Chronic)    Overview   Goals of care-maintain LDL less than 55  02/02/2024-total cholesterol 205, triglycerides 142, HDL 33,   6/18/2024-total cholesterol 76, triglycerides 71, HDL 29, LDL 31  01/22/2025-total cholesterol 55, triglycerides 112, HDL 29, LDL 5         Relevant Medications    atorvastatin (LIPITOR) 40 MG tablet    Other Relevant Orders    Lipid Panel (Completed)    Coronary artery disease involving native coronary artery of native heart with angina pectoris (Chronic)    Overview   Goals of care-dual antiplatelet therapy until at least 03/15/2025, maximize GDMT, aggressive risk management strategies  03/15/1057-LHF-GOV with shockwave to the LAD after in-stent restenosis, PCI to OM1-mild to moderate disease in the mid LAD         Relevant Orders    P2Y12 Platelet Inhibition (Completed)    Comprehensive Metabolic Panel (Completed)    CBC (No Diff) (Completed)    Adult Transthoracic Echo Complete W/ Cont if Necessary Per Protocol    Stress Test With Myocardial Perfusion One Day    ECG 12 Lead    Shortness of breath    Overview   Added automatically from request for surgery [id number]         Relevant Orders    PFT / Spirometry - Order Using Outpatient Pulmonary Function Testing [facility]    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) (Chronic)    Overview   Goals of care: Utilize all 4 pillars of GDMT  04/26/2024-LVEF 31 to 35%, mild LVH, normal diastolic  function  8/27/2024-LVEF 56 to 60%         Relevant Orders    Adult Transthoracic Echo Complete W/ Cont if Necessary Per Protocol     Diagnoses and all orders for this visit:    1. Familial hypercholesterolemia (Primary)  -     atorvastatin (LIPITOR) 40 MG tablet; Take 1 tablet by mouth Daily.  Dispense: 90 tablet; Refill: 3  -     Lipid Panel; Future    2. Shortness of breath  -     PFT / Spirometry - Order Using Outpatient Pulmonary Function Testing SmartSet    3. Coronary artery disease involving native coronary artery of native heart with angina pectoris  -     P2Y12 Platelet Inhibition  -     Comprehensive Metabolic Panel; Future  -     CBC (No Diff); Future  -     Adult Transthoracic Echo Complete W/ Cont if Necessary Per Protocol; Future  -     Stress Test With Myocardial Perfusion One Day; Future  -     ECG 12 Lead    4. 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)  -     Adult Transthoracic Echo Complete W/ Cont if Necessary Per Protocol; Future      Assessment & Plan  1. Chest pain.  The patient reports experiencing chest pain every morning and sometimes during the night, which is alleviated by nitroglycerin. He also experiences shortness of breath with exertion. His EKG remains unchanged, which is not reassuring given his persistent symptoms. He is currently taking isosorbide mononitrate 30 mg daily, although there was a previous instruction to discontinue this medication. He is also on metoprolol and spironolactone, both of which were previously adjusted. A stress test and an echocardiogram will be ordered to evaluate the patency of his stent or the presence of any new blockages. A refill for nitroglycerin will be provided. He is advised to quit smoking to prevent further cardiovascular complications.    2. Shortness of breath.  The patient experiences significant shortness of breath with minimal exertion, which is worsening. A lung function test will be ordered to assess his respiratory  status. He is advised to see a pulmonologist for further evaluation.    3. Hypertension.  The patient reports that his blood pressure at home usually runs between 112 to 130 mmHg. He mentioned that his blood pressure dropped significantly when a previous medication was discontinued. He is advised to continue monitoring his blood pressure at home and report any significant changes.         Follow Up     Return in about 4 weeks (around 4/9/2025).    Patient was given instructions and counseling regarding his condition or for health maintenance advice. Please see specific information pulled into the AVS if appropriate.       Electronically signed by OBDULIO Juan, 03/15/25, 6:51 PM EDT.    Patient or patient representative verbalized consent for the use of Ambient Listening during the visit with  OBDULIO Juan for chart documentation. 3/15/2025  18:51 EDT     Dictated Utilizing Dragon Dictation: Part of this note may be an electronic transcription/translation of spoken language to printed text using the Dragon Dictation System

## 2025-03-13 LAB
ALBUMIN SERPL-MCNC: 4.4 G/DL (ref 3.5–5.2)
ALBUMIN/GLOB SERPL: 1.8 G/DL
ALP SERPL-CCNC: 97 U/L (ref 39–117)
ALT SERPL W P-5'-P-CCNC: 18 U/L (ref 1–41)
ANION GAP SERPL CALCULATED.3IONS-SCNC: 11 MMOL/L (ref 5–15)
AST SERPL-CCNC: 22 U/L (ref 1–40)
BILIRUB SERPL-MCNC: 0.7 MG/DL (ref 0–1.2)
BUN SERPL-MCNC: 11 MG/DL (ref 6–20)
BUN/CREAT SERPL: 11.7 (ref 7–25)
CALCIUM SPEC-SCNC: 9.3 MG/DL (ref 8.6–10.5)
CHLORIDE SERPL-SCNC: 106 MMOL/L (ref 98–107)
CHOLEST SERPL-MCNC: 60 MG/DL (ref 0–200)
CO2 SERPL-SCNC: 22 MMOL/L (ref 22–29)
CREAT SERPL-MCNC: 0.94 MG/DL (ref 0.76–1.27)
DEPRECATED RDW RBC AUTO: 45.9 FL (ref 37–54)
EGFRCR SERPLBLD CKD-EPI 2021: 93.4 ML/MIN/1.73
ERYTHROCYTE [DISTWIDTH] IN BLOOD BY AUTOMATED COUNT: 12.6 % (ref 12.3–15.4)
GLOBULIN UR ELPH-MCNC: 2.4 GM/DL
GLUCOSE SERPL-MCNC: 94 MG/DL (ref 65–99)
HCT VFR BLD AUTO: 46.8 % (ref 37.5–51)
HDLC SERPL-MCNC: 33 MG/DL (ref 40–60)
HGB BLD-MCNC: 15.4 G/DL (ref 13–17.7)
LDLC SERPL CALC-MCNC: 11 MG/DL (ref 0–100)
LDLC/HDLC SERPL: 0.41 {RATIO}
MCH RBC QN AUTO: 32.3 PG (ref 26.6–33)
MCHC RBC AUTO-ENTMCNC: 32.9 G/DL (ref 31.5–35.7)
MCV RBC AUTO: 98.1 FL (ref 79–97)
PLATELET # BLD AUTO: 333 10*3/MM3 (ref 140–450)
PMV BLD AUTO: 10.7 FL (ref 6–12)
POTASSIUM SERPL-SCNC: 4.4 MMOL/L (ref 3.5–5.2)
PROT SERPL-MCNC: 6.8 G/DL (ref 6–8.5)
RBC # BLD AUTO: 4.77 10*6/MM3 (ref 4.14–5.8)
SODIUM SERPL-SCNC: 139 MMOL/L (ref 136–145)
TRIGL SERPL-MCNC: 67 MG/DL (ref 0–150)
VLDLC SERPL-MCNC: 16 MG/DL (ref 5–40)
WBC NRBC COR # BLD AUTO: 6.85 10*3/MM3 (ref 3.4–10.8)

## 2025-03-14 ENCOUNTER — SPECIALTY PHARMACY (OUTPATIENT)
Dept: PHARMACY | Facility: HOSPITAL | Age: 60
End: 2025-03-14
Payer: MEDICARE

## 2025-03-14 NOTE — PROGRESS NOTES
Specialty Pharmacy Patient Management Program  Medication Management Clinic Refill Outreach      Javier was contacted today regarding refills of his medication(s).    Specialty medication(s) and dose(s) confirmed: repatha sureclick    Refill Questions      Flowsheet Row Most Recent Value   Changes to allergies? No   Changes to medications? No   New conditions or infections since last clinic visit No   Unplanned office visit, urgent care, ED, or hospital admission in the last 4 weeks  No   How does patient/caregiver feel medication is working? Very good   Financial problems or insurance changes  No   Since the previous refill, were any specialty medication doses or scheduled injections missed or delayed?  No   Does this patient require a clinical escalation to a pharmacist? No          Delivery Questions      Flowsheet Row Most Recent Value   Delivery method  at Pharmacy   Copay verified? Yes   Copay amount $0.00   Copay form of payment No copayment ($0)   Signature Required No            Follow-Up: 28 days    Sylvia Crews, PharmD  3/14/2025  14:11 EDT

## 2025-03-19 DIAGNOSIS — I25.119 CORONARY ARTERY DISEASE INVOLVING NATIVE CORONARY ARTERY OF NATIVE HEART WITH ANGINA PECTORIS: ICD-10-CM

## 2025-03-19 DIAGNOSIS — I50.22 CHRONIC HFREF (HEART FAILURE WITH REDUCED EJECTION FRACTION): Chronic | ICD-10-CM

## 2025-03-19 DIAGNOSIS — I25.119 CORONARY ARTERY DISEASE INVOLVING NATIVE CORONARY ARTERY OF NATIVE HEART WITH ANGINA PECTORIS: Chronic | ICD-10-CM

## 2025-03-19 DIAGNOSIS — K21.9 GASTROESOPHAGEAL REFLUX DISEASE WITHOUT ESOPHAGITIS: ICD-10-CM

## 2025-03-20 RX ORDER — NITROGLYCERIN 0.4 MG/1
0.4 TABLET SUBLINGUAL
Qty: 30 TABLET | Refills: 0 | Status: SHIPPED | OUTPATIENT
Start: 2025-03-20

## 2025-03-20 RX ORDER — SACUBITRIL AND VALSARTAN 24; 26 MG/1; MG/1
0.5 TABLET, FILM COATED ORAL 2 TIMES DAILY
Qty: 30 TABLET | Refills: 3 | Status: SHIPPED | OUTPATIENT
Start: 2025-03-20

## 2025-03-20 RX ORDER — FAMOTIDINE 40 MG/1
40 TABLET, FILM COATED ORAL 2 TIMES DAILY
Qty: 90 TABLET | Refills: 2 | Status: SHIPPED | OUTPATIENT
Start: 2025-03-20

## 2025-03-24 RX ORDER — RANOLAZINE 500 MG/1
500 TABLET, EXTENDED RELEASE ORAL 2 TIMES DAILY
Qty: 60 TABLET | Refills: 1 | Status: SHIPPED | OUTPATIENT
Start: 2025-03-24

## 2025-03-25 ENCOUNTER — PREP FOR SURGERY (OUTPATIENT)
Dept: OTHER | Facility: HOSPITAL | Age: 60
End: 2025-03-25
Payer: MEDICARE

## 2025-03-25 ENCOUNTER — OFFICE VISIT (OUTPATIENT)
Dept: NEUROSURGERY | Facility: CLINIC | Age: 60
End: 2025-03-25
Payer: MEDICARE

## 2025-03-25 VITALS — HEIGHT: 70 IN | WEIGHT: 170 LBS | BODY MASS INDEX: 24.34 KG/M2 | TEMPERATURE: 97.7 F

## 2025-03-25 DIAGNOSIS — Z98.1 S/P CERVICAL SPINAL FUSION: ICD-10-CM

## 2025-03-25 DIAGNOSIS — G56.02 CARPAL TUNNEL SYNDROME OF LEFT WRIST: Primary | ICD-10-CM

## 2025-03-25 DIAGNOSIS — G56.03 BILATERAL CARPAL TUNNEL SYNDROME: Primary | ICD-10-CM

## 2025-03-25 PROCEDURE — 1159F MED LIST DOCD IN RCRD: CPT | Performed by: NEUROLOGICAL SURGERY

## 2025-03-25 PROCEDURE — 1160F RVW MEDS BY RX/DR IN RCRD: CPT | Performed by: NEUROLOGICAL SURGERY

## 2025-03-25 PROCEDURE — 99214 OFFICE O/P EST MOD 30 MIN: CPT | Performed by: NEUROLOGICAL SURGERY

## 2025-03-25 NOTE — H&P
Patient: Javier Santiago  : 1965     Primary Care Provider: Annie Jay FNP     Requesting Provider: As above           History     Chief Complaint:   1.  Intermittent numbness and tingling in the hands.  2.  Neck and intrascapular pain.     History of Present Illness: Mr. Santiago is a 59-year-old gentleman who is well-known to my service.  He has a history of cervical myelopathy due to spondylosis.  On 2021 he underwent ACDF C4-6.  He improved but developed progressive recurrent symptoms and on  underwent C3-6 decompression with fusion and stabilization from C3-5.  Dysesthesias in his chest and abdomen that were present improved.  He has had ongoing numbness in his hands that is typically intermittent.  This involves the thumbs more than anything.  His left hand is affected more than the right.  He has known carpal tunnel syndrome.  He has used wrist splints which have not been that helpful.  He is on physical therapy and is taken medicines.  He is not really describing radicular arm pain.     Review of Systems   Constitutional:  Negative for activity change, appetite change, chills, diaphoresis, fatigue, fever and unexpected weight change.   HENT:  Negative for congestion, dental problem, drooling, ear discharge, ear pain, facial swelling, hearing loss, mouth sores, nosebleeds, postnasal drip, rhinorrhea, sinus pressure, sinus pain, sneezing, sore throat, tinnitus, trouble swallowing and voice change.    Eyes:  Negative for photophobia, pain, discharge, redness, itching and visual disturbance.   Respiratory:  Negative for apnea, cough, choking, chest tightness, shortness of breath, wheezing and stridor.    Cardiovascular:  Negative for chest pain, palpitations and leg swelling.   Gastrointestinal:  Negative for abdominal distention, abdominal pain, anal bleeding, blood in stool, constipation, diarrhea, nausea, rectal pain and vomiting.   Endocrine: Negative for cold intolerance, heat  intolerance, polydipsia, polyphagia and polyuria.   Genitourinary:  Negative for decreased urine volume, difficulty urinating, dysuria, enuresis, flank pain, frequency, genital sores, hematuria and urgency.   Musculoskeletal:  Positive for neck pain. Negative for arthralgias, back pain, gait problem, joint swelling, myalgias and neck stiffness.   Skin:  Negative for color change, pallor, rash and wound.   Allergic/Immunologic: Negative for environmental allergies, food allergies and immunocompromised state.   Neurological:  Positive for numbness. Negative for dizziness, tremors, seizures, syncope, facial asymmetry, speech difficulty, weakness, light-headedness and headaches.   Hematological:  Negative for adenopathy. Does not bruise/bleed easily.   Psychiatric/Behavioral:  Negative for agitation, behavioral problems, confusion, decreased concentration, dysphoric mood, hallucinations, self-injury, sleep disturbance and suicidal ideas. The patient is not nervous/anxious and is not hyperactive.       The patient's past medical history, past surgical history, family history, and social history have been reviewed at length in the electronic medical record.     Past Medical History:   Diagnosis Date    Acute coronary syndrome 03/15/2024    Allergic     Anxiety     Arthritis     psoriatic    Arthritis of back     Arthritis of neck     ASCVD (arteriosclerotic cardiovascular disease)     Cervical disc disorder     CHF (congestive heart failure)     Chronic low back pain     Cirrhosis     Congenital heart disease     COPD (chronic obstructive pulmonary disease)     Coronary artery disease     CTS (carpal tunnel syndrome)     Fibromyalgia     Fracture, finger     Frozen shoulder     Full dentures     GERD (gastroesophageal reflux disease)     History of EKG 01/26/2016    NORMAL    Hyperlipidemia     Hypertension     Hypotension     Injury of back     Insomnia     Knee swelling     Low back strain     Lumbosacral disc disease      MI, old 2015    MRSA infection 08/2022    NECK SURGERY    Neck strain     Psoriasis     Psoriatic arthritis     RA (rheumatoid arthritis)     Rotator cuff syndrome     Sciatic nerve pain     Skin pain     from nerve pain    Sleep apnea     cpap non compliant    Tennis elbow     Thoracic disc disorder     Tinnitus     Wears glasses     readers     Past Surgical History:   Procedure Laterality Date    ANTERIOR CERVICAL DISCECTOMY W/ FUSION N/A 04/26/2021    Procedure: CERVICAL DISCECTOMY ANTERIOR WITH FUSION C4-6;  Surgeon: Chester Cowan MD;  Location:  [facility];  Service: Neurosurgery;  Laterality: N/A;    BACK SURGERY      lumbar 1995    CARDIAC CATHETERIZATION N/A 10/13/2017    Procedure: Left Heart Cath;  Surgeon: Chester Centeno MD;  [facility];  Service:     CARDIAC CATHETERIZATION N/A 02/11/2021    Procedure: Left Heart Cath;  Surgeon: Brent Degroot MD;  [facility];  Service: Cardiology;  Laterality: N/A;    CARDIAC CATHETERIZATION N/A 03/15/2024    Procedure: Coronary angiography;  Surgeon: Charles Montaño MD;  [facility];  Service: Cardiology;  Laterality: N/A;    CARDIAC CATHETERIZATION N/A 03/15/2024    Procedure: Percutaneous Coronary Intervention;  Surgeon: Charles Montaño MD;  [facility]:  [facility];  Service: Cardiology;  Laterality: N/A;    CATARACT EXTRACTION, BILATERAL Bilateral     CERVICAL DISCECTOMY POSTERIOR FUSION WITH BRAIN LAB N/A 08/04/2022    Procedure: CERVICAL POSTERIOR FUSION WITH INSTRUMENTATION C3-5;  Surgeon: Chester Cowan MD;  Location:  [facility];  Service: Neurosurgery;  Laterality: N/A;    COLONOSCOPY      CORONARY ANGIOPLASTY WITH STENT PLACEMENT      x2 stent in place    ENDOSCOPY      ENDOSCOPY N/A 02/09/2018    Procedure: ESOPHAGOGASTRODUODENOSCOPY WITH DILATATION CPT CODE: 01897;  Surgeon: Everett Villanueva III, MD;  Location:  [facility];  Service:      ENDOSCOPY N/A 06/07/2023    Procedure: ESOPHAGOGASTRODUODENOSCOPY WITH ESOPHAGEAL BALLOON DILATATION AND BIOPSY;  Surgeon: Sg Black MD;  Location:  [facility];  Service: Gastroenterology;  Laterality: N/A;  BIOPSY OF GASTRIC ULCERS, GEJ, AND ESOPHAGUS BIOPSIES, DILATION UP TO 12 MM WITH BALLOON    ENDOSCOPY WITH FOREIGN BODY REMOVAL N/A 08/05/2022    Procedure: ESOPHAGOGASTRODUODENOSCOPY WITH FOREIGN BODY REMOVAL;  Surgeon: Sg Black MD;  Location:  [facility];  Service: Gastroenterology;  Laterality: N/A;    HAND SURGERY      INTERVENTIONAL RADIOLOGY PROCEDURE N/A 03/15/2024    Procedure: Intravascular Ultrasound;  Surgeon: Charles Montaño MD;  [facility]:  [facility];  Service: Cardiology;  Laterality: N/A;    NECK SURGERY      SPINAL FUSION      TRIGGER POINT INJECTION       Family History   Problem Relation Age of Onset    Heart attack Mother         x 3    Atrial fibrillation Mother     Heart disease Mother     Diabetes Mother     Hypertension Mother     Heart attack Father     Heart disease Father         CABG    Heart failure Father     Asthma Sister      Social History     Socioeconomic History    Marital status:      Spouse name: Coretta Bhatt   Tobacco Use    Smoking status: Every Day     Current packs/day: 1.00     Average packs/day: 0.8 packs/day for 85.2 years (67.7 ttl pk-yrs)     Types: Cigarettes     Start date: 1/1/1975     Passive exposure: Current    Smokeless tobacco: Current     Types: Chew   Vaping Use    Vaping status: Never Used   Substance and Sexual Activity    Alcohol use: No    Drug use: Never    Sexual activity: Defer           No Known Allergies    Current Outpatient Medications on File Prior to Visit   Medication Sig Dispense Refill    albuterol (PROVENTIL HFA;VENTOLIN HFA) 108 (90 BASE) MCG/ACT inhaler Inhale 2 puffs Every 4 (Four) Hours As Needed for Wheezing.      ALPRAZolam (XANAX) 1 MG tablet Take 1 tablet by mouth  Every Other Day. Only takes 10 per month. (Patient taking differently: Take 1 tablet by mouth Every Other Day. Only takes 15 per month)      aspirin 81 MG tablet Take 1 tablet by mouth Daily. 30 tablet 11    atorvastatin (LIPITOR) 40 MG tablet Take 1 tablet by mouth Daily. 90 tablet 3    baclofen (LIORESAL) 10 MG tablet Take 1 tablet by mouth 3 (Three) Times a Day.      clopidogrel (PLAVIX) 75 MG tablet Take 1 tablet by mouth Daily. 90 tablet 5    cyanocobalamin 1000 MCG/ML injection Inject 1 mL into the appropriate muscle as directed by prescriber Every 28 (Twenty-Eight) Days.      dapagliflozin Propanediol 10 MG tablet Take 10 mg by mouth Daily. 30 tablet 5    Enbrel 50 MG/ML injection Inject 1 mL under the skin into the appropriate area as directed 1 (One) Time Per Week. Wednesday      EPINEPHrine (EPIPEN IJ) Inject 1 dose as directed As Needed (ALLERGIC REACTION).      Evolocumab (REPATHA) solution auto-injector SureClick injection Inject 1 mL under the skin into the appropriate area as directed Every 14 (Fourteen) Days. 2 mL 5    Evolocumab (REPATHA) solution auto-injector SureClick injection Inject 1 mL under the skin into the appropriate area as directed Every 14 (Fourteen) Days. 2 mL 5    famotidine (Pepcid) 40 MG tablet Take 1 tablet by mouth 2 (Two) Times a Day. 90 tablet 2    gabapentin (NEURONTIN) 600 MG tablet Take 1 tablet by mouth 4 (Four) Times a Day.      HYDROcodone-acetaminophen (NORCO)  MG per tablet Take 1 tablet by mouth 3 (Three) Times a Day As Needed for Moderate Pain . (Patient taking differently: Take 1 tablet by mouth Every 6 (Six) Hours As Needed for Moderate Pain.) 30 tablet 0    hydrOXYzine (ATARAX) 25 MG tablet Take 1 tablet by mouth Every 8 (Eight) Hours As Needed for Itching. Rarely      isosorbide mononitrate (IMDUR) 30 MG 24 hr tablet Take 1 tablet by mouth Daily.      metoprolol succinate XL (TOPROL-XL) 25 MG 24 hr tablet Take 0.5 tablet by mouth Daily. 45 tablet 1     "nitroglycerin (NITROSTAT) 0.4 MG SL tablet Place 1 tablet under the tongue Every 5 (Five) Minutes As Needed for Chest Pain. Take no more than 3 doses in 15 minutes. 30 tablet 0    pantoprazole (PROTONIX) 40 MG EC tablet       ranolazine (Ranexa) 500 MG 12 hr tablet Take 1 tablet by mouth 2 (Two) Times a Day. 60 tablet 1    sacubitril-valsartan (Entresto) 24-26 MG tablet Take 0.5 tablets by mouth 2 (Two) Times a Day. 30 tablet 3    spironolactone (ALDACTONE) 25 MG tablet Take 0.5 tablet by mouth Daily. 15 tablet 11    SYMBICORT 80-4.5 MCG/ACT inhaler Inhale 2 puffs 2 (Two) Times a Day As Needed.      vitamin D (ERGOCALCIFEROL) 1.25 MG (20384 UT) capsule capsule Take 1 capsule by mouth 1 (One) Time Per Week.       No current facility-administered medications on file prior to visit.          Physical Exam:   Temp 97.7 °F (36.5 °C) (Temporal)   Ht 177.8 cm (70\")   Wt 77.1 kg (170 lb)   BMI 24.39 kg/m²   Tinel sign is moderately positive on the left and negative on the right.    Well-healed dorsal and anterior cervical incisions are noted.  Andres sign is positive on the right and negative on the left.  Lower extremity reflexes are difficult to elicit.  His gait is normal.     Medical Decision Making     Data Review:   (All imaging studies were personally reviewed unless stated otherwise)  MRI of the cervical spine demonstrates spondylosis.  There is some mild angulation at C6-7 below his previous construct.  His spinal cord is well decompressed.  There is no high-grade cord or canal compromise.     Electrodiagnostic studies suggest bilateral carpal tunnel syndrome.     Diagnosis:   1.  Bilateral carpal tunnel syndrome, left greater than right.  2.  History of cervical myelopathy status post multiple interventions.     Treatment Options:   I do not have a ready solution for his mechanical cervical and thoracic pain.  Treatment will need to remain symptomatic.  However we have discussed left carpal tunnel release.  I " am not sure that is going to completely eradicate his hand numbness but it may be helpful.  The nature of the procedure as well as the potential risks, complications, limitations, and alternatives to the procedure were discussed at length with the patient and the patient has agreed to proceed with surgery.  He will need to hold his Plavix ahead of surgery and he will need cardiac clearance to do that.

## 2025-03-25 NOTE — PROGRESS NOTES
Patient: Javier Santiago  : 1965  Chart #: [id number]    Date of Service: 2025    CC: ***    History of Present Illness      Chronic Illnesses:    Past Medical History:   Diagnosis Date   • Acute coronary syndrome 03/15/2024   • Allergic    • Anxiety    • Arthritis     psoriatic   • Arthritis of back    • Arthritis of neck    • ASCVD (arteriosclerotic cardiovascular disease)    • Cervical disc disorder    • CHF (congestive heart failure)    • Chronic low back pain    • Cirrhosis    • Congenital heart disease    • COPD (chronic obstructive pulmonary disease)    • Coronary artery disease    • CTS (carpal tunnel syndrome)    • Fibromyalgia    • Fracture, finger    • Frozen shoulder    • Full dentures    • GERD (gastroesophageal reflux disease)    • History of EKG 2016    NORMAL   • Hyperlipidemia    • Hypertension    • Hypotension    • Injury of back    • Insomnia    • Knee swelling    • Low back strain    • Lumbosacral disc disease    • MI, old    • MRSA infection 2022    NECK SURGERY   • Neck strain    • Psoriasis    • Psoriatic arthritis    • RA (rheumatoid arthritis)    • Rotator cuff syndrome    • Sciatic nerve pain    • Skin pain     from nerve pain   • Sleep apnea     cpap non compliant   • Tennis elbow    • Thoracic disc disorder    • Tinnitus    • Wears glasses     readers         Past Surgical History:   Procedure Laterality Date   • ANTERIOR CERVICAL DISCECTOMY W/ FUSION N/A 2021    Procedure: CERVICAL DISCECTOMY ANTERIOR WITH FUSION C4-6;  Surgeon: Chester Cowan MD;  [facility];  Service: Neurosurgery;  Laterality: N/A;   • BACK SURGERY      lumbar 1995   • CARDIAC CATHETERIZATION N/A 10/13/2017    Procedure: Left Heart Cath;  Surgeon: Chester Centeno MD;  [facility]:  [facility];  Service:    • CARDIAC CATHETERIZATION N/A 2021    Procedure: Left Heart Cath;  Surgeon: Brent Degroot MD;  Location:  [facility];  Service: Cardiology;  Laterality: N/A;   • CARDIAC CATHETERIZATION N/A 03/15/2024    Procedure: Coronary angiography;  Surgeon: Charles Montaño MD;  [facility]:  [facility];  Service: Cardiology;  Laterality: N/A;   • CARDIAC CATHETERIZATION N/A 03/15/2024    Procedure: Percutaneous Coronary Intervention;  Surgeon: Charles Montaño MD;  [facility]:  [facility];  Service: Cardiology;  Laterality: N/A;   • CATARACT EXTRACTION, BILATERAL Bilateral    • CERVICAL DISCECTOMY POSTERIOR FUSION WITH BRAIN LAB N/A 08/04/2022    Procedure: CERVICAL POSTERIOR FUSION WITH INSTRUMENTATION C3-5;  Surgeon: Chester Cowan MD;  Location:  [facility];  Service: Neurosurgery;  Laterality: N/A;   • COLONOSCOPY     • CORONARY ANGIOPLASTY WITH STENT PLACEMENT      x2 stent in place   • ENDOSCOPY     • ENDOSCOPY N/A 02/09/2018    Procedure: ESOPHAGOGASTRODUODENOSCOPY WITH DILATATION CPT CODE: 35159;  Surgeon: Everett Villanueva III, MD;  Location:  [facility];  Service:    • ENDOSCOPY N/A 06/07/2023    Procedure: ESOPHAGOGASTRODUODENOSCOPY WITH ESOPHAGEAL BALLOON DILATATION AND BIOPSY;  Surgeon: Sg Black MD;  Location:  [facility];  Service: Gastroenterology;  Laterality: N/A;  BIOPSY OF GASTRIC ULCERS, GEJ, AND ESOPHAGUS BIOPSIES, DILATION UP TO 12 MM WITH BALLOON   • ENDOSCOPY WITH FOREIGN BODY REMOVAL N/A 08/05/2022    Procedure: ESOPHAGOGASTRODUODENOSCOPY WITH FOREIGN BODY REMOVAL;  Surgeon: Sg Black MD;  Location:  [facility];  Service: Gastroenterology;  Laterality: N/A;   • HAND SURGERY     • INTERVENTIONAL RADIOLOGY PROCEDURE N/A 03/15/2024    Procedure: Intravascular Ultrasound;  Surgeon: Charles Montaño MD;  [facility]:  [facility];  Service: Cardiology;  Laterality: N/A;   • NECK SURGERY     • SPINAL FUSION     • TRIGGER POINT INJECTION         No Known Allergies      Current Outpatient Medications:   •  albuterol (PROVENTIL HFA;VENTOLIN  HFA) 108 (90 BASE) MCG/ACT inhaler, Inhale 2 puffs Every 4 (Four) Hours As Needed for Wheezing., Disp: , Rfl:   •  ALPRAZolam (XANAX) 1 MG tablet, Take 1 tablet by mouth Every Other Day. Only takes 10 per month. (Patient taking differently: Take 1 tablet by mouth Every Other Day. Only takes 15 per month), Disp: , Rfl:   •  aspirin 81 MG tablet, Take 1 tablet by mouth Daily., Disp: 30 tablet, Rfl: 11  •  atorvastatin (LIPITOR) 40 MG tablet, Take 1 tablet by mouth Daily., Disp: 90 tablet, Rfl: 3  •  baclofen (LIORESAL) 10 MG tablet, Take 1 tablet by mouth 3 (Three) Times a Day., Disp: , Rfl:   •  clopidogrel (PLAVIX) 75 MG tablet, Take 1 tablet by mouth Daily., Disp: 90 tablet, Rfl: 5  •  cyanocobalamin 1000 MCG/ML injection, Inject 1 mL into the appropriate muscle as directed by prescriber Every 28 (Twenty-Eight) Days., Disp: , Rfl:   •  dapagliflozin Propanediol 10 MG tablet, Take 10 mg by mouth Daily., Disp: 30 tablet, Rfl: 5  •  Enbrel 50 MG/ML injection, Inject 1 mL under the skin into the appropriate area as directed 1 (One) Time Per Week. Wednesday, Disp: , Rfl:   •  EPINEPHrine (EPIPEN IJ), Inject 1 dose as directed As Needed (ALLERGIC REACTION)., Disp: , Rfl:   •  Evolocumab (REPATHA) solution auto-injector SureClick injection, Inject 1 mL under the skin into the appropriate area as directed Every 14 (Fourteen) Days., Disp: 2 mL, Rfl: 5  •  Evolocumab (REPATHA) solution auto-injector SureClick injection, Inject 1 mL under the skin into the appropriate area as directed Every 14 (Fourteen) Days., Disp: 2 mL, Rfl: 5  •  famotidine (Pepcid) 40 MG tablet, Take 1 tablet by mouth 2 (Two) Times a Day., Disp: 90 tablet, Rfl: 2  •  gabapentin (NEURONTIN) 600 MG tablet, Take 1 tablet by mouth 4 (Four) Times a Day., Disp: , Rfl:   •  HYDROcodone-acetaminophen (NORCO)  MG per tablet, Take 1 tablet by mouth 3 (Three) Times a Day As Needed for Moderate Pain . (Patient taking differently: Take 1 tablet by mouth Every 6  (Six) Hours As Needed for Moderate Pain.), Disp: 30 tablet, Rfl: 0  •  hydrOXYzine (ATARAX) 25 MG tablet, Take 1 tablet by mouth Every 8 (Eight) Hours As Needed for Itching. Rarely, Disp: , Rfl:   •  isosorbide mononitrate (IMDUR) 30 MG 24 hr tablet, Take 1 tablet by mouth Daily., Disp: , Rfl:   •  metoprolol succinate XL (TOPROL-XL) 25 MG 24 hr tablet, Take 0.5 tablet by mouth Daily., Disp: 45 tablet, Rfl: 1  •  nitroglycerin (NITROSTAT) 0.4 MG SL tablet, Place 1 tablet under the tongue Every 5 (Five) Minutes As Needed for Chest Pain. Take no more than 3 doses in 15 minutes., Disp: 30 tablet, Rfl: 0  •  pantoprazole (PROTONIX) 40 MG EC tablet, , Disp: , Rfl:   •  ranolazine (Ranexa) 500 MG 12 hr tablet, Take 1 tablet by mouth 2 (Two) Times a Day., Disp: 60 tablet, Rfl: 1  •  sacubitril-valsartan (Entresto) 24-26 MG tablet, Take 0.5 tablets by mouth 2 (Two) Times a Day., Disp: 30 tablet, Rfl: 3  •  spironolactone (ALDACTONE) 25 MG tablet, Take 0.5 tablet by mouth Daily., Disp: 15 tablet, Rfl: 11  •  SYMBICORT 80-4.5 MCG/ACT inhaler, Inhale 2 puffs 2 (Two) Times a Day As Needed., Disp: , Rfl:   •  vitamin D (ERGOCALCIFEROL) 1.25 MG (56158 UT) capsule capsule, Take 1 capsule by mouth 1 (One) Time Per Week., Disp: , Rfl:     Social History     Socioeconomic History   • Marital status:      Spouse name: Coretta Bhatt   Tobacco Use   • Smoking status: Every Day     Current packs/day: 1.00     Average packs/day: 0.8 packs/day for 85.2 years (67.7 ttl pk-yrs)     Types: Cigarettes     Start date: 1/1/1975     Passive exposure: Current   • Smokeless tobacco: Current     Types: Chew   Vaping Use   • Vaping status: Never Used   Substance and Sexual Activity   • Alcohol use: No   • Drug use: Never   • Sexual activity: Defer       Family History   Problem Relation Age of Onset   • Heart attack Mother         x 3   • Atrial fibrillation Mother    • Heart disease Mother    • Diabetes Mother    • Hypertension Mother    •  Heart attack Father    • Heart disease Father         CABG   • Heart failure Father    • Asthma Sister        BMI is within normal parameters. No other follow-up for BMI required.       Social History    Tobacco Use      Smoking status: Every Day        Packs/day: 1.00        Years: 0.8 packs/day for 85.2 years (67.7 ttl pk-yrs)        Types: Cigarettes        Start date: 1/1/1975        Passive exposure: Current      Smokeless tobacco: Current        Types: Chew       Review of Systems   Constitutional:  Negative for activity change, appetite change, chills, diaphoresis, fatigue, fever and unexpected weight change.   HENT:  Negative for congestion, dental problem, drooling, ear discharge, ear pain, facial swelling, hearing loss, mouth sores, nosebleeds, postnasal drip, rhinorrhea, sinus pressure, sinus pain, sneezing, sore throat, tinnitus, trouble swallowing and voice change.    Eyes:  Negative for photophobia, pain, discharge, redness, itching and visual disturbance.   Respiratory:  Negative for apnea, cough, choking, chest tightness, shortness of breath, wheezing and stridor.    Cardiovascular:  Negative for chest pain, palpitations and leg swelling.   Gastrointestinal:  Negative for abdominal distention, abdominal pain, anal bleeding, blood in stool, constipation, diarrhea, nausea, rectal pain and vomiting.   Endocrine: Negative for cold intolerance, heat intolerance, polydipsia, polyphagia and polyuria.   Genitourinary:  Negative for decreased urine volume, difficulty urinating, dysuria, enuresis, flank pain, frequency, genital sores, hematuria and urgency.   Musculoskeletal:  Positive for neck pain. Negative for arthralgias, back pain, gait problem, joint swelling, myalgias and neck stiffness.   Skin:  Negative for color change, pallor, rash and wound.   Allergic/Immunologic: Negative for environmental allergies, food allergies and immunocompromised state.   Neurological:  Positive for numbness. Negative for  "dizziness, tremors, seizures, syncope, facial asymmetry, speech difficulty, weakness, light-headedness and headaches.   Hematological:  Negative for adenopathy. Does not bruise/bleed easily.   Psychiatric/Behavioral:  Negative for agitation, behavioral problems, confusion, decreased concentration, dysphoric mood, hallucinations, self-injury, sleep disturbance and suicidal ideas. The patient is not nervous/anxious and is not hyperactive.       Gait & Balance Assessment:  Risk assessment for falls. Fall precautions:  such as;   Using gait aids a cane, walker at the appropriate height at all times for ambulation or if necessary a wheelchair  Removing all area rugs and coffee tables to create a safe environment at home  Ensure clean, dry floors  Wearing supportive footwear and properly fitting clothing  Ensure bed/chair is appropriate height and patient's feet can touch the floor  Using a shower transfer bench  Using walk-in shower and having shower safety bars installed  Ensure proper lighting, minimize glare  Have nightlights operational and in use  Participation in an exercise program for gait training, balance training and strength  Avoid carrying laundry up and down steps  Ensure proper compliance and organization of medications to avoid errors   Avoid use of over the counter sedatives and alcohol consumption  Ensure easy access to call bell, glasses, TV control, telephone  Ensure glasses/hearing aids are in use or close by (on top of night table)     Physical examination:  Temperature 97.7 °F (36.5 °C), temperature source Temporal, height 177.8 cm (70\"), weight 77.1 kg (170 lb).  HEENT- normocephalic, atraumatic, sclera clear  Lungs-normal expansion, no wheezing  Heart-regular rate and rhythm  Extremities-positive pulses, no edema    Physical Exam      Neurological Exam   WDWN***  A/A/C, speech clear, attention normal, conversant, answers questions appropriately, good historian.  Cranial nerves II through XII are " intact.  Motor examination does not reveal weakness in the , upper or lower extremities.   Sensation is intact.  Gait is normal, balance is normal.   No tremors are noted.  Reflexes are intact.   Penn is negative. Clonus is negative.   Palpation ***    Radiographic Imaging:  For my review ***    Results      Medical Decision Making  There are no diagnoses linked to this encounter.     Assessment & Plan      Any copied data from previous notes included in the (1) HPI, (2) PE, (3) MDM and/or assessment and plan has been reviewed and is accurate as of this day.    {[facility] [employer] Provider Statement:14749}    Jayne Kwong, TOBY    Patient Care Team:  Annie Jay FNP as PCP - General (Nurse Practitioner)  Brent Degroot MD as Consulting Physician (Interventional Cardiology)  Sg Black MD as Consulting Physician (Gastroenterology)  Luis Leija MD as Surgeon (Orthopedic Surgery)

## 2025-03-25 NOTE — PROGRESS NOTES
Patient: Javier Santiago  : 1965    Primary Care Provider: Annie Jay FNP    Requesting Provider: As above        History    Chief Complaint:   1.  Intermittent numbness and tingling in the hands.  2.  Neck and intrascapular pain.    History of Present Illness: Mr. Santiago is a 59-year-old gentleman who is well-known to my service.  He has a history of cervical myelopathy due to spondylosis.  On 2021 he underwent ACDF C4-6.  He improved but developed progressive recurrent symptoms and on  underwent C3-6 decompression with fusion and stabilization from C3-5.  Dysesthesias in his chest and abdomen that were present improved.  He has had ongoing numbness in his hands that is typically intermittent.  This involves the thumbs more than anything.  His left hand is affected more than the right.  He has known carpal tunnel syndrome.  He has used wrist splints which have not been that helpful.  He is on physical therapy and is taken medicines.  He is not really describing radicular arm pain.    Review of Systems   Constitutional:  Negative for activity change, appetite change, chills, diaphoresis, fatigue, fever and unexpected weight change.   HENT:  Negative for congestion, dental problem, drooling, ear discharge, ear pain, facial swelling, hearing loss, mouth sores, nosebleeds, postnasal drip, rhinorrhea, sinus pressure, sinus pain, sneezing, sore throat, tinnitus, trouble swallowing and voice change.    Eyes:  Negative for photophobia, pain, discharge, redness, itching and visual disturbance.   Respiratory:  Negative for apnea, cough, choking, chest tightness, shortness of breath, wheezing and stridor.    Cardiovascular:  Negative for chest pain, palpitations and leg swelling.   Gastrointestinal:  Negative for abdominal distention, abdominal pain, anal bleeding, blood in stool, constipation, diarrhea, nausea, rectal pain and vomiting.   Endocrine: Negative for cold intolerance, heat intolerance,  "polydipsia, polyphagia and polyuria.   Genitourinary:  Negative for decreased urine volume, difficulty urinating, dysuria, enuresis, flank pain, frequency, genital sores, hematuria and urgency.   Musculoskeletal:  Positive for neck pain. Negative for arthralgias, back pain, gait problem, joint swelling, myalgias and neck stiffness.   Skin:  Negative for color change, pallor, rash and wound.   Allergic/Immunologic: Negative for environmental allergies, food allergies and immunocompromised state.   Neurological:  Positive for numbness. Negative for dizziness, tremors, seizures, syncope, facial asymmetry, speech difficulty, weakness, light-headedness and headaches.   Hematological:  Negative for adenopathy. Does not bruise/bleed easily.   Psychiatric/Behavioral:  Negative for agitation, behavioral problems, confusion, decreased concentration, dysphoric mood, hallucinations, self-injury, sleep disturbance and suicidal ideas. The patient is not nervous/anxious and is not hyperactive.      The patient's past medical history, past surgical history, family history, and social history have been reviewed at length in the electronic medical record.      Physical Exam:   Temp 97.7 °F (36.5 °C) (Temporal)   Ht 177.8 cm (70\")   Wt 77.1 kg (170 lb)   BMI 24.39 kg/m²   Tinel sign is moderately positive on the left and negative on the right.    Well-healed dorsal and anterior cervical incisions are noted.  Andres sign is positive on the right and negative on the left.  Lower extremity reflexes are difficult to elicit.  His gait is normal.    Medical Decision Making    Data Review:   (All imaging studies were personally reviewed unless stated otherwise)  MRI of the cervical spine demonstrates spondylosis.  There is some mild angulation at C6-7 below his previous construct.  His spinal cord is well decompressed.  There is no high-grade cord or canal compromise.    Electrodiagnostic studies suggest bilateral carpal tunnel " syndrome.    Diagnosis:   1.  Bilateral carpal tunnel syndrome, left greater than right.  2.  History of cervical myelopathy status post multiple interventions.    Treatment Options:   I do not have a ready solution for his mechanical cervical and thoracic pain.  Treatment will need to remain symptomatic.  However we have discussed left carpal tunnel release.  I am not sure that is going to completely eradicate his hand numbness but it may be helpful.  The nature of the procedure as well as the potential risks, complications, limitations, and alternatives to the procedure were discussed at length with the patient and the patient has agreed to proceed with surgery.  He will need to hold his Plavix ahead of surgery and he will need cardiac clearance to do that.          I, Dr. Cowan, personally performed the services described in the documentation, as scribed in my presence, and it is both accurate and complete.

## 2025-04-01 ENCOUNTER — HOSPITAL ENCOUNTER (OUTPATIENT)
Dept: NUCLEAR MEDICINE | Facility: HOSPITAL | Age: 60
Discharge: HOME | End: 2025-04-01
Payer: MEDICARE

## 2025-04-01 ENCOUNTER — HOSPITAL ENCOUNTER (OUTPATIENT)
Dept: CARDIOLOGY | Facility: HOSPITAL | Age: 60
Discharge: HOME | End: 2025-04-01
Payer: MEDICARE

## 2025-04-01 DIAGNOSIS — I25.119 CORONARY ARTERY DISEASE INVOLVING NATIVE CORONARY ARTERY OF NATIVE HEART WITH ANGINA PECTORIS: Chronic | ICD-10-CM

## 2025-04-01 DIAGNOSIS — I50.32 HEART FAILURE WITH RECOVERED EJECTION FRACTION (HFRECEF): ICD-10-CM

## 2025-04-01 PROCEDURE — 78452 HT MUSCLE IMAGE SPECT MULT: CPT

## 2025-04-01 PROCEDURE — A9500 TC99M SESTAMIBI: HCPCS | Performed by: NURSE PRACTITIONER

## 2025-04-01 PROCEDURE — 93017 CV STRESS TEST TRACING ONLY: CPT

## 2025-04-01 PROCEDURE — 25010000002 SULFUR HEXAFLUORIDE MICROSPH 60.7-25 MG RECONSTITUTED SUSPENSION: Performed by: STUDENT IN AN ORGANIZED HEALTH CARE EDUCATION/TRAINING PROGRAM

## 2025-04-01 PROCEDURE — 93306 TTE W/DOPPLER COMPLETE: CPT

## 2025-04-01 PROCEDURE — 34310000005 TECHNETIUM SESTAMIBI: Performed by: NURSE PRACTITIONER

## 2025-04-01 RX ADMIN — SULFUR HEXAFLUORIDE 2 ML: KIT at 08:49

## 2025-04-01 RX ADMIN — TECHNETIUM TC 99M SESTAMIBI 1 DOSE: 1 INJECTION INTRAVENOUS at 10:30

## 2025-04-01 RX ADMIN — TECHNETIUM TC 99M SESTAMIBI 1 DOSE: 1 INJECTION INTRAVENOUS at 09:55

## 2025-04-02 ENCOUNTER — TELEPHONE (OUTPATIENT)
Dept: CARDIOLOGY | Facility: CLINIC | Age: 60
End: 2025-04-02
Payer: MEDICARE

## 2025-04-02 NOTE — TELEPHONE ENCOUNTER
Cheryl from [facility] called regarding this patient. They are waiting on cardiac clearance to schedule patient for surgery. Patient just had stress , etc done yesterday. They would like to see if we can complete pushpa op risk assessment and fax to their office. This message will be forwarded to Randee MAK

## 2025-04-04 LAB
AV MEAN PRESS GRAD SYS DOP V1V2: 2 MMHG
AV VMAX SYS DOP: 115 CM/SEC
BH CV ECHO MEAS - ACS: 1.6 CM
BH CV ECHO MEAS - AO MAX PG: 5.3 MMHG
BH CV ECHO MEAS - AO ROOT DIAM: 3 CM
BH CV ECHO MEAS - AO V2 VTI: 23.8 CM
BH CV ECHO MEAS - EDV(CUBED): 140.6 ML
BH CV ECHO MEAS - EDV(MOD-SP4): 60.9 ML
BH CV ECHO MEAS - EF(MOD-SP4): 61.2 %
BH CV ECHO MEAS - ESV(CUBED): 59.3 ML
BH CV ECHO MEAS - ESV(MOD-SP4): 23.6 ML
BH CV ECHO MEAS - FS: 25 %
BH CV ECHO MEAS - IVS/LVPW: 0.86 CM
BH CV ECHO MEAS - IVSD: 0.6 CM
BH CV ECHO MEAS - LA DIMENSION: 3.5 CM
BH CV ECHO MEAS - LAT PEAK E' VEL: 13.6 CM/SEC
BH CV ECHO MEAS - LV DIASTOLIC VOL/BSA (35-75): 31.3 CM2
BH CV ECHO MEAS - LV MASS(C)D: 112.1 GRAMS
BH CV ECHO MEAS - LV SYSTOLIC VOL/BSA (12-30): 12.1 CM2
BH CV ECHO MEAS - LVIDD: 5.2 CM
BH CV ECHO MEAS - LVIDS: 3.9 CM
BH CV ECHO MEAS - LVOT AREA: 3.5 CM2
BH CV ECHO MEAS - LVOT DIAM: 2.1 CM
BH CV ECHO MEAS - LVPWD: 0.7 CM
BH CV ECHO MEAS - MED PEAK E' VEL: 10 CM/SEC
BH CV ECHO MEAS - MV A MAX VEL: 49.3 CM/SEC
BH CV ECHO MEAS - MV E MAX VEL: 85.7 CM/SEC
BH CV ECHO MEAS - MV E/A: 1.74
BH CV ECHO MEAS - PA ACC TIME: 0.16 SEC
BH CV ECHO MEAS - RAP SYSTOLE: 10 MMHG
BH CV ECHO MEAS - RVSP: 46.7 MMHG
BH CV ECHO MEAS - SV(MOD-SP4): 37.3 ML
BH CV ECHO MEAS - SVI(MOD-SP4): 19.1 ML/M2
BH CV ECHO MEAS - TAPSE (>1.6): 2.9 CM
BH CV ECHO MEAS - TR MAX PG: 36.7 MMHG
BH CV ECHO MEAS - TR MAX VEL: 303 CM/SEC
BH CV ECHO MEASUREMENTS AVERAGE E/E' RATIO: 7.26
LEFT ATRIUM VOLUME INDEX: 16.8 ML/M2

## 2025-04-08 ENCOUNTER — TELEPHONE (OUTPATIENT)
Dept: CARDIOLOGY | Facility: CLINIC | Age: 60
End: 2025-04-08
Payer: MEDICARE

## 2025-04-08 DIAGNOSIS — I25.119 CORONARY ARTERY DISEASE INVOLVING NATIVE CORONARY ARTERY OF NATIVE HEART WITH ANGINA PECTORIS: Primary | Chronic | ICD-10-CM

## 2025-04-08 LAB
BH CV NUCLEAR PRIOR STUDY: 3
BH CV REST NUCLEAR ISOTOPE DOSE: 9.9 MCI
BH CV STRESS BP STAGE 1: NORMAL
BH CV STRESS DURATION MIN STAGE 1: 3
BH CV STRESS DURATION MIN STAGE 2: 1
BH CV STRESS DURATION SEC STAGE 1: 0
BH CV STRESS DURATION SEC STAGE 2: 49
BH CV STRESS GRADE STAGE 1: 10
BH CV STRESS GRADE STAGE 2: 12
BH CV STRESS HR STAGE 1: 100
BH CV STRESS HR STAGE 2: 136
BH CV STRESS METS STAGE 1: 5
BH CV STRESS METS STAGE 2: 7.5
BH CV STRESS NUCLEAR ISOTOPE DOSE: 30 MCI
BH CV STRESS PROTOCOL 1: NORMAL
BH CV STRESS RECOVERY BP: NORMAL MMHG
BH CV STRESS RECOVERY HR: 81 BPM
BH CV STRESS SPEED STAGE 1: 1.7
BH CV STRESS SPEED STAGE 2: 2.5
BH CV STRESS STAGE 1: 1
BH CV STRESS STAGE 2: 2
MAXIMAL PREDICTED HEART RATE: 161 BPM
PERCENT MAX PREDICTED HR: 84.47 %
SPECT HRT GATED+EF W RNC IV: 59 %
STRESS BASELINE BP: NORMAL MMHG
STRESS BASELINE HR: 71 BPM
STRESS PERCENT HR: 99 %
STRESS POST ESTIMATED WORKLOAD: 7 METS
STRESS POST EXERCISE DUR MIN: 4 MIN
STRESS POST EXERCISE DUR SEC: 49 SEC
STRESS POST PEAK BP: NORMAL MMHG
STRESS POST PEAK HR: 136 BPM
STRESS TARGET HR: 137 BPM

## 2025-04-08 RX ORDER — ISOSORBIDE MONONITRATE 60 MG/1
60 TABLET, EXTENDED RELEASE ORAL DAILY
Qty: 90 TABLET | Refills: 3 | Status: SHIPPED | OUTPATIENT
Start: 2025-04-08

## 2025-04-08 NOTE — TELEPHONE ENCOUNTER
Please call to let them know that his stress test was positive and he is having chest pain daily that requires multiple doses of nitroglycerin.  We will not be able to clear him for surgery.  He wishes to proceed with heart catheterization with the understanding that if we place a stent he will be required to take medication for 6 months without interruption.

## 2025-04-08 NOTE — TELEPHONE ENCOUNTER
Called patient to discuss the results of his recent stress test.  It revealed area worrisome for new blockage.  He is already on Ranexa, Imdur, and beta-blocker.  He reports that he is requiring 2-3 nitroglycerin tablets per day due to chest pain.  We discussed the risks versus benefits of invasive coronary angiogram plus or minus intervention and he wishes to proceed.    Electronically signed by OBDULIO Juan, 04/08/25, 2:25 PM EDT.

## 2025-04-09 ENCOUNTER — SPECIALTY PHARMACY (OUTPATIENT)
Dept: PHARMACY | Facility: HOSPITAL | Age: 60
End: 2025-04-09
Payer: MEDICARE

## 2025-04-09 NOTE — PROGRESS NOTES
Specialty Pharmacy Patient Management Program  Refill Outreach     Javier was contacted today regarding refills of their medication(s).    Refill Questions      Flowsheet Row Most Recent Value   Changes to allergies? No   Changes to medications? No   New conditions or infections since last clinic visit No   Unplanned office visit, urgent care, ED, or hospital admission in the last 4 weeks  No   How does patient/caregiver feel medication is working? Good   Financial problems or insurance changes  No   Since the previous refill, were any specialty medication doses or scheduled injections missed or delayed?  No   Does this patient require a clinical escalation to a pharmacist? No            Delivery Questions      Flowsheet Row Most Recent Value   Delivery method  at Pharmacy   Delivery address Prescription   Medication(s) being filled and delivered Evolocumab (REPATHA)   Copay verified? Yes   Copay amount $0   Copay form of payment No copayment ($0)   Signature Required No                 Follow-up: 84 day(s)     Neha Mccabe, Pharmacy Technician  4/9/2025  09:40 EDT

## 2025-04-10 DIAGNOSIS — R07.9 CHEST PAIN, UNSPECIFIED TYPE: Primary | ICD-10-CM

## 2025-04-11 ENCOUNTER — HOSPITAL ENCOUNTER (OUTPATIENT)
Facility: HOSPITAL | Age: 60
Discharge: HOME | End: 2025-04-11
Attending: INTERNAL MEDICINE | Admitting: INTERNAL MEDICINE
Payer: MEDICARE

## 2025-04-11 VITALS
WEIGHT: 170 LBS | HEART RATE: 68 BPM | OXYGEN SATURATION: 98 % | BODY MASS INDEX: 24.34 KG/M2 | TEMPERATURE: 97.1 F | HEIGHT: 70 IN | SYSTOLIC BLOOD PRESSURE: 113 MMHG | DIASTOLIC BLOOD PRESSURE: 75 MMHG | RESPIRATION RATE: 20 BRPM

## 2025-04-11 DIAGNOSIS — R07.9 CHEST PAIN, UNSPECIFIED TYPE: ICD-10-CM

## 2025-04-11 DIAGNOSIS — I25.119 CORONARY ARTERY DISEASE INVOLVING NATIVE CORONARY ARTERY OF NATIVE HEART WITH ANGINA PECTORIS: ICD-10-CM

## 2025-04-11 LAB
ANION GAP SERPL CALCULATED.3IONS-SCNC: 9.3 MMOL/L (ref 5–15)
BUN SERPL-MCNC: 17 MG/DL (ref 6–20)
BUN/CREAT SERPL: 14.8 (ref 7–25)
CALCIUM SPEC-SCNC: 9.5 MG/DL (ref 8.6–10.5)
CHLORIDE SERPL-SCNC: 104 MMOL/L (ref 98–107)
CO2 SERPL-SCNC: 21.7 MMOL/L (ref 22–29)
CREAT SERPL-MCNC: 1.15 MG/DL (ref 0.76–1.27)
DEPRECATED RDW RBC AUTO: 46.8 FL (ref 37–54)
EGFRCR SERPLBLD CKD-EPI 2021: 73.3 ML/MIN/1.73
ERYTHROCYTE [DISTWIDTH] IN BLOOD BY AUTOMATED COUNT: 13.2 % (ref 12.3–15.4)
GLUCOSE SERPL-MCNC: 102 MG/DL (ref 65–99)
HCT VFR BLD AUTO: 45.4 % (ref 37.5–51)
HGB BLD-MCNC: 15.3 G/DL (ref 13–17.7)
MCH RBC QN AUTO: 32.1 PG (ref 26.6–33)
MCHC RBC AUTO-ENTMCNC: 33.7 G/DL (ref 31.5–35.7)
MCV RBC AUTO: 95.2 FL (ref 79–97)
PLATELET # BLD AUTO: 321 10*3/MM3 (ref 140–450)
PMV BLD AUTO: 10 FL (ref 6–12)
POTASSIUM SERPL-SCNC: 4.1 MMOL/L (ref 3.5–5.2)
RBC # BLD AUTO: 4.77 10*6/MM3 (ref 4.14–5.8)
SODIUM SERPL-SCNC: 135 MMOL/L (ref 136–145)
WBC NRBC COR # BLD AUTO: 7.01 10*3/MM3 (ref 3.4–10.8)

## 2025-04-11 PROCEDURE — 85027 COMPLETE CBC AUTOMATED: CPT | Performed by: INTERNAL MEDICINE

## 2025-04-11 PROCEDURE — 80048 BASIC METABOLIC PNL TOTAL CA: CPT | Performed by: INTERNAL MEDICINE

## 2025-04-11 PROCEDURE — 93458 L HRT ARTERY/VENTRICLE ANGIO: CPT | Performed by: INTERNAL MEDICINE

## 2025-04-11 PROCEDURE — 25510000001 IOPAMIDOL PER 1 ML: Performed by: INTERNAL MEDICINE

## 2025-04-11 PROCEDURE — 99236 HOSP IP/OBS SAME DATE HI 85: CPT | Performed by: INTERNAL MEDICINE

## 2025-04-11 PROCEDURE — 25010000002 HEPARIN (PORCINE) PER 1000 UNITS: Performed by: INTERNAL MEDICINE

## 2025-04-11 PROCEDURE — 25010000002 FENTANYL CITRATE (PF) 50 MCG/ML SOLUTION: Performed by: INTERNAL MEDICINE

## 2025-04-11 PROCEDURE — 25010000002 LIDOCAINE 2% SOLUTION: Performed by: INTERNAL MEDICINE

## 2025-04-11 PROCEDURE — C1894 INTRO/SHEATH, NON-LASER: HCPCS | Performed by: INTERNAL MEDICINE

## 2025-04-11 PROCEDURE — 25810000003 SODIUM CHLORIDE 0.9 % SOLUTION: Performed by: INTERNAL MEDICINE

## 2025-04-11 PROCEDURE — C1769 GUIDE WIRE: HCPCS | Performed by: INTERNAL MEDICINE

## 2025-04-11 PROCEDURE — 25010000002 MIDAZOLAM PER 1 MG: Performed by: INTERNAL MEDICINE

## 2025-04-11 RX ORDER — IOPAMIDOL 755 MG/ML
INJECTION, SOLUTION INTRAVASCULAR
Status: DISCONTINUED | OUTPATIENT
Start: 2025-04-11 | End: 2025-04-11 | Stop reason: HOSPADM

## 2025-04-11 RX ORDER — HEPARIN SODIUM 1000 [USP'U]/ML
INJECTION, SOLUTION INTRAVENOUS; SUBCUTANEOUS
Status: DISCONTINUED | OUTPATIENT
Start: 2025-04-11 | End: 2025-04-11 | Stop reason: HOSPADM

## 2025-04-11 RX ORDER — FENTANYL CITRATE 50 UG/ML
INJECTION, SOLUTION INTRAMUSCULAR; INTRAVENOUS
Status: DISCONTINUED | OUTPATIENT
Start: 2025-04-11 | End: 2025-04-11 | Stop reason: HOSPADM

## 2025-04-11 RX ORDER — SODIUM CHLORIDE 9 MG/ML
INJECTION, SOLUTION INTRAVENOUS
Status: COMPLETED | OUTPATIENT
Start: 2025-04-11 | End: 2025-04-11

## 2025-04-11 RX ORDER — MIDAZOLAM HYDROCHLORIDE 1 MG/ML
INJECTION, SOLUTION INTRAMUSCULAR; INTRAVENOUS
Status: DISCONTINUED | OUTPATIENT
Start: 2025-04-11 | End: 2025-04-11 | Stop reason: HOSPADM

## 2025-04-11 RX ORDER — NITROGLYCERIN 0.4 MG/1
0.4 TABLET SUBLINGUAL
Status: DISCONTINUED | OUTPATIENT
Start: 2025-04-11 | End: 2025-04-11 | Stop reason: HOSPADM

## 2025-04-11 RX ORDER — VERAPAMIL HYDROCHLORIDE 2.5 MG/ML
INJECTION, SOLUTION INTRAVENOUS
Status: DISCONTINUED | OUTPATIENT
Start: 2025-04-11 | End: 2025-04-11 | Stop reason: HOSPADM

## 2025-04-11 RX ORDER — LIDOCAINE HYDROCHLORIDE 20 MG/ML
INJECTION, SOLUTION INFILTRATION; PERINEURAL
Status: DISCONTINUED | OUTPATIENT
Start: 2025-04-11 | End: 2025-04-11 | Stop reason: HOSPADM

## 2025-04-11 RX ORDER — ACETAMINOPHEN 325 MG/1
650 TABLET ORAL EVERY 4 HOURS PRN
Status: DISCONTINUED | OUTPATIENT
Start: 2025-04-11 | End: 2025-04-11 | Stop reason: HOSPADM

## 2025-04-11 NOTE — Clinical Note
Hemostasis started on the right radial artery. R-Band was used in achieving hemostasis. Radial compression device applied to vessel. Hemostasis achieved successfully. Closure device additional comment: 10mL OF AIR

## 2025-04-11 NOTE — Clinical Note
A 6 fr sheath was successfully inserted with ultrasound guidance into the right radial vein. Sheath insertion not delayed.

## 2025-04-11 NOTE — H&P
Patient Identification:  Name:  Javier Santiago  Age:  59 y.o.  Sex:  male  :  1965  MRN:  [id number]   Visit Number:  [id number]  Primary Care Physician:  Annie Jay FNP    Chief complaint:   Abnormal stress test  History of presenting illness:    59-year-old white male who has a history of coronary artery disease status post multiple stents in [facility].  Patient underwent a stress test that shows reversible ischemia in lateral and inferior wall.  Due to that patient was scheduled to undergo cardiac catheterization    ROS: All systems reviewed and negative except as mentioned above.     ---------------------------------------------------------------------------------------------------------------------   Past Medical History:   Diagnosis Date    Acute coronary syndrome 03/15/2024    Allergic     Anxiety     Arthritis     psoriatic    Arthritis of back     Arthritis of neck     ASCVD (arteriosclerotic cardiovascular disease)     Cervical disc disorder     CHF (congestive heart failure)     Chronic low back pain     Cirrhosis     Congenital heart disease     COPD (chronic obstructive pulmonary disease)     Coronary artery disease     CTS (carpal tunnel syndrome)     Fibromyalgia     Fracture, finger     Frozen shoulder     Full dentures     GERD (gastroesophageal reflux disease)     History of EKG 2016    NORMAL    Hyperlipidemia     Hypertension     Hypotension     Injury of back     Insomnia     Knee swelling     Low back strain     Lumbosacral disc disease     MI, old     MRSA infection 2022    NECK SURGERY    Neck strain     Psoriasis     Psoriatic arthritis     RA (rheumatoid arthritis)     Rotator cuff syndrome     Sciatic nerve pain     Skin pain     from nerve pain    Sleep apnea     cpap non compliant    Tennis elbow     Thoracic disc disorder     Tinnitus     Wears glasses     readers     Past Surgical History:   Procedure Laterality Date    ANTERIOR CERVICAL DISCECTOMY W/ FUSION N/A  04/26/2021    Procedure: CERVICAL DISCECTOMY ANTERIOR WITH FUSION C4-6;  Surgeon: Chester Cowan MD;  Location:  [facility];  Service: Neurosurgery;  Laterality: N/A;    BACK SURGERY      lumbar 1995    CARDIAC CATHETERIZATION N/A 10/13/2017    Procedure: Left Heart Cath;  Surgeon: Chester Centeno MD;  [facility];  Service:     CARDIAC CATHETERIZATION N/A 02/11/2021    Procedure: Left Heart Cath;  Surgeon: Brent Degroot MD;  [facility]  [facility];  Service: Cardiology;  Laterality: N/A;    CARDIAC CATHETERIZATION N/A 03/15/2024    Procedure: Coronary angiography;  Surgeon: Charles Montaño MD;  [facility]  [facility];  Service: Cardiology;  Laterality: N/A;    CARDIAC CATHETERIZATION N/A 03/15/2024    Procedure: Percutaneous Coronary Intervention;  Surgeon: Charles Montaño MD;  [facility]:  [facility];  Service: Cardiology;  Laterality: N/A;    CATARACT EXTRACTION, BILATERAL Bilateral     CERVICAL DISCECTOMY POSTERIOR FUSION WITH BRAIN LAB N/A 08/04/2022    Procedure: CERVICAL POSTERIOR FUSION WITH INSTRUMENTATION C3-5;  Surgeon: Chester Cowan MD;  Location:  [facility];  Service: Neurosurgery;  Laterality: N/A;    COLONOSCOPY      CORONARY ANGIOPLASTY WITH STENT PLACEMENT      x2 stent in place    ENDOSCOPY      ENDOSCOPY N/A 02/09/2018    Procedure: ESOPHAGOGASTRODUODENOSCOPY WITH DILATATION CPT CODE: 72730;  Surgeon: Everett Villanueva III, MD;  Location:  [facility];  Service:     ENDOSCOPY N/A 06/07/2023    Procedure: ESOPHAGOGASTRODUODENOSCOPY WITH ESOPHAGEAL BALLOON DILATATION AND BIOPSY;  Surgeon: Sg Black MD;  Location:  [facility];  Service: Gastroenterology;  Laterality: N/A;  BIOPSY OF GASTRIC ULCERS, GEJ, AND ESOPHAGUS BIOPSIES, DILATION UP TO 12 MM WITH BALLOON    ENDOSCOPY WITH FOREIGN BODY REMOVAL N/A 08/05/2022    Procedure: ESOPHAGOGASTRODUODENOSCOPY WITH FOREIGN BODY REMOVAL;  Surgeon:  Sg Black MD;  Location:  [facility];  Service: Gastroenterology;  Laterality: N/A;    HAND SURGERY      INTERVENTIONAL RADIOLOGY PROCEDURE N/A 03/15/2024    Procedure: Intravascular Ultrasound;  Surgeon: Charles Montaño MD;  [facility]:  [facility];  Service: Cardiology;  Laterality: N/A;    NECK SURGERY      SPINAL FUSION      TRIGGER POINT INJECTION       Family History   Problem Relation Age of Onset    Heart attack Mother         x 3    Atrial fibrillation Mother     Heart disease Mother     Diabetes Mother     Hypertension Mother     Heart attack Father     Heart disease Father         CABG    Heart failure Father     Asthma Sister      Social History     Socioeconomic History    Marital status:      Spouse name: Coretta Bhatt   Tobacco Use    Smoking status: Every Day     Current packs/day: 2.00     Average packs/day: 0.8 packs/day for 85.3 years (69.0 ttl pk-yrs)     Types: Cigarettes     Start date: 1/1/1975     Passive exposure: Current    Smokeless tobacco: Current     Types: Chew   Vaping Use    Vaping status: Never Used   Substance and Sexual Activity    Alcohol use: No    Drug use: Never    Sexual activity: Defer     ---------------------------------------------------------------------------------------------------------------------   Allergies:  Patient has no known allergies.  ---------------------------------------------------------------------------------------------------------------------   Prior to Admission Medications       Prescriptions Last Dose Informant Patient Reported? Taking?    albuterol (PROVENTIL HFA;VENTOLIN HFA) 108 (90 BASE) MCG/ACT inhaler Past Week Self Yes Yes    Inhale 2 puffs Every 4 (Four) Hours As Needed for Wheezing.    ALPRAZolam (XANAX) 1 MG tablet Past Week Self Yes Yes    Take 1 tablet by mouth Every Other Day. Only takes 10 per month.    Patient taking differently:  Take 1 tablet by mouth Every Other Day. Only takes 15 per  month    aspirin 81 MG tablet 4/11/2025 Self, Pharmacy No Yes    Take 1 tablet by mouth Daily.    atorvastatin (LIPITOR) 40 MG tablet 4/11/2025  No Yes    Take 1 tablet by mouth Daily.    baclofen (LIORESAL) 10 MG tablet 4/10/2025  Yes Yes    Take 1 tablet by mouth 3 (Three) Times a Day.    clopidogrel (PLAVIX) 75 MG tablet 4/11/2025  No Yes    Take 1 tablet by mouth Daily.    cyanocobalamin 1000 MCG/ML injection Past Week Self Yes Yes    Inject 1 mL into the appropriate muscle as directed by prescriber Every 28 (Twenty-Eight) Days.    dapagliflozin Propanediol 10 MG tablet 4/11/2025  No Yes    Take 10 mg by mouth Daily.    Enbrel 50 MG/ML injection 4/10/2025 Self Yes Yes    Inject 1 mL under the skin into the appropriate area as directed 1 (One) Time Per Week. Wednesday    Evolocumab (REPATHA) solution auto-injector SureClick injection Past Week  No Yes    Inject 1 mL under the skin into the appropriate area as directed Every 14 (Fourteen) Days.    famotidine (Pepcid) 40 MG tablet 4/11/2025  No Yes    Take 1 tablet by mouth 2 (Two) Times a Day.    gabapentin (NEURONTIN) 600 MG tablet 4/10/2025 Self Yes Yes    Take 1 tablet by mouth 4 (Four) Times a Day.    HYDROcodone-acetaminophen (NORCO)  MG per tablet 4/10/2025 Self No Yes    Take 1 tablet by mouth 3 (Three) Times a Day As Needed for Moderate Pain .    Patient taking differently:  Take 1 tablet by mouth Every 6 (Six) Hours As Needed for Moderate Pain.    hydrOXYzine (ATARAX) 25 MG tablet 4/11/2025 Self Yes Yes    Take 1 tablet by mouth Every 8 (Eight) Hours As Needed for Itching. Rarely    isosorbide mononitrate (IMDUR) 60 MG 24 hr tablet 4/11/2025  No Yes    Take 1 tablet by mouth Daily.    metoprolol succinate XL (TOPROL-XL) 25 MG 24 hr tablet 4/11/2025  No Yes    Take 0.5 tablet by mouth Daily.    nitroglycerin (NITROSTAT) 0.4 MG SL tablet 4/10/2025  No Yes    Place 1 tablet under the tongue Every 5 (Five) Minutes As Needed for Chest Pain. Take no more  than 3 doses in 15 minutes.    pantoprazole (PROTONIX) 40 MG EC tablet 4/11/2025  Yes Yes    ranolazine (Ranexa) 500 MG 12 hr tablet 4/11/2025  No Yes    Take 1 tablet by mouth 2 (Two) Times a Day.    sacubitril-valsartan (Entresto) 24-26 MG tablet 4/11/2025  No Yes    Take 0.5 tablets by mouth 2 (Two) Times a Day.    spironolactone (ALDACTONE) 25 MG tablet 4/11/2025  No Yes    Take 0.5 tablet by mouth Daily.    SYMBICORT 80-4.5 MCG/ACT inhaler 4/10/2025 Self Yes Yes    Inhale 2 puffs 2 (Two) Times a Day As Needed.    vitamin D (ERGOCALCIFEROL) 1.25 MG (46565 UT) capsule capsule Past Week  Yes Yes    Take 1 capsule by mouth 1 (One) Time Per Week.    EPINEPHrine (EPIPEN IJ) More than a month Self Yes No    Inject 1 dose as directed As Needed (ALLERGIC REACTION).          Hospital Scheduled Meds:     sodium chloride, , Last Rate: 500 mL/hr (04/11/25 1044)      ---------------------------------------------------------------------------------------------------------------------   Vital Signs:  Temp:  [97.1 °F (36.2 °C)] 97.1 °F (36.2 °C)  Heart Rate:  [66] 66  Resp:  [16] 16  BP: (122)/(81) 122/81      04/11/25  0912   Weight: 77.1 kg (170 lb)     Body mass index is 24.39 kg/m².  ---------------------------------------------------------------------------------------------------------------------   Physical Exam:  Constitutional:  Well-developed and well-nourished.  No respiratory distress.      HENT:  Head: Normocephalic and atraumatic.  Mouth:  Moist mucous membranes.    Eyes:  Conjunctivae and EOM are normal.  Pupils are equal, round, and reactive to light.  No scleral icterus.  Neck:  Neck supple.  No JVD present.    Cardiovascular:  Normal rate, regular rhythm and normal heart sounds with no murmur.  Pulmonary/Chest:  No respiratory distress, no wheezes, no crackles, with normal breath sounds and good air movement.  Abdominal:  Soft.  Bowel sounds are normal.  No distension and no tenderness.   Musculoskeletal:  No  "edema, no tenderness, and no deformity.  No red or swollen joints anywhere.    Neurological:  Alert and oriented to person, place, and time.  No cranial nerve deficit.  No tongue deviation.  No facial droop.  No slurred speech.   Skin:  Skin is warm and dry.  No rash noted.  No pallor.   Psychiatric:  Normal mood and affect.  Behavior is normal.  Judgment and thought content normal.   Peripheral vascular:  No edema and strong pulses on all 4 extremities.  ---------------------------------------------------------------------------------------------------------------------  EKG: Normal sinus rhythm  Telemetry: Normal sinus rhythm  I have personally looked at both the EKG and the telemetry strips.  ---------------------------------------------------------------------------------------------------------------------   Results from last 7 days   Lab Units 04/11/25  0859   WBC 10*3/mm3 7.01   HEMOGLOBIN g/dL 15.3   HEMATOCRIT % 45.4   MCV fL 95.2   MCHC g/dL 33.7   PLATELETS 10*3/mm3 321         Results from last 7 days   Lab Units 04/11/25  0859   SODIUM mmol/L 135*   POTASSIUM mmol/L 4.1   CHLORIDE mmol/L 104   CO2 mmol/L 21.7*   BUN mg/dL 17   CREATININE mg/dL 1.15   CALCIUM mg/dL 9.5   GLUCOSE mg/dL 102*   Estimated Creatinine Clearance: 75.4 mL/min (by C-G formula based on SCr of 1.15 mg/dL).  No results found for: \"AMMONIA\"          Lab Results   Component Value Date    HGBA1C 5.70 (H) 01/22/2025     Lab Results   Component Value Date    TSH 1.650 01/22/2025    FREET4 1.23 01/22/2025     No results found for: \"PREGTESTUR\", \"PREGSERUM\", \"HCG\", \"HCGQUANT\"  Pain Management Panel           No data to display              No results found for: \"BLOODCX\"  No results found for: \"URINECX\"  No results found for: \"WOUNDCX\"  No results found for: \"STOOLCX\"      ---------------------------------------------------------------------------------------------------------------------  Imaging Results (Last 7 Days)       ** No results " found for the last 168 hours. **            I have personally reviewed the radiology images and read the final radiology report.  ---------------------------------------------------------------------------------------------------------------------  Assessment and Plan:   Abnormal stress test  History of CAD status post multiple stents in the past    Recommendation  Will proceed with cardiac catheterization as scheduled by cardiology team in the clinic  Risk-benefit alternative discussed with patient  Risk of vascular trauma stroke death myocardial infarction discussed with patient he wanted to proceed with that.  Further recommendation depending on the cardiac cath results    Keisha King MD  04/11/25  10:53 EDT

## 2025-04-16 ENCOUNTER — OFFICE VISIT (OUTPATIENT)
Dept: CARDIOLOGY | Facility: CLINIC | Age: 60
End: 2025-04-16
Payer: MEDICARE

## 2025-04-16 VITALS
SYSTOLIC BLOOD PRESSURE: 108 MMHG | BODY MASS INDEX: 24.48 KG/M2 | DIASTOLIC BLOOD PRESSURE: 71 MMHG | HEIGHT: 70 IN | WEIGHT: 171 LBS | HEART RATE: 74 BPM | OXYGEN SATURATION: 96 %

## 2025-04-16 DIAGNOSIS — I50.32 HEART FAILURE WITH RECOVERED EJECTION FRACTION (HFRECEF): Chronic | ICD-10-CM

## 2025-04-16 DIAGNOSIS — I10 ESSENTIAL HYPERTENSION: Chronic | ICD-10-CM

## 2025-04-16 DIAGNOSIS — E78.01 FAMILIAL HYPERCHOLESTEROLEMIA: Chronic | ICD-10-CM

## 2025-04-16 DIAGNOSIS — I25.119 CORONARY ARTERY DISEASE INVOLVING NATIVE CORONARY ARTERY OF NATIVE HEART WITH ANGINA PECTORIS: Primary | Chronic | ICD-10-CM

## 2025-04-16 RX ORDER — VALSARTAN 40 MG/1
40 TABLET ORAL DAILY
Qty: 90 TABLET | Refills: 3 | Status: SHIPPED | OUTPATIENT
Start: 2025-04-16

## 2025-04-16 NOTE — PROGRESS NOTES
"Chief Complaint  Follow-up (Patient c/o dizziness and feeling weak, and out of breath ), Dizziness (Cloudy feeling in his head ), and post cath    Subjective          Javier Santiago presents to [facility] for follow up.    History of Present Illness    Mr. Santiago presents for follow up after undergoing invasive coronary angiogram 04/11/2025 with Dr. King.  He had been having chest pain and his stress test was positive for ischemia.  He was found to have patent stents and no new obstructive disease.  History of Present Illness  Chest discomfort has been ongoing, with uncertainty regarding whether it is cardiac in origin or related to acid reflux. Current chest pain is reported to be located higher than the pain experienced during a previous myocardial infarction. No complications have been noted following the cardiac catheterization performed by Dr. King, who confirmed that the stents were open and no significant issues were observed. He is currently on a regimen of medications for acid reflux.    Difficulty swallowing, particularly with bread, is reported, along with frequent choking episodes. He has had to wait up to 6 to 7 hours for food to pass through his esophagus. The last endoscopic examination was conducted over a year ago, with previous procedures performed at this facility.    His blood pressure has been on the low end of normal.  His most recent lipids are at goal thanks to his compliance with PCSK9 inhibitor therapy         Tobacco Use: High Risk (5/2/2025)    Patient History     Smoking Tobacco Use: Every Day     Smokeless Tobacco Use: Current     Passive Exposure: Current       Objective     Vital Signs:   /71   Pulse 74   Ht 177.8 cm (70\")   Wt 77.6 kg (171 lb)   SpO2 96%   BMI 24.54 kg/m²       Physical Exam  Vitals and nursing note reviewed.   Constitutional:       General: He is not in acute distress.  HENT:      Head: Normocephalic and atraumatic.   Eyes:     "  Conjunctiva/sclera: Conjunctivae normal.   Neck:      Vascular: No carotid bruit.   Cardiovascular:      Rate and Rhythm: Normal rate and regular rhythm.      Pulses: Normal pulses.   Pulmonary:      Effort: Pulmonary effort is normal.      Breath sounds: Normal breath sounds.   Musculoskeletal:      Cervical back: Neck supple.      Right lower leg: No edema.      Left lower leg: No edema.   Skin:     General: Skin is warm and dry.   Neurological:      General: No focal deficit present.   Psychiatric:         Mood and Affect: Mood normal.         Behavior: Behavior normal.             Result Review :   The following data was reviewed by: OBDULIO Juan on 04/16/2025:  Common labs          1/22/2025    08:59 3/12/2025    14:32 4/11/2025    08:59   Common Labs   Glucose 108  94  102    BUN 14  11  17    Creatinine 0.92  0.94  1.15    Sodium 140  139  135    Potassium 4.2  4.4  4.1    Chloride 107  106  104    Calcium 8.8  9.3  9.5    Albumin 4.1  4.4     Total Bilirubin 0.3  0.7     Alkaline Phosphatase 114  97     AST (SGOT) 22  22     ALT (SGPT) 24  18     WBC 7.72  6.85  7.01    Hemoglobin 15.5  15.4  15.3    Hematocrit 44.7  44.0     46.8  45.4    Platelets 339  312     333  321    Total Cholesterol 55  60     Triglycerides 112  67     HDL Cholesterol 29  33     LDL Cholesterol  5  11     Hemoglobin A1C 5.70        Lipid Panel          6/18/2024    08:36 1/22/2025    08:59 3/12/2025    14:32   Lipid Panel   Total Cholesterol 76  55  60    Triglycerides 71  112  67    HDL Cholesterol 29  29  33    VLDL Cholesterol 16  21  16    LDL Cholesterol  31  5  11    LDL/HDL Ratio 1.13  0.12  0.41      Data reviewed : Cardiology studies as detailed below      Last Cardiac Cath  Results for orders placed during the hospital encounter of 04/11/25    Cardiac Catheterization/Vascular Study    Conclusion  CARDIAC CATHETERIZATION / INTERVENTION REPORT    DATE OF PROCEDURE: 4/11/2025    INDICATION FOR PROCEDURE: Abnormal  stress test      PROCEDURE PERFORMED:    1. Coronary Angiogram  2. Left heart catheretization with LV ventriculogram.  3.  Right radial approach  4.  Moderate sedation 25 minutes  Patient was given Versed and fentanyl        PROCEDURE COMMENTS:    Javier Santiago was brought to the cath lab and placed on the cardiac catherization table in the postabsortive state. The patient was prepped and draped according to the cath lab protocol under strict aseptic and sterile condition. Patient's right radial artery sight was prepped and draped. Under ultrasound guidance the right radial artery was punctured using a 21 gauge needle utilizing the modified Seldinger technique. 6 Croatian sheath was introduced over a wire. After aspirating for blood it was flushed with heparinized saline.    We advanced Yevgeniy type diagnostic catheters over the wire. The  left and right coronary arteries  were selectively engaged. Left and right coronary angiogram were obtained in multiple orthogonal projections.    We subsequently exchanged for 6 Croatian pigtail catheter over the wire. The LV cavity was entered with no difficulty. Pressure in the LV cavity was recorded.  LV ventriculogram was then performed in the MITCHELL projection. Post-LV ventriculogram pressures were recorded. Subsequently we performed a pullback pressure recording across the aortic valve.  The JR4 catheter was then removed over the wire.    After completion of the procedure the femoral sheath was removed in the cath lab and hemostasis was achieved by radial band. The patient tolerated the procedure without complications.        FINDINGS:    1. HEMODYNAMICS: AO : 120 mmHg,  mmHg,  LVEDP 16 mmHg      2. CORONARY ANGIOGRAPHY: Right dominant coronary artery system.    The left main coronary artery bifurcated into the LAD and left circumflex coronary.  The LAD coursed in the anterior interventricular groove, gave rise to diagonal branches and reached the apex.    Left circumflex  coursed in the left atrial ventricular groove and gives rise to several marginal branches.    The right coronary artery course in the right atrial ventricular groove I gave rise to several acute marginal branches.    Left main: Normal caliber vessel divides into LAD and circumflex branches    LAD: 10 to 20% in-stent restenosis noted in LAD otherwise stents are widely open.  LAD does not have any significant disease in the distal segment    Left circumflex: Normal caliber vessel no focal stenosis noted.  Stent in OM1 is widely patent  RCA: Dominant vessel with luminal irregularity no hemodynamically significant focal stenosis noted    Final impression:  False positive stress test  Stress test shows ischemia in the lateral and inferior location      RECOMMENDATIONS:  Continue current medication  Follow-up as an outpatient with local cardiologist  Will DC home today      Keisha King MD    04/11/25      Last Stress test  Results for orders placed during the hospital encounter of 04/01/25    Stress Test With Myocardial Perfusion One Day    Interpretation Summary    Impressions are consistent with a high risk study.    Left ventricular ejection fraction is normal (Calculated EF = 59%).    Myocardial perfusion imaging indicates a moderate-sized, moderate-to-severe area of ischemia located in the inferior wall and lateral wall.    Patient has inferolateral significant ischemia on SPECT images EKG positive for ischemia Will recommend cardiac catheterization if patient symptomatic on antianginal medications per guideline .       Last Echo  Results for orders placed during the hospital encounter of 04/01/25    Adult Transthoracic Echo Complete W/ Cont if Necessary Per Protocol    Interpretation Summary    Left ventricle is normal in size and function with estimate ejection fraction of 55 to 60%. Normal diastolic function.    Right ventricle appears normal in size and function.    Normal left atrial cavity size noted. No  evidence of a patent foramen ovale. No evidence of an atrial septal defect present.    Normal right atrial cavity size noted. The inferior vena cava is normally sized.    The aortic valve is trileaflet. There is no significant aortic stenosis or regurgitation.    There is mild mitral regurgitation.    Estimated right ventricular systolic pressure from tricuspid regurgitation is moderately elevated (45-55 mmHg). There is mild to moderate tricuspid agitation. Estimated RVSP is 47 mmHg with estimated RA pressure of 10 mmHg.    There is no evidence of pericardial effusion.    The aortic root measures 3 cm.             Current Outpatient Medications   Medication Sig Dispense Refill    albuterol (PROVENTIL HFA;VENTOLIN HFA) 108 (90 BASE) MCG/ACT inhaler Inhale 2 puffs Every 4 (Four) Hours As Needed for Wheezing.      ALPRAZolam (XANAX) 1 MG tablet Take 1 tablet by mouth Every Other Day. Only takes 10 per month. (Patient taking differently: Take 1 tablet by mouth Every Other Day. Only takes 15 per month)      aspirin 81 MG tablet Take 1 tablet by mouth Daily. 30 tablet 11    atorvastatin (LIPITOR) 40 MG tablet Take 1 tablet by mouth Daily. 90 tablet 3    baclofen (LIORESAL) 10 MG tablet Take 1 tablet by mouth 3 (Three) Times a Day.      clopidogrel (PLAVIX) 75 MG tablet Take 1 tablet by mouth Daily. 90 tablet 5    cyanocobalamin 1000 MCG/ML injection Inject 1 mL into the appropriate muscle as directed by prescriber Every 28 (Twenty-Eight) Days.      Enbrel 50 MG/ML injection Inject 1 mL under the skin into the appropriate area as directed 1 (One) Time Per Week. Wednesday      EPINEPHrine (EPIPEN IJ) Inject 1 dose as directed As Needed (ALLERGIC REACTION).      Evolocumab (REPATHA) solution auto-injector SureClick injection Inject 1 mL under the skin into the appropriate area as directed Every 14 (Fourteen) Days. 2 mL 5    famotidine (Pepcid) 40 MG tablet Take 1 tablet by mouth 2 (Two) Times a Day. 90 tablet 2    gabapentin  (NEURONTIN) 600 MG tablet Take 1 tablet by mouth 4 (Four) Times a Day.      HYDROcodone-acetaminophen (NORCO)  MG per tablet Take 1 tablet by mouth 3 (Three) Times a Day As Needed for Moderate Pain . (Patient taking differently: Take 1 tablet by mouth Every 6 (Six) Hours As Needed for Moderate Pain.) 30 tablet 0    hydrOXYzine (ATARAX) 25 MG tablet Take 1 tablet by mouth Every 8 (Eight) Hours As Needed for Itching. Rarely      isosorbide mononitrate (IMDUR) 60 MG 24 hr tablet Take 1 tablet by mouth Daily. 90 tablet 3    metoprolol succinate XL (TOPROL-XL) 25 MG 24 hr tablet Take 0.5 tablet by mouth Daily. 45 tablet 1    nitroglycerin (NITROSTAT) 0.4 MG SL tablet Place 1 tablet under the tongue Every 5 (Five) Minutes As Needed for Chest Pain. Take no more than 3 doses in 15 minutes. 30 tablet 0    pantoprazole (PROTONIX) 40 MG EC tablet       ranolazine (Ranexa) 500 MG 12 hr tablet Take 1 tablet by mouth 2 (Two) Times a Day. 60 tablet 1    spironolactone (ALDACTONE) 25 MG tablet Take 0.5 tablet by mouth Daily. 15 tablet 11    SYMBICORT 80-4.5 MCG/ACT inhaler Inhale 2 puffs 2 (Two) Times a Day As Needed.      vitamin D (ERGOCALCIFEROL) 1.25 MG (55112 UT) capsule capsule Take 1 capsule by mouth 1 (One) Time Per Week.      dapagliflozin Propanediol 10 MG tablet Take 10 mg by mouth Daily. 30 tablet 5    valsartan (DIOVAN) 40 MG tablet Take 1 tablet by mouth Daily. 90 tablet 3     No current facility-administered medications for this visit.            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)  (Primary)  -     valsartan (DIOVAN) 40 MG tablet; Take 1 tablet by mouth Daily.  Dispense: 90 tablet; Refill: 3    I have assumed longitudinal care for complex medical condition(s) that require long-term management involving monitoring and adjustments to medications.  Goals of care, history of important events, and historical results can be found with each problem.  Episodic plan of care can be found in the order section.  Assessment & Plan  1. Hypotension:  - Discontinue Entresto and initiate valsartan 40 mg.  - Monitor blood pressure twice daily for the next 2 weeks and record readings.  - Provide form for recording blood pressure readings.  - Prescription for valsartan to be sent to the pharmacy.    2. Chest pain:  - Referral to a specialist for evaluation of esophageal condition.  - Inform nurse practitioner at the clinic about suspected esophageal spasms and potential stricture.    3. Dysphagia:  - Referral to a specialist for further evaluation and management of the stricture.    Follow-up:  - Drop off the completed blood pressure form at the clinic for review and potential medication adjustments.         Follow Up     No follow-ups on file.    Patient was given instructions and counseling regarding his condition or for health maintenance advice. Please see specific information pulled into the AVS if appropriate.       Electronically signed by OBDULIO Juan, 05/02/25, 6:43 PM EDT.    Patient or patient representative verbalized consent for the use of Ambient Listening during the visit with  OBDULIO Juan for chart documentation. 5/2/2025  18:43 EDT     Dictated Utilizing Dragon Dictation: Part of this note may be an electronic transcription/translation of spoken language to printed text using the Dragon Dictation System

## 2025-05-02 NOTE — ASSESSMENT & PLAN NOTE
HF Classification: Heart Failure with improved Ejection Fraction    NYHA Class:  Class II: Slight limitation of physical activity. Comfortable at rest Ordinary physical activity results in fatigue, palpitation, dyspnea (shortness of breath)    HF Plan of Care:  GDMT Beta Blocker, SGLT2 Inhibitor, ARNI/ACE/ARB, and MRA; Additional Recommendations Daily weight log - Report to clinic if gain of 3 pounds in one day or 5 pounds in 1 week    Entresto discontinued due to hypotension.  Valsartan ordered

## 2025-05-02 NOTE — ASSESSMENT & PLAN NOTE
Coronary Artery Disease: Coronary artery disease is stable.  Goals of Care:Medication tolerance and compliance, control progression of disease, and Lifestyle modification:  tobacco use cessation  Plan: Continue current treatment regimen.  Cardiac status will be reassessed in 3 months.

## 2025-06-09 RX ORDER — CLOPIDOGREL BISULFATE 75 MG/1
75 TABLET ORAL DAILY
Qty: 90 TABLET | Refills: 3 | Status: SHIPPED | OUTPATIENT
Start: 2025-06-09

## 2025-06-11 NOTE — PROGRESS NOTES
[facility] CARDIOLOGY  [address] [address]  Phone: [phone]  Fax: [phone]    03/12/2024    Chief Complaint   Patient presents with    Chest Pain        History:   Javier Santiago is a 58 y.o. male seen in consultation, patient has a history of prior cardiac catheterization done by Dr. Mukherjee in 2021 showing patent stent but 50% stenosis in the LAD.  He been having worsening chest pain becoming more frequent and more dyspnea on exertion with minimal activity.          Past Medical History:   Diagnosis Date    Anxiety     Arthritis     psoriatic    ASCVD (arteriosclerotic cardiovascular disease)     CHF (congestive heart failure)     Chronic low back pain     COPD (chronic obstructive pulmonary disease)     Coronary artery disease     Fibromyalgia     Full dentures     GERD (gastroesophageal reflux disease)     History of EKG 01/26/2016    NORMAL    Hyperlipidemia     Hypertension     Hypotension     Insomnia     MI, old 2015    MRSA infection 08/2022    NECK SURGERY    Psoriasis     Psoriatic arthritis     RA (rheumatoid arthritis)     Sciatic nerve pain     Skin pain     from nerve pain    Sleep apnea     cpap non compliant    Tinnitus     Wears glasses     readers       Past Surgical History:   Procedure Laterality Date    ANTERIOR CERVICAL DISCECTOMY W/ FUSION N/A 04/26/2021    Procedure: CERVICAL DISCECTOMY ANTERIOR WITH FUSION C4-6;  Surgeon: Chester Cowan MD;  Location: [facility];  Service: Neurosurgery;  Laterality: N/A;    BACK SURGERY      lumbar 1995    CARDIAC CATHETERIZATION N/A 10/13/2017    Procedure: Left Heart Cath;  Surgeon: Chester Centeno MD;  [facility]:  [facility];  Service:     CARDIAC CATHETERIZATION N/A 02/11/2021    Procedure: Left Heart Cath;  Surgeon: Brent Degroot MD;  Location:  [facility];  Service: Cardiology;  Laterality: N/A;    CATARACT EXTRACTION, BILATERAL Bilateral      1st attempt / left voicemail for patient to return call to [phone], speak with Endocrinology.   CERVICAL DISCECTOMY POSTERIOR FUSION WITH BRAIN LAB N/A 08/04/2022    Procedure: CERVICAL POSTERIOR FUSION WITH INSTRUMENTATION C3-5;  Surgeon: Chester Cowan MD;  Location:  [facility];  Service: Neurosurgery;  Laterality: N/A;    COLONOSCOPY      CORONARY ANGIOPLASTY WITH STENT PLACEMENT      x2 stent in place    ENDOSCOPY      ENDOSCOPY N/A 02/09/2018    Procedure: ESOPHAGOGASTRODUODENOSCOPY WITH DILATATION CPT CODE: 29209;  Surgeon: Everett Villanueva III, MD;  Location:  [facility];  Service:     ENDOSCOPY N/A 6/7/2023    Procedure: ESOPHAGOGASTRODUODENOSCOPY WITH ESOPHAGEAL BALLOON DILATATION AND BIOPSY;  Surgeon: Sg Black MD;  Location:  [facility];  Service: Gastroenterology;  Laterality: N/A;  BIOPSY OF GASTRIC ULCERS, GEJ, AND ESOPHAGUS BIOPSIES, DILATION UP TO 12 MM WITH BALLOON    ENDOSCOPY WITH FOREIGN BODY REMOVAL N/A 08/05/2022    Procedure: ESOPHAGOGASTRODUODENOSCOPY WITH FOREIGN BODY REMOVAL;  Surgeon: Sg Black MD;  Location:  [facility];  Service: Gastroenterology;  Laterality: N/A;        ROS  As above otherwise negative    Past Social History:  Social History     Socioeconomic History    Marital status:      Spouse name: Coretta Bhatt   Tobacco Use    Smoking status: Every Day     Current packs/day: 0.50     Average packs/day: 0.5 packs/day for 35.0 years (17.5 ttl pk-yrs)     Types: Cigarettes    Smokeless tobacco: Former     Types: Chew     Quit date: 2018    Tobacco comments:     down 0.5 ppd now   Vaping Use    Vaping status: Never Used   Substance and Sexual Activity    Alcohol use: No    Drug use: Never    Sexual activity: Defer       Past Family History:  Family History   Problem Relation Age of Onset    Heart attack Mother         x 3    Atrial fibrillation Mother     Heart disease Mother     Heart attack Father     Heart disease Father         CABG       Current Outpatient Medications   Medication Sig Dispense Refill    albuterol (PROVENTIL  HFA;VENTOLIN HFA) 108 (90 BASE) MCG/ACT inhaler Inhale 2 puffs Every 4 (Four) Hours As Needed for Wheezing.      ALPRAZolam (XANAX) 1 MG tablet Take 1 tablet by mouth 3 (Three) Times a Day As Needed for Anxiety.      aspirin 81 MG tablet Take 1 tablet by mouth Daily. 30 tablet 11    atorvastatin (LIPITOR) 80 MG tablet Take 1 tablet by mouth Daily.      baclofen (LIORESAL) 10 MG tablet Take 1 tablet by mouth 2 (Two) Times a Day. 60 tablet 0    cyanocobalamin 1000 MCG/ML injection Inject 1 mL into the appropriate muscle as directed by prescriber Every 28 (Twenty-Eight) Days.      dexlansoprazole (DEXILANT) 60 MG capsule Take 1 capsule by mouth Daily. Open capsule, sprinkle on 1 tablespoon applesauce, swallow immediately, follow with sips of water, do not crush/cut/chew granules OR mix with 20 mL of water, draw into oral syringe and take immediately, mix residue with 10 mL of water, swirl, take by mouth immediately, repeat with additional 10 mL of water. 90 capsule 3    Diclofenac Sodium (VOLTAREN) 1 % gel gel Apply 4 g topically to the appropriate area as directed 3 (Three) Times a Day As Needed (JOINT PAIN).      DULoxetine (CYMBALTA) 30 MG capsule Take 1 capsule by mouth Daily.      Enbrel 50 MG/ML injection Inject 1 mL under the skin into the appropriate area as directed 2 (Two) Times a Week. TUESDAY AND SATURDAY      EPINEPHrine (EPIPEN IJ) Inject 1 dose as directed As Needed (ALLERGIC REACTION).      gabapentin (NEURONTIN) 600 MG tablet Take 1 tablet by mouth 3 (Three) Times a Day.      hydrALAZINE (APRESOLINE) 25 MG tablet Take 1 tablet by mouth 3 (Three) Times a Day.      hydrOXYzine (ATARAX) 25 MG tablet Take 1 tablet by mouth Every 8 (Eight) Hours As Needed for Itching.      methotrexate 2.5 MG tablet Take 8 tablets by mouth 1 (One) Time Per Week. WEDNESDAY      nitroglycerin (NITROSTAT) 0.4 MG SL tablet Place 1 tablet under the tongue Every 5 (Five) Minutes As Needed for Chest Pain. Take no more than 3 doses  in 15 minutes.      pantoprazole (PROTONIX) 40 MG EC tablet Take 1 tablet by mouth 2 (Two) Times a Day Before Meals. 60 tablet 5    SYMBICORT 80-4.5 MCG/ACT inhaler Inhale 2 puffs 2 (Two) Times a Day As Needed.      amLODIPine (NORVASC) 5 MG tablet TAKE ONE TABLET BY MOUTH DAILY FOR FOR BLOOD  PRESSURE (Patient not taking: Reported on 3/12/2024) 30 tablet 5    betamethasone dipropionate (DIPROLENE) 0.05 % ointment Apply 1 application topically to the appropriate area as directed 2 (Two) Times a Day. (Patient not taking: Reported on 3/12/2024)      HYDROcodone-acetaminophen (NORCO)  MG per tablet Take 1 tablet by mouth 3 (Three) Times a Day As Needed for Moderate Pain . (Patient taking differently: Take 1 tablet by mouth Every 6 (Six) Hours As Needed for Moderate Pain.) 30 tablet 0    isosorbide mononitrate (IMDUR) 30 MG 24 hr tablet TAKE ONE TABLET BY MOUTH DAILY FOR HEART (Patient not taking: Reported on 3/12/2024) 90 tablet 3    loratadine (CLARITIN) 10 MG tablet Take 1 tablet by mouth Daily. (Patient not taking: Reported on 3/12/2024)      metoprolol succinate XL (TOPROL-XL) 25 MG 24 hr tablet Take 1 tablet by mouth Daily. (Patient not taking: Reported on 3/12/2024) 90 tablet 1     No current facility-administered medications for this visit.        No Known Allergies      Objective:  Vitals:    03/12/24 0956   BP: 163/81   Pulse: 67   Resp: 18   SpO2: 97%       Physical Exam  Pleasant cooperative no acute distress he is alert and oriented no rhonchi or wheezing S1-S2 are normal regular rhythm no murmurs no edema clubbing or cyanosis    DATA:  Results for orders placed during the hospital encounter of 02/11/21    SCANNED - TELEMETRY     Results for orders placed during the hospital encounter of 01/20/21    Adult Transthoracic Echo Complete W/ Cont if Necessary Per Protocol    Interpretation Summary  · Left ventricular ejection fraction appears to be 56 - 60%. Left ventricular systolic function is normal.  ·  Left ventricular diastolic function was normal.  · No significant functional valvular abnormalities noted.  · There is no evidence of pericardial effusion   Results for orders placed during the hospital encounter of 01/20/21    Stress Test With Myocardial Perfusion One Day    Interpretation Summary  · Myocardial perfusion imaging indicates a small-sized infarct located in the apex with mild ischemia in the distal anteroapical territory.  · Left ventricular ejection fraction is normal. (Calculated EF = 60%).  · Findings consistent with a normal ECG stress test.  · Impressions are consistent with an intermediate risk study.   Results for orders placed during the hospital encounter of 01/20/21    Stress Test With Myocardial Perfusion One Day    Interpretation Summary  · Myocardial perfusion imaging indicates a small-sized infarct located in the apex with mild ischemia in the distal anteroapical territory.  · Left ventricular ejection fraction is normal. (Calculated EF = 60%).  · Findings consistent with a normal ECG stress test.  · Impressions are consistent with an intermediate risk study.   Results for orders placed during the hospital encounter of 02/11/21    Cardiac Catheterization/Vascular Study    Narrative  Images from the original result were not included.  CARDIAC CATHETERIZATION / INTERVENTION REPORT            DATE OF PROCEDURE: 2/11/2021      INDICATION FOR PROCEDURE: Abnormal stress test      PRE PROCEDURE DIAGNOSIS:  CCS 2 angina with abnormal stress test showing apical infarct with mild ischemia      POST PROCEDURE DIAGNOSIS:  Mild to moderate in stent restenosis in the very proximal portion of LAD Stent 40-50% in stent restenosis  Normal LV systolic and diastolic function    Face to face mdoerate conscious  sedation time : 30 min      COMPLICATIONS : None    Specimens collected : None    PROCEDURE PERFORMED:    1. Selective right and left Coronary Angiogram  2. Left heart catheretization  3. Left  Ventriculography    Description of the procedure:  Prior to the procedure risk, benefits and possible alternative were discussed with the patient and informed consent was obtained. Patient was brought to cardiac cath lab table in post absorbtive state. Patient was prepped and drape in usual sterile fashion. IV Versed and Fentanyl was used for moderate sedation. 2% Lidocaine was used for topical anesthesia. R radial arterial site was prepped and a micropuncture needle was used to access the artery and a 5 F slender sheath was placed. 2.5 mg of Verapamil and 200 mcg of NTG was given through the sheath intra arterial and 5000 units of Heparin was given once the catheter crossed the aortic arch.    5 F TIG 4 catheters was used for right and left coronary angiogram and 5 F pigtail catheter was used for Left heart hemodynamics and left ventriculography. All the catheters were exchanged over 0.035 wire. The R radial arterial sheath was removed and TR band was applied and immediate and complete hemostasis was achieved. The patient tolerate the entire procedure well without any immediate known complications.    Coronary anatomy findings:    LM: Is a large calibre vessel , normal take off from left cusp, divides into LAD and Lcx. Very short length vessel with no stenosis    LAD: Stent from proximal to mid, proximal portion had 40-50% tubular in stent restenosis , remaining mid and distal portion of the stent were patent, remaining mid and distal was small calibre and mild diffuse disease noted    LCX: Moderate calibre vessel, mild luminal irregularities.    RCA: Large calibre, dominant artery, normal take off from right cusp.  No significant stenosis noted.    Left Ventriculography:    LV systolic function was normal with visual estimated EF of 65%. No wall motion abnormalities.  No significant mitral regurgitation noted.    LVEDP: 14 mmHg  No gradient across the aortic valve on pull back.            Final Impression:  Mild  "to moderate Non obstructive 50% in stent restenosis in the proximal LAD stent        Recommendations:  Will continue with medical rx for non obstructive CAD            Brent MD Mikayla, [facility]  Interventional Cardiology    02/11/21  09:22 EST       Procedures     Lab Results   Component Value Date    CHOL 137 11/01/2017    CHLPL 170 01/11/2016    CHLPL 230 (H) 01/14/2014     Lab Results   Component Value Date    TRIG 95 11/01/2017    TRIG 234 (H) 01/11/2016    TRIG 323 (H) 01/14/2014     Lab Results   Component Value Date    HDL 40 (L) 11/01/2017    HDL 25 (L) 01/11/2016     Lab Results   Component Value Date    LDL 78 11/01/2017    LDL 98 01/11/2016       No results found for: \"TSH\"          Invalid input(s): \"LABALBU\", \"PROT\"            Assessment and Plan:    Javier Santiago  reports that he has been smoking cigarettes. He has a 17.5 pack-year smoking history. He quit smokeless tobacco use about 6 years ago.  His smokeless tobacco use included chew.   He is having episodes of worsening angina similar to his prior chest pain with worsening dyspnea on exertion.  Given that he had 50% stenosis 3 years ago and with ongoing smoking I strongly suggest a urgent coronary angiogram.  We will plan a right radial approach.  His recent CT scan showed severe calcification of the coronary arteries as well    Thank you for allowing me to participate in the care of Javier Santiago. Feel free to contact me directly with any further questions or concerns.            Charles Montaño MD, [facility], [facility]  Interventional Cardiology     "

## 2025-06-16 ENCOUNTER — SPECIALTY PHARMACY (OUTPATIENT)
Dept: PHARMACY | Facility: HOSPITAL | Age: 60
End: 2025-06-16
Payer: MEDICARE

## 2025-06-16 NOTE — PROGRESS NOTES
Specialty Pharmacy Patient Management Program  Refill Outreach     Javier was contacted today regarding refills of their medication(s).    Refill Questions      Flowsheet Row Most Recent Value   Changes to allergies? No   Changes to medications? No   New conditions or infections since last clinic visit No   Unplanned office visit, urgent care, ED, or hospital admission in the last 4 weeks  No   How does patient/caregiver feel medication is working? Good   Financial problems or insurance changes  No   Since the previous refill, were any specialty medication doses or scheduled injections missed or delayed?  No   Does this patient require a clinical escalation to a pharmacist? No            Delivery Questions      Flowsheet Row Most Recent Value   Delivery method  at Pharmacy   Delivery address Prescription   Medication(s) being filled and delivered Evolocumab (REPATHA)   Copay verified? Yes   Copay amount $0   Copay form of payment No copayment ($0)   Signature Required No                 Follow-up: 56 day(s)     Neha Mccabe, Pharmacy Technician  6/16/2025  14:26 EDT

## 2025-07-09 DIAGNOSIS — I50.22 CHRONIC HFREF (HEART FAILURE WITH REDUCED EJECTION FRACTION): Chronic | ICD-10-CM

## 2025-07-09 RX ORDER — SPIRONOLACTONE 25 MG/1
25 TABLET ORAL DAILY
Qty: 30 TABLET | Refills: 1 | Status: SHIPPED | OUTPATIENT
Start: 2025-07-09

## 2025-08-03 DIAGNOSIS — I25.119 CORONARY ARTERY DISEASE INVOLVING NATIVE CORONARY ARTERY OF NATIVE HEART WITH ANGINA PECTORIS: Chronic | ICD-10-CM

## 2025-08-03 DIAGNOSIS — K21.9 GASTROESOPHAGEAL REFLUX DISEASE WITHOUT ESOPHAGITIS: ICD-10-CM

## 2025-08-04 RX ORDER — RANOLAZINE 500 MG/1
500 TABLET, EXTENDED RELEASE ORAL 2 TIMES DAILY
Qty: 60 TABLET | Refills: 1 | Status: SHIPPED | OUTPATIENT
Start: 2025-08-04

## 2025-08-04 RX ORDER — FAMOTIDINE 40 MG/1
40 TABLET, FILM COATED ORAL 2 TIMES DAILY
Qty: 90 TABLET | Refills: 2 | Status: SHIPPED | OUTPATIENT
Start: 2025-08-04

## 2025-08-08 ENCOUNTER — SPECIALTY PHARMACY (OUTPATIENT)
Dept: PHARMACY | Facility: HOSPITAL | Age: 60
End: 2025-08-08
Payer: MEDICARE

## (undated) DEVICE — GOWN,REINF,POLY,ECL,PP SLV,XL: Brand: MEDLINE

## (undated) DEVICE — ANTIBACTERIAL UNDYED BRAIDED (POLYGLACTIN 910), SYNTHETIC ABSORBABLE SUTURE: Brand: COATED VICRYL

## (undated) DEVICE — TUBING, SUCTION, 1/4" X 20', STRAIGHT: Brand: MEDLINE INDUSTRIES, INC.

## (undated) DEVICE — ELECTRD BLD EZ CLN MOD 4IN

## (undated) DEVICE — RADIAL RUNWAY TOP PADS: Brand: RADIAL RUNWAY TOP PADS

## (undated) DEVICE — PK CATH CARD 70

## (undated) DEVICE — LN INJ CONTRST FLXCIL HP F/M LL 1200PSI10

## (undated) DEVICE — CANNULA,OXY,ADULT,SUPER SOFT,W/14'TUB,UC: Brand: MEDLINE INDUSTRIES, INC.

## (undated) DEVICE — HYBRID CO2 TUBING/CAP SET FOR OLYMPUS® SCOPES & CO2 SOURCE: Brand: ERBE

## (undated) DEVICE — BANDAGE,GAUZE,BULKEE II,4.5"X4.1YD,STRL: Brand: MEDLINE

## (undated) DEVICE — PAD, DEFIB, ADULT, RADIOTRANS, ZOLL: Brand: MEDLINE

## (undated) DEVICE — GW INQW FIX/CORE PTFE J/3MM .035 260CM

## (undated) DEVICE — JP PERF DRN SIL FLT 7MM FULL: Brand: CARDINAL HEALTH

## (undated) DEVICE — SPNG GZ WOVN 4X4IN 12PLY 10/BX STRL

## (undated) DEVICE — CATH F5 INF JL 3.5 100CM: Brand: INFINITI

## (undated) DEVICE — CONTN GRAD MEAS TRIANG 32OZ BLK

## (undated) DEVICE — Device: Brand: PROWATER

## (undated) DEVICE — GLV SURG PREMIERPRO MIC LTX PF SZ7.5 BRN

## (undated) DEVICE — SINGLE-USE BIOPSY FORCEPS: Brand: RADIAL JAW 4

## (undated) DEVICE — STRAP POSTN KN/BDY FM 5X72IN DISP

## (undated) DEVICE — ADHS SKIN PREMIERPRO EXOFIN TOPICAL HI/VISC .5ML

## (undated) DEVICE — ADULT DISPOSABLE SINGLE-PATIENT USE PULSE OXIMETER SENSOR: Brand: NONIN

## (undated) DEVICE — INTENDED FOR TISSUE SEPARATION, AND OTHER PROCEDURES THAT REQUIRE A SHARP SURGICAL BLADE TO PUNCTURE OR CUT.: Brand: BARD-PARKER ® STAINLESS STEEL BLADES

## (undated) DEVICE — TRAP,MUCUS SPECIMEN,40CC: Brand: MEDLINE

## (undated) DEVICE — FRCP BX RADJAW4 NDL 2.8 240CM LG OG BX40

## (undated) DEVICE — DEV INFL MONARCH 25W

## (undated) DEVICE — LN FLTR ORL/NASL MICROSTREAM NONINTUB A/LNG

## (undated) DEVICE — ST INF PRI SMRTSTE 20DRP 2VLV 24ML 117

## (undated) DEVICE — IRRIGATOR BULB ASEPTO 60CC STRL

## (undated) DEVICE — HEARTRAIL III GUIDING CATHETER: Brand: HEARTRAIL

## (undated) DEVICE — SHORT SPIKE VENTED W/1-WAY SC: Brand: MEDLINE INDUSTRIES, INC.

## (undated) DEVICE — SUT VIC PLS CTD ANTIB BR 3/0 8/18IN 45CM

## (undated) DEVICE — GLIDESHEATH SLENDER STAINLESS STEEL KIT: Brand: GLIDESHEATH SLENDER

## (undated) DEVICE — SPHR MARKR STEALTH STATION

## (undated) DEVICE — TOOL MR8-14MH30 MR8 14CM MATCH 3MM: Brand: MIDAS REX MR8

## (undated) DEVICE — Device

## (undated) DEVICE — DEV INFL CRE STERIFLATE 60CC DISP

## (undated) DEVICE — NC TREK NEO™ CORONARY DILATATION CATHETER 2.50 X 12 MM / RAPID-EXCHANGE: Brand: NC TREK NEO™

## (undated) DEVICE — PATIENT RETURN ELECTRODE, SINGLE-USE, CONTACT QUALITY MONITORING, ADULT, WITH 9FT CORD, FOR PATIENTS WEIGING OVER 33LBS. (15KG): Brand: MEGADYNE

## (undated) DEVICE — ADAPT CLN LUM OLYMP AIR/H20

## (undated) DEVICE — ELECTRD BLD EZ CLN MOD XLNG 2.75IN

## (undated) DEVICE — 3M™ STERI-DRAPE™ INSTRUMENT POUCH 1018: Brand: STERI-DRAPE™

## (undated) DEVICE — BIT NAV2076 VERT STERILE DRILL BIT 2.9MM: Brand: VERTEX® RECONSTRUCTION SYSTEM

## (undated) DEVICE — BLANKT WARM LOWR/BDY 100X120CM

## (undated) DEVICE — CATH F5 INF JR 4 100CM: Brand: INFINITI

## (undated) DEVICE — SINGLE PORT MANIFOLD: Brand: NEPTUNE 2

## (undated) DEVICE — ENDOGATOR HYBRID TUBING KIT FOR USE WITH ENDOGATOR IRRIGATION PUMP, OLYMPUS PUMP, GI4000 ESU, AND TORRENT IRRIGATION PUMP.: Brand: ENDOGATOR KIT

## (undated) DEVICE — CATH F5 INF PIG145 110CM 6SH: Brand: INFINITI

## (undated) DEVICE — TOOL 12BA30 LEGEND 12CM 3MM BALL: Brand: MIDAS REX

## (undated) DEVICE — SUT SILK 2/0 TIES 18IN A185H

## (undated) DEVICE — SAFELINER SUCTION CANISTER 1000CC: Brand: DEROYAL

## (undated) DEVICE — SKIN PREP TRAY W/CHG: Brand: MEDLINE INDUSTRIES, INC.

## (undated) DEVICE — RUBBERBAND LF STRL PK/2

## (undated) DEVICE — THE BITE BLOCK MAXI, LATEX FREE STRAP IS USED TO PROTECT THE ENDOSCOPE INSERTION TUBE FROM BEING BITTEN BY THE PATIENT.

## (undated) DEVICE — TR BAND RADIAL ARTERY COMPRESSION DEVICE: Brand: TR BAND

## (undated) DEVICE — OPTICAL DILATOR

## (undated) DEVICE — SOL IRR H2O BTL 1000ML STRL

## (undated) DEVICE — DRAPE, RADIAL, STERILE: Brand: MEDLINE

## (undated) DEVICE — ST EXT IV SMRTSTE 2VLV FIX M LL 6ML 41

## (undated) DEVICE — NEEDLE, QUINCKE, 20GX3.5": Brand: MEDLINE

## (undated) DEVICE — ST EXT IV SMARTSITE 2VLV SP M LL 5ML IV1

## (undated) DEVICE — Device: Brand: DEFENDO AIR/WATER/SUCTION AND BIOPSY VALVE

## (undated) DEVICE — RADIFOCUS OPTITORQUE ANGIOGRAPHIC CATHETER: Brand: OPTITORQUE

## (undated) DEVICE — SUT ETHLN 3/0 FS1 30IN 669H

## (undated) DEVICE — CATH INTRAVAS ULTRASND EAGLE EYE 2.9FR

## (undated) DEVICE — GLV SURG PREMIERPRO MIC LTX PF SZ7 BRN

## (undated) DEVICE — CATH BALN LITHO INTRAVASC CORNRY 6F 138CM 3X12MM

## (undated) DEVICE — PROXIMATE RH ROTATING HEAD SKIN STAPLERS (35 WIDE) CONTAINS 35 STAINLESS STEEL STAPLES: Brand: PROXIMATE

## (undated) DEVICE — SPNG VERSALON 4X4 4PLY NONSTRL LF BG/200

## (undated) DEVICE — PENCL ROCKRSWCH MEGADYNE W/HOLSTR 10FT SS

## (undated) DEVICE — LUBE JELLY FOIL PACK 1.4 OZ: Brand: MEDLINE INDUSTRIES, INC.

## (undated) DEVICE — A2000 MULTI-USE SYRINGE KIT, P/N 701277-003KIT CONTENTS: 100ML CONTRAST RESERVOIR AND TUBING WITH CONTRAST SPIKE AND CLAMP: Brand: A2000 MULTI-USE SYRINGE KIT

## (undated) DEVICE — "MH-443 SUCTION VALVE F/EVIS140 EVIS160": Brand: SUCTION VALVE

## (undated) DEVICE — MODEL AT P65, P/N 701554-001KIT CONTENTS: HAND CONTROLLER, 3-WAY HIGH-PRESSURE STOPCOCK WITH ROTATING END AND PREMIUM HIGH-PRESSURE TUBING: Brand: ANGIOTOUCH® KIT

## (undated) DEVICE — ASMBL SPK CONTRST CONTRL

## (undated) DEVICE — SOLIDIFIER LIQ PREMISORB 1500CC

## (undated) DEVICE — DRILL BIT 7080513 STERILE 13MM

## (undated) DEVICE — PK NEURO DISC 10

## (undated) DEVICE — SYR LUERLOK 30CC

## (undated) DEVICE — DRSNG SURESITE WNDW 4X4.5

## (undated) DEVICE — KT MANIFLD NAMIC HEART/LT INTERGRATED/COMPENSATOR W/SYR/10ML

## (undated) DEVICE — CVR HNDL LIGHT RIGID

## (undated) DEVICE — FIRST STEP BEDSIDE ADD WATER KIT - RESEALABLE STAND-UP POUCH, ENDOSCOPIC CLEANING PAD - 1 POUCH: Brand: FIRST STEP BEDSIDE ADD WATER KIT - RESEALABLE STAND-UP POUCH, ENDOSCOPIC CLEANIN

## (undated) DEVICE — ELECTRD BLD EZ CLN STD 2.5IN

## (undated) DEVICE — INTRO ACCSR BLNT TP

## (undated) DEVICE — DRP MICROSCOPE 4 BINOCULAR CV 54X150IN

## (undated) DEVICE — SYR LUERLOK 50ML

## (undated) DEVICE — INSTRUMENT 7080902 PLATE HOLDING PIN

## (undated) DEVICE — KITTNER SPONGE: Brand: DEROYAL

## (undated) DEVICE — RUNWAY RADL W/TOP PAD

## (undated) DEVICE — INSTRUMENT 875-088 14MM DSTRCT PIN STRL: Brand: MEDTRONIC REUSABLE INSTRUMENT

## (undated) DEVICE — TUBING, SUCTION, 1/4" X 10', STRAIGHT: Brand: MEDLINE

## (undated) DEVICE — CATH VENT MIV RADL PIG LNG TIP 5F 110CM

## (undated) DEVICE — COPILOT BLEEDBACK CONTROL VALVE: Brand: COPILOT

## (undated) DEVICE — ST LINER SAFECAP GRN RED CP STRL

## (undated) DEVICE — GLV SURG PREMIERPRO MIC LTX PF SZ6.5 BRN

## (undated) DEVICE — TOOL MR8-15MH22 MR8 15CM MATCH 2.2MM: Brand: MIDAS REX MR8

## (undated) DEVICE — BLD KNIF KARLIN 46 3178

## (undated) DEVICE — JACKSON-PRATT 100CC BULB RESERVOIR: Brand: CARDINAL HEALTH

## (undated) DEVICE — NC TREK NEO™ CORONARY DILATATION CATHETER 3.00 MM X 15 MM / RAPID-EXCHANGE: Brand: NC TREK NEO™

## (undated) DEVICE — RADIFOCUS GLIDEWIRE: Brand: GLIDEWIRE

## (undated) DEVICE — ACCY PA700 LUBRICANT DIFFUSER MR7 4 PACK: Brand: MIDAS REX

## (undated) DEVICE — APPL CHLORAPREP TINTED 26ML TEAL

## (undated) DEVICE — DRSNG WND GZ PAD BORDERED 4X8IN STRL

## (undated) DEVICE — SNAP KOVER: Brand: UNBRANDED

## (undated) DEVICE — ESOPHAGEAL/PYLORIC/COLONIC WIREGUIDED BALLOON DILATATION CATHETER: Brand: CRE WIREGUIDED

## (undated) DEVICE — BIT DRL VERTEX NAV 2.4MM

## (undated) DEVICE — DRILL BIT NAVG3606010 NAVIGATED: Brand: NAVIGATED INFINITY™ OCCIPITOCERVICAL UPPER THORACIC SYSTEM

## (undated) DEVICE — MAYFIELD® DISPOSABLE ADULT SKULL PIN (PLASTIC BASE): Brand: MAYFIELD®

## (undated) DEVICE — PACK,UNIVERSAL,NO GOWNS: Brand: MEDLINE

## (undated) DEVICE — KT ORCA ORCAPOD DISP STRL

## (undated) DEVICE — THE DISPOSABLE ROTH NET PLATINUM FOOD BOLUS RETRIEVAL DEVICE IS USED IN THE ENDOSCOPIC RETRIEVAL FOOD BOLUS.: Brand: ROTH NET PLATINUM

## (undated) DEVICE — SHEET,DRAPE,40X58,STERILE: Brand: MEDLINE